# Patient Record
Sex: MALE | Race: WHITE | Employment: OTHER
[De-identification: names, ages, dates, MRNs, and addresses within clinical notes are randomized per-mention and may not be internally consistent; named-entity substitution may affect disease eponyms.]

---

## 2017-01-04 ENCOUNTER — NURSE ONLY (OUTPATIENT)
Dept: PODIATRY | Facility: CLINIC | Age: 72
End: 2017-01-04

## 2017-01-04 DIAGNOSIS — E11.43 DIABETIC AUTONOMIC NEUROPATHY ASSOCIATED WITH TYPE 2 DIABETES MELLITUS (HCC): ICD-10-CM

## 2017-01-04 DIAGNOSIS — E11.51 DIABETES MELLITUS WITH PERIPHERAL VASCULAR DISEASE (HCC): ICD-10-CM

## 2017-01-04 DIAGNOSIS — Z79.4 TYPE 2 DIABETES MELLITUS WITHOUT COMPLICATION, WITH LONG-TERM CURRENT USE OF INSULIN (HCC): ICD-10-CM

## 2017-01-04 DIAGNOSIS — E11.51 DIABETES MELLITUS WITH PERIPHERAL CIRCULATORY DISORDER (HCC): ICD-10-CM

## 2017-01-04 DIAGNOSIS — E11.42 DIABETIC PERIPHERAL NEUROPATHY ASSOCIATED WITH TYPE 2 DIABETES MELLITUS (HCC): Primary | ICD-10-CM

## 2017-01-04 DIAGNOSIS — E11.9 TYPE 2 DIABETES MELLITUS WITHOUT COMPLICATION, WITH LONG-TERM CURRENT USE OF INSULIN (HCC): ICD-10-CM

## 2017-01-04 DIAGNOSIS — B35.1 ONYCHOMYCOSIS OF TOENAIL: ICD-10-CM

## 2017-01-23 ENCOUNTER — TELEPHONE (OUTPATIENT)
Dept: PODIATRY | Facility: CLINIC | Age: 72
End: 2017-01-23

## 2017-02-08 ENCOUNTER — OFFICE VISIT (OUTPATIENT)
Dept: ORTHOPEDIC SURGERY | Facility: CLINIC | Age: 72
End: 2017-02-08

## 2017-02-08 VITALS
HEART RATE: 66 BPM | WEIGHT: 303 LBS | DIASTOLIC BLOOD PRESSURE: 61 MMHG | BODY MASS INDEX: 44.88 KG/M2 | HEIGHT: 69 IN | SYSTOLIC BLOOD PRESSURE: 126 MMHG

## 2017-02-08 DIAGNOSIS — Z96.652 STATUS POST TOTAL LEFT KNEE REPLACEMENT: Primary | ICD-10-CM

## 2017-02-08 PROCEDURE — 99211 OFF/OP EST MAY X REQ PHY/QHP: CPT | Performed by: ORTHOPAEDIC SURGERY

## 2017-02-14 ENCOUNTER — NURSE ONLY (OUTPATIENT)
Dept: PODIATRY | Facility: CLINIC | Age: 72
End: 2017-02-14

## 2017-02-14 DIAGNOSIS — E11.9 TYPE 2 DIABETES MELLITUS WITHOUT COMPLICATION, WITH LONG-TERM CURRENT USE OF INSULIN (HCC): ICD-10-CM

## 2017-02-14 DIAGNOSIS — E11.42 DIABETIC PERIPHERAL NEUROPATHY ASSOCIATED WITH TYPE 2 DIABETES MELLITUS (HCC): Primary | ICD-10-CM

## 2017-02-14 DIAGNOSIS — Z79.4 TYPE 2 DIABETES MELLITUS WITHOUT COMPLICATION, WITH LONG-TERM CURRENT USE OF INSULIN (HCC): ICD-10-CM

## 2017-02-14 PROCEDURE — A5513 MULTI DEN INSERT CUSTOM MOLD: HCPCS | Performed by: PODIATRIST

## 2017-02-14 PROCEDURE — A5500 DIAB SHOE FOR DENSITY INSERT: HCPCS | Performed by: PODIATRIST

## 2017-03-20 ENCOUNTER — OFFICE VISIT (OUTPATIENT)
Dept: PODIATRY | Age: 72
End: 2017-03-20
Payer: MEDICARE

## 2017-03-20 VITALS
BODY MASS INDEX: 43.38 KG/M2 | DIASTOLIC BLOOD PRESSURE: 75 MMHG | HEIGHT: 70 IN | WEIGHT: 303 LBS | SYSTOLIC BLOOD PRESSURE: 145 MMHG | HEART RATE: 67 BPM

## 2017-03-20 DIAGNOSIS — E11.43 TYPE 2 DIABETES MELLITUS WITH DIABETIC AUTONOMIC NEUROPATHY, WITHOUT LONG-TERM CURRENT USE OF INSULIN (HCC): ICD-10-CM

## 2017-03-20 DIAGNOSIS — B35.1 ONYCHOMYCOSIS OF TOENAIL: Primary | ICD-10-CM

## 2017-03-20 DIAGNOSIS — E11.51 TYPE 2 DIABETES MELLITUS WITH PERIPHERAL VASCULAR DISEASE (HCC): ICD-10-CM

## 2017-03-20 PROCEDURE — 11721 DEBRIDE NAIL 6 OR MORE: CPT | Performed by: PODIATRIST

## 2017-03-20 ASSESSMENT — ENCOUNTER SYMPTOMS
NAUSEA: 0
BACK PAIN: 0
COLOR CHANGE: 0
DIARRHEA: 0
SHORTNESS OF BREATH: 0

## 2017-04-17 ENCOUNTER — HOSPITAL ENCOUNTER (OUTPATIENT)
Age: 72
Setting detail: SPECIMEN
Discharge: HOME | End: 2017-04-17
Payer: MEDICARE

## 2017-04-17 ENCOUNTER — HOSPITAL ENCOUNTER (OUTPATIENT)
Facility: CLINIC | Age: 72
Setting detail: OUTPATIENT SURGERY
Discharge: HOME | End: 2017-04-17
Attending: PLASTIC SURGERY | Admitting: PLASTIC SURGERY
Payer: MEDICARE

## 2017-04-17 VITALS
BODY MASS INDEX: 45.03 KG/M2 | HEIGHT: 69 IN | DIASTOLIC BLOOD PRESSURE: 72 MMHG | OXYGEN SATURATION: 98 % | TEMPERATURE: 97 F | RESPIRATION RATE: 16 BRPM | HEART RATE: 68 BPM | SYSTOLIC BLOOD PRESSURE: 138 MMHG | WEIGHT: 304 LBS

## 2017-04-17 PROCEDURE — 3600000002 HC SURGERY LEVEL 2 BASE: Performed by: PLASTIC SURGERY

## 2017-04-17 PROCEDURE — 3600000012 HC SURGERY LEVEL 2 ADDTL 15MIN: Performed by: PLASTIC SURGERY

## 2017-04-17 PROCEDURE — 2500000003 HC RX 250 WO HCPCS: Performed by: PLASTIC SURGERY

## 2017-04-17 PROCEDURE — 7100000010 HC PHASE II RECOVERY - FIRST 15 MIN: Performed by: PLASTIC SURGERY

## 2017-04-17 RX ORDER — LIDOCAINE HYDROCHLORIDE AND EPINEPHRINE 10; 10 MG/ML; UG/ML
INJECTION, SOLUTION INFILTRATION; PERINEURAL PRN
Status: DISCONTINUED | OUTPATIENT
Start: 2017-04-17 | End: 2017-04-17 | Stop reason: HOSPADM

## 2017-04-17 ASSESSMENT — PAIN - FUNCTIONAL ASSESSMENT: PAIN_FUNCTIONAL_ASSESSMENT: 0-10

## 2017-04-19 LAB — DERMATOLOGY PATHOLOGY REPORT: NORMAL

## 2017-04-25 PROBLEM — C44.319 BASAL CELL CARCINOMA OF RIGHT FOREHEAD: Status: ACTIVE | Noted: 2017-04-25

## 2017-06-12 ENCOUNTER — PROCEDURE VISIT (OUTPATIENT)
Dept: PODIATRY | Age: 72
End: 2017-06-12
Payer: MEDICARE

## 2017-06-12 VITALS
WEIGHT: 306 LBS | SYSTOLIC BLOOD PRESSURE: 134 MMHG | DIASTOLIC BLOOD PRESSURE: 65 MMHG | BODY MASS INDEX: 45.32 KG/M2 | HEIGHT: 69 IN | HEART RATE: 70 BPM

## 2017-06-12 DIAGNOSIS — B35.1 ONYCHOMYCOSIS OF TOENAIL: Primary | ICD-10-CM

## 2017-06-12 DIAGNOSIS — E11.51 TYPE 2 DIABETES MELLITUS WITH PERIPHERAL VASCULAR DISEASE (HCC): ICD-10-CM

## 2017-06-12 DIAGNOSIS — E11.43 TYPE 2 DIABETES MELLITUS WITH DIABETIC AUTONOMIC NEUROPATHY, WITHOUT LONG-TERM CURRENT USE OF INSULIN (HCC): ICD-10-CM

## 2017-06-12 PROCEDURE — 11721 DEBRIDE NAIL 6 OR MORE: CPT | Performed by: PODIATRIST

## 2017-06-12 RX ORDER — AMOXICILLIN 500 MG/1
CAPSULE ORAL
Refills: 0 | COMMUNITY
Start: 2017-05-12 | End: 2021-03-16

## 2017-06-15 ASSESSMENT — ENCOUNTER SYMPTOMS
COLOR CHANGE: 0
DIARRHEA: 0
NAUSEA: 0
SHORTNESS OF BREATH: 0
BACK PAIN: 0

## 2017-08-09 ENCOUNTER — OFFICE VISIT (OUTPATIENT)
Dept: ORTHOPEDIC SURGERY | Age: 72
End: 2017-08-09
Payer: MEDICARE

## 2017-08-09 DIAGNOSIS — Z96.652 H/O TOTAL KNEE REPLACEMENT, LEFT: Primary | ICD-10-CM

## 2017-08-09 PROCEDURE — 99213 OFFICE O/P EST LOW 20 MIN: CPT | Performed by: ORTHOPAEDIC SURGERY

## 2017-09-11 ENCOUNTER — PROCEDURE VISIT (OUTPATIENT)
Dept: PODIATRY | Age: 72
End: 2017-09-11
Payer: MEDICARE

## 2017-09-11 VITALS
HEART RATE: 65 BPM | BODY MASS INDEX: 45.47 KG/M2 | WEIGHT: 307 LBS | DIASTOLIC BLOOD PRESSURE: 53 MMHG | HEIGHT: 69 IN | SYSTOLIC BLOOD PRESSURE: 107 MMHG

## 2017-09-11 DIAGNOSIS — B35.1 ONYCHOMYCOSIS OF TOENAIL: Primary | ICD-10-CM

## 2017-09-11 DIAGNOSIS — E11.51 TYPE 2 DIABETES MELLITUS WITH PERIPHERAL VASCULAR DISEASE (HCC): ICD-10-CM

## 2017-09-11 DIAGNOSIS — E11.43 TYPE 2 DIABETES MELLITUS WITH DIABETIC AUTONOMIC NEUROPATHY, WITHOUT LONG-TERM CURRENT USE OF INSULIN (HCC): ICD-10-CM

## 2017-09-11 PROCEDURE — 11721 DEBRIDE NAIL 6 OR MORE: CPT | Performed by: PODIATRIST

## 2017-09-12 ASSESSMENT — ENCOUNTER SYMPTOMS
SHORTNESS OF BREATH: 0
BACK PAIN: 0
COLOR CHANGE: 0
NAUSEA: 0
DIARRHEA: 0

## 2017-12-11 ENCOUNTER — PROCEDURE VISIT (OUTPATIENT)
Dept: PODIATRY | Age: 72
End: 2017-12-11
Payer: MEDICARE

## 2017-12-11 VITALS
HEIGHT: 70 IN | DIASTOLIC BLOOD PRESSURE: 65 MMHG | SYSTOLIC BLOOD PRESSURE: 136 MMHG | BODY MASS INDEX: 43.67 KG/M2 | WEIGHT: 305 LBS | HEART RATE: 71 BPM

## 2017-12-11 DIAGNOSIS — E11.43 TYPE 2 DIABETES MELLITUS WITH DIABETIC AUTONOMIC NEUROPATHY, WITHOUT LONG-TERM CURRENT USE OF INSULIN (HCC): ICD-10-CM

## 2017-12-11 DIAGNOSIS — E11.51 TYPE 2 DIABETES MELLITUS WITH PERIPHERAL VASCULAR DISEASE (HCC): ICD-10-CM

## 2017-12-11 DIAGNOSIS — M79.675 PAIN OF TOES OF BOTH FEET: ICD-10-CM

## 2017-12-11 DIAGNOSIS — B35.1 ONYCHOMYCOSIS OF TOENAIL: Primary | ICD-10-CM

## 2017-12-11 DIAGNOSIS — M79.674 PAIN OF TOES OF BOTH FEET: ICD-10-CM

## 2017-12-11 PROCEDURE — 11721 DEBRIDE NAIL 6 OR MORE: CPT | Performed by: PODIATRIST

## 2017-12-11 RX ORDER — WARFARIN SODIUM 5 MG/1
TABLET ORAL
COMMUNITY
Start: 2017-10-13 | End: 2017-12-11 | Stop reason: SDUPTHER

## 2017-12-11 ASSESSMENT — ENCOUNTER SYMPTOMS
DIARRHEA: 0
COLOR CHANGE: 0
NAUSEA: 0
SHORTNESS OF BREATH: 0
BACK PAIN: 0

## 2017-12-11 NOTE — PROGRESS NOTES
SUBJECTIVE: Mary Snyder is a 74 y.o. male who returns to the office with chief complaint of painful fungal toenails. Patient relates toe nails are thickened/difficult to trim as well as painful with ambulation and with shoe gear. Review of Systems   Constitutional: Negative for activity change, appetite change, chills, diaphoresis, fatigue and fever. Respiratory: Negative for shortness of breath. Cardiovascular: Negative for leg swelling. Gastrointestinal: Negative for diarrhea and nausea. Endocrine: Negative for cold intolerance, heat intolerance and polyuria. Musculoskeletal: Positive for arthralgias. Negative for back pain, gait problem, joint swelling and myalgias. Skin: Negative for color change, pallor, rash and wound. Allergic/Immunologic: Negative for environmental allergies and food allergies. Neurological: Negative for dizziness, weakness, light-headedness and numbness. Hematological: Does not bruise/bleed easily. Psychiatric/Behavioral: Negative for behavioral problems, confusion and self-injury. The patient is not nervous/anxious. OBJECTIVE: Clinical evaluation of patient reveals nails 1,2,3,4,5 of the right foot and nails 1,2,3,4,5, of the left foot to present with thickness, elongation, discoloration, brittleness, and subungual debris. There was pain with palpation and debridement of the toenails of the bilateral feet. No open lesions noted to either foot today. The right DP pulse is not palpable. The left DP pulse is not palpable. The right PT pulse is not palpable. The left PT pulse is not palpable. Protective sensation is absent to the right plantar foot as noted with a 5.07 [place] monofilament. Protective sensation is absent to the left plantar foot as noted with a 5.07 [place] monofilament. Glucose: Patient states that they do not know their current blood sugar level. ASSESSMENT:   1.  Onychomycosis of toenail  [place] DEBRIDEMENT OF NAILS, 6 OR MORE    HM DIABETES FOOT EXAM   2. Pain of toes of both feet  [place] DEBRIDEMENT OF NAILS, 6 OR MORE    HM DIABETES FOOT EXAM   3. Type 2 diabetes mellitus with peripheral vascular disease (HCC)  [place] DEBRIDEMENT OF NAILS, 6 OR MORE    HM DIABETES FOOT EXAM   4. Type 2 diabetes mellitus with diabetic autonomic neuropathy, without long-term current use of insulin (HCC)  [place] DEBRIDEMENT OF NAILS, 6 OR MORE    HM DIABETES FOOT EXAM     PLAN: Toenails 1,2,3,4,5 of the right foot and 1,2,3,4,5 of the left foot were debrided in length and thickness using a nail nipper and a . Return in about 9 weeks (around 2/12/2018) for At risk diabetic foot care.    12/11/2017      Ivan Puckett DPM

## 2018-03-12 ENCOUNTER — PROCEDURE VISIT (OUTPATIENT)
Dept: PODIATRY | Age: 73
End: 2018-03-12
Payer: MEDICARE

## 2018-03-12 VITALS
HEART RATE: 72 BPM | BODY MASS INDEX: 43.38 KG/M2 | HEIGHT: 70 IN | WEIGHT: 303 LBS | DIASTOLIC BLOOD PRESSURE: 67 MMHG | SYSTOLIC BLOOD PRESSURE: 98 MMHG

## 2018-03-12 DIAGNOSIS — E11.43 TYPE 2 DIABETES MELLITUS WITH DIABETIC AUTONOMIC NEUROPATHY, WITHOUT LONG-TERM CURRENT USE OF INSULIN (HCC): ICD-10-CM

## 2018-03-12 DIAGNOSIS — B35.1 ONYCHOMYCOSIS OF TOENAIL: Primary | ICD-10-CM

## 2018-03-12 DIAGNOSIS — E11.51 TYPE 2 DIABETES MELLITUS WITH PERIPHERAL VASCULAR DISEASE (HCC): ICD-10-CM

## 2018-03-12 DIAGNOSIS — M79.675 PAIN OF TOES OF BOTH FEET: ICD-10-CM

## 2018-03-12 DIAGNOSIS — M79.674 PAIN OF TOES OF BOTH FEET: ICD-10-CM

## 2018-03-12 PROCEDURE — 11721 DEBRIDE NAIL 6 OR MORE: CPT | Performed by: PODIATRIST

## 2018-03-12 ASSESSMENT — ENCOUNTER SYMPTOMS
BACK PAIN: 0
COLOR CHANGE: 0
SHORTNESS OF BREATH: 0
DIARRHEA: 0
NAUSEA: 0

## 2018-03-16 ENCOUNTER — NURSE ONLY (OUTPATIENT)
Dept: PODIATRY | Age: 73
End: 2018-03-16

## 2018-03-16 DIAGNOSIS — E11.51 TYPE 2 DIABETES MELLITUS WITH PERIPHERAL VASCULAR DISEASE (HCC): ICD-10-CM

## 2018-03-16 DIAGNOSIS — E11.43 TYPE 2 DIABETES MELLITUS WITH DIABETIC AUTONOMIC NEUROPATHY, WITHOUT LONG-TERM CURRENT USE OF INSULIN (HCC): Primary | ICD-10-CM

## 2018-03-16 DIAGNOSIS — R09.89 POOR CIRCULATION: ICD-10-CM

## 2018-04-17 ENCOUNTER — NURSE ONLY (OUTPATIENT)
Dept: PODIATRY | Age: 73
End: 2018-04-17
Payer: MEDICARE

## 2018-04-17 DIAGNOSIS — E11.51 TYPE 2 DIABETES MELLITUS WITH PERIPHERAL VASCULAR DISEASE (HCC): Primary | ICD-10-CM

## 2018-04-17 DIAGNOSIS — R09.89 POOR CIRCULATION: ICD-10-CM

## 2018-04-17 DIAGNOSIS — E11.42 DIABETIC PERIPHERAL NEUROPATHY ASSOCIATED WITH TYPE 2 DIABETES MELLITUS (HCC): ICD-10-CM

## 2018-04-17 PROCEDURE — A5513 MULTI DEN INSERT CUSTOM MOLD: HCPCS | Performed by: PODIATRIST

## 2018-04-17 PROCEDURE — A5500 DIAB SHOE FOR DENSITY INSERT: HCPCS | Performed by: PODIATRIST

## 2018-06-11 ENCOUNTER — OFFICE VISIT (OUTPATIENT)
Dept: PODIATRY | Age: 73
End: 2018-06-11
Payer: MEDICARE

## 2018-06-11 VITALS
SYSTOLIC BLOOD PRESSURE: 132 MMHG | WEIGHT: 311 LBS | HEIGHT: 69 IN | DIASTOLIC BLOOD PRESSURE: 67 MMHG | HEART RATE: 68 BPM | BODY MASS INDEX: 46.06 KG/M2

## 2018-06-11 DIAGNOSIS — M79.675 PAIN OF TOES OF BOTH FEET: ICD-10-CM

## 2018-06-11 DIAGNOSIS — B35.1 ONYCHOMYCOSIS OF TOENAIL: Primary | ICD-10-CM

## 2018-06-11 DIAGNOSIS — E11.42 DIABETIC PERIPHERAL NEUROPATHY ASSOCIATED WITH TYPE 2 DIABETES MELLITUS (HCC): ICD-10-CM

## 2018-06-11 DIAGNOSIS — M79.674 PAIN OF TOES OF BOTH FEET: ICD-10-CM

## 2018-06-11 DIAGNOSIS — E11.51 TYPE 2 DIABETES MELLITUS WITH PERIPHERAL VASCULAR DISEASE (HCC): ICD-10-CM

## 2018-06-11 PROCEDURE — 99999 PR OFFICE/OUTPT VISIT,PROCEDURE ONLY: CPT | Performed by: PODIATRIST

## 2018-06-11 PROCEDURE — 11721 DEBRIDE NAIL 6 OR MORE: CPT | Performed by: PODIATRIST

## 2018-06-11 RX ORDER — BLOOD PRESSURE TEST KIT
KIT MISCELLANEOUS
Qty: 30 EACH | Refills: 2 | Status: SHIPPED | OUTPATIENT
Start: 2018-06-11 | End: 2018-06-11 | Stop reason: CLARIF

## 2018-06-11 RX ORDER — IBUPROFEN 200 MG
1 CAPSULE ORAL DAILY
Qty: 1 EACH | Refills: 2 | Status: SHIPPED | OUTPATIENT
Start: 2018-06-11 | End: 2018-06-11 | Stop reason: CLARIF

## 2018-06-11 RX ORDER — GAUZE BANDAGE 4" X 4"
BANDAGE TOPICAL
Qty: 100 EACH | Refills: 1 | Status: SHIPPED | OUTPATIENT
Start: 2018-06-11 | End: 2018-06-11 | Stop reason: CLARIF

## 2018-06-11 ASSESSMENT — ENCOUNTER SYMPTOMS
SHORTNESS OF BREATH: 0
COLOR CHANGE: 0
BACK PAIN: 0
DIARRHEA: 0
NAUSEA: 0

## 2018-09-10 ENCOUNTER — OFFICE VISIT (OUTPATIENT)
Dept: PODIATRY | Age: 73
End: 2018-09-10
Payer: MEDICARE

## 2018-09-10 VITALS
WEIGHT: 308 LBS | HEIGHT: 70 IN | DIASTOLIC BLOOD PRESSURE: 66 MMHG | BODY MASS INDEX: 44.09 KG/M2 | SYSTOLIC BLOOD PRESSURE: 116 MMHG | HEART RATE: 69 BPM

## 2018-09-10 DIAGNOSIS — M79.674 PAIN OF TOES OF BOTH FEET: ICD-10-CM

## 2018-09-10 DIAGNOSIS — E11.42 DIABETIC PERIPHERAL NEUROPATHY ASSOCIATED WITH TYPE 2 DIABETES MELLITUS (HCC): ICD-10-CM

## 2018-09-10 DIAGNOSIS — M79.675 PAIN OF TOES OF BOTH FEET: ICD-10-CM

## 2018-09-10 DIAGNOSIS — B35.1 ONYCHOMYCOSIS OF TOENAIL: Primary | ICD-10-CM

## 2018-09-10 DIAGNOSIS — E11.51 TYPE 2 DIABETES MELLITUS WITH PERIPHERAL VASCULAR DISEASE (HCC): ICD-10-CM

## 2018-09-10 PROCEDURE — 11721 DEBRIDE NAIL 6 OR MORE: CPT | Performed by: PODIATRIST

## 2018-09-10 PROCEDURE — 99999 PR OFFICE/OUTPT VISIT,PROCEDURE ONLY: CPT | Performed by: PODIATRIST

## 2018-09-12 ASSESSMENT — ENCOUNTER SYMPTOMS
BACK PAIN: 0
COLOR CHANGE: 0
NAUSEA: 0
SHORTNESS OF BREATH: 0
DIARRHEA: 0

## 2018-09-12 NOTE — PROGRESS NOTES
SUBJECTIVE: Diana Bueno is a 74 y.o. male who returns to the office with chief complaint of painful fungal toenails. Patient relates toe nails are thickened/difficult to trim as well as painful with ambulation and with shoe gear. Review of Systems   Constitutional: Negative for activity change, appetite change, chills, diaphoresis, fatigue and fever. Respiratory: Negative for shortness of breath. Cardiovascular: Negative for leg swelling. Gastrointestinal: Negative for diarrhea and nausea. Endocrine: Negative for cold intolerance, heat intolerance and polyuria. Musculoskeletal: Positive for arthralgias. Negative for back pain, gait problem, joint swelling and myalgias. Skin: Negative for color change, pallor, rash and wound. Allergic/Immunologic: Negative for environmental allergies and food allergies. Neurological: Negative for dizziness, weakness, light-headedness and numbness. Hematological: Does not bruise/bleed easily. Psychiatric/Behavioral: Negative for behavioral problems, confusion and self-injury. The patient is not nervous/anxious. OBJECTIVE: Clinical evaluation of patient reveals nails 1,2,3,4,5 of the right foot and nails 1,2,3,4,5, of the left foot to present with thickness, elongation, discoloration, brittleness, and subungual debris. There was pain with palpation and debridement of the toenails of the bilateral feet. No open lesions noted to either foot today. The right DP pulse is not palpable. The left DP pulse is not palpable. The right PT pulse is not palpable. The left PT pulse is not palpable. Protective sensation is absent to the right plantar foot as noted with a 5.07 [place] monofilament. Protective sensation is absent to the left plantar foot as noted with a 5.07 [place] monofilament. Glucose: 129 mg/dl. ASSESSMENT:    Diagnosis Orders   1.  Onychomycosis of toenail  [place] DEBRIDEMENT OF NAILS, 6 OR MORE    HM DIABETES FOOT EXAM 2. Pain of toes of both feet  [place] DEBRIDEMENT OF NAILS, 6 OR MORE    HM DIABETES FOOT EXAM   3. Type 2 diabetes mellitus with peripheral vascular disease (HCC)  [place] DEBRIDEMENT OF NAILS, 6 OR MORE    HM DIABETES FOOT EXAM   4. Diabetic peripheral neuropathy associated with type 2 diabetes mellitus (HCC)  [place] DEBRIDEMENT OF NAILS, 6 OR MORE    HM DIABETES FOOT EXAM     PLAN: Toenails 1,2,3,4,5 of the right foot and 1,2,3,4,5 of the left foot were debrided in length and thickness using a nail nipper and a . Return in about 9 weeks (around 11/12/2018) for At risk diabetic foot care.    9/10/2018      Govern[place] DPM

## 2018-11-29 ENCOUNTER — OFFICE VISIT (OUTPATIENT)
Dept: PODIATRY | Age: 73
End: 2018-11-29
Payer: MEDICARE

## 2018-11-29 VITALS — BODY MASS INDEX: 44.09 KG/M2 | HEIGHT: 70 IN | WEIGHT: 308 LBS

## 2018-11-29 DIAGNOSIS — M79.675 PAIN IN TOE OF LEFT FOOT: ICD-10-CM

## 2018-11-29 DIAGNOSIS — M79.674 PAIN OF TOES OF BOTH FEET: ICD-10-CM

## 2018-11-29 DIAGNOSIS — L60.0 OC (ONYCHOCRYPTOSIS): ICD-10-CM

## 2018-11-29 DIAGNOSIS — E11.51 TYPE 2 DIABETES MELLITUS WITH PERIPHERAL VASCULAR DISEASE (HCC): ICD-10-CM

## 2018-11-29 DIAGNOSIS — L03.032 PARONYCHIA OF GREAT TOE OF LEFT FOOT: Primary | ICD-10-CM

## 2018-11-29 DIAGNOSIS — B35.1 ONYCHOMYCOSIS OF TOENAIL: ICD-10-CM

## 2018-11-29 DIAGNOSIS — E11.42 DIABETIC PERIPHERAL NEUROPATHY ASSOCIATED WITH TYPE 2 DIABETES MELLITUS (HCC): ICD-10-CM

## 2018-11-29 DIAGNOSIS — M79.675 PAIN OF TOES OF BOTH FEET: ICD-10-CM

## 2018-11-29 PROCEDURE — 11721 DEBRIDE NAIL 6 OR MORE: CPT | Performed by: PODIATRIST

## 2018-11-29 PROCEDURE — 99213 OFFICE O/P EST LOW 20 MIN: CPT | Performed by: PODIATRIST

## 2018-11-29 RX ORDER — CLINDAMYCIN HYDROCHLORIDE 300 MG/1
300 CAPSULE ORAL 4 TIMES DAILY
Qty: 56 CAPSULE | Refills: 0 | Status: SHIPPED | OUTPATIENT
Start: 2018-11-29 | End: 2018-12-13

## 2018-12-03 ASSESSMENT — ENCOUNTER SYMPTOMS
DIARRHEA: 0
COLOR CHANGE: 0
NAUSEA: 0
BACK PAIN: 0
SHORTNESS OF BREATH: 0

## 2018-12-06 ENCOUNTER — OFFICE VISIT (OUTPATIENT)
Dept: PODIATRY | Age: 73
End: 2018-12-06
Payer: MEDICARE

## 2018-12-06 VITALS — WEIGHT: 308 LBS | HEIGHT: 70 IN | BODY MASS INDEX: 44.09 KG/M2

## 2018-12-06 DIAGNOSIS — M79.675 PAIN IN TOE OF LEFT FOOT: ICD-10-CM

## 2018-12-06 DIAGNOSIS — L03.032 PARONYCHIA OF GREAT TOE OF LEFT FOOT: Primary | ICD-10-CM

## 2018-12-06 DIAGNOSIS — L60.0 OC (ONYCHOCRYPTOSIS): ICD-10-CM

## 2018-12-06 PROCEDURE — 99213 OFFICE O/P EST LOW 20 MIN: CPT | Performed by: PODIATRIST

## 2018-12-06 RX ORDER — CLINDAMYCIN HYDROCHLORIDE 300 MG/1
300 CAPSULE ORAL
COMMUNITY
Start: 2018-11-29 | End: 2018-12-13

## 2018-12-06 ASSESSMENT — ENCOUNTER SYMPTOMS
BACK PAIN: 0
SHORTNESS OF BREATH: 0
COLOR CHANGE: 0
DIARRHEA: 0
NAUSEA: 0

## 2019-01-23 PROBLEM — D48.5 NEOPLASM OF UNCERTAIN BEHAVIOR OF SKIN: Status: ACTIVE | Noted: 2019-01-23

## 2019-02-14 ENCOUNTER — OFFICE VISIT (OUTPATIENT)
Dept: PODIATRY | Age: 74
End: 2019-02-14
Payer: MEDICARE

## 2019-02-14 VITALS — BODY MASS INDEX: 44.09 KG/M2 | HEIGHT: 70 IN | WEIGHT: 308 LBS

## 2019-02-14 DIAGNOSIS — R09.89 POOR CIRCULATION: ICD-10-CM

## 2019-02-14 DIAGNOSIS — B35.1 ONYCHOMYCOSIS OF TOENAIL: Primary | ICD-10-CM

## 2019-02-14 DIAGNOSIS — M79.674 PAIN OF TOES OF BOTH FEET: ICD-10-CM

## 2019-02-14 DIAGNOSIS — M79.675 PAIN OF TOES OF BOTH FEET: ICD-10-CM

## 2019-02-14 DIAGNOSIS — E11.51 TYPE 2 DIABETES MELLITUS WITH PERIPHERAL VASCULAR DISEASE (HCC): ICD-10-CM

## 2019-02-14 DIAGNOSIS — E11.42 DIABETIC PERIPHERAL NEUROPATHY ASSOCIATED WITH TYPE 2 DIABETES MELLITUS (HCC): ICD-10-CM

## 2019-02-14 PROCEDURE — 99999 PR OFFICE/OUTPT VISIT,PROCEDURE ONLY: CPT | Performed by: PODIATRIST

## 2019-02-14 PROCEDURE — 11721 DEBRIDE NAIL 6 OR MORE: CPT | Performed by: PODIATRIST

## 2019-02-14 ASSESSMENT — ENCOUNTER SYMPTOMS
NAUSEA: 0
COLOR CHANGE: 0
DIARRHEA: 0
BACK PAIN: 0
SHORTNESS OF BREATH: 0

## 2019-05-02 ENCOUNTER — OFFICE VISIT (OUTPATIENT)
Dept: PODIATRY | Age: 74
End: 2019-05-02
Payer: MEDICARE

## 2019-05-02 VITALS — HEIGHT: 70 IN | WEIGHT: 308 LBS | BODY MASS INDEX: 44.09 KG/M2

## 2019-05-02 DIAGNOSIS — E11.42 DIABETIC PERIPHERAL NEUROPATHY ASSOCIATED WITH TYPE 2 DIABETES MELLITUS (HCC): ICD-10-CM

## 2019-05-02 DIAGNOSIS — M79.675 PAIN OF TOES OF BOTH FEET: ICD-10-CM

## 2019-05-02 DIAGNOSIS — E11.51 TYPE 2 DIABETES MELLITUS WITH PERIPHERAL VASCULAR DISEASE (HCC): ICD-10-CM

## 2019-05-02 DIAGNOSIS — M79.674 PAIN OF TOES OF BOTH FEET: ICD-10-CM

## 2019-05-02 DIAGNOSIS — B35.1 ONYCHOMYCOSIS OF TOENAIL: Primary | ICD-10-CM

## 2019-05-02 PROCEDURE — 99999 PR OFFICE/OUTPT VISIT,PROCEDURE ONLY: CPT | Performed by: PODIATRIST

## 2019-05-02 PROCEDURE — 11721 DEBRIDE NAIL 6 OR MORE: CPT | Performed by: PODIATRIST

## 2019-05-02 RX ORDER — ACETAMINOPHEN 500 MG
650 TABLET ORAL
COMMUNITY
End: 2019-10-03

## 2019-05-08 ASSESSMENT — ENCOUNTER SYMPTOMS
COLOR CHANGE: 0
NAUSEA: 0
BACK PAIN: 0
DIARRHEA: 0
SHORTNESS OF BREATH: 0

## 2019-05-08 NOTE — PROGRESS NOTES
SUBJECTIVE: Bertha Morel is a 74 y.o. male who returns to the office with chief complaint of painful fungal toenails. Patient relates toe nails are thickened/difficult to trim as well as painful with ambulation and with shoe gear. Chief Complaint   Patient presents with    Nail Problem     B/L NAIL TRIM    Other     PCP: LAST VISIT 07/27/2018     Diabetes     B/L DIABETIC FOOT CHECK/BLOODSUGAR: 148     Review of Systems   Constitutional: Negative for activity change, appetite change, chills, diaphoresis, fatigue and fever. Respiratory: Negative for shortness of breath. Cardiovascular: Negative for leg swelling. Gastrointestinal: Negative for diarrhea and nausea. Endocrine: Negative for cold intolerance, heat intolerance and polyuria. Musculoskeletal: Positive for arthralgias. Negative for back pain, gait problem, joint swelling and myalgias. Skin: Negative for color change, pallor, rash and wound. Allergic/Immunologic: Negative for environmental allergies and food allergies. Neurological: Negative for dizziness, weakness, light-headedness and numbness. Hematological: Does not bruise/bleed easily. Psychiatric/Behavioral: Negative for behavioral problems, confusion and self-injury. The patient is not nervous/anxious. OBJECTIVE: Clinical evaluation of patient reveals nails 1,2,3,4,5 of the right foot and nails 1,2,3,4,5, of the left foot to present with thickness, elongation, discoloration, brittleness, and subungual debris. There was pain with palpation and debridement of the toenails of the bilateral feet. No open lesions noted to either foot today. The right DP pulse is not palpable. The left DP pulse is not palpable. The right PT pulse is not palpable. The left PT pulse is not palpable. Protective sensation is absent to the right plantar foot as noted with a 5.07 [place]-[employer] monofilament.    Protective sensation is absent to the left plantar foot as noted with a 5.07 [place]-Inoc[employer] monofilament. Glucose: 148 mg/dl. ASSESSMENT:    Diagnosis Orders   1. Onychomycosis of toenail  [place] DEBRIDEMENT OF NAILS, 6 OR MORE    HM DIABETES FOOT EXAM   2. Pain of toes of both feet  [place] DEBRIDEMENT OF NAILS, 6 OR MORE    HM DIABETES FOOT EXAM   3. Type 2 diabetes mellitus with peripheral vascular disease (HCC)  [place] DEBRIDEMENT OF NAILS, 6 OR MORE    HM DIABETES FOOT EXAM   4. Diabetic peripheral neuropathy associated with type 2 diabetes mellitus (HCC)  [place] DEBRIDEMENT OF NAILS, 6 OR MORE    HM DIABETES FOOT EXAM     PLAN: Toenails 1,2,3,4,5 of the right foot and 1,2,3,4,5 of the left foot were debrided in length and thickness using a nail nipper and a . Return in about 2 months (around 7/4/2019) for At risk diabetic foot care.    5/2/2019      Mary Lockwood DPM

## 2019-07-18 ENCOUNTER — OFFICE VISIT (OUTPATIENT)
Dept: PODIATRY | Age: 74
End: 2019-07-18
Payer: MEDICARE

## 2019-07-18 DIAGNOSIS — M79.674 PAIN OF TOES OF BOTH FEET: ICD-10-CM

## 2019-07-18 DIAGNOSIS — E11.51 TYPE 2 DIABETES MELLITUS WITH PERIPHERAL VASCULAR DISEASE (HCC): ICD-10-CM

## 2019-07-18 DIAGNOSIS — M79.675 PAIN OF TOES OF BOTH FEET: ICD-10-CM

## 2019-07-18 DIAGNOSIS — E11.42 DIABETIC PERIPHERAL NEUROPATHY ASSOCIATED WITH TYPE 2 DIABETES MELLITUS (HCC): ICD-10-CM

## 2019-07-18 DIAGNOSIS — B35.1 ONYCHOMYCOSIS OF TOENAIL: Primary | ICD-10-CM

## 2019-07-18 PROCEDURE — 11721 DEBRIDE NAIL 6 OR MORE: CPT | Performed by: PODIATRIST

## 2019-07-18 PROCEDURE — 99999 PR OFFICE/OUTPT VISIT,PROCEDURE ONLY: CPT | Performed by: PODIATRIST

## 2019-07-19 ASSESSMENT — ENCOUNTER SYMPTOMS
COLOR CHANGE: 0
BACK PAIN: 0
DIARRHEA: 0
SHORTNESS OF BREATH: 0
NAUSEA: 0

## 2019-09-18 ENCOUNTER — HOSPITAL ENCOUNTER (OUTPATIENT)
Age: 74
Setting detail: SPECIMEN
Discharge: HOME | End: 2019-09-18
Payer: MEDICARE

## 2019-09-18 ENCOUNTER — HOSPITAL ENCOUNTER (OUTPATIENT)
Facility: CLINIC | Age: 74
Setting detail: OUTPATIENT SURGERY
Discharge: HOME | End: 2019-09-18
Attending: PLASTIC SURGERY | Admitting: PLASTIC SURGERY
Payer: MEDICARE

## 2019-09-18 VITALS
OXYGEN SATURATION: 96 % | HEIGHT: 69 IN | TEMPERATURE: 97.5 F | BODY MASS INDEX: 45.47 KG/M2 | DIASTOLIC BLOOD PRESSURE: 73 MMHG | WEIGHT: 307 LBS | HEART RATE: 67 BPM | RESPIRATION RATE: 18 BRPM | SYSTOLIC BLOOD PRESSURE: 145 MMHG

## 2019-09-18 PROCEDURE — 3600000002 HC SURGERY LEVEL 2 BASE: Performed by: PLASTIC SURGERY

## 2019-09-18 PROCEDURE — 7100000011 HC PHASE II RECOVERY - ADDTL 15 MIN: Performed by: PLASTIC SURGERY

## 2019-09-18 PROCEDURE — 2500000003 HC RX 250 WO HCPCS: Performed by: PLASTIC SURGERY

## 2019-09-18 PROCEDURE — 2709999900 HC NON-CHARGEABLE SUPPLY: Performed by: PLASTIC SURGERY

## 2019-09-18 PROCEDURE — 7100000010 HC PHASE II RECOVERY - FIRST 15 MIN: Performed by: PLASTIC SURGERY

## 2019-09-18 PROCEDURE — 3600000012 HC SURGERY LEVEL 2 ADDTL 15MIN: Performed by: PLASTIC SURGERY

## 2019-09-18 ASSESSMENT — PAIN - FUNCTIONAL ASSESSMENT: PAIN_FUNCTIONAL_ASSESSMENT: 0-10

## 2019-09-18 NOTE — H&P
Subjective:      Patient ID: Yue Dhillon is a 74 y.o. male.     HPI  Patient comes in today to have cyst back of neck evaluated. He has been seeing his PCP Dr Brenda Weeks for this and been waiting for this appointment for a few weeks. While waiting for this appointment it started to drain and became very red and inflamed. Dr Brenda Weeks gave pt oral antibiotics doxcycline and rocephin shot and things have settled a bit. It recently spontaneously drained.     Review of Systems   Constitutional: Negative. HENT: Negative for congestion and trouble swallowing. Respiratory: Negative for cough and shortness of breath. Cardiovascular: Negative for chest pain. Gastrointestinal: Negative for abdominal pain. Neurological: Negative for light-headedness and headaches. Psychiatric/Behavioral: Negative for dysphoric mood.      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 Provider   Diabetes mellitus [facility])   Provider   GERD (gastroesophageal reflux disease)  BETTER WITH CPAP Provider   [place] (hard of hearing)  BILAT HEARING AIDS Provider   Hyperlipidemia   Provider   Hypertension   Provider   Kidney stones   Provider   Osteoarthritis   Provider   Pacemaker 2010  Provider   Sleep apnea  Cpap machine Provider   Family History     Problem Relation Age of Onset Comments   Cancer Mother     Diabetes Paternal Grandmother     Social History     Tobacco History     Smoking Status  Former Smoker Quit date  8/12/1984   Smokeless Tobacco Use  Never Used   Alcohol History     Alcohol Use Status  Not Currently Drinks/Week  0 Standard drinks or equivalent per week Amount  0.0 standard drinks of alcohol/wk   Drug Use     Drug Use Status  No   Sexual Activity     Sexually Active  Not Asked    Surgical History     Procedure Laterality Date Comment Source   AORTIC VALVE REPLACEMENT  2012/2013 Mechanical Valve Provider   BACK SURGERY   LUMBAR Provider CARDIAC SURGERY   AORTIC VALVE REPLACEMENT Provider   COLONOSCOPY    Provider   CYSTOSCOPY    Provider   EYE SURGERY Bilateral  CATARACTS Provider   FACIAL RECONSTRUCTION SURGERY Right 4/17/2017 EXCISION LESION RIGHT SIDE OF FOREHEAD performed by Tricia Simmons MD at [address] [place] [address] Provider   FOOT SURGERY Right  Bone Spur removed Provider   JOINT REPLACEMENT Left 08/23/2016 TKA Provider   KIDNEY STONE SURGERY    Provider   KIDNEY SURGERY Left  STONES REMOVED, WAS DONE YEARS AGO Provider   KNEE ARTHROSCOPY Left   Provider   PACEMAKER PLACEMENT    Provider   PRE-MALIGNANT / BENIGN SKIN LESION EXCISION Right 04/17/2017 forehead Provider   TONSILLECTOMY    Provider   TOTAL KNEE ARTHROPLASTY Left  with biomet and GPS product application Provider   TUMOR REMOVAL   Ear Provider   E-Cigarettes Vaping or Juuling     Questions   E-Cigarette Use   Start Date   Does device contain nicotine? Quit Date   E-Cigarette Type   Socioeconomic History     Employment History     No employment history on file. Family and Education     Marital Status      Social Identity     Preferred Language Ethnicity Race   English Non-/Non  White      Financial Resource Strain     No financial resource strain value on [employer] Insecurity     No food insecurity information on file   Transportation Needs     No transportation needs information on file          Outpatient Medications    cephALEXin (KEFLEX) 250 MG capsule Take one PO QID. Start day before surgery. acetaminophen (TYLENOL) 500 MG tablet Take 650 mg by mouth    amoxicillin (AMOXIL) 500 MG capsule TAKE 4 CAPSULES BY MOUTH 1 HOUR PRIOR TO APPOINTMENT    warfarin (COUMADIN) 5 MG tablet Continue with 1.5 tablets daily as previous  Patient taking differently: Take by mouth See Admin Instructions MWF and Saturday take 7.5 mg / day. On Tuesdays, Thursdays, and Sundays, take 10 mg / day.     triamterene-hydrochlorothiazide (DYAZIDE) 37.5-25 MG per capsule Take 1

## 2019-09-19 NOTE — OP NOTE
[facility]                                OPERATIVE REPORT    PATIENT NAME: Rosemarie Gloria                      :        1945  MED REC NO:   [id number]                             ROOM:  ACCOUNT NO:   [de-identified]                           ADMIT DATE: 2019  PROVIDER:     Paula Gambino    DATE OF PROCEDURE:  2019    ATTENDING PHYSICIAN AND SURGEON:  Paula Gambino MD    PREOPERATIVE DIAGNOSIS:  Mass, posterior neck. POSTOPERATIVE DIAGNOSIS:  Mass, posterior neck. PROCEDURE:  Excision of mass of posterior neck (2.5 cm), taken with a  2-mm margin, with intermediate repair of 3.5 cm length. ANESTHESIA:  Local 1% Xylocaine with epinephrine, buffered. INDICATIONS:  The patient is a 75-year-old male who previously had an  infected cyst in this area, now with residual mass, here for removal and  diagnosis. The procedure, the risks, the benefits were discussed with  him. All questions were answered to his satisfaction, and consent was  signed. NARRATIVE SUMMARY:  The patient was brought to the operating suite,  placed in the lateral position. He was prepped and draped in the usual  sterile fashion. Next, using a marker, outline was made for the  excision along with margin. Local anesthetic was infiltrated. Next, with a scalpel, incision was carried out through the skin, and  then with sharp and blunt scissor dissection, the underlying mass was  carefully dissected from its surrounding normal tissues. This was all  sent off as specimen. Bovie cautery used for hemostasis. The wound was  then closed in layers with interrupted 4-0 Vicryl in the subcutaneous  and running intracuticular 4-0 Prolene in the skin. Steri-Strips for  dressing. The patient tolerated the procedure well and was transferred  to the recovery in stable condition.         Seamus PINEDO: 09/18/2019 15:40:26       T: 09/18/2019 23:49:19     [email or web address]  Job#: [id number]     Doc#: [id number]    CC:

## 2019-09-20 LAB — DERMATOLOGY PATHOLOGY REPORT: NORMAL

## 2019-10-03 ENCOUNTER — OFFICE VISIT (OUTPATIENT)
Dept: PODIATRY | Age: 74
End: 2019-10-03
Payer: MEDICARE

## 2019-10-03 VITALS — HEIGHT: 70 IN | WEIGHT: 305 LBS | BODY MASS INDEX: 43.67 KG/M2

## 2019-10-03 DIAGNOSIS — E11.42 DIABETIC PERIPHERAL NEUROPATHY ASSOCIATED WITH TYPE 2 DIABETES MELLITUS (HCC): ICD-10-CM

## 2019-10-03 DIAGNOSIS — M79.674 PAIN OF TOES OF BOTH FEET: ICD-10-CM

## 2019-10-03 DIAGNOSIS — E11.51 TYPE 2 DIABETES MELLITUS WITH PERIPHERAL VASCULAR DISEASE (HCC): ICD-10-CM

## 2019-10-03 DIAGNOSIS — B35.1 ONYCHOMYCOSIS OF TOENAIL: Primary | ICD-10-CM

## 2019-10-03 DIAGNOSIS — M79.675 PAIN OF TOES OF BOTH FEET: ICD-10-CM

## 2019-10-03 PROCEDURE — 11721 DEBRIDE NAIL 6 OR MORE: CPT | Performed by: PODIATRIST

## 2019-10-08 ASSESSMENT — ENCOUNTER SYMPTOMS
COLOR CHANGE: 0
BACK PAIN: 0
DIARRHEA: 0
SHORTNESS OF BREATH: 0
NAUSEA: 0

## 2019-12-19 ENCOUNTER — OFFICE VISIT (OUTPATIENT)
Dept: PODIATRY | Age: 74
End: 2019-12-19
Payer: MEDICARE

## 2019-12-19 VITALS — BODY MASS INDEX: 43.67 KG/M2 | HEIGHT: 70 IN | WEIGHT: 305 LBS

## 2019-12-19 DIAGNOSIS — M79.674 PAIN OF TOES OF BOTH FEET: ICD-10-CM

## 2019-12-19 DIAGNOSIS — M79.675 PAIN OF TOES OF BOTH FEET: ICD-10-CM

## 2019-12-19 DIAGNOSIS — E11.51 TYPE 2 DIABETES MELLITUS WITH PERIPHERAL VASCULAR DISEASE (HCC): ICD-10-CM

## 2019-12-19 DIAGNOSIS — B35.1 ONYCHOMYCOSIS OF TOENAIL: Primary | ICD-10-CM

## 2019-12-19 DIAGNOSIS — E11.42 DIABETIC PERIPHERAL NEUROPATHY ASSOCIATED WITH TYPE 2 DIABETES MELLITUS (HCC): ICD-10-CM

## 2019-12-19 PROCEDURE — 99999 PR OFFICE/OUTPT VISIT,PROCEDURE ONLY: CPT | Performed by: PODIATRIST

## 2019-12-19 PROCEDURE — 11721 DEBRIDE NAIL 6 OR MORE: CPT | Performed by: PODIATRIST

## 2019-12-19 ASSESSMENT — ENCOUNTER SYMPTOMS
SHORTNESS OF BREATH: 0
COLOR CHANGE: 0
DIARRHEA: 0
BACK PAIN: 0
NAUSEA: 0

## 2020-03-05 ENCOUNTER — OFFICE VISIT (OUTPATIENT)
Dept: PODIATRY | Age: 75
End: 2020-03-05
Payer: MEDICARE

## 2020-03-05 VITALS — BODY MASS INDEX: 43.67 KG/M2 | WEIGHT: 305 LBS | HEIGHT: 70 IN

## 2020-03-05 PROCEDURE — 11721 DEBRIDE NAIL 6 OR MORE: CPT | Performed by: PODIATRIST

## 2020-03-05 PROCEDURE — 99999 PR OFFICE/OUTPT VISIT,PROCEDURE ONLY: CPT | Performed by: PODIATRIST

## 2020-03-05 ASSESSMENT — ENCOUNTER SYMPTOMS
BACK PAIN: 0
COLOR CHANGE: 0
NAUSEA: 0
DIARRHEA: 0
SHORTNESS OF BREATH: 0

## 2020-03-05 NOTE — PROGRESS NOTES
SUBJECTIVE: Greg Batista is a 66 y.o. male who returns to the office with chief complaint of painful fungal toenails. Patient relates toe nails are thickened/difficult to trim as well as painful with ambulation and with shoe gear. Chief Complaint   Patient presents with    Nail Problem     B/L NAIL TRIM    Diabetes     B/L DM FOOT CHECK/BS: 133    Other     PCP: LAST VISIT 08/06/2019 DR Gisel Wisdom     Review of Systems   Constitutional: Negative for activity change, appetite change, chills, diaphoresis, fatigue and fever. Respiratory: Negative for shortness of breath. Cardiovascular: Negative for leg swelling. Gastrointestinal: Negative for diarrhea and nausea. Endocrine: Negative for cold intolerance, heat intolerance and polyuria. Musculoskeletal: Positive for arthralgias. Negative for back pain, gait problem, joint swelling and myalgias. Skin: Negative for color change, pallor, rash and wound. Allergic/Immunologic: Negative for environmental allergies and food allergies. Neurological: Negative for dizziness, weakness, light-headedness and numbness. Hematological: Does not bruise/bleed easily. Psychiatric/Behavioral: Negative for behavioral problems, confusion and self-injury. The patient is not nervous/anxious. OBJECTIVE: Clinical evaluation of patient reveals nails 1,2,3,4,5 of the right foot and nails 1,2,3,4,5, of the left foot to present with thickness, elongation, discoloration, brittleness, and subungual debris. There was pain with palpation and debridement of the toenails of the bilateral feet. No open lesions noted to either foot today. The right DP pulse is not palpable. The left DP pulse is not palpable. The right PT pulse is not palpable. The left PT pulse is not palpable. Protective sensation is absent to the right plantar foot as noted with a 5.07 [place]-[employer] monofilament.    Protective sensation is absent to the left plantar foot as noted with a 5.07

## 2020-05-21 ENCOUNTER — OFFICE VISIT (OUTPATIENT)
Dept: PODIATRY | Age: 75
End: 2020-05-21
Payer: MEDICARE

## 2020-05-21 VITALS — HEIGHT: 70 IN | BODY MASS INDEX: 42.23 KG/M2 | WEIGHT: 295 LBS

## 2020-05-21 PROCEDURE — 11721 DEBRIDE NAIL 6 OR MORE: CPT | Performed by: PODIATRIST

## 2020-05-21 ASSESSMENT — ENCOUNTER SYMPTOMS
BACK PAIN: 0
SHORTNESS OF BREATH: 0
DIARRHEA: 0
NAUSEA: 0
COLOR CHANGE: 0

## 2020-05-21 NOTE — PROGRESS NOTES
SUBJECTIVE: Lino Bhakta is a 66 y.o. male who returns to the office with chief complaint of painful fungal toenails. Patient relates toe nails are thickened/difficult to trim as well as painful with ambulation and with shoe gear. Chief Complaint   Patient presents with    Nail Problem     nail trim/last saw Talib Fontenot 8/6/2019     Diabetes     blood sugar 143     Review of Systems   Constitutional: Negative for activity change, appetite change, chills, diaphoresis, fatigue and fever. Respiratory: Negative for shortness of breath. Cardiovascular: Negative for leg swelling. Gastrointestinal: Negative for diarrhea and nausea. Endocrine: Negative for cold intolerance, heat intolerance and polyuria. Musculoskeletal: Positive for arthralgias. Negative for back pain, gait problem, joint swelling and myalgias. Skin: Negative for color change, pallor, rash and wound. Allergic/Immunologic: Negative for environmental allergies and food allergies. Neurological: Negative for dizziness, weakness, light-headedness and numbness. Hematological: Does not bruise/bleed easily. Psychiatric/Behavioral: Negative for behavioral problems, confusion and self-injury. The patient is not nervous/anxious. OBJECTIVE: Clinical evaluation of patient reveals nails 1,2,3,4,5 of the right foot and nails 1,2,3,4,5, of the left foot to present with thickness, elongation, discoloration, brittleness, and subungual debris. There was pain with palpation and debridement of the toenails of the bilateral feet. No open lesions noted to either foot today. The right DP pulse is not palpable. The left DP pulse is not palpable. The right PT pulse is not palpable. The left PT pulse is not palpable. Protective sensation is absent to the right plantar foot as noted with a 5.07 [place] monofilament. Protective sensation is absent to the left plantar foot as noted with a 5.07 [place] monofilament.

## 2020-08-13 ENCOUNTER — OFFICE VISIT (OUTPATIENT)
Dept: PODIATRY | Age: 75
End: 2020-08-13
Payer: MEDICARE

## 2020-08-13 VITALS — WEIGHT: 295 LBS | HEIGHT: 70 IN | BODY MASS INDEX: 42.23 KG/M2

## 2020-08-13 PROCEDURE — 11721 DEBRIDE NAIL 6 OR MORE: CPT | Performed by: PODIATRIST

## 2020-08-13 PROCEDURE — 99999 PR OFFICE/OUTPT VISIT,PROCEDURE ONLY: CPT | Performed by: PODIATRIST

## 2020-08-13 ASSESSMENT — ENCOUNTER SYMPTOMS
SHORTNESS OF BREATH: 0
NAUSEA: 0
DIARRHEA: 0
COLOR CHANGE: 0
BACK PAIN: 0

## 2020-08-13 NOTE — PROGRESS NOTES
SUBJECTIVE: Elena Angeles is a 66 y.o. male who returns to the office with chief complaint of painful fungal toenails. Patient relates toe nails are thickened/difficult to trim as well as painful with ambulation and with shoe gear. Chief Complaint   Patient presents with    Nail Problem     b/l nail trim/ last seen Dr. Stefan Caro 8/6/19    Diabetes     last blood sugar 147     Review of Systems   Constitutional: Negative for activity change, appetite change, chills, diaphoresis, fatigue and fever. Respiratory: Negative for shortness of breath. Cardiovascular: Negative for leg swelling. Gastrointestinal: Negative for diarrhea and nausea. Endocrine: Negative for cold intolerance, heat intolerance and polyuria. Musculoskeletal: Positive for arthralgias. Negative for back pain, gait problem, joint swelling and myalgias. Skin: Negative for color change, pallor, rash and wound. Allergic/Immunologic: Negative for environmental allergies and food allergies. Neurological: Negative for dizziness, weakness, light-headedness and numbness. Hematological: Does not bruise/bleed easily. Psychiatric/Behavioral: Negative for behavioral problems, confusion and self-injury. The patient is not nervous/anxious. OBJECTIVE: Clinical evaluation of patient reveals nails 1,2,3,4,5 of the right foot and nails 1,2,3,4,5, of the left foot to present with thickness, elongation, discoloration, brittleness, and subungual debris. There was pain with palpation and debridement of the toenails of the bilateral feet. No open lesions noted to either foot today. The right DP pulse is not palpable. The left DP pulse is not palpable. The right PT pulse is not palpable. The left PT pulse is not palpable. Protective sensation is absent to the right plantar foot as noted with a 5.07 [place]-[employer] monofilament.    Protective sensation is absent to the left plantar foot as noted with a 5.07 [place] monofilament. Glucose: 147 mg/dl. Class A Findings (1 needed)   [] Non-traumatic amputation of foot or integral skeleton portion thereof. [] Q7.      Class B Findings (2 needed)   1. [] Absent posterior tibial pulse   2. [] Absent dorsalis pedis pulse   3. [] Advanced trophic changes; three of the following are required:   ·         [] hair growth (decrease or absence)   ·         [] nail changes (thickening)   ·         [] pigmentary changes (discoloration)   ·         [] skin texture (thin, shiny)   ·         [] skin color (rubor or redness)   [] Q8.      Class C Findings (1 Class B, 2 Class C needed)   1. [] Claudication   2. [] Temperature changes   3. [] Edema   4. [] Paresthesia   5. [] Burning   [] Q9.     NO CLASS FINDINGS ARE NOTED    ASSESSMENT:    Diagnosis Orders   1. Onychomycosis of toenail  OR DEBRIDEMENT OF NAILS, 6 OR MORE    HM DIABETES FOOT EXAM   2. Pain of toes of both feet  OR DEBRIDEMENT OF NAILS, 6 OR MORE    HM DIABETES FOOT EXAM   3. Type 2 diabetes mellitus with peripheral vascular disease (HCC)  [place] DEBRIDEMENT OF NAILS, 6 OR MORE    HM DIABETES FOOT EXAM   4. Diabetic peripheral neuropathy associated with type 2 diabetes mellitus (HCC)  [place] DEBRIDEMENT OF NAILS, 6 OR MORE    HM DIABETES FOOT EXAM     PLAN: Toenails 1,2,3,4,5 of the right foot and 1,2,3,4,5 of the left foot were debrided in length and thickness using a nail nipper and a . Return in about 9 weeks (around 10/15/2020) for At risk diabetic foot care.    8/13/2020      Maxx Cabrera DPM

## 2020-10-29 ENCOUNTER — OFFICE VISIT (OUTPATIENT)
Dept: PODIATRY | Age: 75
End: 2020-10-29
Payer: MEDICARE

## 2020-10-29 VITALS — WEIGHT: 293 LBS | HEIGHT: 70 IN | BODY MASS INDEX: 41.95 KG/M2

## 2020-10-29 PROCEDURE — 11721 DEBRIDE NAIL 6 OR MORE: CPT | Performed by: PODIATRIST

## 2020-10-29 ASSESSMENT — ENCOUNTER SYMPTOMS
BACK PAIN: 0
DIARRHEA: 0
SHORTNESS OF BREATH: 0
NAUSEA: 0
COLOR CHANGE: 0

## 2020-10-29 NOTE — PROGRESS NOTES
SUBJECTIVE: Millie Perez is a 66 y.o. male who returns to the office with chief complaint of painful fungal toenails. Patient relates toe nails are thickened/difficult to trim as well as painful with ambulation and with shoe gear. Chief Complaint   Patient presents with    Nail Problem     b/l nail trim/ last seen Dr. Remedios Minor 8/6/20    Diabetes     last blood sugar 135     Review of Systems   Constitutional: Negative for activity change, appetite change, chills, diaphoresis, fatigue and fever. Respiratory: Negative for shortness of breath. Cardiovascular: Negative for leg swelling. Gastrointestinal: Negative for diarrhea and nausea. Endocrine: Negative for cold intolerance, heat intolerance and polyuria. Musculoskeletal: Positive for arthralgias. Negative for back pain, gait problem, joint swelling and myalgias. Skin: Negative for color change, pallor, rash and wound. Allergic/Immunologic: Negative for environmental allergies and food allergies. Neurological: Negative for dizziness, weakness, light-headedness and numbness. Hematological: Does not bruise/bleed easily. Psychiatric/Behavioral: Negative for behavioral problems, confusion and self-injury. The patient is not nervous/anxious. OBJECTIVE: Clinical evaluation of patient reveals nails 1,2,3,4,5 of the right foot and nails 1,2,3,4,5, of the left foot to present with thickness, elongation, discoloration, brittleness, and subungual debris. There was pain with palpation and debridement of the toenails of the bilateral feet. No open lesions noted to either foot today. The right DP pulse is not palpable. The left DP pulse is not palpable. The right PT pulse is not palpable. The left PT pulse is not palpable. Protective sensation is absent to the right plantar foot as noted with a 5.07 [place] monofilament.    Protective sensation is absent to the left plantar foot as noted with a 5.07 [place] monofilament. Glucose: 135 mg/dl. Class A Findings (1 needed)   [] Non-traumatic amputation of foot or integral skeleton portion thereof. [] Q7.      Class B Findings (2 needed)   1. [x] Absent posterior tibial pulse   2. [x] Absent dorsalis pedis pulse   3. [] Advanced trophic changes; three of the following are required:   ·         [] hair growth (decrease or absence)   ·         [] nail changes (thickening)   ·         [] pigmentary changes (discoloration)   ·         [] skin texture (thin, shiny)   ·         [] skin color (rubor or redness)   [x] Q8.      Class C Findings (1 Class B, 2 Class C needed)   1. [] Claudication   2. [] Temperature changes   3. [] Edema   4. [] Paresthesia   5. [] Burning   [] Q9.     ASSESSMENT:    Diagnosis Orders   1. Onychomycosis of toenail  NE DEBRIDEMENT OF NAILS, 6 OR MORE    HM DIABETES FOOT EXAM   2. Pain of toes of both feet  NE DEBRIDEMENT OF NAILS, 6 OR MORE    HM DIABETES FOOT EXAM   3. Type 2 diabetes mellitus with peripheral vascular disease (HCC)  NE DEBRIDEMENT OF NAILS, 6 OR MORE    HM DIABETES FOOT EXAM   4. Diabetic peripheral neuropathy associated with type 2 diabetes mellitus (HCC)  NE DEBRIDEMENT OF NAILS, 6 OR MORE    HM DIABETES FOOT EXAM     PLAN: Toenails 1,2,3,4,5 of the right foot and 1,2,3,4,5 of the left foot were debrided in length and thickness using a nail nipper and a . Return in about 9 weeks (around 12/31/2020) for At risk diabetic foot care.    10/29/2020      Enrrique Wallis DPM

## 2021-01-14 ENCOUNTER — OFFICE VISIT (OUTPATIENT)
Dept: PODIATRY | Age: 76
End: 2021-01-14
Payer: MEDICARE

## 2021-01-14 VITALS — HEIGHT: 70 IN | BODY MASS INDEX: 41.95 KG/M2 | WEIGHT: 293 LBS

## 2021-01-14 DIAGNOSIS — M79.675 PAIN OF TOES OF BOTH FEET: ICD-10-CM

## 2021-01-14 DIAGNOSIS — M79.674 PAIN OF TOES OF BOTH FEET: ICD-10-CM

## 2021-01-14 DIAGNOSIS — E11.42 DIABETIC PERIPHERAL NEUROPATHY ASSOCIATED WITH TYPE 2 DIABETES MELLITUS (HCC): ICD-10-CM

## 2021-01-14 DIAGNOSIS — E11.51 TYPE 2 DIABETES MELLITUS WITH PERIPHERAL VASCULAR DISEASE (HCC): ICD-10-CM

## 2021-01-14 DIAGNOSIS — B35.1 ONYCHOMYCOSIS OF TOENAIL: Primary | ICD-10-CM

## 2021-01-14 PROCEDURE — 11721 DEBRIDE NAIL 6 OR MORE: CPT | Performed by: PODIATRIST

## 2021-01-14 RX ORDER — HYDROCODONE BITARTRATE AND ACETAMINOPHEN 5; 325 MG/1; MG/1
TABLET ORAL
COMMUNITY
Start: 2020-12-22 | End: 2021-03-16

## 2021-01-14 ASSESSMENT — ENCOUNTER SYMPTOMS
DIARRHEA: 0
NAUSEA: 0
BACK PAIN: 0
COLOR CHANGE: 0
SHORTNESS OF BREATH: 0

## 2021-01-14 NOTE — PROGRESS NOTES
SUBJECTIVE: Edil Gonzalez is a 66 y.o. male who returns to the office with chief complaint of painful fungal toenails. Patient relates toe nails are thickened/difficult to trim as well as painful with ambulation and with shoe gear. Chief Complaint   Patient presents with    Nail Problem     nail trim/last saw Bob Villanueva 11/2020    Diabetes     last a1c 5.7     Review of Systems   Constitutional: Negative for activity change, appetite change, chills, diaphoresis, fatigue and fever. Respiratory: Negative for shortness of breath. Cardiovascular: Negative for leg swelling. Gastrointestinal: Negative for diarrhea and nausea. Endocrine: Negative for cold intolerance, heat intolerance and polyuria. Musculoskeletal: Positive for arthralgias. Negative for back pain, gait problem, joint swelling and myalgias. Skin: Negative for color change, pallor, rash and wound. Allergic/Immunologic: Negative for environmental allergies and food allergies. Neurological: Negative for dizziness, weakness, light-headedness and numbness. Hematological: Does not bruise/bleed easily. Psychiatric/Behavioral: Negative for behavioral problems, confusion and self-injury. The patient is not nervous/anxious. OBJECTIVE: Clinical evaluation of patient reveals nails 1,2,3,4,5 of the right foot and nails 1,2,3,4,5, of the left foot to present with thickness, elongation, discoloration, brittleness, and subungual debris. There was pain with palpation and debridement of the toenails of the bilateral feet. No open lesions noted to either foot today. The right DP pulse is not palpable. The left DP pulse is not palpable. The right PT pulse is not palpable. The left PT pulse is not palpable. Protective sensation is absent to the right plantar foot as noted with a 5.07 [place] monofilament. Protective sensation is absent to the left plantar foot as noted with a 5.07 [place] monofilament.    HbA1c: 5.7 %. Class A Findings (1 needed)   [] Non-traumatic amputation of foot or integral skeleton portion thereof. [] Q7.      Class B Findings (2 needed)   1. [x] Absent posterior tibial pulse   2. [x] Absent dorsalis pedis pulse   3. [] Advanced trophic changes; three of the following are required:   ·         [] hair growth (decrease or absence)   ·         [] nail changes (thickening)   ·         [] pigmentary changes (discoloration)   ·         [] skin texture (thin, shiny)   ·         [] skin color (rubor or redness)   [x] Q8.      Class C Findings (1 Class B, 2 Class C needed)   1. [] Claudication   2. [] Temperature changes   3. [] Edema   4. [] Paresthesia   5. [] Burning   [] Q9.     ASSESSMENT:    Diagnosis Orders   1. Onychomycosis of toenail  VT DEBRIDEMENT OF NAILS, 6 OR MORE    HM DIABETES FOOT EXAM   2. Pain of toes of both feet  VT DEBRIDEMENT OF NAILS, 6 OR MORE    HM DIABETES FOOT EXAM   3. Type 2 diabetes mellitus with peripheral vascular disease (HCC)  VT DEBRIDEMENT OF NAILS, 6 OR MORE    HM DIABETES FOOT EXAM   4. Diabetic peripheral neuropathy associated with type 2 diabetes mellitus (HCC)  VT DEBRIDEMENT OF NAILS, 6 OR MORE    HM DIABETES FOOT EXAM     PLAN: Toenails 1,2,3,4,5 of the right foot and 1,2,3,4,5 of the left foot were debrided in length and thickness using a nail nipper and a . Return in about 9 weeks (around 3/18/2021) for At risk diabetic foot care.    1/14/2021      Zhen Underwood DPM

## 2021-03-12 ENCOUNTER — HOSPITAL ENCOUNTER (OUTPATIENT)
Age: 76
Discharge: HOME | End: 2021-03-12
Payer: MEDICARE

## 2021-03-12 ENCOUNTER — HOSPITAL ENCOUNTER (OUTPATIENT)
Dept: GENERAL RADIOLOGY | Age: 76
Discharge: HOME | End: 2021-03-14
Payer: MEDICARE

## 2021-03-12 ENCOUNTER — HOSPITAL ENCOUNTER (OUTPATIENT)
Age: 76
Discharge: HOME | End: 2021-03-14
Payer: MEDICARE

## 2021-03-12 DIAGNOSIS — R29.898 WEAKNESS OF LOWER EXTREMITY, UNSPECIFIED LATERALITY: ICD-10-CM

## 2021-03-12 LAB
ABSOLUTE EOS #: 0.1 K/UL (ref 0–0.4)
ABSOLUTE IMMATURE GRANULOCYTE: ABNORMAL K/UL (ref 0–0.3)
ABSOLUTE LYMPH #: 2.5 K/UL (ref 1–4.8)
ABSOLUTE MONO #: 1.3 K/UL (ref 0.1–1.3)
ALBUMIN SERPL-MCNC: 4.1 G/DL (ref 3.5–5.2)
ALBUMIN/GLOBULIN RATIO: ABNORMAL (ref 1–2.5)
ALP BLD-CCNC: 76 U/L (ref 40–129)
ALT SERPL-CCNC: 182 U/L (ref 5–41)
ANION GAP SERPL CALCULATED.3IONS-SCNC: 14 MMOL/L (ref 9–17)
AST SERPL-CCNC: 400 U/L
BASOPHILS # BLD: 1 % (ref 0–2)
BASOPHILS ABSOLUTE: 0.1 K/UL (ref 0–0.2)
BILIRUB SERPL-MCNC: 1.77 MG/DL (ref 0.3–1.2)
BILIRUBIN DIRECT: 0.71 MG/DL
BILIRUBIN, INDIRECT: 1.06 MG/DL (ref 0–1)
BUN BLDV-MCNC: 59 MG/DL (ref 8–23)
BUN/CREAT BLD: ABNORMAL (ref 9–20)
CALCIUM SERPL-MCNC: 9.7 MG/DL (ref 8.6–10.4)
CHLORIDE BLD-SCNC: 101 MMOL/L (ref 98–107)
CO2: 21 MMOL/L (ref 20–31)
CREAT SERPL-MCNC: 1.47 MG/DL (ref 0.7–1.2)
DIFFERENTIAL TYPE: ABNORMAL
EOSINOPHILS RELATIVE PERCENT: 1 % (ref 0–4)
GFR AFRICAN AMERICAN: 56 ML/MIN
GFR NON-AFRICAN AMERICAN: 46 ML/MIN
GFR SERPL CREATININE-BSD FRML MDRD: ABNORMAL ML/MIN/{1.73_M2}
GFR SERPL CREATININE-BSD FRML MDRD: ABNORMAL ML/MIN/{1.73_M2}
GLOBULIN: ABNORMAL G/DL (ref 1.5–3.8)
GLUCOSE BLD-MCNC: 142 MG/DL (ref 70–99)
HCT VFR BLD CALC: 41.2 % (ref 41–53)
HEMOGLOBIN: 13.7 G/DL (ref 13.5–17.5)
IMMATURE GRANULOCYTES: ABNORMAL %
LYMPHOCYTES # BLD: 20 % (ref 24–44)
MCH RBC QN AUTO: 28.2 PG (ref 26–34)
MCHC RBC AUTO-ENTMCNC: 33.2 G/DL (ref 31–37)
MCV RBC AUTO: 85.1 FL (ref 80–100)
MONOCYTES # BLD: 11 % (ref 1–7)
NRBC AUTOMATED: ABNORMAL PER 100 WBC
PDW BLD-RTO: 14 % (ref 11.5–14.9)
PLATELET # BLD: 389 K/UL (ref 150–450)
PLATELET ESTIMATE: ABNORMAL
PMV BLD AUTO: 7.8 FL (ref 6–12)
POTASSIUM SERPL-SCNC: 5.1 MMOL/L (ref 3.7–5.3)
RBC # BLD: 4.85 M/UL (ref 4.5–5.9)
RBC # BLD: ABNORMAL 10*6/UL
SEDIMENTATION RATE, ERYTHROCYTE: 33 MM (ref 0–20)
SEG NEUTROPHILS: 67 % (ref 36–66)
SEGMENTED NEUTROPHILS ABSOLUTE COUNT: 8.7 K/UL (ref 1.3–9.1)
SODIUM BLD-SCNC: 136 MMOL/L (ref 135–144)
THYROXINE, FREE: 1.34 NG/DL (ref 0.93–1.7)
TOTAL PROTEIN: 8 G/DL (ref 6.4–8.3)
TSH SERPL DL<=0.05 MIU/L-ACNC: 2.02 MIU/L (ref 0.3–5)
WBC # BLD: 12.7 K/UL (ref 3.5–11)
WBC # BLD: ABNORMAL 10*3/UL

## 2021-03-12 PROCEDURE — 85652 RBC SED RATE AUTOMATED: CPT

## 2021-03-12 PROCEDURE — 80048 BASIC METABOLIC PNL TOTAL CA: CPT

## 2021-03-12 PROCEDURE — 85025 COMPLETE CBC W/AUTO DIFF WBC: CPT

## 2021-03-12 PROCEDURE — 84443 ASSAY THYROID STIM HORMONE: CPT

## 2021-03-12 PROCEDURE — 80076 HEPATIC FUNCTION PANEL: CPT

## 2021-03-12 PROCEDURE — 72100 X-RAY EXAM L-S SPINE 2/3 VWS: CPT

## 2021-03-12 PROCEDURE — 84439 ASSAY OF FREE THYROXINE: CPT

## 2021-03-12 PROCEDURE — 36415 COLL VENOUS BLD VENIPUNCTURE: CPT

## 2021-03-16 ENCOUNTER — APPOINTMENT (OUTPATIENT)
Dept: CT IMAGING | Age: 76
DRG: 683 | End: 2021-03-16
Payer: MEDICARE

## 2021-03-16 ENCOUNTER — HOSPITAL ENCOUNTER (INPATIENT)
Age: 76
LOS: 4 days | Discharge: HOME | DRG: 683 | End: 2021-03-20
Attending: STUDENT IN AN ORGANIZED HEALTH CARE EDUCATION/TRAINING PROGRAM | Admitting: FAMILY MEDICINE
Payer: MEDICARE

## 2021-03-16 ENCOUNTER — APPOINTMENT (OUTPATIENT)
Dept: GENERAL RADIOLOGY | Age: 76
DRG: 683 | End: 2021-03-16
Payer: MEDICARE

## 2021-03-16 DIAGNOSIS — E87.5 HYPERKALEMIA: ICD-10-CM

## 2021-03-16 DIAGNOSIS — N17.9 ACUTE RENAL FAILURE, UNSPECIFIED ACUTE RENAL FAILURE TYPE (HCC): Primary | ICD-10-CM

## 2021-03-16 PROBLEM — I25.10 ASCVD (ARTERIOSCLEROTIC CARDIOVASCULAR DISEASE): Status: ACTIVE | Noted: 2021-03-16

## 2021-03-16 PROBLEM — R74.01 TRANSAMINITIS: Status: ACTIVE | Noted: 2021-03-16

## 2021-03-16 PROBLEM — I10 ESSENTIAL HYPERTENSION: Status: ACTIVE | Noted: 2021-03-16

## 2021-03-16 PROBLEM — I50.32 CHRONIC DIASTOLIC CONGESTIVE HEART FAILURE (HCC): Status: ACTIVE | Noted: 2021-03-16

## 2021-03-16 PROBLEM — N17.0 ACUTE RENAL FAILURE WITH TUBULAR NECROSIS (HCC): Status: ACTIVE | Noted: 2021-03-16

## 2021-03-16 PROBLEM — N19 RENAL FAILURE: Status: ACTIVE | Noted: 2021-03-16

## 2021-03-16 PROBLEM — Z79.01 CURRENT USE OF LONG TERM ANTICOAGULATION: Status: ACTIVE | Noted: 2021-03-16

## 2021-03-16 PROBLEM — G47.33 OBSTRUCTIVE SLEEP APNEA: Status: ACTIVE | Noted: 2021-03-16

## 2021-03-16 PROBLEM — M62.82 NON-TRAUMATIC RHABDOMYOLYSIS: Status: ACTIVE | Noted: 2021-03-16

## 2021-03-16 PROBLEM — E66.01 CLASS 3 SEVERE OBESITY DUE TO EXCESS CALORIES WITH BODY MASS INDEX (BMI) OF 40.0 TO 44.9 IN ADULT (HCC): Status: ACTIVE | Noted: 2021-03-16

## 2021-03-16 LAB
-: NORMAL
ABSOLUTE EOS #: 0.1 K/UL (ref 0–0.4)
ABSOLUTE IMMATURE GRANULOCYTE: ABNORMAL K/UL (ref 0–0.3)
ABSOLUTE LYMPH #: 2.7 K/UL (ref 1–4.8)
ABSOLUTE MONO #: 1 K/UL (ref 0.1–1.3)
ALBUMIN SERPL-MCNC: 3.6 G/DL (ref 3.5–5.2)
ALBUMIN/GLOBULIN RATIO: ABNORMAL (ref 1–2.5)
ALP BLD-CCNC: 68 U/L (ref 40–129)
ALT SERPL-CCNC: 212 U/L (ref 5–41)
AMMONIA: 26 UMOL/L (ref 16–60)
ANION GAP SERPL CALCULATED.3IONS-SCNC: 15 MMOL/L (ref 9–17)
ANION GAP SERPL CALCULATED.3IONS-SCNC: 16 MMOL/L (ref 9–17)
AST SERPL-CCNC: 286 U/L
BASOPHILS # BLD: 1 % (ref 0–2)
BASOPHILS ABSOLUTE: 0.1 K/UL (ref 0–0.2)
BILIRUB SERPL-MCNC: 1.32 MG/DL (ref 0.3–1.2)
BUN BLDV-MCNC: 110 MG/DL (ref 8–23)
BUN BLDV-MCNC: 112 MG/DL (ref 8–23)
BUN/CREAT BLD: ABNORMAL (ref 9–20)
BUN/CREAT BLD: ABNORMAL (ref 9–20)
CALCIUM SERPL-MCNC: 9.2 MG/DL (ref 8.6–10.4)
CALCIUM SERPL-MCNC: 9.3 MG/DL (ref 8.6–10.4)
CHLORIDE BLD-SCNC: 97 MMOL/L (ref 98–107)
CHLORIDE BLD-SCNC: 98 MMOL/L (ref 98–107)
CO2: 18 MMOL/L (ref 20–31)
CO2: 20 MMOL/L (ref 20–31)
CREAT SERPL-MCNC: 2.95 MG/DL (ref 0.7–1.2)
CREAT SERPL-MCNC: 3.33 MG/DL (ref 0.7–1.2)
DIFFERENTIAL TYPE: ABNORMAL
EOSINOPHILS RELATIVE PERCENT: 1 % (ref 0–4)
GFR AFRICAN AMERICAN: 22 ML/MIN
GFR AFRICAN AMERICAN: 25 ML/MIN
GFR NON-AFRICAN AMERICAN: 18 ML/MIN
GFR NON-AFRICAN AMERICAN: 21 ML/MIN
GFR SERPL CREATININE-BSD FRML MDRD: ABNORMAL ML/MIN/{1.73_M2}
GLUCOSE BLD-MCNC: 106 MG/DL (ref 75–110)
GLUCOSE BLD-MCNC: 115 MG/DL (ref 70–99)
GLUCOSE BLD-MCNC: 115 MG/DL (ref 70–99)
HAV IGM SER IA-ACNC: NONREACTIVE
HCT VFR BLD CALC: 39.1 % (ref 41–53)
HEMOGLOBIN: 12.9 G/DL (ref 13.5–17.5)
HEPATITIS B CORE IGM ANTIBODY: NONREACTIVE
HEPATITIS B SURFACE ANTIGEN: NONREACTIVE
HEPATITIS C ANTIBODY: NONREACTIVE
IMMATURE GRANULOCYTES: ABNORMAL %
INR BLD: 9.9
LYMPHOCYTES # BLD: 21 % (ref 24–44)
MCH RBC QN AUTO: 28.3 PG (ref 26–34)
MCHC RBC AUTO-ENTMCNC: 33.1 G/DL (ref 31–37)
MCV RBC AUTO: 85.7 FL (ref 80–100)
MONOCYTES # BLD: 8 % (ref 1–7)
NRBC AUTOMATED: ABNORMAL PER 100 WBC
PDW BLD-RTO: 14.3 % (ref 11.5–14.9)
PLATELET # BLD: 318 K/UL (ref 150–450)
PLATELET ESTIMATE: ABNORMAL
PMV BLD AUTO: 8.5 FL (ref 6–12)
POTASSIUM SERPL-SCNC: 4.5 MMOL/L (ref 3.7–5.3)
POTASSIUM SERPL-SCNC: 6 MMOL/L (ref 3.7–5.3)
PROTHROMBIN TIME: 76.4 SEC (ref 11.8–14.6)
RBC # BLD: 4.56 M/UL (ref 4.5–5.9)
RBC # BLD: ABNORMAL 10*6/UL
REASON FOR REJECTION: NORMAL
SEG NEUTROPHILS: 69 % (ref 36–66)
SEGMENTED NEUTROPHILS ABSOLUTE COUNT: 8.8 K/UL (ref 1.3–9.1)
SODIUM BLD-SCNC: 131 MMOL/L (ref 135–144)
SODIUM BLD-SCNC: 133 MMOL/L (ref 135–144)
TOTAL CK: 6266 U/L (ref 39–308)
TOTAL PROTEIN: 6.9 G/DL (ref 6.4–8.3)
TROPONIN INTERP: ABNORMAL
TROPONIN T: ABNORMAL NG/ML
TROPONIN, HIGH SENSITIVITY: 128 NG/L (ref 0–22)
TROPONIN, HIGH SENSITIVITY: 144 NG/L (ref 0–22)
TROPONIN, HIGH SENSITIVITY: 163 NG/L (ref 0–22)
WBC # BLD: 12.7 K/UL (ref 3.5–11)
WBC # BLD: ABNORMAL 10*3/UL
ZZ NTE CLEAN UP: ORDERED TEST: NORMAL
ZZ NTE WITH NAME CLEAN UP: SPECIMEN SOURCE: NORMAL

## 2021-03-16 PROCEDURE — 74176 CT ABD & PELVIS W/O CONTRAST: CPT

## 2021-03-16 PROCEDURE — 82570 ASSAY OF URINE CREATININE: CPT

## 2021-03-16 PROCEDURE — 2580000003 HC RX 258: Performed by: INTERNAL MEDICINE

## 2021-03-16 PROCEDURE — 85610 PROTHROMBIN TIME: CPT

## 2021-03-16 PROCEDURE — 6360000002 HC RX W HCPCS: Performed by: FAMILY MEDICINE

## 2021-03-16 PROCEDURE — 94761 N-INVAS EAR/PLS OXIMETRY MLT: CPT

## 2021-03-16 PROCEDURE — 6370000000 HC RX 637 (ALT 250 FOR IP): Performed by: FAMILY MEDICINE

## 2021-03-16 PROCEDURE — 83036 HEMOGLOBIN GLYCOSYLATED A1C: CPT

## 2021-03-16 PROCEDURE — 85025 COMPLETE CBC W/AUTO DIFF WBC: CPT

## 2021-03-16 PROCEDURE — 80074 ACUTE HEPATITIS PANEL: CPT

## 2021-03-16 PROCEDURE — 71045 X-RAY EXAM CHEST 1 VIEW: CPT

## 2021-03-16 PROCEDURE — 84300 ASSAY OF URINE SODIUM: CPT

## 2021-03-16 PROCEDURE — 96365 THER/PROPH/DIAG IV INF INIT: CPT

## 2021-03-16 PROCEDURE — 99285 EMERGENCY DEPT VISIT HI MDM: CPT

## 2021-03-16 PROCEDURE — 94660 CPAP INITIATION&MGMT: CPT

## 2021-03-16 PROCEDURE — 2580000003 HC RX 258: Performed by: STUDENT IN AN ORGANIZED HEALTH CARE EDUCATION/TRAINING PROGRAM

## 2021-03-16 PROCEDURE — 84540 ASSAY OF URINE/UREA-N: CPT

## 2021-03-16 PROCEDURE — 84484 ASSAY OF TROPONIN QUANT: CPT

## 2021-03-16 PROCEDURE — 2060000000 HC ICU INTERMEDIATE R&B

## 2021-03-16 PROCEDURE — 80053 COMPREHEN METABOLIC PANEL: CPT

## 2021-03-16 PROCEDURE — 82947 ASSAY GLUCOSE BLOOD QUANT: CPT

## 2021-03-16 PROCEDURE — 70450 CT HEAD/BRAIN W/O DYE: CPT

## 2021-03-16 PROCEDURE — 36415 COLL VENOUS BLD VENIPUNCTURE: CPT

## 2021-03-16 PROCEDURE — 94664 DEMO&/EVAL PT USE INHALER: CPT

## 2021-03-16 PROCEDURE — 96375 TX/PRO/DX INJ NEW DRUG ADDON: CPT

## 2021-03-16 PROCEDURE — 82550 ASSAY OF CK (CPK): CPT

## 2021-03-16 PROCEDURE — 87040 BLOOD CULTURE FOR BACTERIA: CPT

## 2021-03-16 PROCEDURE — 2580000003 HC RX 258: Performed by: FAMILY MEDICINE

## 2021-03-16 PROCEDURE — 6370000000 HC RX 637 (ALT 250 FOR IP): Performed by: STUDENT IN AN ORGANIZED HEALTH CARE EDUCATION/TRAINING PROGRAM

## 2021-03-16 PROCEDURE — 6360000002 HC RX W HCPCS: Performed by: INTERNAL MEDICINE

## 2021-03-16 PROCEDURE — 6360000002 HC RX W HCPCS: Performed by: STUDENT IN AN ORGANIZED HEALTH CARE EDUCATION/TRAINING PROGRAM

## 2021-03-16 PROCEDURE — 80048 BASIC METABOLIC PNL TOTAL CA: CPT

## 2021-03-16 PROCEDURE — 93005 ELECTROCARDIOGRAM TRACING: CPT | Performed by: STUDENT IN AN ORGANIZED HEALTH CARE EDUCATION/TRAINING PROGRAM

## 2021-03-16 PROCEDURE — 82140 ASSAY OF AMMONIA: CPT

## 2021-03-16 RX ORDER — CITALOPRAM 20 MG/1
10 TABLET ORAL DAILY
Status: DISCONTINUED | OUTPATIENT
Start: 2021-03-16 | End: 2021-03-20 | Stop reason: HOSPADM

## 2021-03-16 RX ORDER — ONDANSETRON 2 MG/ML
4 INJECTION INTRAMUSCULAR; INTRAVENOUS EVERY 6 HOURS PRN
Status: DISCONTINUED | OUTPATIENT
Start: 2021-03-16 | End: 2021-03-20 | Stop reason: HOSPADM

## 2021-03-16 RX ORDER — PROMETHAZINE HYDROCHLORIDE 25 MG/1
12.5 TABLET ORAL EVERY 6 HOURS PRN
Status: DISCONTINUED | OUTPATIENT
Start: 2021-03-16 | End: 2021-03-20 | Stop reason: HOSPADM

## 2021-03-16 RX ORDER — SODIUM CHLORIDE 0.9 % (FLUSH) 0.9 %
10 SYRINGE (ML) INJECTION EVERY 12 HOURS SCHEDULED
Status: DISCONTINUED | OUTPATIENT
Start: 2021-03-16 | End: 2021-03-20 | Stop reason: HOSPADM

## 2021-03-16 RX ORDER — WARFARIN SODIUM 7.5 MG/1
7.5 TABLET ORAL DAILY
Status: DISCONTINUED | OUTPATIENT
Start: 2021-03-16 | End: 2021-03-16

## 2021-03-16 RX ORDER — ACETAMINOPHEN 650 MG/1
650 SUPPOSITORY RECTAL EVERY 6 HOURS PRN
Status: DISCONTINUED | OUTPATIENT
Start: 2021-03-16 | End: 2021-03-20 | Stop reason: HOSPADM

## 2021-03-16 RX ORDER — DEXTROSE MONOHYDRATE 25 G/50ML
25 INJECTION, SOLUTION INTRAVENOUS ONCE
Status: COMPLETED | OUTPATIENT
Start: 2021-03-16 | End: 2021-03-16

## 2021-03-16 RX ORDER — SODIUM CHLORIDE 9 MG/ML
1000 INJECTION, SOLUTION INTRAVENOUS CONTINUOUS
Status: DISCONTINUED | OUTPATIENT
Start: 2021-03-16 | End: 2021-03-17

## 2021-03-16 RX ORDER — M-VIT,TX,IRON,MINS/CALC/FOLIC 27MG-0.4MG
1 TABLET ORAL DAILY
COMMUNITY

## 2021-03-16 RX ORDER — WARFARIN SODIUM 5 MG/1
10 TABLET ORAL WEEKLY
COMMUNITY
End: 2021-11-18

## 2021-03-16 RX ORDER — ACETAMINOPHEN 325 MG/1
650 TABLET ORAL EVERY 4 HOURS PRN
Status: DISCONTINUED | OUTPATIENT
Start: 2021-03-16 | End: 2021-03-18 | Stop reason: SDUPTHER

## 2021-03-16 RX ORDER — WARFARIN SODIUM 5 MG/1
7.5 TABLET ORAL
COMMUNITY

## 2021-03-16 RX ORDER — ACETAMINOPHEN 325 MG/1
650 TABLET ORAL EVERY 6 HOURS PRN
Status: DISCONTINUED | OUTPATIENT
Start: 2021-03-16 | End: 2021-03-20 | Stop reason: HOSPADM

## 2021-03-16 RX ORDER — SODIUM CHLORIDE 0.9 % (FLUSH) 0.9 %
10 SYRINGE (ML) INJECTION PRN
Status: DISCONTINUED | OUTPATIENT
Start: 2021-03-16 | End: 2021-03-20 | Stop reason: HOSPADM

## 2021-03-16 RX ORDER — CITALOPRAM 10 MG/1
10 TABLET ORAL DAILY
COMMUNITY
End: 2021-04-27

## 2021-03-16 RX ORDER — NICOTINE POLACRILEX 4 MG
15 LOZENGE BUCCAL PRN
Status: DISCONTINUED | OUTPATIENT
Start: 2021-03-16 | End: 2021-03-20 | Stop reason: HOSPADM

## 2021-03-16 RX ORDER — SODIUM POLYSTYRENE SULFONATE 4.1 MEQ/G
30 POWDER, FOR SUSPENSION ORAL; RECTAL ONCE
Status: COMPLETED | OUTPATIENT
Start: 2021-03-16 | End: 2021-03-16

## 2021-03-16 RX ORDER — DEXTROSE MONOHYDRATE 50 MG/ML
100 INJECTION, SOLUTION INTRAVENOUS PRN
Status: DISCONTINUED | OUTPATIENT
Start: 2021-03-16 | End: 2021-03-20 | Stop reason: HOSPADM

## 2021-03-16 RX ORDER — SODIUM CHLORIDE 9 MG/ML
1000 INJECTION, SOLUTION INTRAVENOUS CONTINUOUS
Status: DISCONTINUED | OUTPATIENT
Start: 2021-03-16 | End: 2021-03-16

## 2021-03-16 RX ORDER — DOXYCYCLINE 100 MG/1
100 CAPSULE ORAL EVERY 12 HOURS SCHEDULED
Status: DISCONTINUED | OUTPATIENT
Start: 2021-03-16 | End: 2021-03-20 | Stop reason: HOSPADM

## 2021-03-16 RX ORDER — 0.9 % SODIUM CHLORIDE 0.9 %
500 INTRAVENOUS SOLUTION INTRAVENOUS ONCE
Status: COMPLETED | OUTPATIENT
Start: 2021-03-16 | End: 2021-03-16

## 2021-03-16 RX ORDER — DEXTROSE MONOHYDRATE 25 G/50ML
12.5 INJECTION, SOLUTION INTRAVENOUS PRN
Status: DISCONTINUED | OUTPATIENT
Start: 2021-03-16 | End: 2021-03-20 | Stop reason: HOSPADM

## 2021-03-16 RX ADMIN — CALCIUM GLUCONATE 1000 MG: 98 INJECTION, SOLUTION INTRAVENOUS at 15:09

## 2021-03-16 RX ADMIN — DOXYCYCLINE 100 MG: 100 CAPSULE ORAL at 21:58

## 2021-03-16 RX ADMIN — SODIUM POLYSTYRENE SULFONATE 30 G: 1 POWDER ORAL; RECTAL at 16:30

## 2021-03-16 RX ADMIN — PHYTONADIONE 1 MG: 10 INJECTION, EMULSION INTRAMUSCULAR; INTRAVENOUS; SUBCUTANEOUS at 21:25

## 2021-03-16 RX ADMIN — CEFTRIAXONE SODIUM 1000 MG: 1 INJECTION, POWDER, FOR SOLUTION INTRAMUSCULAR; INTRAVENOUS at 22:29

## 2021-03-16 RX ADMIN — CITALOPRAM HYDROBROMIDE 10 MG: 20 TABLET ORAL at 22:02

## 2021-03-16 RX ADMIN — METOPROLOL TARTRATE 25 MG: 25 TABLET, FILM COATED ORAL at 21:58

## 2021-03-16 RX ADMIN — SODIUM CHLORIDE 500 ML: 9 INJECTION, SOLUTION INTRAVENOUS at 19:54

## 2021-03-16 RX ADMIN — SODIUM CHLORIDE 1000 ML: 9 INJECTION, SOLUTION INTRAVENOUS at 16:30

## 2021-03-16 RX ADMIN — SODIUM CHLORIDE 1000 ML: 9 INJECTION, SOLUTION INTRAVENOUS at 22:28

## 2021-03-16 RX ADMIN — INSULIN HUMAN 5 UNITS: 100 INJECTION, SOLUTION PARENTERAL at 15:09

## 2021-03-16 RX ADMIN — DEXTROSE MONOHYDRATE 25 G: 25 INJECTION, SOLUTION INTRAVENOUS at 15:09

## 2021-03-16 ASSESSMENT — ENCOUNTER SYMPTOMS
DIARRHEA: 0
EYE PAIN: 0
RECTAL PAIN: 0
COLOR CHANGE: 0
VOICE CHANGE: 0
ANAL BLEEDING: 0
ABDOMINAL DISTENTION: 0
PHOTOPHOBIA: 0
RHINORRHEA: 0
SHORTNESS OF BREATH: 0
NAUSEA: 0
BACK PAIN: 0
CHOKING: 0
EYE REDNESS: 0
FACIAL SWELLING: 0
COUGH: 0
TROUBLE SWALLOWING: 0
STRIDOR: 0
BLOOD IN STOOL: 0
VOMITING: 0
SORE THROAT: 0
ABDOMINAL PAIN: 0

## 2021-03-16 NOTE — PROGRESS NOTES
Pharmacy Note  Warfarin Consult    Harrison Prasad is a 66 y.o. male for whom pharmacy has been consulted to manage warfarin therapy. Consulting Physician: Popeye Ramos  Reason for Admission: fatigue, bilateral leg weakness. Warfarin dose prior to admission: 7.5 mg daily except on Mondays dose is 10 mg. Warfarin indication: mechanical aortic heart valve. Target INR range: 2.5-3.5     Past Medical History:   Diagnosis Date    Atrioventricular block     Cancer ([facility] [address] 1978    Mastoid tumor removed right ear    Diabetes mellitus ([facility] [address]     GERD (gastroesophageal reflux disease)     BETTER WITH CPAP    [place] (hard of hearing)     BILAT HEARING AIDS    Hyperlipidemia     Hypertension     Kidney stones     Osteoarthritis     Pacemaker 2010    Sleep apnea     Cpap machine                Recent Labs     03/16/21  1848   INR 9.9*     Recent Labs     03/16/21  1340   HGB 12.9*   HCT 39.1*          Current warfarin drug-drug interactions: doxycycline, Rocephin      Date             INR        Dose   3/16/2021        9.9     Hold dose for now. Daily PT/INR while inpatient. Thank you for the consult. Will continue to follow. Leslie Gambino. Ph.  3/16/2021  7:47 PM

## 2021-03-16 NOTE — PROGRESS NOTES
Medication History completed:    New medications: citalopram, multivitamin    Medications discontinued: amoxicillin, Norco    Changes to dosing:  Warfarin dosing changed to 2 tablets (10mg) on Mondays, 1 1/2 tablets (7.5mg) all other days  Metoprolol tartrate dosing changed to 50mg daily    Stated allergies: NKDA    Other pertinent information:   Patient medication information obtained from patient and wife who presented with their full home supply. Prescription bottles are from both [facility] and [facility].  [facility]  Patient was told by doctor to stop simvastatin, most recent dose taken yesterday.     Thank you,  Roseline Nicholas, PharmD candidate [id number]

## 2021-03-16 NOTE — ED NOTES
Pt off monitor per self; pt reports that he \"needed to sit up\" due to the pressure in his rectum which pt relates to the diarrhea; pt denies further needs; spouse at bedside.      Elías Corona RN  03/16/21 0603

## 2021-03-16 NOTE — ED PROVIDER NOTES
Osteoarthritis     Pacemaker 2010    Sleep apnea     Cpap machine     Past Problem List  Patient Active Problem List   Diagnosis Code    Basal cell carcinoma of right forehead C44.319    Neoplasm of uncertain behavior of skin D48.5    Acute renal failure with tubular necrosis (HCC) N17.0    Essential hypertension I10    Class 3 severe obesity due to excess calories with body mass index (BMI) of 40.0 to 44.9 in adult ([facility] [address] E66.01, Z68.41    Transaminitis R74.01    ASCVD (arteriosclerotic cardiovascular disease) I25.10    Chronic diastolic congestive heart failure (HCC) I50.32    Current use of long term anticoagulation Z79.01    Obstructive sleep apnea G47.33    Renal failure N19     SURGICAL HISTORY       Past Surgical History:   Procedure Laterality Date    AORTIC VALVE REPLACEMENT  2012/2013    Mechanical Valve    BACK SURGERY      LUMBAR    CARDIAC SURGERY      AORTIC VALVE REPLACEMENT    COLONOSCOPY      CYSTOSCOPY      EYE SURGERY Bilateral     CATARACTS    FACIAL RECONSTRUCTION SURGERY Right 4/17/2017    EXCISION LESION RIGHT SIDE OF FOREHEAD performed by Avinash Lopez MD at [address] [place] [address] Right     Bone Spur removed    JOINT REPLACEMENT Left 08/23/2016    TKA    KIDNEY STONE SURGERY      KIDNEY SURGERY Left     STONES REMOVED, WAS DONE YEARS AGO    KNEE ARTHROSCOPY Left     NECK SURGERY N/A 9/18/2019    EXCISION MASS POSTERIOR NECK performed by Avinash Lopez MD at [facility]      PRE-MALIGNANT / [address] Right 04/17/2017    forehead    PRE-MALIGNANT / BENIGN SKIN LESION EXCISION  09/18/2019    posterior neck mass    TONSILLECTOMY      TOTAL KNEE ARTHROPLASTY Left     with biomet and GPS product application    TUMOR REMOVAL      Ear     CURRENT MEDICATIONS       Previous Medications    CITALOPRAM (CELEXA) 10 MG TABLET    Take 10 mg by mouth daily    LISINOPRIL (PRINIVIL;ZESTRIL) 20 MG TABLET    Take 20 mg by mouth daily     METFORMIN ER (GLUCOPHAGE-XR) 500 MG XR TABLET    Take 500 mg by mouth 2 times daily     METOPROLOL TARTRATE (LOPRESSOR) 50 MG TABLET    Take 25 mg by mouth 2 times daily     MULTIPLE VITAMINS-MINERALS (THERAPEUTIC MULTIVITAMIN-MINERALS) TABLET    Take 1 tablet by mouth daily    SIMVASTATIN (ZOCOR) 40 MG TABLET    Take 40 mg by mouth nightly     TRIAMTERENE-HYDROCHLOROTHIAZIDE (DYAZIDE) 37.5-25 MG PER CAPSULE    Take 1 capsule by mouth every morning     WARFARIN (COUMADIN) 5 MG TABLET    Take 7.5 mg by mouth Six times weekly Indications: ALL DAYS EXCEPT MONDAY - INR range 2.5-3.5 Per [facility]    WARFARIN (COUMADIN) 5 MG TABLET    Take 10 mg by mouth once a week Indications: ON  ONLY - INR range 2.5-3.5 Per [facility]     ALLERGIES     has No Known Allergies. FAMILY HISTORY     He indicated that his mother is . He indicated that his father is . He indicated that the status of his paternal grandmother is unknown. SOCIAL HISTORY       Social History     Tobacco Use    Smoking status: Former Smoker     Quit date: 1984     Years since quittin.6    Smokeless tobacco: Never Used   Substance Use Topics    Alcohol use: Not Currently     Alcohol/week: 0.0 standard drinks    Drug use: No     PHYSICAL EXAM     INITIAL VITALS: BP (!) 127/48   Pulse 66   Temp 97.9 °F (36.6 °C) (Oral)   Resp 20   Ht 5' 9\" (1.753 m)   Wt 293 lb (132.9 kg)   SpO2 97%   BMI 43.27 kg/m²    Physical Exam  Constitutional:       Appearance: Normal appearance. HENT:      Head: Normocephalic and atraumatic. Nose: Nose normal.   Eyes:      Extraocular Movements: Extraocular movements intact. Pupils: Pupils are equal, round, and reactive to light. Neck:      Musculoskeletal: Normal range of motion. No neck rigidity. Cardiovascular:      Rate and Rhythm: Normal rate and regular rhythm.    Pulmonary:      Effort: Pulmonary effort is normal. No respiratory distress. Breath sounds: Normal breath sounds. Abdominal:      General: Abdomen is flat. There is no distension. Palpations: Abdomen is soft. There is no mass. Musculoskeletal: Normal range of motion. General: No swelling. Skin:     General: Skin is warm and dry. Neurological:      General: No focal deficit present. Mental Status: He is alert. Mental status is at baseline. Cranial Nerves: No cranial nerve deficit. Comments: 5/5 BUE/BLE strength and sensation. Normal mental status. CN II-XII intact. No ataxia. Normal speech          MEDICAL DECISION MAKIN-year-old male presents for evaluation of generalized weakness and fatigue. Nonfocal neurologically. Work up for renal failure, liver failure, symptomatic anemia, electrolyte abnormality, pacemaker malfunction    EKG shows normal paced rhythm. Labs significant for acute renal failure with creatinine of 3.3 BUN of 112 and potassium of 6.0. Stable transaminitis. Patient did have urine in his bladder on bladder scan. Will start treatment for hyperkalemia with insulin D50 Kayexalate calcium. Nephrology consulted and will admit for work-up of acute kidney injury. CRITICAL CARE:   30 minutes for management of acute renal failure and life threatening hyperkalemia    PROCEDURES:    Procedures    DIAGNOSTIC RESULTS   EKG:All EKG's are interpreted by the Emergency Department Physician who either signs or Co-signs this chart in the absence of a cardiologist.    Paced rhythm rate of 68 left axis wide QRS atrial sensed ventricular paced morphology    RADIOLOGY:All plain film, CT, MRI, and formal ultrasound images (except ED bedside ultrasound) are read by the radiologist, see reports below, unless otherwisenoted in MDM or here. CT ABDOMEN PELVIS WO CONTRAST Additional Contrast? None   Final Result   1.   Findings compatible with bronchiolitis in the left lower lobe and to a   lesser degree in the lingula for which developing inflammatory process or   infection should be considered. 2.  No acute abnormality identified in the abdomen or pelvis. 3.  Distended gallbladder without calcified stones or evidence for biliary   dilatation. 4.  No renal calculi identified. 5.  Additional chronic and benign findings, as above. XR CHEST PORTABLE   Final Result   Mild stable cardiomegaly and chronic pulmonary change without acute process. LABS: All lab results were reviewed by myself, and all abnormals are listed below.   Labs Reviewed   CBC WITH AUTO DIFFERENTIAL - Abnormal; Notable for the following components:       Result Value    WBC 12.7 (*)     Hemoglobin 12.9 (*)     Hematocrit 39.1 (*)     Seg Neutrophils 69 (*)     Lymphocytes 21 (*)     Monocytes 8 (*)     All other components within normal limits   COMPREHENSIVE METABOLIC PANEL W/ REFLEX TO MG FOR LOW K - Abnormal; Notable for the following components:    Glucose 115 (*)      (*)     CREATININE 3.33 (*)     Sodium 133 (*)     Potassium 6.0 (*)      (*)      (*)     Total Bilirubin 1.32 (*)     GFR Non- 18 (*)     GFR  22 (*)     All other components within normal limits   TROPONIN - Abnormal; Notable for the following components:    Troponin, High Sensitivity 144 (*)     All other components within normal limits   SPECIMEN REJECTION   TROPONIN   URINALYSIS   SODIUM, URINE, RANDOM   CREATININE, RANDOM URINE   UREA NITROGEN, URINE   CK   BASIC METABOLIC PANEL W/ REFLEX TO MG FOR LOW K       EMERGENCY DEPARTMENTCOURSE:         Vitals:    Vitals:    03/16/21 1545 03/16/21 1600 03/16/21 1615 03/16/21 1747   BP:    (!) 127/48   Pulse:       Resp:       Temp:       TempSrc:       SpO2: 96% 96% 95% 97%   Weight:       Height:           The patient was given the following medications while in the emergency department:  Orders Placed This Encounter   Medications    insulin regular (HUMULIN R;NOVOLIN R) injection 5 Units    dextrose 50 % IV solution    calcium gluconate 1,000 mg in dextrose 5 % 100 mL IVPB    sodium polystyrene (KAYEXALATE) powder 30 g    0.9 % sodium chloride infusion     CONSULTS:  IP CONSULT TO INTERNAL MEDICINE  IP CONSULT TO NEPHROLOGY  PHARMACY TO DOSE WARFARIN    FINAL IMPRESSION      1. Acute renal failure, unspecified acute renal failure type ([facility] [address]    2. Hyperkalemia          DISPOSITION/PLAN   DISPOSITION Admitted 03/16/2021 05:11:41 PM      PATIENT REFERRED TO:  No follow-up provider specified.   DISCHARGE MEDICATIONS:  New Prescriptions    No medications on file     Anatoly Soto MD  Attending Emergency Physician                    Anatoly Soto MD  03/16/21 1277

## 2021-03-16 NOTE — ED NOTES
Bladder scan - 76 mls of urine present. Pt stated that he urinated PTA.      Brittany Nicole RN  03/16/21 0566

## 2021-03-16 NOTE — PROGRESS NOTES
Dr. Ermelinda Razo notified of B/P 77/46. Orders received for 500 mL bolus and to recheck pressure after. Normal saline @ 200 mL/hr after bolus due to elevated CK.

## 2021-03-16 NOTE — H&P
History and Physical      Name: Danyelle Mg  MRN: [id number]     Acct: [de-identified]  Room: [facility]    Admit Date: 3/16/2021  PCP: Jose Ferreira      Chief Complaint:     Chief Complaint   Patient presents with    Fatigue    Extremity Weakness     bilateral legs        History Obtained From:     Patient,wife,electronic medical record    History of Present Illness:      Danyelle Mg is a  66 y.o.  male with medical history of hypertension, long-term anticoagulation due to mechanical aortic valve , obstructive sleep apnea presents with Fatigue and Extremity Weakness (bilateral legs )  Provide patient has been progressively getting weaker specially has bilateral leg weakness. Patient has been falling asleep frequently during daytime. Patient denies chest pain shortness of breath palpitations visual change nausea vomiting abdominal pain dysuria decrease in urination difficulty urinating loss of taste loss of smell fever or chills. abdomen CT report reviewed.     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    Diabetes mellitus ([address]     GERD (gastroesophageal reflux disease)     BETTER WITH CPAP    [place] (hard of hearing)     BILAT HEARING AIDS    Hyperlipidemia     Hypertension     Kidney stones     Osteoarthritis     Pacemaker 2010    Sleep apnea     Cpap machine        Past Surgical History:     Past Surgical History:   Procedure Laterality Date    AORTIC VALVE REPLACEMENT  2012/2013    Mechanical Valve    BACK SURGERY      LUMBAR    CARDIAC SURGERY      AORTIC VALVE REPLACEMENT    COLONOSCOPY      CYSTOSCOPY      EYE SURGERY Bilateral     CATARACTS    FACIAL RECONSTRUCTION SURGERY Right 4/17/2017    EXCISION LESION RIGHT SIDE OF FOREHEAD performed by Jamie Conway MD at [facility] Right     Bone Spur removed    JOINT REPLACEMENT Left 08/23/2016    TKA    KIDNEY STONE SURGERY      KIDNEY SURGERY Left     STONES REMOVED, WAS DONE YEARS AGO    KNEE ARTHROSCOPY Left     NECK SURGERY N/A 9/18/2019    EXCISION MASS POSTERIOR NECK performed by Chet Browne MD at [address] [place] [address] PRE-MALIGNANT / [address] Right 04/17/2017    forehead    PRE-MALIGNANT / BENIGN SKIN LESION EXCISION  09/18/2019    posterior neck mass    TONSILLECTOMY      TOTAL KNEE ARTHROPLASTY Left     with biomet and GPS product application    TUMOR REMOVAL      Ear        Medications Prior to Admission:       Prior to Admission medications    Medication Sig Start Date End Date Taking? Authorizing Provider   citalopram (CELEXA) 10 MG tablet Take 10 mg by mouth daily   Yes Historical Provider, MD   Multiple Vitamins-Minerals (THERAPEUTIC MULTIVITAMIN-MINERALS) tablet Take 1 tablet by mouth daily   Yes Historical Provider, MD   warfarin (COUMADIN) 5 MG tablet Take 7.5 mg by mouth Six times weekly Indications: ALL DAYS EXCEPT MONDAY - INR range 2.5-3.5 Per [facility]   Yes Historical Provider, MD   warfarin (COUMADIN) 5 MG tablet Take 10 mg by mouth once a week Indications: ON MONDAYS ONLY - INR range 2.5-3.5 Per [facility]   Yes Historical Provider, MD   triamterene-hydrochlorothiazide (DYAZIDE) 37.5-25 MG per capsule Take 1 capsule by mouth every morning  12/7/15  Yes Historical Provider, MD   simvastatin (ZOCOR) 40 MG tablet Take 40 mg by mouth nightly  10/23/15  Yes Historical Provider, MD   metoprolol tartrate (LOPRESSOR) 50 MG tablet Take 25 mg by mouth 2 times daily  10/12/15  Yes Historical Provider, MD   lisinopril (PRINIVIL;ZESTRIL) 20 MG tablet Take 20 mg by mouth daily  10/2/15  Yes Historical Provider, MD   metFORMIN ER (GLUCOPHAGE-XR) 500 MG XR tablet Take 500 mg by mouth 2 times daily  11/12/15  Yes Historical Provider, MD        Allergies:       Patient has no known allergies.     Social History: Tobacco:    reports that he quit smoking about 36 years ago. He has never used smokeless tobacco.  Alcohol:      reports previous alcohol use. Drug Use:  reports no history of drug use. Family History:     Family History   Problem Relation Age of Onset   Lety Lund Cancer Mother     Diabetes Paternal Grandmother        Review of Systems:     Positive and Negative as described in HPI   all 10 systems are reviewed and negative except as Noted      Review of Systems   Constitutional: Negative for appetite change, chills and diaphoresis. HENT: Negative for drooling, ear pain, trouble swallowing and voice change. Eyes: Negative for photophobia and visual disturbance. Respiratory: Negative for choking and stridor. Cardiovascular: Negative for chest pain and palpitations. Gastrointestinal: Negative for abdominal distention, anal bleeding, blood in stool and rectal pain. Endocrine: Negative for polyphagia and polyuria. Genitourinary: Negative for dysuria, flank pain, hematuria and urgency. Musculoskeletal: Negative for back pain, myalgias and neck stiffness. Skin: Negative for color change, pallor and rash. Allergic/Immunologic: Negative for environmental allergies and food allergies. Neurological: Positive for weakness (generalized). Negative for tremors, seizures, facial asymmetry and numbness. Hematological: Negative for adenopathy. Does not bruise/bleed easily. Psychiatric/Behavioral: Negative for agitation, behavioral problems, hallucinations, self-injury and suicidal ideas. Code Status:  Full Code    Physical Exam:     Vitals:  BP (!) 77/46   Pulse 66   Temp 97.5 °F (36.4 °C) (Axillary)   Resp 18   Ht 5' 9\" (1.753 m)   Wt 293 lb (132.9 kg)   SpO2 94%   BMI 43.27 kg/m²   Temp (24hrs), Av.7 °F (36.5 °C), Min:97.5 °F (36.4 °C), Max:97.9 °F (36.6 °C)        Physical Exam  Vitals signs reviewed. Constitutional:       Appearance: Normal appearance. He is obese.  He is not diaphoretic. HENT:      Head: Normocephalic and atraumatic. Right Ear: External ear normal.      Left Ear: External ear normal.      Nose: Nose normal.      Mouth/Throat:      Mouth: Mucous membranes are moist.      Pharynx: Oropharynx is clear. Eyes:      Conjunctiva/sclera: Conjunctivae normal.   Neck:      Musculoskeletal: Normal range of motion and neck supple. No neck rigidity. Cardiovascular:      Rate and Rhythm: Normal rate and regular rhythm. Pulses: Normal pulses. Heart sounds: Normal heart sounds. Pulmonary:      Effort: Pulmonary effort is normal.      Breath sounds: Examination of the left-lower field reveals rhonchi. Rhonchi present. Abdominal:      General: Bowel sounds are normal. There is no distension. Palpations: Abdomen is soft. Tenderness: There is no abdominal tenderness. There is no right CVA tenderness or left CVA tenderness. Musculoskeletal: Normal range of motion. General: No tenderness or deformity. Right lower leg: No edema. Left lower leg: No edema. Skin:     General: Skin is warm and dry. Capillary Refill: Capillary refill takes less than 2 seconds. Coloration: Skin is not jaundiced. Neurological:      General: No focal deficit present. Mental Status: Mental status is at baseline.    Psychiatric:         Mood and Affect: Mood normal.         Behavior: Behavior normal.               Data:     Recent Results (from the past 24 hour(s))   EKG 12 Lead    Collection Time: 03/16/21  1:30 PM   Result Value Ref Range    Ventricular Rate 68 BPM    Atrial Rate 68 BPM    P-R Interval 192 ms    QRS Duration 206 ms    Q-T Interval 510 ms    QTc Calculation (Bazett) 542 ms    P Axis 16 degrees    R Axis -76 degrees    T Axis 84 degrees   CBC Auto Differential    Collection Time: 03/16/21  1:40 PM   Result Value Ref Range    WBC 12.7 (H) 3.5 - 11.0 k/uL    RBC 4.56 4.5 - 5.9 m/uL    Hemoglobin 12.9 (L) 13.5 - 17.5 g/dL Hematocrit 39.1 (L) 41 - 53 %    MCV 85.7 80 - 100 fL    MCH 28.3 26 - 34 pg    MCHC 33.1 31 - 37 g/dL    RDW 14.3 11.5 - 14.9 %    Platelets 864 112 - 213 k/uL    MPV 8.5 6.0 - 12.0 fL    NRBC Automated NOT REPORTED per 100 WBC    Differential Type NOT REPORTED     Seg Neutrophils 69 (H) 36 - 66 %    Lymphocytes 21 (L) 24 - 44 %    Monocytes 8 (H) 1 - 7 %    Eosinophils % 1 0 - 4 %    Basophils 1 0 - 2 %    Immature Granulocytes NOT REPORTED 0 %    Segs Absolute 8.80 1.3 - 9.1 k/uL    Absolute Lymph # 2.70 1.0 - 4.8 k/uL    Absolute Mono # 1.00 0.1 - 1.3 k/uL    Absolute Eos # 0.10 0.0 - 0.4 k/uL    Basophils Absolute 0.10 0.0 - 0.2 k/uL    Absolute Immature Granulocyte NOT REPORTED 0.00 - 0.30 k/uL    WBC Morphology NOT REPORTED     RBC Morphology NOT REPORTED     Platelet Estimate NOT REPORTED    SPECIMEN REJECTION    Collection Time: 03/16/21  1:40 PM   Result Value Ref Range    Specimen Source BLOOD     Ordered Test CP,TROPI     Reason for Rejection Unable to perform testing: Specimen hemolyzed.      - NOT REPORTED    Comprehensive Metabolic Panel w/ Reflex to MG    Collection Time: 03/16/21  2:08 PM   Result Value Ref Range    Glucose 115 (H) 70 - 99 mg/dL     (HH) 8 - 23 mg/dL    CREATININE 3.33 (H) 0.70 - 1.20 mg/dL    Bun/Cre Ratio NOT REPORTED 9 - 20    Calcium 9.3 8.6 - 10.4 mg/dL    Sodium 133 (L) 135 - 144 mmol/L    Potassium 6.0 (HH) 3.7 - 5.3 mmol/L    Chloride 98 98 - 107 mmol/L    CO2 20 20 - 31 mmol/L    Anion Gap 15 9 - 17 mmol/L    Alkaline Phosphatase 68 40 - 129 U/L     (H) 5 - 41 U/L     (H) <40 U/L    Total Bilirubin 1.32 (H) 0.3 - 1.2 mg/dL    Total Protein 6.9 6.4 - 8.3 g/dL    Albumin 3.6 3.5 - 5.2 g/dL    Albumin/Globulin Ratio NOT REPORTED 1.0 - 2.5    GFR Non- 18 (L) >60 mL/min    GFR  22 (L) >60 mL/min    GFR Comment          GFR Staging NOT REPORTED    Troponin    Collection Time: 03/16/21  2:08 PM   Result Value Ref Range Troponin, High Sensitivity 144 (HH) 0 - 22 ng/L    Troponin T NOT REPORTED <0.03 ng/mL    Troponin Interp NOT REPORTED    CK    Collection Time: 03/16/21  2:08 PM   Result Value Ref Range    Total CK 6,266 (H) 39 - 308 U/L       Assesment:     Primary Problem  Acute renal failure with tubular necrosis (HCC)    Principal Problem:    Acute renal failure with tubular necrosis (HCC)  Active Problems:    Essential hypertension    Class 3 severe obesity due to excess calories with body mass index (BMI) of 40.0 to 44.9 in adult (HCC)    Transaminitis    ASCVD (arteriosclerotic cardiovascular disease)    Chronic diastolic congestive heart failure [address]    Current use of long term anticoagulation    Obstructive sleep apnea    Renal failure    Non-traumatic rhabdomyolysis  Resolved Problems:    * No resolved hospital problems. *      Plan:     1. Monitor Trop level  2. IV normal saline at 100 mL/hour  3.  hold lisinopril  4.  hold triamterene/ hydrochlorothiazide  5.  hold metformin  6. insulin sliding scale  7.  monitor CPK level  8. IV Rocephin 1 g  9.  doxycycline 100 mg p.o. twice daily  10. Consult nephrology for acute renal failure  11. Monitor CBC, CMP  12. Acute hepatitis panel  13. BiPAP nightly  14. 2D echo  15. Check ammonia level  16.  urine culture  17.  blood culture  18. DVT prophylaxis on Coumadin  19. CT abdominal pelvis report reviewed  20. CT brain  21. EPCs  22.  check and replace electrolytes per sliding scale  23.   restart home medications        Electronically signed by Ema Donnelly MD     Copy sent to Dr. Mack Dos Santos

## 2021-03-16 NOTE — ED NOTES
Pt transported to room via wheelchair accompanied by PCT Isabel Santoro. Pt denies any further needs at this time. Pt is eupneic, A&OX4, and PWD. Wife present.         Garima Turner RN  03/16/21 2190

## 2021-03-16 NOTE — CONSULTS
NEPHROLOGY CONSULT     Patient :  Saige Tillman; 66 y.o. MRN# [id number]  Location:  A/A  Attending:  Amanda Florian MD  Admit Date:  3/16/2021   Hospital Day: 0      Reason for Consult:  AURELIO, Hyperkalmeia      Chief Complaint: weakness, fatigue, poor oral intake  History Obtained From: Patient    History of Present Illness: This is a 66 y.o. male with past medical history of type 2 diabetes non-insulin-dependent diabetes mellitus, history of essential hypertension, obstructive sleep apnea on CPAP,CHF with preserved EF diastolic dysfunction, mild to moderate MR mild to moderate TR. Patient presented to the hospital with complaints of weakness fatigue, decrease in appetite poor oral intake for the last 10 days patient has been losing weight  Denies any shortness of breath  Does complain of dizziness    Pt denies any history of  prolonged NSAID use. Patient denies dysuria, gross hematuria, flank pain, nocturia, urgency, passing frothy urine or urinary incontinence. There has been no recent exposure to IV contrast. There is no history  of paraprotein disease. Pt denies any history of recurrent UTI or kidney stones. Medication review shows use of lisinopril, triamterene, hydrochlorothiazide, Metformin.   Statin    Past Medical History:        Diagnosis Date    Atrioventricular block     Cancer [address] 1978    Mastoid tumor removed right ear    Diabetes mellitus ([facility]     GERD (gastroesophageal reflux disease)     BETTER WITH CPAP    [place] (hard of hearing)     BILAT HEARING AIDS    Hyperlipidemia     Hypertension     Kidney stones     Osteoarthritis     Pacemaker 2010    Sleep apnea     Cpap machine       Past Surgical History:        Procedure Laterality Date    AORTIC VALVE REPLACEMENT  2012/2013    Mechanical Valve    BACK SURGERY      LUMBAR    CARDIAC SURGERY      AORTIC VALVE REPLACEMENT    COLONOSCOPY      CYSTOSCOPY      EYE SURGERY Bilateral     CATARACTS    FACIAL RECONSTRUCTION SURGERY Right 2017    EXCISION LESION RIGHT SIDE OF FOREHEAD performed by Ammy Esposito MD at [facility] Right     Bone Spur removed    JOINT REPLACEMENT Left 2016    TKA    KIDNEY STONE SURGERY      KIDNEY SURGERY Left     STONES REMOVED, WAS DONE YEARS AGO    KNEE ARTHROSCOPY Left     NECK SURGERY N/A 2019    EXCISION MASS POSTERIOR NECK performed by Ammy Es[facility]ito MD at [facility]      PRE-MALIGNANT / [address] Right 2017    forehead    PRE-MALIGNANT / BENIGN SKIN LESION EXCISION  2019    posterior neck mass    TONSILLECTOMY      TOTAL KNEE ARTHROPLASTY Left     with biomet and GPS product application    TUMOR REMOVAL      Ear       Current Medications:    sodium polystyrene (KAYEXALATE) powder 30 g, Once  0.9 % sodium chloride infusion, Continuous        Allergies:  Patient has no known allergies.     Social History:   Social History     Socioeconomic History    Marital status:      Spouse name: Not on file    Number of children: Not on file    Years of education: Not on file    Highest education level: Not on file   Occupational History    Not on file   Social Needs    Financial resource strain: Not on file    Food insecurity     Worry: Not on file     Inability: Not on file    Transportation needs     Medical: Not on file     Non-medical: Not on file   Tobacco Use    Smoking status: Former Smoker     Quit date: 1984     Years since quittin.6    Smokeless tobacco: Never Used   Substance and Sexual Activity    Alcohol use: Not Currently     Alcohol/week: 0.0 standard drinks    Drug use: No    Sexual activity: Not on file   Lifestyle    Physical activity     Days per week: Not on file     Minutes per session: Not on file    Stress: Not on file   Relationships    Social connections     Talks on phone: Not on file     Gets together: Not on file     Attends Jew service: Not on file     Active member of club or organization: Not on file     Attends meetings of clubs or organizations: Not on file     Relationship status: Not on file    Intimate partner violence     Fear of current or ex partner: Not on file     Emotionally abused: Not on file     Physically abused: Not on file     Forced sexual activity: Not on file   Other Topics Concern    Not on file   Social History Narrative    Not on file       Family History:   Family History   Problem Relation Age of Onset    Cancer Mother     Diabetes Paternal Grandmother        Review of Systems:    Constitutional: No fever, no chills, no night sweats, fatigue, generalized weakness, loss of appetite  HEENT:  No headache, otalgia, itchy eyes, epistaxis, nasal discharge or sore throat. Cardiac:  No chest pain, dyspnea, orthopnea or PND, palpitations  Chest:  No cough, hemoptysis, pleuritic chest pain, wheezing,SOB  Abdomen:  No abdominal pain, nausea, vomiting, diarrhea, melena, dysphagia hematemesis,constipation, abdominal bloating, flank pain  Neuro:  No CVA, TIA or seizure like activity. Skin:   No rashes, no itching. :   No hematuria, no pyuria, no dysuria, no flank pain. Extremities:  No swelling or joint pains. Objective:  CURRENT TEMPERATURE:  Temp: 97.9 °F (36.6 °C)  MAXIMUM TEMPERATURE OVER 24HRS:  Temp (24hrs), Av.9 °F (36.6 °C), Min:97.9 °F (36.6 °C), Max:97.9 °F (36.6 °C)    CURRENT RESPIRATORY RATE:  Resp: 20  CURRENT PULSE:  Pulse: 66  CURRENT BLOOD PRESSURE:  BP: (!) 120/45  24HR BLOOD PRESSURE RANGE:  Systolic (30ERR), MNQ:322 , Min:94 , ZW   ; Diastolic (07XZG), FOH:62, Min:45, Max:79    24HR INTAKE/OUTPUT:  No intake or output data in the 24 hours ending 21 1733  Patient Vitals for the past 96 hrs (Last 3 readings):   Weight   21 1316 293 lb (132.9 kg)       Physical Exam:  GENERAL APPEARANCE: Alert and cooperative, and appears to be in no acute distress.   HEAD: 5:33 PM

## 2021-03-16 NOTE — ED NOTES
Mode of arrival (squad #, walk in, police, etc) : walk in, from home, with family         Arrival Note (brief scenario, treatment PTA, etc). : patient reports feeling weak and fatigued, he describes that his legs are weaker than normal. Patients wife states that he has been getting increasingly weak, she states that he is falling asleep frequently. Patients wife states they spoke to his PCP and they advised patient to come to ED for evaluation. Patient alert and oriented x4, respirations even non-labored. Patient able to transfer from wheelchair to ED stretcher with assistance but has unsteady gait.             Dean Martinez RN  03/16/21 7605

## 2021-03-17 LAB
-: ABNORMAL
ABSOLUTE EOS #: 0.3 K/UL (ref 0–0.4)
ABSOLUTE IMMATURE GRANULOCYTE: ABNORMAL K/UL (ref 0–0.3)
ABSOLUTE LYMPH #: 3.6 K/UL (ref 1–4.8)
ABSOLUTE MONO #: 1.4 K/UL (ref 0.1–1.3)
ALBUMIN SERPL-MCNC: 3.3 G/DL (ref 3.5–5.2)
ALBUMIN/GLOBULIN RATIO: ABNORMAL (ref 1–2.5)
ALP BLD-CCNC: 60 U/L (ref 40–129)
ALT SERPL-CCNC: 184 U/L (ref 5–41)
AMORPHOUS: ABNORMAL
ANION GAP SERPL CALCULATED.3IONS-SCNC: 15 MMOL/L (ref 9–17)
AST SERPL-CCNC: 222 U/L
BACTERIA: ABNORMAL
BASOPHILS # BLD: 1 % (ref 0–2)
BASOPHILS ABSOLUTE: 0.1 K/UL (ref 0–0.2)
BILIRUB SERPL-MCNC: 1.54 MG/DL (ref 0.3–1.2)
BILIRUBIN URINE: NEGATIVE
BUN BLDV-MCNC: 111 MG/DL (ref 8–23)
BUN/CREAT BLD: ABNORMAL (ref 9–20)
CALCIUM SERPL-MCNC: 8.9 MG/DL (ref 8.6–10.4)
CASTS UA: ABNORMAL /LPF
CHLORIDE BLD-SCNC: 100 MMOL/L (ref 98–107)
CO2: 16 MMOL/L (ref 20–31)
COLOR: YELLOW
COMMENT UA: ABNORMAL
COMPLEMENT C3: 127 MG/DL (ref 90–180)
COMPLEMENT C4: 29 MG/DL (ref 10–40)
CREAT SERPL-MCNC: 2.22 MG/DL (ref 0.7–1.2)
CREATININE URINE: 128.9 MG/DL (ref 39–259)
CREATININE URINE: 137.5 MG/DL (ref 39–259)
CRYSTALS, UA: ABNORMAL /HPF
DIFFERENTIAL TYPE: ABNORMAL
EKG ATRIAL RATE: 68 BPM
EKG P AXIS: 16 DEGREES
EKG P-R INTERVAL: 192 MS
EKG Q-T INTERVAL: 510 MS
EKG QRS DURATION: 206 MS
EKG QTC CALCULATION (BAZETT): 542 MS
EKG R AXIS: -76 DEGREES
EKG T AXIS: 84 DEGREES
EKG VENTRICULAR RATE: 68 BPM
EOSINOPHILS RELATIVE PERCENT: 2 % (ref 0–4)
EPITHELIAL CELLS UA: ABNORMAL /HPF
ESTIMATED AVERAGE GLUCOSE: 134 MG/DL
FREE KAPPA/LAMBDA RATIO: 1.43 (ref 0.26–1.65)
GFR AFRICAN AMERICAN: 35 ML/MIN
GFR NON-AFRICAN AMERICAN: 29 ML/MIN
GFR SERPL CREATININE-BSD FRML MDRD: ABNORMAL ML/MIN/{1.73_M2}
GFR SERPL CREATININE-BSD FRML MDRD: ABNORMAL ML/MIN/{1.73_M2}
GLUCOSE BLD-MCNC: 110 MG/DL (ref 75–110)
GLUCOSE BLD-MCNC: 112 MG/DL (ref 70–99)
GLUCOSE BLD-MCNC: 118 MG/DL (ref 75–110)
GLUCOSE BLD-MCNC: 122 MG/DL (ref 75–110)
GLUCOSE BLD-MCNC: 145 MG/DL (ref 75–110)
GLUCOSE URINE: NEGATIVE
HBA1C MFR BLD: 6.3 % (ref 4–6)
HCT VFR BLD CALC: 36.1 % (ref 41–53)
HEMOGLOBIN: 12.4 G/DL (ref 13.5–17.5)
IMMATURE GRANULOCYTES: ABNORMAL %
INR BLD: 3.6
KAPPA FREE LIGHT CHAINS QNT: 5.48 MG/DL (ref 0.37–1.94)
KETONES, URINE: NEGATIVE
LAMBDA FREE LIGHT CHAINS QNT: 3.83 MG/DL (ref 0.57–2.63)
LEUKOCYTE ESTERASE, URINE: NEGATIVE
LV EF: 65 %
LVEF MODALITY: NORMAL
LYMPHOCYTES # BLD: 26 % (ref 24–44)
MCH RBC QN AUTO: 28.6 PG (ref 26–34)
MCHC RBC AUTO-ENTMCNC: 34.3 G/DL (ref 31–37)
MCV RBC AUTO: 83.4 FL (ref 80–100)
MONOCYTES # BLD: 10 % (ref 1–7)
MUCUS: ABNORMAL
NITRITE, URINE: NEGATIVE
NRBC AUTOMATED: ABNORMAL PER 100 WBC
OSMOLALITY URINE: 480 MOSM/KG (ref 80–1300)
OTHER OBSERVATIONS UA: ABNORMAL
PDW BLD-RTO: 13.8 % (ref 11.5–14.9)
PH UA: 5 (ref 5–8)
PLATELET # BLD: 261 K/UL (ref 150–450)
PLATELET ESTIMATE: ABNORMAL
PMV BLD AUTO: 8.8 FL (ref 6–12)
POTASSIUM SERPL-SCNC: 4.1 MMOL/L (ref 3.7–5.3)
PROTEIN UA: ABNORMAL
PROTHROMBIN TIME: 35 SEC (ref 11.8–14.6)
RBC # BLD: 4.33 M/UL (ref 4.5–5.9)
RBC # BLD: ABNORMAL 10*6/UL
RBC UA: ABNORMAL /HPF
RENAL EPITHELIAL, UA: ABNORMAL /HPF
SEG NEUTROPHILS: 61 % (ref 36–66)
SEGMENTED NEUTROPHILS ABSOLUTE COUNT: 8.3 K/UL (ref 1.3–9.1)
SERUM OSMOLALITY: 313 MOSM/KG (ref 275–295)
SODIUM BLD-SCNC: 131 MMOL/L (ref 135–144)
SODIUM,UR: 22 MMOL/L
SPECIFIC GRAVITY UA: 1.01 (ref 1–1.03)
TOTAL CK: 4190 U/L (ref 39–308)
TOTAL PROTEIN, URINE: 14 MG/DL
TOTAL PROTEIN: 6.9 G/DL (ref 6.4–8.3)
TRICHOMONAS: ABNORMAL
TROPONIN INTERP: ABNORMAL
TROPONIN T: ABNORMAL NG/ML
TROPONIN, HIGH SENSITIVITY: 101 NG/L (ref 0–22)
TURBIDITY: ABNORMAL
UREA NITROGEN, UR: 903 MG/DL
URIC ACID: 10.5 MG/DL (ref 3.4–7)
URINE HGB: ABNORMAL
URINE TOTAL PROTEIN CREATININE RATIO: 0.1 (ref 0–0.2)
UROBILINOGEN, URINE: NORMAL
WBC # BLD: 13.7 K/UL (ref 3.5–11)
WBC # BLD: ABNORMAL 10*3/UL
WBC UA: ABNORMAL /HPF
YEAST: ABNORMAL

## 2021-03-17 PROCEDURE — 6370000000 HC RX 637 (ALT 250 FOR IP): Performed by: INTERNAL MEDICINE

## 2021-03-17 PROCEDURE — 84484 ASSAY OF TROPONIN QUANT: CPT

## 2021-03-17 PROCEDURE — 93306 TTE W/DOPPLER COMPLETE: CPT

## 2021-03-17 PROCEDURE — 84550 ASSAY OF BLOOD/URIC ACID: CPT

## 2021-03-17 PROCEDURE — 97116 GAIT TRAINING THERAPY: CPT

## 2021-03-17 PROCEDURE — 82550 ASSAY OF CK (CPK): CPT

## 2021-03-17 PROCEDURE — 83883 ASSAY NEPHELOMETRY NOT SPEC: CPT

## 2021-03-17 PROCEDURE — 93010 ELECTROCARDIOGRAM REPORT: CPT | Performed by: INTERNAL MEDICINE

## 2021-03-17 PROCEDURE — 83935 ASSAY OF URINE OSMOLALITY: CPT

## 2021-03-17 PROCEDURE — 81001 URINALYSIS AUTO W/SCOPE: CPT

## 2021-03-17 PROCEDURE — 2580000003 HC RX 258: Performed by: FAMILY MEDICINE

## 2021-03-17 PROCEDURE — 6370000000 HC RX 637 (ALT 250 FOR IP): Performed by: FAMILY MEDICINE

## 2021-03-17 PROCEDURE — 6360000002 HC RX W HCPCS: Performed by: FAMILY MEDICINE

## 2021-03-17 PROCEDURE — 2500000003 HC RX 250 WO HCPCS: Performed by: INTERNAL MEDICINE

## 2021-03-17 PROCEDURE — 82947 ASSAY GLUCOSE BLOOD QUANT: CPT

## 2021-03-17 PROCEDURE — 85610 PROTHROMBIN TIME: CPT

## 2021-03-17 PROCEDURE — 86160 COMPLEMENT ANTIGEN: CPT

## 2021-03-17 PROCEDURE — 83930 ASSAY OF BLOOD OSMOLALITY: CPT

## 2021-03-17 PROCEDURE — 2580000003 HC RX 258: Performed by: INTERNAL MEDICINE

## 2021-03-17 PROCEDURE — 97166 OT EVAL MOD COMPLEX 45 MIN: CPT

## 2021-03-17 PROCEDURE — 2060000000 HC ICU INTERMEDIATE R&B

## 2021-03-17 PROCEDURE — 87086 URINE CULTURE/COLONY COUNT: CPT

## 2021-03-17 PROCEDURE — 85025 COMPLETE CBC W/AUTO DIFF WBC: CPT

## 2021-03-17 PROCEDURE — 97530 THERAPEUTIC ACTIVITIES: CPT

## 2021-03-17 PROCEDURE — 97162 PT EVAL MOD COMPLEX 30 MIN: CPT

## 2021-03-17 PROCEDURE — 36415 COLL VENOUS BLD VENIPUNCTURE: CPT

## 2021-03-17 PROCEDURE — 80053 COMPREHEN METABOLIC PANEL: CPT

## 2021-03-17 PROCEDURE — 84156 ASSAY OF PROTEIN URINE: CPT

## 2021-03-17 RX ORDER — 0.9 % SODIUM CHLORIDE 0.9 %
250 INTRAVENOUS SOLUTION INTRAVENOUS ONCE
Status: COMPLETED | OUTPATIENT
Start: 2021-03-17 | End: 2021-03-17

## 2021-03-17 RX ORDER — MIDODRINE HYDROCHLORIDE 2.5 MG/1
2.5 TABLET ORAL
Status: DISCONTINUED | OUTPATIENT
Start: 2021-03-17 | End: 2021-03-20 | Stop reason: HOSPADM

## 2021-03-17 RX ADMIN — SODIUM BICARBONATE: 84 INJECTION, SOLUTION INTRAVENOUS at 18:52

## 2021-03-17 RX ADMIN — SODIUM CHLORIDE, PRESERVATIVE FREE 10 ML: 5 INJECTION INTRAVENOUS at 10:00

## 2021-03-17 RX ADMIN — SODIUM CHLORIDE 250 ML: 9 INJECTION, SOLUTION INTRAVENOUS at 13:33

## 2021-03-17 RX ADMIN — MIDODRINE HYDROCHLORIDE 2.5 MG: 2.5 TABLET ORAL at 16:49

## 2021-03-17 RX ADMIN — CEFTRIAXONE SODIUM 1000 MG: 1 INJECTION, POWDER, FOR SOLUTION INTRAMUSCULAR; INTRAVENOUS at 18:52

## 2021-03-17 RX ADMIN — BISACODYL 5 MG: 5 TABLET, COATED ORAL at 18:47

## 2021-03-17 RX ADMIN — DOXYCYCLINE 100 MG: 100 CAPSULE ORAL at 20:45

## 2021-03-17 RX ADMIN — MIDODRINE HYDROCHLORIDE 2.5 MG: 2.5 TABLET ORAL at 13:33

## 2021-03-17 RX ADMIN — METOPROLOL TARTRATE 25 MG: 25 TABLET, FILM COATED ORAL at 20:45

## 2021-03-17 RX ADMIN — DOXYCYCLINE 100 MG: 100 CAPSULE ORAL at 09:53

## 2021-03-17 ASSESSMENT — ENCOUNTER SYMPTOMS
COLOR CHANGE: 0
RECTAL PAIN: 0
STRIDOR: 0
ANAL BLEEDING: 0
TROUBLE SWALLOWING: 0
CHOKING: 0
VOICE CHANGE: 0
ABDOMINAL DISTENTION: 0
PHOTOPHOBIA: 0
BACK PAIN: 0
BLOOD IN STOOL: 0

## 2021-03-17 ASSESSMENT — PAIN SCALES - GENERAL: PAINLEVEL_OUTOF10: 0

## 2021-03-17 NOTE — PROGRESS NOTES
Pharmacy Note  Warfarin Consult follow-up      Recent Labs     03/16/21  1848 03/17/21  0602   INR 9.9* 3.6     Recent Labs     03/16/21  1340 03/17/21  0602   HGB 12.9* 12.4*   HCT 39.1* 36.1*    261       Significant Drug-Drug Interactions:  New warfarin drug-drug interactions: none  Discontinued drug-drug interactions: none  Current warfarin drug-drug interactions: doxycycline, ceftriaxone      Date             INR        Dose given previous day  Dose scheduled for today  3/17/2021            3.6       HELD           HOLD        Notes:                   Patient received vitamin k 1 mg last night. Patient on doxycycline which has a significant interaction with warfarin. HOLD warfarin again today. Daily PT/INR while inpatient.      Malcolm Rangel, Pharm D, [address]  3/17/2021 9:12 AM

## 2021-03-17 NOTE — FLOWSHEET NOTE
03/17/21 1257   Provider Notification   Reason for Communication Evaluate   Provider Notification Physician   Method of Communication Call   Response Waiting for response   Notification Time [phone]   Provider notified of hypotension.

## 2021-03-17 NOTE — CARE COORDINATION
CASE MANAGEMENT NOTE:    Admission Date:  3/16/2021 Gabriel Mcgee is a 66 y.o.  male    Admitted for : Renal failure [N19]    Met with:  Patient    PCP:  Dr. Goyo Santos:  [employer]      Current Residence/ Living Arrangements:  independently at home             Current Services PTA:  No    Is patient agreeable to VNS: No    Freedom of choice provided:  No    List of [address] provided: No    VNS chosen:  NA    DME:  straight cane    Home Oxygen: No    Nebulizer: No    CPAP/BIPAP: No    Supplier: N/A    Potential Assistance Needed: No    SNF needed: No    Freedom of choice and list provided: NA    Pharmacy:  [facility] on [address]        Does Patient want to use MEDS to BEDS? No    Is patient currently receiving oral anticoagulation therapy? No    Is the Patient an BRIAN ANTOINE [facility] with Readmission Risk Score greater than 14%? NA  If yes, pt needs a follow up appointment made within 7 days. Family Members/Caregivers that pt would like involved in their care:    Yes    If yes, list name here:  Jourdan Hernández    Transportation Provider:  Family             Is patient in Isolation/One on One/Altered Mental Status? No  If yes, skip next question. If no, would they like an I-Pad to  use? No  If yes, call [phone]. Discharge Plan:  3/17/21 [employer] Pt is from home in a one story home with his wife DME: cane VNS Denies need plan is to discharge to home with no needs will continue to follow .//tv                  Electronically signed by:  Alicia Huynh RN on 3/17/2021 at 11:41 AM

## 2021-03-17 NOTE — PROGRESS NOTES
Call Dr. Kaden Cantu, critical lab value Trop of 163 and INR 9.9. Orders received vit K 1 mg. Hold Coumadin.

## 2021-03-17 NOTE — PROGRESS NOTES
Patient wore bipap for half an hour before requesting it off. Patient stated it felt like it was leaking. RN offered to adjust straps, patient still felt like it was leaking. Patient's Osat 96 -97% on room air.

## 2021-03-17 NOTE — PROGRESS NOTES
Physical Therapy    Facility/Department: [facility] PROGRESSIVE CARE  Initial Assessment    NAME: Gabriel Mcgee  : 1945  MRN: [id number]    Date of Service: 3/17/2021    Discharge Recommendations:  Patient would benefit from continued therapy after discharge   PT Equipment Recommendations  Equipment Needed: Yes  Mobility Devices: Sallie Volga: Rolling    Assessment   Body structures, Functions, Activity limitations: Decreased functional mobility ; Decreased endurance;Decreased balance;Decreased strength  Assessment: continue per POC to maxmize potential for safe D/C  Treatment Diagnosis: impaired mobility due to generalized  weakness  Specific instructions for Next Treatment: advance gait distance, instruct in exercise program  Prognosis: Good  Decision Making: Medium Complexity  History: admitted due to renal failure  Exam: ROM, MMT, balance and mobility assessments, monitored O2 sats w/ evaluation  Clinical Presentation: gait w/ wheeled walker 15' x 2 w/ CGA x 1, fall risk  PT Education: Goals;PT Role;Plan of Care  Barriers to Learning: [place]  REQUIRES PT FOLLOW UP: Yes  Activity Tolerance  Activity Tolerance: Patient limited by fatigue;Patient limited by endurance       Patient Diagnosis(es): The primary encounter diagnosis was Acute renal failure, unspecified acute renal failure type ([address] A diagnosis of Hyperkalemia was also pertinent to this visit. has a past medical history of Atrioventricular block, Cancer ([address] Diabetes mellitus ([address] GERD (gastroesophageal reflux disease), [place] (hard of hearing), Hyperlipidemia, Hypertension, Kidney stones, Osteoarthritis, Pacemaker, and Sleep apnea. has a past surgical history that includes Aortic valve replacement (); Kidney stone surgery; Foot surgery (Right); Colonoscopy; Knee arthroscopy (Left); tumor removal; pacemaker placement; Kidney surgery (Left); Cystoscopy; Cardiac surgery; back surgery; Tonsillectomy; eye surgery (Bilateral);  Total knee arthroplasty (Left); joint replacement (Left, 08/23/2016); pre-malignant / benign skin lesion excision (Right, 04/17/2017); Facial reconstruction surgery (Right, 4/17/2017); pre-malignant / benign skin lesion excision (09/18/2019); and Neck surgery (N/A, 9/18/2019). Restrictions  Restrictions/Precautions  Restrictions/Precautions: Fall Risk, Up as Tolerated(peripheral IV right antecubital, troponins 163 on 3-, continuous pulse oximeter)  Required Braces or Orthoses?: Yes(bilateral wrist braces for carpal keyonna)  Implants present? : Metal implants, Pacemaker(left TKR, aortic valve replacement)  Required Braces or Orthoses  Right Upper Extremity Brace/Splint: (bilateral wrist braces for carpal keyonna)  Left Upper Extremity Brace/Splint: (bilateral wrist braces for carpal keyonna)  Vision/Hearing  Vision: Impaired  Vision Exceptions: Wears glasses for distance  Hearing: Exceptions to [facility]  Hearing Exceptions: Hard of hearing/hearing concerns;Bilateral hearing aid     Subjective  General  Chart Reviewed: Yes  Patient assessed for rehabilitation services?: Yes  Response To Previous Treatment: Not applicable  Family / Caregiver Present: Yes(spouse)  Referring Practitioner: Dr. Morris Shoemaker  Referral Date : 03/16/21  Diagnosis: renal failure  Follows Commands: Within Functional Limits  Other (Comment): OK per nurse Zimmerman to proceed w/ PT evaluation  General Comment  Comments: spouse states that he was weak and having increased difficulty walking at home prior to admission but that he was feeling stronger today. Subjective  Subjective: pt had C/O generalized weakness mainly the legs , fatigue and SOB for several days PTA. Pt reports that yesterday he wasn't even able to shift himself in bed and had trouble standing for nursing staff. Pt states that he feels he is at 40% right now.   Pain Screening  Patient Currently in Pain: Denies  Vital Signs  Patient Currently in Pain: Denies Orientation  Orientation  Overall Orientation Status: Within Functional Limits  Social/Functional History  Social/Functional History  Lives With: Spouse  Type of Home: House(recently moved to [facility])  Home Layout: One level  Home Access: Level entry  Bathroom Shower/Tub: Walk-in shower, Curtain, Doors(\"curtain for prettiness only\"; also states that they have a walkin tub but doesn't use it)  Bathroom Toilet: Handicap height(has sink/ cabinent next to it)  [employer]: Grab bars in shower, Built-in shower seat(states that he is unable to use corner built in seat)  Bathroom Accessibility: Walker accessible  Home Equipment: Forrestine Must, Quad cane(C-Pap)  Receives Help From: Family  ADL Assistance: Independent  Homemaking Assistance: Needs assistance(spouse is primary)  Homemaking Responsibilities: Yes(spouse is primary)  Ambulation Assistance: Independent(no device)  Transfer Assistance: Independent  Active : Yes  Mode of Transportation: [place]  Occupation: Retired  Type of occupation: [employer]  Leisure & Hobbies: watch [employer], [employer], watch [employer]  IADL Comments: sleeps in a flat bed  Additional Comments: spouse is retired and able to assist at [employer]        Objective     Observation/Palpation  Observation: peripheral IV right antecubital, continuous pulse oximeter    AROM RLE (degrees)  RLE AROM: WFL  AROM LLE (degrees)  LLE AROM : WFL  AROM RUE (degrees)  RUE General AROM: see OT for UE assessment  AROM LUE (degrees)  LUE General AROM: see OT for UE assessment  Strength RLE  Comment: hip flexors 4-/5 otherwise grossly 4/5  Strength LLE  Comment: hip flexors 4-/5 otherwise grossly 4/5  Strength RUE  Comment: see OT for UE assessment  Strength LUE  Comment: see OT for UE assessment     Sensation  Overall Sensation Status: Impaired(C/O intermittant numbness and tingling bilateral hands from carpal keyonna)  Bed mobility  Rolling to Right: Stand by assistance  Supine to Sit: Stand by assistance  Sit to Supine: Minimal assistance(assist to swing legs back into bed)  Comment: pt dangled at the EOB w/ supervision; O2 sat at rest in bed 95%, O2 sat dangling at the EOB 94%, O2 sat post first gait 94-96%, end of treatment post 2nd gait 93%  initially but rebounded to 96%  Transfers  Sit to Stand: Contact guard assistance  Stand to sit: Contact guard assistance  Stand Pivot Transfers: Contact guard assistance(used wheeled walker)  Ambulation  Ambulation?: Yes  Ambulation 1  Surface: level tile  Device: Rolling Walker  Assistance: Contact guard assistance  Gait Deviations: Slow Hue;Deviated path; Increased FERMÍN  Distance: 15' x 2 w/ short seated rest break to evaluate O2 sat  Comments: O2 sat post first gait 94-96%, end of treatment post 2nd gait 93%  initially but rebounded to 96%  Stairs/Curb  Stairs?: No     Balance  Sitting - Static: Good  Sitting - Dynamic: Good  Standing - Static: Good(used wheeled walker)  Standing - Dynamic: Good;-(used wheeled walker)        Plan   Plan  Times per week: 3-5 treatments/ 5 days  Times per day: (3-5 treatments/ 5 days)  Specific instructions for Next Treatment: advance gait distance, instruct in exercise program  Current Treatment Recommendations: Strengthening, Home Exercise Program, Safety Education & Training, Balance Training, Endurance Training, Patient/Caregiver Education & Training, Equipment Evaluation, Education, & procurement, Functional Mobility Training, Transfer Training, Gait Training  Safety Devices  Type of devices: Call light within reach, Gait belt, Patient at risk for falls, All fall risk precautions in place, Nurse notified, Bed alarm in place, Left in bed(nurse Zimmerman)    G-Code       OutComes Score                                                  AM-PAC Score     AM-PAC Inpatient Mobility without Stair Climbing Raw Score : 15 (03/17/21 1010)  AM-PAC Inpatient without Stair Climbing T-Scale Score : 43.03 (03/17/21 1010)  Mobility Inpatient CMS 0-100% Score: 47.43 (03/17/21 1010)  Mobility Inpatient without Stair CMS G-Code Modifier : CK (03/17/21 1010)       Goals  Short term goals  Time Frame for Short term goals: 3-5 treatments/ 5 days  Short term goal 1: pt to tolerate 1/2 hour of therapuetic exercise and activity keeping O2 sats above 90%  Short term goal 2: pt to demonstrate independent bed mobility for rolling and supine <> sit for position change  Short term goal 3: pt to demonstrate MODIFIED independent transfers using least restrictive device  Short term goal 4: pt to demonstrate MODIFIED independent gait 50-80'  using least restrictive device for household distances  Short term goal 5: pt to demonstrate good balance using least restrictive device  Short term goal 6: pt to demonstrate good technique for LE strengthening and energy conservation techniques  Short term goal 7: pt to demonstrate increased strength bilateral LEs by 1/2 MMG  Patient Goals   Patient goals : return home w/ spouse       Therapy Time   Individual Concurrent Group Co-treatment   Time In 1010         Time Out 1048         Minutes 38         Timed Code Treatment Minutes: [address], PT

## 2021-03-17 NOTE — PROGRESS NOTES
PRN  acetaminophen (TYLENOL) tablet 650 mg, Q4H PRN  citalopram (CELEXA) tablet 10 mg, Daily  metoprolol tartrate (LOPRESSOR) tablet 25 mg, BID  cefTRIAXone (ROCEPHIN) 1000 mg IVPB in 50 mL D5W minibag, Q24H  doxycycline monohydrate (MONODOX) capsule 100 mg, 2 times per day  insulin lispro (HUMALOG) injection vial 0-12 Units, TID WC  insulin lispro (HUMALOG) injection vial 0-6 Units, Nightly  glucose (GLUTOSE) 40 % oral gel 15 g, PRN  dextrose 50 % IV solution, PRN  glucagon (rDNA) injection 1 mg, PRN  dextrose 5 % solution, PRN  sodium chloride flush 0.9 % injection 10 mL, 2 times per day  sodium chloride flush 0.9 % injection 10 mL, PRN  promethazine (PHENERGAN) tablet 12.5 mg, Q6H PRN    Or  ondansetron (ZOFRAN) injection 4 mg, Q6H PRN  acetaminophen (TYLENOL) tablet 650 mg, Q6H PRN    Or  acetaminophen (TYLENOL) suppository 650 mg, Q6H PRN  bisacodyl (DULCOLAX) EC tablet 5 mg, Daily PRN  0.9 % sodium chloride infusion, Continuous  warfarin (COUMADIN) daily dosing (placeholder), RX Placeholder        Data:     Code Status:  Full Code    Family History   Problem Relation Age of Onset    Cancer Mother     Diabetes Paternal Grandmother        Social History     Socioeconomic History    Marital status:      Spouse name: Not on file    Number of children: Not on file    Years of education: Not on file    Highest education level: Not on file   Occupational History    Not on file   Social Needs    Financial resource strain: Not on file    Food insecurity     Worry: Not on file     Inability: Not on file    Transportation needs     Medical: Not on file     Non-medical: Not on file   Tobacco Use    Smoking status: Former Smoker     Quit date: 1984     Years since quittin.6    Smokeless tobacco: Never Used   Substance and Sexual Activity    Alcohol use: Not Currently     Alcohol/week: 0.0 standard drinks    Drug use: No    Sexual activity: Not on file   Lifestyle    Physical activity Days per week: Not on file     Minutes per session: Not on file    Stress: Not on file   Relationships    Social connections     Talks on phone: Not on file     Gets together: Not on file     Attends Christian service: Not on file     Active member of club or organization: Not on file     Attends meetings of clubs or organizations: Not on file     Relationship status: Not on file    Intimate partner violence     Fear of current or ex partner: Not on file     Emotionally abused: Not on file     Physically abused: Not on file     Forced sexual activity: Not on file   Other Topics Concern    Not on file   Social History Narrative    Not on file       I/O (24Hr): Intake/Output Summary (Last 24 hours) at 3/17/2021 1123  Last data filed at 3/17/2021 1108  Gross per 24 hour   Intake 2139 ml   Output 1400 ml   Net 739 ml     Radiology:  Ct Abdomen Pelvis Wo Contrast Additional Contrast? None    Result Date: 3/16/2021  1. Findings compatible with bronchiolitis in the left lower lobe and to a lesser degree in the lingula for which developing inflammatory process or infection should be considered. 2.  No acute abnormality identified in the abdomen or pelvis. 3.  Distended gallbladder without calcified stones or evidence for biliary dilatation. 4.  No renal calculi identified. 5.  Additional chronic and benign findings, as above. Xr Lumbar Spine (2-3 Views)    Result Date: 3/12/2021  Multilevel degenerative changes     Ct Head Wo Contrast    Result Date: 3/16/2021  No evidence for acute intracranial hemorrhage, territorial infarction or intracranial mass lesion. Mild generalized volume loss. Xr Chest Portable    Result Date: 3/16/2021  Mild stable cardiomegaly and chronic pulmonary change without acute process.        Labs:  Recent Results (from the past 24 hour(s))   EKG 12 Lead    Collection Time: 03/16/21  1:30 PM   Result Value Ref Range    Ventricular Rate 68 BPM    Atrial Rate 68 BPM    P-R Interval 192 ms QRS Duration 206 ms    Q-T Interval 510 ms    QTc Calculation (Bazett) 542 ms    P Axis 16 degrees    R Axis -76 degrees    T Axis 84 degrees   CBC Auto Differential    Collection Time: 03/16/21  1:40 PM   Result Value Ref Range    WBC 12.7 (H) 3.5 - 11.0 k/uL    RBC 4.56 4.5 - 5.9 m/uL    Hemoglobin 12.9 (L) 13.5 - 17.5 g/dL    Hematocrit 39.1 (L) 41 - 53 %    MCV 85.7 80 - 100 fL    MCH 28.3 26 - 34 pg    MCHC 33.1 31 - 37 g/dL    RDW 14.3 11.5 - 14.9 %    Platelets 360 290 - 889 k/uL    MPV 8.5 6.0 - 12.0 fL    NRBC Automated NOT REPORTED per 100 WBC    Differential Type NOT REPORTED     Seg Neutrophils 69 (H) 36 - 66 %    Lymphocytes 21 (L) 24 - 44 %    Monocytes 8 (H) 1 - 7 %    Eosinophils % 1 0 - 4 %    Basophils 1 0 - 2 %    Immature Granulocytes NOT REPORTED 0 %    Segs Absolute 8.80 1.3 - 9.1 k/uL    Absolute Lymph # 2.70 1.0 - 4.8 k/uL    Absolute Mono # 1.00 0.1 - 1.3 k/uL    Absolute Eos # 0.10 0.0 - 0.4 k/uL    Basophils Absolute 0.10 0.0 - 0.2 k/uL    Absolute Immature Granulocyte NOT REPORTED 0.00 - 0.30 k/uL    WBC Morphology NOT REPORTED     RBC Morphology NOT REPORTED     Platelet Estimate NOT REPORTED    SPECIMEN REJECTION    Collection Time: 03/16/21  1:40 PM   Result Value Ref Range    Specimen Source BLOOD     Ordered Test CP,TROPI     Reason for Rejection Unable to perform testing: Specimen hemolyzed.      - NOT REPORTED    Hemoglobin A1C    Collection Time: 03/16/21  1:40 PM   Result Value Ref Range    Hemoglobin A1C 6.3 (H) 4.0 - 6.0 %    Estimated Avg Glucose 134 mg/dL   Comprehensive Metabolic Panel w/ Reflex to MG    Collection Time: 03/16/21  2:08 PM   Result Value Ref Range    Glucose 115 (H) 70 - 99 mg/dL     (HH) 8 - 23 mg/dL    CREATININE 3.33 (H) 0.70 - 1.20 mg/dL    Bun/Cre Ratio NOT REPORTED 9 - 20    Calcium 9.3 8.6 - 10.4 mg/dL    Sodium 133 (L) 135 - 144 mmol/L    Potassium 6.0 (HH) 3.7 - 5.3 mmol/L    Chloride 98 98 - 107 mmol/L    CO2 20 20 - 31 mmol/L    Anion Gap 15 9 - 17 mmol/L    Alkaline Phosphatase 68 40 - 129 U/L     (H) 5 - 41 U/L     (H) <40 U/L    Total Bilirubin 1.32 (H) 0.3 - 1.2 mg/dL    Total Protein 6.9 6.4 - 8.3 g/dL    Albumin 3.6 3.5 - 5.2 g/dL    Albumin/Globulin Ratio NOT REPORTED 1.0 - 2.5    GFR Non- 18 (L) >60 mL/min    GFR  22 (L) >60 mL/min    GFR Comment          GFR Staging NOT REPORTED    Troponin    Collection Time: 03/16/21  2:08 PM   Result Value Ref Range    Troponin, High Sensitivity 144 (HH) 0 - 22 ng/L    Troponin T NOT REPORTED <0.03 ng/mL    Troponin Interp NOT REPORTED    CK    Collection Time: 03/16/21  2:08 PM   Result Value Ref Range    Total CK 6,266 (H) 39 - 308 U/L   Protime-INR    Collection Time: 03/16/21  6:48 PM   Result Value Ref Range    Protime 76.4 (H) 11.8 - 14.6 sec    INR 9.9 (HH)    Culture, Blood 1    Collection Time: 03/16/21  6:48 PM    Specimen: Blood   Result Value Ref Range    Specimen Description . BLOOD LAC 10ML     Special Requests NOT REPORTED     Culture NO GROWTH 10 HOURS    Hepatitis Acute June    Collection Time: 03/16/21  6:48 PM   Result Value Ref Range    Hepatitis B Surface Ag NONREACTIVE NONREACTIVE    Hepatitis C Ab NONREACTIVE NONREACTIVE    Hep B Core Ab, IgM NONREACTIVE NONREACTIVE    Hep A IgM NONREACTIVE NONREACTIVE   Ammonia    Collection Time: 03/16/21  6:48 PM   Result Value Ref Range    Ammonia 26 16 - 60 umol/L   Troponin    Collection Time: 03/16/21  6:48 PM   Result Value Ref Range    Troponin, High Sensitivity 163 (HH) 0 - 22 ng/L    Troponin T NOT REPORTED <0.03 ng/mL    Troponin Interp NOT REPORTED    POC Glucose Fingerstick    Collection Time: 03/16/21  7:49 PM   Result Value Ref Range    POC Glucose 106 75 - 110 mg/dL   Culture, Blood 1    Collection Time: 03/16/21  9:20 PM    Specimen: Blood   Result Value Ref Range    Specimen Description . BLOOD     Special Requests NOT REPORTED     Culture NO GROWTH 7 HOURS    Basic Metabolic Panel w/ Reflex to MG    Collection Time: 03/16/21  9:20 PM   Result Value Ref Range    Glucose 115 (H) 70 - 99 mg/dL     (HH) 8 - 23 mg/dL    CREATININE 2.95 (H) 0.70 - 1.20 mg/dL    Bun/Cre Ratio NOT REPORTED 9 - 20    Calcium 9.2 8.6 - 10.4 mg/dL    Sodium 131 (L) 135 - 144 mmol/L    Potassium 4.5 3.7 - 5.3 mmol/L    Chloride 97 (L) 98 - 107 mmol/L    CO2 18 (L) 20 - 31 mmol/L    Anion Gap 16 9 - 17 mmol/L    GFR Non-African American 21 (L) >60 mL/min    GFR  25 (L) >60 mL/min    GFR Comment          GFR Staging NOT REPORTED    Troponin    Collection Time: 03/16/21  9:20 PM   Result Value Ref Range    Troponin, High Sensitivity 128 (HH) 0 - 22 ng/L    Troponin T NOT REPORTED <0.03 ng/mL    Troponin Interp NOT REPORTED    PROTIME-INR    Collection Time: 03/17/21  6:02 AM   Result Value Ref Range    Protime 35.0 (H) 11.8 - 14.6 sec    INR 3.6    Comprehensive Metabolic Panel w/ Reflex to MG    Collection Time: 03/17/21  6:02 AM   Result Value Ref Range    Glucose 112 (H) 70 - 99 mg/dL     (HH) 8 - 23 mg/dL    CREATININE 2.22 (H) 0.70 - 1.20 mg/dL    Bun/Cre Ratio NOT REPORTED 9 - 20    Calcium 8.9 8.6 - 10.4 mg/dL    Sodium 131 (L) 135 - 144 mmol/L    Potassium 4.1 3.7 - 5.3 mmol/L    Chloride 100 98 - 107 mmol/L    CO2 16 (L) 20 - 31 mmol/L    Anion Gap 15 9 - 17 mmol/L    Alkaline Phosphatase 60 40 - 129 U/L     (H) 5 - 41 U/L     (H) <40 U/L    Total Bilirubin 1.54 (H) 0.3 - 1.2 mg/dL    Total Protein 6.9 6.4 - 8.3 g/dL    Albumin 3.3 (L) 3.5 - 5.2 g/dL    Albumin/Globulin Ratio NOT REPORTED 1.0 - 2.5    GFR Non-African American 29 (L) >60 mL/min    GFR  35 (L) >60 mL/min    GFR Comment          GFR Staging NOT REPORTED    CBC auto differential    Collection Time: 03/17/21  6:02 AM   Result Value Ref Range    WBC 13.7 (H) 3.5 - 11.0 k/uL    RBC 4.33 (L) 4.5 - 5.9 m/uL    Hemoglobin 12.4 (L) 13.5 - 17.5 g/dL    Hematocrit 36.1 (L) 41 - 53 %    MCV 83.4 80 - 100 /HPF    Renal Epithelial, UA NOT REPORTED 0 /HPF    Bacteria, UA FEW (A) None    Mucus, UA 1+ (A) None    Trichomonas, UA NOT REPORTED None    Amorphous, UA NOT REPORTED None    Other Observations UA NOT REPORTED NOT REQ. Yeast, UA NOT REPORTED None   POC Glucose Fingerstick    Collection Time: 21  6:54 AM   Result Value Ref Range    POC Glucose 110 75 - 110 mg/dL       Physical Examination:        Vitals:  BP (!) 96/48   Pulse 62   Temp 98.2 °F (36.8 °C) (Oral)   Resp 18   Ht 5' 9\" (1.753 m)   Wt 278 lb 3.5 oz (126.2 kg)   SpO2 97%   BMI 41.09 kg/m²   Temp (24hrs), Av.7 °F (36.5 °C), Min:97.3 °F (36.3 °C), Max:98.2 °F (36.8 °C)    Recent Labs     21  0654   POCGLU 106 110         Physical Exam  Vitals signs reviewed. Constitutional:       Appearance: Normal appearance. He is obese. He is not diaphoretic. HENT:      Head: Normocephalic and atraumatic. Right Ear: External ear normal.      Left Ear: External ear normal.      Nose: Nose normal.      Mouth/Throat:      Mouth: Mucous membranes are moist.      Pharynx: Oropharynx is clear. Eyes:      Conjunctiva/sclera: Conjunctivae normal.   Neck:      Musculoskeletal: Normal range of motion and neck supple. No neck rigidity. Cardiovascular:      Rate and Rhythm: Normal rate and regular rhythm. Pulses: Normal pulses. Heart sounds: Normal heart sounds. Pulmonary:      Effort: Pulmonary effort is normal.      Breath sounds: Normal breath sounds. Abdominal:      General: Bowel sounds are normal. There is no distension. Palpations: Abdomen is soft. Musculoskeletal: Normal range of motion. General: No tenderness or deformity. Right lower leg: No edema. Left lower leg: No edema. Skin:     General: Skin is warm and dry. Capillary Refill: Capillary refill takes less than 2 seconds. Coloration: Skin is not jaundiced. Neurological:      General: No focal deficit present. Mental Status: Mental status is at baseline. Psychiatric:         Mood and Affect: Mood normal.         Behavior: Behavior normal.         Assessment:        Primary Problem  Acute renal failure with tubular necrosis (HCC)     Principal Problem:    Acute renal failure with tubular necrosis (HCC)  Active Problems:    Essential hypertension    Class 3 severe obesity due to excess calories with body mass index (BMI) of 40.0 to 44.9 in adult [facility])    Transaminitis    ASCVD (arteriosclerotic cardiovascular disease)    Chronic diastolic congestive heart failure (HCC)    Current use of long term anticoagulation    Obstructive sleep apnea    Renal failure    Non-traumatic rhabdomyolysis  Resolved Problems:    * No resolved hospital problems. 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 IV bicarb drip per nephrology  2. IV Rocephin  3. 100 mg p.o. daily  4. Urine culture pending  5. Blood culture pending  6. Monitor CMP, CK level  1. DVT Prophylaxis on Coumadin INR 3.6  2. EPCs  3. PT/OT to evaluate and treat  4. Pain control  5. Replace electrolytes as per sliding scale  6. Home medications reviewed and appropriate medications continued  7.  Reviewed labs and imaging studies from last 24 hours and results explained to patient      Electronically signed by Kalee Emerson MD

## 2021-03-17 NOTE — PROGRESS NOTES
NEPHROLOGY PROGRESS NOTE     Patient :  Harrison Prasad; 66 y.o. MRN# [id number]  Location:  2115/2115-01  Attending:  Merissa Davila MD  Admit Date:  3/16/2021   Hospital Day: 1    Reason for Consult:  Management of acute kidney injury and hyperkalemia. Chief Complaint: Weakness, fatigue and poor oral intake. Interval history: Patient was seen and examined today and he is feeling much better. He is nonoliguric and Dyazide remains on hold. Serum potassium is now within normal range at 4.5 mmol/L. He does not have shortness of breath at rest.    History of Present Illness: This is a 66 y.o. male with a significant past medical history of type 2 diabetes mellitus, essential systemic hypertension, obstructive sleep apnea [on nocturnal CPAP] and heart failure with preserved ejection fraction [HFpEF], who presented to the hospital with complaints of generalized weakness, fatigue, poor appetite and weight loss of 10 days duration. He denied any associated shortness of breath or dizziness. Pt denies any history of  prolonged NSAID use. Patient denies dysuria, gross hematuria, flank pain, nocturia, urgency, passing frothy urine or urinary incontinence. There has been no recent exposure to IV contrast. There is no history  of paraprotein disease. Pt denies any history of recurrent UTI or kidney stones. Medication review shows use of Lisinopril, triamterene, hydrochlorothiazide, Metformin and Simvastatin.     Current Medications:    perflutren lipid microspheres (DEFINITY) injection 2.2 mg, ONCE PRN  sodium chloride flush 0.9 % injection 10 mL, 2 times per day  sodium chloride flush 0.9 % injection 10 mL, PRN  acetaminophen (TYLENOL) tablet 650 mg, Q4H PRN  citalopram (CELEXA) tablet 10 mg, Daily  metoprolol tartrate (LOPRESSOR) tablet 25 mg, BID  cefTRIAXone (ROCEPHIN) 1000 mg IVPB in 50 mL D5W minibag, Q24H  doxycycline monohydrate (MONODOX) capsule 100 mg, 2 times per day  insulin lispro (HUMALOG) injection vial 0-12 Units, TID WC  insulin lispro (HUMALOG) injection vial 0-6 Units, Nightly  glucose (GLUTOSE) 40 % oral gel 15 g, PRN  dextrose 50 % IV solution, PRN  glucagon (rDNA) injection 1 mg, PRN  dextrose 5 % solution, PRN  sodium chloride flush 0.9 % injection 10 mL, 2 times per day  sodium chloride flush 0.9 % injection 10 mL, PRN  promethazine (PHENERGAN) tablet 12.5 mg, Q6H PRN    Or  ondansetron (ZOFRAN) injection 4 mg, Q6H PRN  acetaminophen (TYLENOL) tablet 650 mg, Q6H PRN    Or  acetaminophen (TYLENOL) suppository 650 mg, Q6H PRN  bisacodyl (DULCOLAX) EC tablet 5 mg, Daily PRN  0.9 % sodium chloride infusion, Continuous  warfarin (COUMADIN) daily dosing (placeholder), RX Placeholder      Objective:  CURRENT TEMPERATURE:  Temp: 98.2 °F (36.8 °C)  MAXIMUM TEMPERATURE OVER 24HRS:  Temp (24hrs), Av.7 °F (36.5 °C), Min:97.3 °F (36.3 °C), Max:98.2 °F (36.8 °C)    CURRENT RESPIRATORY RATE:  Resp: 18  CURRENT PULSE:  Pulse: 62  CURRENT BLOOD PRESSURE:  BP: (!) 96/48  24HR BLOOD PRESSURE RANGE:  Systolic (39IUM), LZQ:887 , Min:77 , JAJ:501   ; Diastolic (17UKD), ANGELICA:76, Min:45, Max:79    24HR INTAKE/OUTPUT:      Intake/Output Summary (Last 24 hours) at 3/17/2021 1212  Last data filed at 3/17/2021 1108  Gross per 24 hour   Intake 2139 ml   Output 1400 ml   Net 739 ml     Patient Vitals for the past 96 hrs (Last 3 readings):   Weight   21 0415 278 lb 3.5 oz (126.2 kg)   21 1316 293 lb (132.9 kg)     Physical Exam:  GENERAL APPEARANCE: Alert and cooperative, and appears to be in no acute distress. HEAD: normocephalic  CARDIAC: Normal S1 and S2. No S3, S4 or murmurs. Rhythm is regular. LUNGS: Clear to auscultation and percussion without rales, rhonchi, wheezing or diminished breath sounds. ABDOMEN: Soft with normal bowel sounds. MUSKULOSKELETAL: Adequately aligned spine. No joint erythema or tenderness. EXTREMITIES: No edema. Peripheral pulses intact.    NEURO: Nonfocal    Labs:   CBC:  Recent Labs 03/16/21  1340 03/17/21  0602   WBC 12.7* 13.7*   RBC 4.56 4.33*   HGB 12.9* 12.4*   HCT 39.1* 36.1*   MCV 85.7 83.4   MCH 28.3 28.6   MCHC 33.1 34.3   RDW 14.3 13.8    261   MPV 8.5 8.8      BMP:   Recent Labs     03/16/21  1408 03/16/21  2120 03/17/21  0602   * 131* 131*   K 6.0* 4.5 4.1   CL 98 97* 100   CO2 20 18* 16*   * 110* 111*   CREATININE 3.33* 2.95* 2.22*   GLUCOSE 115* 115* 112*   CALCIUM 9.3 9.2 8.9      Albumin:   Recent Labs     03/16/21  1408 03/17/21  0602   LABALBU 3.6 3.3*       Recent Labs     03/17/21  0602   PROT 6.9     Urinalysis:    Recent Labs     03/17/21  0626   NITRU NEGATIVE   COLORU YELLOW   PHUR 5.0   WBCUA 2 TO 5   RBCUA 0 TO 2   MUCUS 1+*   TRICHOMONAS NOT REPORTED   YEAST NOT REPORTED   BACTERIA FEW*   SPECGRAV 1.013   LEUKOCYTESUR NEGATIVE   UROBILINOGEN Normal   BILIRUBINUR NEGATIVE   GLUCOSEU NEGATIVE   KETUA NEGATIVE   AMORPHOUS NOT REPORTED     Assessment/plan:    1. Acute kidney injury - most consistent with prerenal azotemia from poor intake/Diuretic/ACE inhibitor as well as toxic acute tubular necrosis secondary to rhabdomyolysis [statin related]. Renal function is slowly improving. Plan: Gentle hydration with IV fluid 0.45 normal saline at 50 mL/h. Hold lisinopril, triamterene, hydrochlorothiazide and simvastatin. Check CPK daily. Monitor urine output closely. Basic metabolic profile daily. 2.  Hyperkalemia - secondary to impaired potassium elimination in the setting of decreased distal sodium delivery. Serum potassium is now within normal range. We will however continue to hold lisinopril. 3.  Systemic hypertension - blood pressure control is adequate. 4.  Non-anion gap metabolic acidosis - top differential is renal tubular acidosis. We will check urinary anion gap. Add bicarbonate to IV fluid. 5.  Hypotension - likely due to hypovolemia. We will start gentle hydration.     6.  Hyponatremia - we will check serum and urine osmolality. Prognosis is guarded. I had prolonged discussion with patient and his wife regarding renal prognosis.     Electronically signed by Nelda Steinberg MD on 3/17/2021 at 12:12 PM

## 2021-03-17 NOTE — FLOWSHEET NOTE
03/17/21 0827   Encounter Summary   [facility] of [facility] Completed   Spiritual/Voodoo   Intervention Contacted support as requested per patient/family request   Who? [facility]   Why?  Spiritual Support   At Request Of Patient/Family

## 2021-03-17 NOTE — FLOWSHEET NOTE
provided listening presence, words of comfort and prayer. 03/17/21 1533   Encounter Summary   Services provided to: Patient   Referral/Consult From: Rounding   Support System Spouse; Family members   Continue Visiting   (3/17/2021)   Complexity of Encounter Moderate   Length of Encounter 15 minutes   Spiritual Assessment Completed Yes   Routine   Type Initial   Assessment Coping   Intervention Active listening;Nurtured hope;[place]   Outcome Expressed gratitude

## 2021-03-17 NOTE — PLAN OF CARE
Problem: Falls - Risk of:  Goal: Will remain free from falls  Description: Will remain free from falls  Outcome: Ongoing  Goal: Absence of physical injury  Description: Absence of physical injury  Outcome: Pt assessed as a fall risk this shift. Remains free from falls and accidental injury at this time. Fall precautions in place, including falling star sign and fall risk band on pt. Floor free from obstacles, and bed is locked and in lowest position. Adequate lighting provided. Pt encouraged to call before getting OOB for any need. Bed alarm activated. Will continue to monitor needs during hourly rounding, and reinforce education on use of call light.      Problem: Musculor/Skeletal Functional Status  Goal: Highest potential functional level  Outcome: Ongoing  Goal: Absence of falls  Outcome: Ongoing

## 2021-03-17 NOTE — FLOWSHEET NOTE
03/17/21 1245   Vital Signs   Temp 98.1 °F (36.7 °C)   Temp Source Oral   Pulse 65   Heart Rate Source Monitor   Resp 16   BP (!) 88/44   BP Location Left lower arm   Patient Position High fowlers   Level of Consciousness Alert (0)   MEWS Score 2   Height and Weight   Weight 276 lb 10.8 oz (125.5 kg)   Weight Method Bed scale   BMI (Calculated) 40.9   Oxygen Therapy   SpO2 98 %   Pulse Oximeter Device Mode Intermittent   Pulse Oximeter Device Location Finger   O2 Device None (Room air)   Patient hypotension reported to provider.  BP88/44

## 2021-03-18 LAB
ABSOLUTE EOS #: 0.3 K/UL (ref 0–0.4)
ABSOLUTE IMMATURE GRANULOCYTE: ABNORMAL K/UL (ref 0–0.3)
ABSOLUTE LYMPH #: 4 K/UL (ref 1–4.8)
ABSOLUTE MONO #: 1.3 K/UL (ref 0.1–1.3)
ALBUMIN SERPL-MCNC: 3.1 G/DL (ref 3.5–5.2)
ALBUMIN/GLOBULIN RATIO: ABNORMAL (ref 1–2.5)
ALP BLD-CCNC: 56 U/L (ref 40–129)
ALT SERPL-CCNC: 156 U/L (ref 5–41)
ANION GAP SERPL CALCULATED.3IONS-SCNC: 15 MMOL/L (ref 9–17)
AST SERPL-CCNC: 137 U/L
BASOPHILS # BLD: 1 % (ref 0–2)
BASOPHILS ABSOLUTE: 0.1 K/UL (ref 0–0.2)
BILIRUB SERPL-MCNC: 1.82 MG/DL (ref 0.3–1.2)
BUN BLDV-MCNC: 87 MG/DL (ref 8–23)
BUN/CREAT BLD: ABNORMAL (ref 9–20)
CA 19-9: 29 U/ML (ref 0–35)
CALCIUM SERPL-MCNC: 8.8 MG/DL (ref 8.6–10.4)
CARCINOEMBRYONIC ANTIGEN: 3.7 NG/ML
CHLORIDE BLD-SCNC: 104 MMOL/L (ref 98–107)
CO2: 18 MMOL/L (ref 20–31)
CREAT SERPL-MCNC: 1.5 MG/DL (ref 0.7–1.2)
CULTURE: NO GROWTH
DIFFERENTIAL TYPE: ABNORMAL
EOSINOPHILS RELATIVE PERCENT: 3 % (ref 0–4)
GFR AFRICAN AMERICAN: 55 ML/MIN
GFR NON-AFRICAN AMERICAN: 45 ML/MIN
GFR SERPL CREATININE-BSD FRML MDRD: ABNORMAL ML/MIN/{1.73_M2}
GFR SERPL CREATININE-BSD FRML MDRD: ABNORMAL ML/MIN/{1.73_M2}
GLUCOSE BLD-MCNC: 112 MG/DL (ref 75–110)
GLUCOSE BLD-MCNC: 115 MG/DL (ref 70–99)
GLUCOSE BLD-MCNC: 125 MG/DL (ref 75–110)
GLUCOSE BLD-MCNC: 135 MG/DL (ref 75–110)
GLUCOSE BLD-MCNC: 138 MG/DL (ref 75–110)
HCT VFR BLD CALC: 34.2 % (ref 41–53)
HEMOGLOBIN: 11.2 G/DL (ref 13.5–17.5)
IMMATURE GRANULOCYTES: ABNORMAL %
INR BLD: 2.2
LYMPHOCYTES # BLD: 36 % (ref 24–44)
Lab: NORMAL
MAGNESIUM: 1.8 MG/DL (ref 1.6–2.6)
MCH RBC QN AUTO: 28.7 PG (ref 26–34)
MCHC RBC AUTO-ENTMCNC: 32.9 G/DL (ref 31–37)
MCV RBC AUTO: 87.4 FL (ref 80–100)
MONOCYTES # BLD: 12 % (ref 1–7)
NRBC AUTOMATED: ABNORMAL PER 100 WBC
PDW BLD-RTO: 14.5 % (ref 11.5–14.9)
PLATELET # BLD: 224 K/UL (ref 150–450)
PLATELET ESTIMATE: ABNORMAL
PMV BLD AUTO: 8.7 FL (ref 6–12)
POTASSIUM SERPL-SCNC: 3.5 MMOL/L (ref 3.7–5.3)
PROTHROMBIN TIME: 24.3 SEC (ref 11.8–14.6)
RBC # BLD: 3.91 M/UL (ref 4.5–5.9)
RBC # BLD: ABNORMAL 10*6/UL
SEG NEUTROPHILS: 48 % (ref 36–66)
SEGMENTED NEUTROPHILS ABSOLUTE COUNT: 5.4 K/UL (ref 1.3–9.1)
SODIUM BLD-SCNC: 137 MMOL/L (ref 135–144)
SPECIMEN DESCRIPTION: NORMAL
TOTAL CK: 1754 U/L (ref 39–308)
TOTAL PROTEIN: 6.5 G/DL (ref 6.4–8.3)
WBC # BLD: 11 K/UL (ref 3.5–11)
WBC # BLD: ABNORMAL 10*3/UL

## 2021-03-18 PROCEDURE — 6370000000 HC RX 637 (ALT 250 FOR IP): Performed by: FAMILY MEDICINE

## 2021-03-18 PROCEDURE — 86301 IMMUNOASSAY TUMOR CA 19-9: CPT

## 2021-03-18 PROCEDURE — 2500000003 HC RX 250 WO HCPCS: Performed by: INTERNAL MEDICINE

## 2021-03-18 PROCEDURE — 80053 COMPREHEN METABOLIC PANEL: CPT

## 2021-03-18 PROCEDURE — 2580000003 HC RX 258: Performed by: INTERNAL MEDICINE

## 2021-03-18 PROCEDURE — 6360000002 HC RX W HCPCS: Performed by: FAMILY MEDICINE

## 2021-03-18 PROCEDURE — 6370000000 HC RX 637 (ALT 250 FOR IP): Performed by: INTERNAL MEDICINE

## 2021-03-18 PROCEDURE — 99222 1ST HOSP IP/OBS MODERATE 55: CPT | Performed by: INTERNAL MEDICINE

## 2021-03-18 PROCEDURE — 82550 ASSAY OF CK (CPK): CPT

## 2021-03-18 PROCEDURE — 82378 CARCINOEMBRYONIC ANTIGEN: CPT

## 2021-03-18 PROCEDURE — 36415 COLL VENOUS BLD VENIPUNCTURE: CPT

## 2021-03-18 PROCEDURE — 82947 ASSAY GLUCOSE BLOOD QUANT: CPT

## 2021-03-18 PROCEDURE — 2580000003 HC RX 258: Performed by: FAMILY MEDICINE

## 2021-03-18 PROCEDURE — 85025 COMPLETE CBC W/AUTO DIFF WBC: CPT

## 2021-03-18 PROCEDURE — 85610 PROTHROMBIN TIME: CPT

## 2021-03-18 PROCEDURE — 2060000000 HC ICU INTERMEDIATE R&B

## 2021-03-18 PROCEDURE — 83735 ASSAY OF MAGNESIUM: CPT

## 2021-03-18 RX ORDER — POTASSIUM CHLORIDE 7.45 MG/ML
10 INJECTION INTRAVENOUS PRN
Status: DISCONTINUED | OUTPATIENT
Start: 2021-03-18 | End: 2021-03-20 | Stop reason: HOSPADM

## 2021-03-18 RX ORDER — POTASSIUM CHLORIDE 20 MEQ/1
40 TABLET, EXTENDED RELEASE ORAL PRN
Status: DISCONTINUED | OUTPATIENT
Start: 2021-03-18 | End: 2021-03-20 | Stop reason: HOSPADM

## 2021-03-18 RX ORDER — WARFARIN SODIUM 3 MG/1
3 TABLET ORAL ONCE
Status: COMPLETED | OUTPATIENT
Start: 2021-03-18 | End: 2021-03-18

## 2021-03-18 RX ADMIN — METOPROLOL TARTRATE 25 MG: 25 TABLET, FILM COATED ORAL at 08:17

## 2021-03-18 RX ADMIN — DOXYCYCLINE 100 MG: 100 CAPSULE ORAL at 08:17

## 2021-03-18 RX ADMIN — DOXYCYCLINE 100 MG: 100 CAPSULE ORAL at 21:03

## 2021-03-18 RX ADMIN — MIDODRINE HYDROCHLORIDE 2.5 MG: 2.5 TABLET ORAL at 08:17

## 2021-03-18 RX ADMIN — CITALOPRAM HYDROBROMIDE 10 MG: 20 TABLET ORAL at 08:17

## 2021-03-18 RX ADMIN — SODIUM BICARBONATE: 84 INJECTION, SOLUTION INTRAVENOUS at 16:00

## 2021-03-18 RX ADMIN — POTASSIUM CHLORIDE 40 MEQ: 1500 TABLET, EXTENDED RELEASE ORAL at 10:04

## 2021-03-18 RX ADMIN — CEFTRIAXONE SODIUM 1000 MG: 1 INJECTION, POWDER, FOR SOLUTION INTRAMUSCULAR; INTRAVENOUS at 16:46

## 2021-03-18 RX ADMIN — METOPROLOL TARTRATE 25 MG: 25 TABLET, FILM COATED ORAL at 21:03

## 2021-03-18 RX ADMIN — WARFARIN SODIUM 3 MG: 3 TABLET ORAL at 21:03

## 2021-03-18 ASSESSMENT — ENCOUNTER SYMPTOMS
RECTAL PAIN: 0
TROUBLE SWALLOWING: 0
CHOKING: 0
VOICE CHANGE: 0
STRIDOR: 0
PHOTOPHOBIA: 0
ANAL BLEEDING: 0
ABDOMINAL DISTENTION: 0
BACK PAIN: 0
BLOOD IN STOOL: 0
COLOR CHANGE: 0

## 2021-03-18 ASSESSMENT — PAIN SCALES - GENERAL: PAINLEVEL_OUTOF10: 0

## 2021-03-18 NOTE — PROGRESS NOTES
Physical Therapy    DATE: 3/18/2021    NAME: Bruce Clements  MRN: [id number]   : 1945      Patient not seen this date for Physical Therapy due to:    3- at [phone] [id number]- pt unavailable for PT treatment as pt w/ doctor. Will try back later today if time permits.       Electronically signed by Shoshana Farias PT on 3/18/2021 at 3:52 PM

## 2021-03-18 NOTE — CARE COORDINATION
DISCHARGE PLANNING NOTE:      I spoke with [facility] and they informed me that they are able to do patients with certain pacemakers. I provided them with the patients pacemaker information/model number etc and they informed me that his patient is not compatible with their machine. Per Dr. Lawanda Arechiga, cancel MRI for now and proceed with liver US.      Electronically signed by Pineda Sood RN on 3/18/2021 at 3:19 PM

## 2021-03-18 NOTE — PROGRESS NOTES
PT refused BIPAP at this time. Pt is complaining of mask hurting/leaking. Placed on cont pox for the night.   Sp02 95% on room air

## 2021-03-18 NOTE — PLAN OF CARE
Problem: Falls - Risk of:  Goal: Will remain free from falls  Description: Will remain free from falls  3/18/2021 1555 by Nikia Umaña RN  Outcome: MET     Problem: Musculor/Skeletal Functional Status  Goal: Highest potential functional level  3/18/2021 1555 by Nikia Umaña RN  Outcome: Ongoing   Pt/ot on     Problem: Fluid Volume:  Goal: Ability to achieve a balanced intake and output will improve  Description: Ability to achieve a balanced intake and output will improve  3/18/2021 1555 by Nikia Umaña RN  Outcome: Ongoing   I/o tracked. Problem: Physical Regulation:  Goal: Ability to maintain clinical measurements within normal limits will improve  Description: Ability to maintain clinical measurements within normal limits will improve  Outcome: Ongoing   Encourage pt to reposition frequently.

## 2021-03-18 NOTE — CARE COORDINATION
DISCHARGE PLANNING NOTE:    Patient declines any discharge needs upon my visit. States he has everything he needs at home and no need for [facility] services. Hopeful to discharge to home tomorrow.     Electronically signed by Saba Soliz RN on 3/18/2021 at 2:42 PM

## 2021-03-18 NOTE — PROGRESS NOTES
Pharmacy Note  Warfarin Consult follow-up      Recent Labs     03/16/21  1848 03/17/21  0602 03/18/21  0427   INR 9.9* 3.6 2.2     Recent Labs     03/16/21  1340 03/17/21  0602 03/18/21  0427   HGB 12.9* 12.4* 11.2*   HCT 39.1* 36.1* 34.2*    261 224       Significant Drug-Drug Interactions:  New warfarin drug-drug interactions: none  Discontinued drug-drug interactions: none  Current warfarin drug-drug interactions: celexa      Date             INR        Dose given previous day  Dose scheduled for today  3/18/2021            2.2       Held for INR 3.6           3 mg        Notes:                   INR goal 2.5 to 3.5, below goal.  Due to potential for drug interaction with Doxycycline, will give lower dose of 3 mg tonight. Daily PT/INR while inpatient.      Chela Gan,PharmD,  3/18/2021, 6:02 PM

## 2021-03-18 NOTE — PLAN OF CARE
Problem: Falls - Risk of:  Goal: Will remain free from falls  Description: Will remain free from falls  3/18/2021 0429 by Raine Alexander RN  Outcome: Met This Shift  Note: Will continue to monitor patient needs hourly and respond in a timely fashion in order to decrease the risk of patient falls. Patient is a high fall risk. Bed alarm initiated at all times. Will encourage patient to utilize call light when needing to ambulate out of bed. Patient compliant this shift. Problem: Falls - Risk of:  Goal: Absence of physical injury  Description: Absence of physical injury  3/18/2021 0429 by Raine Alexander RN  Outcome: Met This Shift  Note: Patient will continue to be free from physical injury. Patient's bed alarm is on and hourly rounding is being completed to ensure all patient needs are met. Problem: Musculor/Skeletal Functional Status  Goal: Highest potential functional level  3/18/2021 0429 by Raine Alexander RN  Outcome: Ongoing  Note: Will continue to work with patient to continue to maintain and improve patients ability to function on a physical level. Problem: Fluid Volume:  Goal: Ability to achieve a balanced intake and output will improve  Description: Ability to achieve a balanced intake and output will improve  Outcome: Ongoing  Note: Will continue to monitor patients I&O's to ensure fluid balance is remaining stable. Problem: Physical Regulation:  Goal: Will show no signs and symptoms of electrolyte imbalance  Description: Will show no signs and symptoms of electrolyte imbalance  Outcome: Ongoing  Note: Will continue to monitor patient electrolytes daily to ensure they are in normal limits.

## 2021-03-18 NOTE — CONSULTS
chronic and benign findings, as above.             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    Diabetes mellitus ([address]     GERD (gastroesophageal reflux disease)     BETTER WITH CPAP    [place] (hard of hearing)     BILAT HEARING AIDS    Hyperlipidemia     Hypertension     Kidney stones     Osteoarthritis     Pacemaker 2010    Sleep apnea     Cpap machine      Past Surgical History:   Procedure Laterality Date    AORTIC VALVE REPLACEMENT  2012/2013    Mechanical Valve    BACK SURGERY      LUMBAR    CARDIAC SURGERY      AORTIC VALVE REPLACEMENT    COLONOSCOPY      CYSTOSCOPY      EYE SURGERY Bilateral     CATARACTS    FACIAL RECONSTRUCTION SURGERY Right 4/17/2017    EXCISION LESION RIGHT SIDE OF FOREHEAD performed by Lin Fernandez MD at [address] [place] [address] Right     Bone Spur removed    JOINT REPLACEMENT Left 08/23/2016    TKA    KIDNEY STONE SURGERY      KIDNEY SURGERY Left     STONES REMOVED, WAS DONE YEARS AGO    KNEE ARTHROSCOPY Left     NECK SURGERY N/A 9/18/2019    EXCISION MASS POSTERIOR NECK performed by Lin Fernandez MD at [facility]      PRE-MALIGNANT / [address] Right 04/17/2017    forehead    PRE-MALIGNANT / BENIGN SKIN LESION EXCISION  09/18/2019    posterior neck mass    TONSILLECTOMY      TOTAL KNEE ARTHROPLASTY Left     with biomet and GPS product application    TUMOR REMOVAL      Ear      Past Endoscopic History none recent    Admission Meds  No current facility-administered medications on file prior to encounter.       Current Outpatient Medications on File Prior to Encounter   Medication Sig Dispense Refill    citalopram (CELEXA) 10 MG tablet Take 10 mg by mouth daily      Multiple Vitamins-Minerals (THERAPEUTIC MULTIVITAMIN-MINERALS) tablet Take 1 tablet by mouth daily      warfarin (COUMADIN) 5 MG tablet Take 7.5 mg by mouth Six times weekly Indications: ALL DAYS EXCEPT MONDAY - INR range 2.5-3.5 Per [facility]      warfarin (COUMADIN) 5 MG tablet Take 10 mg by mouth once a week Indications: ON  ONLY - INR range 2.5-3.5 Per [facility]      triamterene-hydrochlorothiazide (DYAZIDE) 37.5-25 MG per capsule Take 1 capsule by mouth every morning       simvastatin (ZOCOR) 40 MG tablet Take 40 mg by mouth nightly       metoprolol tartrate (LOPRESSOR) 50 MG tablet Take 25 mg by mouth 2 times daily       lisinopril (PRINIVIL;ZESTRIL) 20 MG tablet Take 20 mg by mouth daily       metFORMIN ER (GLUCOPHAGE-XR) 500 MG XR tablet Take 500 mg by mouth 2 times daily          Patient   Does Use ASA, NSAID No  Allergies  No Known Allergies     Social   Social History     Tobacco Use    Smoking status: Former Smoker     Quit date: 1984     Years since quittin.6    Smokeless tobacco: Never Used   Substance Use Topics    Alcohol use: Not Currently     Alcohol/week: 0.0 standard drinks        PSYCH HISTORY:  Depression No  Anxiety No  Suicide No       Family History   Problem Relation Age of Onset    Cancer Mother     Diabetes Paternal Grandmother       No family history of colon cancer, Crohn's disease, or ulcerative colitis. Review of Systems  Constitutional: negative  Eyes: negative  Ears, nose, mouth, throat, and face: negative  Respiratory: negative  Cardiovascular: negative  Gastrointestinal: negative  Genitourinary:negative  Integument/breast: negative  Hematologic/lymphatic: negative  Musculoskeletal:negative  Endocrine: negative           Physical Exam  Blood pressure 132/60, pulse 67, temperature 98.1 °F (36.7 °C), temperature source Oral, resp. rate 18, height 5' 9\" (1.753 m), weight 282 lb 10.1 oz (128.2 kg), SpO2 94 %.          General Appearance: alert and oriented to person, place and time, well-developed and well-nourished, in no acute distress  Skin: warm and dry, no rash or erythema  Head: normocephalic and atraumatic  Eyes: pupils equal, round, and reactive to light, extraocular eye movements intact, conjunctivae normal  ENT: hearing grossly normal bilaterally  Neck: neck supple and non tender without mass, no thyromegaly or thyroid nodules, no cervical lymphadenopathy   Pulmonary/Chest: clear to auscultation bilaterally- no wheezes, rales or rhonchi, normal air movement, no respiratory distress  Cardiovascular: normal rate, regular rhythm, normal S1 and S2, no murmurs, rubs, clicks or gallops, distal pulses intact, no carotid bruits  Abdomen: soft, non-tender, non-distended, normal bowel sounds, no masses or organomegaly  Extremities: no cyanosis, clubbing or edema  Musculoskeletal: normal range of motion, no joint swelling, deformity or tenderness  Neurologic: no cranial nerve deficit and muscle strength normal    Data Review:    Recent Labs     03/16/21  1340 03/17/21  0602 03/18/21  0427   WBC 12.7* 13.7* 11.0   HGB 12.9* 12.4* 11.2*   HCT 39.1* 36.1* 34.2*   MCV 85.7 83.4 87.4    261 224     Recent Labs     03/16/21  2120 03/17/21  0602 03/18/21  0427   * 131* 137   K 4.5 4.1 3.5*   CL 97* 100 104   CO2 18* 16* 18*   * 111* 87*   CREATININE 2.95* 2.22* 1.50*     Recent Labs     03/16/21  1408 03/17/21  0602 03/18/21  0427   * 222* 137*   * 184* 156*   BILITOT 1.32* 1.54* 1.82*   ALKPHOS 68 60 56     No results for input(s): LIPASE, AMYLASE in the last 72 hours. Recent Labs     03/16/21  1848 03/17/21  0602 03/18/21  0427   PROTIME 76.4* 35.0* 24.3*   INR 9.9* 3.6 2.2     No results for input(s): PTT in the last 72 hours. No results for input(s): OCCULTBLD in the last 72 hours. CEA:  No results found for: CEA  Ca 125:  No results found for:   Ca 19-9:  No results found for:   Ca 15-3:  No results found for:   AFP:  No components found for: AFAFP  Beta HCG:  No components found for: BHCG  Neuron Specific Enolase:   No results found for: NSE  Imaging Studies:                           All appropriate imaging studies and reports reviewed: Yes                 Assessment:     Principal Problem:    Acute renal failure with tubular necrosis (HCC)  Active Problems:    Essential hypertension    Class 3 severe obesity due to excess calories with body mass index (BMI) of 40.0 to 44.9 in adult [facility])    Transaminitis    ASCVD (arteriosclerotic cardiovascular disease)    Chronic diastolic congestive heart failure (HCC)    Current use of long term anticoagulation    Obstructive sleep apnea    Renal failure    Non-traumatic rhabdomyolysis  Resolved Problems:    * No resolved hospital problems. *    Patient with significant elevation of the liver enzymes  No significant abdominal pain  Weight loss which is significant but started to be intentionally and then it was not after that  Used to be on statin and Metformin which has been stopped      Recommendations: The main issue with this patient is to rule out malignancy  Responsible for the elevation of the liver enzymes nevertheless  We are not able to proceed with an MRI because of a pacemaker  We will get an ultrasound  We will get the tumor markers  Liver diseases work-up  We will trend the liver enzymes and take it from there  Although the patient does have significant elevation in the CPK and possibility of rhabdomyolysis is there, but he denied any bruises denied laying on the floor for any long time, which make it less likely  The patient does have large blood on the UA with negative RBCs which could indicate rhabdomyolysis  For which he recommended rehydration as tolerated  Rest of the liver diseases work-up will be sent                    Thank you for allowing me to participate in the care of your patient. Please feel free to contact me with any questions or concerns.      Crispin Cunningham MD

## 2021-03-18 NOTE — PLAN OF CARE
Please have the patient, or the patient representative, fill out the MRI Screening form and fax to dept @ [phone]. Any questions. .please call [facility] @ [phone]. MRI exam will be scheduled after receiving the completed screening form.  Thank you!!

## 2021-03-18 NOTE — PROGRESS NOTES
NEPHROLOGY PROGRESS NOTE     Patient :  Alexander Santos; 66 y.o. MRN# [id number]  Location:  2115/2115-01  Attending:  Drea Helms MD  Admit Date:  3/16/2021   Hospital Day: 2    Reason for Consult:  Management of acute kidney injury and hyperkalemia. Chief Complaint: Weakness, fatigue and poor oral intake. Interval history:   Patient was seen and examined today and he is feeling much better. He is nonoliguric and Dyazide remains on hold. He does not have shortness of breath at rest.  Creatinine has improved to 1.5 mg/dL BUN 87 good urine output  CK has trended down 1754    History of Present Illness: This is a 66 y.o. male with a significant past medical history of type 2 diabetes mellitus, essential systemic hypertension, obstructive sleep apnea [on nocturnal CPAP] and heart failure with preserved ejection fraction [HFpEF], who presented to the hospital with complaints of generalized weakness, fatigue, poor appetite and weight loss of 10 days duration. He denied any associated shortness of breath or dizziness. Pt denies any history of  prolonged NSAID use. Patient denies dysuria, gross hematuria, flank pain, nocturia, urgency, passing frothy urine or urinary incontinence. There has been no recent exposure to IV contrast. There is no history  of paraprotein disease. Pt denies any history of recurrent UTI or kidney stones. Medication review shows use of Lisinopril, triamterene, hydrochlorothiazide, Metformin and Simvastatin.     Current Medications:    potassium chloride (KLOR-CON M) extended release tablet 40 mEq, PRN    Or  potassium bicarb-citric acid (EFFER-K) effervescent tablet 40 mEq, PRN    Or  potassium chloride 10 mEq/100 mL IVPB (Peripheral Line), PRN  perflutren lipid microspheres (DEFINITY) injection 2.2 mg, ONCE PRN  sodium bicarbonate 75 mEq in sodium chloride 0.45 % 1,000 mL infusion, Continuous  midodrine (PROAMATINE) tablet 2.5 mg, TID WC  sodium chloride flush 0.9 % injection 10 mL, 2 times per day  sodium chloride flush 0.9 % injection 10 mL, PRN  citalopram (CELEXA) tablet 10 mg, Daily  metoprolol tartrate (LOPRESSOR) tablet 25 mg, BID  cefTRIAXone (ROCEPHIN) 1000 mg IVPB in 50 mL D5W minibag, Q24H  doxycycline monohydrate (MONODOX) capsule 100 mg, 2 times per day  insulin lispro (HUMALOG) injection vial 0-12 Units, TID WC  insulin lispro (HUMALOG) injection vial 0-6 Units, Nightly  glucose (GLUTOSE) 40 % oral gel 15 g, PRN  dextrose 50 % IV solution, PRN  glucagon (rDNA) injection 1 mg, PRN  dextrose 5 % solution, PRN  sodium chloride flush 0.9 % injection 10 mL, 2 times per day  sodium chloride flush 0.9 % injection 10 mL, PRN  promethazine (PHENERGAN) tablet 12.5 mg, Q6H PRN    Or  ondansetron (ZOFRAN) injection 4 mg, Q6H PRN  acetaminophen (TYLENOL) tablet 650 mg, Q6H PRN    Or  acetaminophen (TYLENOL) suppository 650 mg, Q6H PRN  bisacodyl (DULCOLAX) EC tablet 5 mg, Daily PRN  warfarin (COUMADIN) daily dosing (placeholder), RX Placeholder      Objective:  CURRENT TEMPERATURE:  Temp: 98.1 °F (36.7 °C)  MAXIMUM TEMPERATURE OVER 24HRS:  Temp (24hrs), Av.8 °F (36.6 °C), Min:97.4 °F (36.3 °C), Max:98.1 °F (36.7 °C)    CURRENT RESPIRATORY RATE:  Resp: 18  CURRENT PULSE:  Pulse: 65  CURRENT BLOOD PRESSURE:  BP: 135/61  24HR BLOOD PRESSURE RANGE:  Systolic (24AFX), ZET:353 , Min:112 , DIS:781   ; Diastolic (15WOA), ONS:88, Min:48, Max:61    24HR INTAKE/OUTPUT:      Intake/Output Summary (Last 24 hours) at 3/18/2021 1645  Last data filed at 3/18/2021 1551  Gross per 24 hour   Intake 1144.33 ml   Output --   Net 1144.33 ml     Patient Vitals for the past 96 hrs (Last 3 readings):   Weight   21 0100 282 lb 10.1 oz (128.2 kg)   21 1245 276 lb 10.8 oz (125.5 kg)   21 0415 278 lb 3.5 oz (126.2 kg)     Physical Exam:  GENERAL APPEARANCE: Alert and cooperative, and appears to be in no acute distress. HEAD: normocephalic  CARDIAC: Normal S1 and S2. No S3, S4 or murmurs.  Rhythm is regular. LUNGS: Clear to auscultation and percussion without rales, rhonchi, wheezing or diminished breath sounds. ABDOMEN: Soft with normal bowel sounds. MUSKULOSKELETAL: Adequately aligned spine. No joint erythema or tenderness. EXTREMITIES: No edema. Peripheral pulses intact. NEURO: Nonfocal    Labs:   CBC:  Recent Labs     03/16/21  1340 03/17/21  0602 03/18/21  0427   WBC 12.7* 13.7* 11.0   RBC 4.56 4.33* 3.91*   HGB 12.9* 12.4* 11.2*   HCT 39.1* 36.1* 34.2*   MCV 85.7 83.4 87.4   MCH 28.3 28.6 28.7   MCHC 33.1 34.3 32.9   RDW 14.3 13.8 14.5    261 224   MPV 8.5 8.8 8.7      BMP:   Recent Labs     03/16/21  2120 03/17/21  0602 03/18/21  0427   * 131* 137   K 4.5 4.1 3.5*   CL 97* 100 104   CO2 18* 16* 18*   * 111* 87*   CREATININE 2.95* 2.22* 1.50*   GLUCOSE 115* 112* 115*   CALCIUM 9.2 8.9 8.8      Albumin:   Recent Labs     03/16/21  1408 03/17/21  0602 03/18/21  0427   LABALBU 3.6 3.3* 3.1*       Recent Labs     03/18/21  0427   PROT 6.5     Urinalysis:    Recent Labs     03/17/21  0626   NITRU NEGATIVE   COLORU YELLOW   PHUR 5.0   WBCUA 2 TO 5   RBCUA 0 TO 2   MUCUS 1+*   TRICHOMONAS NOT REPORTED   YEAST NOT REPORTED   BACTERIA FEW*   SPECGRAV 1.013   LEUKOCYTESUR NEGATIVE   UROBILINOGEN Normal   BILIRUBINUR NEGATIVE   GLUCOSEU NEGATIVE   KETUA NEGATIVE   AMORPHOUS NOT REPORTED     Assessment/plan:    1. Acute kidney injury - most consistent with prerenal azotemia from poor intake/Diuretic/ACE inhibitor as well as toxic acute tubular necrosis secondary to rhabdomyolysis [statin related]. Renal function is slowly improving. 2.  Hyperkalemia - secondary to impaired potassium elimination in the setting of decreased distal sodium delivery. Serum potassium is now within normal range. We will however continue to hold lisinopril. Calcium improved and stable    3. Systemic hypertension - blood pressure control is adequate. 4.  Non-anion gap metabolic acidosis -     5. Hypotension - likely due to hypovolemia. We will start gentle hydration. 6.  Hyponatremia - we will check serum and urine osmolality. Plan  Continue IV fluids bicarb drip at 50 mils per hour  CK daily  BMP daily    Prognosis is guarded.   Electronically signed by Daphnie Dean MD on 3/18/2021 at 4:45 PM No

## 2021-03-18 NOTE — PROGRESS NOTES
Spoke with patient records department with [employer] regarding pt pacemaker. Pacemaker is NOT mri compatible. Dr Amanuel Ayoub notified.

## 2021-03-18 NOTE — PROGRESS NOTES
Progress Note    3/18/2021   2:37 PM    Name:  Jayleen Edouard  MRN:    [id number]     Acct:     [de-identified]   Room:  2115/2115-01  IP Day: 2     Admit Date: 3/16/2021  1:11 PM  PCP: Gabi Ceballos    Subjective:     C/C:   Chief Complaint   Patient presents with    Fatigue    Extremity Weakness     bilateral legs        Interval History: Status: not changed. Patient denies chest pain shortness of breath abdominal pain extremity weakness significantly improving. Vital signs reviewed, blood pressure readings stable with addition of midodrine. Recent labs reviewed creatinine improved at 1.50. Remains elevated at 1.82,  , CPK decreasing at 1754    ROS:   all 10 systems reviewed and are negative except as noted    Review of Systems   Constitutional: Negative for appetite change, chills and diaphoresis. HENT: Negative for drooling, ear pain, trouble swallowing and voice change. Eyes: Negative for photophobia and visual disturbance. Respiratory: Negative for choking and stridor. Cardiovascular: Negative for chest pain and palpitations. Gastrointestinal: Negative for abdominal distention, anal bleeding, blood in stool and rectal pain. Endocrine: Negative for polyphagia and polyuria. Genitourinary: Negative for dysuria, flank pain, hematuria and urgency. Musculoskeletal: Negative for back pain, myalgias and neck stiffness. Skin: Negative for color change, pallor and rash. Allergic/Immunologic: Negative for environmental allergies and food allergies. Neurological: Negative for tremors, seizures, facial asymmetry and numbness. Hematological: Negative for adenopathy. Does not bruise/bleed easily. Psychiatric/Behavioral: Negative for agitation, behavioral problems, hallucinations, self-injury and suicidal ideas. Medications:      Allergies: No Known Allergies    Current Meds: potassium chloride (KLOR-CON M) extended release tablet 40 mEq, PRN    Or  potassium bicarb-citric acid (EFFER-K) effervescent tablet 40 mEq, PRN    Or  potassium chloride 10 mEq/100 mL IVPB (Peripheral Line), PRN  perflutren lipid microspheres (DEFINITY) injection 2.2 mg, ONCE PRN  sodium bicarbonate 75 mEq in sodium chloride 0.45 % 1,000 mL infusion, Continuous  midodrine (PROAMATINE) tablet 2.5 mg, TID WC  sodium chloride flush 0.9 % injection 10 mL, 2 times per day  sodium chloride flush 0.9 % injection 10 mL, PRN  citalopram (CELEXA) tablet 10 mg, Daily  metoprolol tartrate (LOPRESSOR) tablet 25 mg, BID  cefTRIAXone (ROCEPHIN) 1000 mg IVPB in 50 mL D5W minibag, Q24H  doxycycline monohydrate (MONODOX) capsule 100 mg, 2 times per day  insulin lispro (HUMALOG) injection vial 0-12 Units, TID WC  insulin lispro (HUMALOG) injection vial 0-6 Units, Nightly  glucose (GLUTOSE) 40 % oral gel 15 g, PRN  dextrose 50 % IV solution, PRN  glucagon (rDNA) injection 1 mg, PRN  dextrose 5 % solution, PRN  sodium chloride flush 0.9 % injection 10 mL, 2 times per day  sodium chloride flush 0.9 % injection 10 mL, PRN  promethazine (PHENERGAN) tablet 12.5 mg, Q6H PRN    Or  ondansetron (ZOFRAN) injection 4 mg, Q6H PRN  acetaminophen (TYLENOL) tablet 650 mg, Q6H PRN    Or  acetaminophen (TYLENOL) suppository 650 mg, Q6H PRN  bisacodyl (DULCOLAX) EC tablet 5 mg, Daily PRN  warfarin (COUMADIN) daily dosing (placeholder), RX Placeholder        Data:     Code Status:  Full Code    Family History   Problem Relation Age of Onset    Cancer Mother     Diabetes Paternal Grandmother        Social History     Socioeconomic History    Marital status:      Spouse name: Not on file    Number of children: Not on file    Years of education: Not on file    Highest education level: Not on file   Occupational History    Not on file   Social Needs    Financial resource strain: Not on file    Food insecurity     Worry: Not on file     Inability: Not on file    Transportation needs     Medical: Not on file     Non-medical: Not on file Tobacco Use    Smoking status: Former Smoker     Quit date: 1984     Years since quittin.6    Smokeless tobacco: Never Used   Substance and Sexual Activity    Alcohol use: Not Currently     Alcohol/week: 0.0 standard drinks    Drug use: No    Sexual activity: Not on file   Lifestyle    Physical activity     Days per week: Not on file     Minutes per session: Not on file    Stress: Not on file   Relationships    Social connections     Talks on phone: Not on file     Gets together: Not on file     Attends [employer] service: Not on file     Active member of club or organization: Not on file     Attends meetings of clubs or organizations: Not on file     Relationship status: Not on file    Intimate partner violence     Fear of current or ex partner: Not on file     Emotionally abused: Not on file     Physically abused: Not on file     Forced sexual activity: Not on file   Other Topics Concern    Not on file   Social History Narrative    Not on file       I/O (24Hr): Intake/Output Summary (Last 24 hours) at 3/18/2021 1437  Last data filed at 3/18/2021 1238  Gross per 24 hour   Intake 919.33 ml   Output --   Net 919.33 ml     Radiology:  Ct Abdomen Pelvis Wo Contrast Additional Contrast? None    Result Date: 3/16/2021  1. Findings compatible with bronchiolitis in the left lower lobe and to a lesser degree in the lingula for which developing inflammatory process or infection should be considered. 2.  No acute abnormality identified in the abdomen or pelvis. 3.  Distended gallbladder without calcified stones or evidence for biliary dilatation. 4.  No renal calculi identified. 5.  Additional chronic and benign findings, as above. Xr Lumbar Spine (2-3 Views)    Result Date: 3/12/2021  Multilevel degenerative changes     Ct Head Wo Contrast    Result Date: 3/16/2021  No evidence for acute intracranial hemorrhage, territorial infarction or intracranial mass lesion. Mild generalized volume loss. 34.2 (L) 41 - 53 %    MCV 87.4 80 - 100 fL    MCH 28.7 26 - 34 pg    MCHC 32.9 31 - 37 g/dL    RDW 14.5 11.5 - 14.9 %    Platelets 383 070 - 881 k/uL    MPV 8.7 6.0 - 12.0 fL    NRBC Automated NOT REPORTED per 100 WBC    Differential Type NOT REPORTED     Seg Neutrophils 48 36 - 66 %    Lymphocytes 36 24 - 44 %    Monocytes 12 (H) 1 - 7 %    Eosinophils % 3 0 - 4 %    Basophils 1 0 - 2 %    Immature Granulocytes NOT REPORTED 0 %    Segs Absolute 5.40 1.3 - 9.1 k/uL    Absolute Lymph # 4.00 1.0 - 4.8 k/uL    Absolute Mono # 1.30 0.1 - 1.3 k/uL    Absolute Eos # 0.30 0.0 - 0.4 k/uL    Basophils Absolute 0.10 0.0 - 0.2 k/uL    Absolute Immature Granulocyte NOT REPORTED 0.00 - 0.30 k/uL    WBC Morphology NOT REPORTED     RBC Morphology NOT REPORTED     Platelet Estimate NOT REPORTED    Creatine Kinase    Collection Time: 21  4:27 AM   Result Value Ref Range    Total CK 1,754 (H) 39 - 308 U/L   Magnesium    Collection Time: 21  4:27 AM   Result Value Ref Range    Magnesium 1.8 1.6 - 2.6 mg/dL   POC Glucose Fingerstick    Collection Time: 21  6:57 AM   Result Value Ref Range    POC Glucose 112 (H) 75 - 110 mg/dL   POC Glucose Fingerstick    Collection Time: 21 11:05 AM   Result Value Ref Range    POC Glucose 138 (H) 75 - 110 mg/dL       Physical Examination:        Vitals:  /60   Pulse 67   Temp 98.1 °F (36.7 °C) (Oral)   Resp 18   Ht 5' 9\" (1.753 m)   Wt 282 lb 10.1 oz (128.2 kg)   SpO2 94%   BMI 41.74 kg/m²   Temp (24hrs), Av.7 °F (36.5 °C), Min:97.4 °F (36.3 °C), Max:98.1 °F (36.7 °C)    Recent Labs     21  1649 21  2056 21  0657 21  1105   POCGLU 118* 145* 112* 138*         Physical Exam  Vitals signs reviewed. Constitutional:       Appearance: Normal appearance. He is not diaphoretic. HENT:      Head: Normocephalic and atraumatic.       Right Ear: External ear normal.      Left Ear: External ear normal.      Nose: Nose normal.      Mouth/Throat: Mouth: Mucous membranes are moist.      Pharynx: Oropharynx is clear. Eyes:      Conjunctiva/sclera: Conjunctivae normal.   Neck:      Musculoskeletal: Normal range of motion and neck supple. No neck rigidity. Cardiovascular:      Rate and Rhythm: Normal rate and regular rhythm. Pulses: Normal pulses. Heart sounds: Normal heart sounds. Pulmonary:      Effort: Pulmonary effort is normal.      Breath sounds: Normal breath sounds. Abdominal:      General: Bowel sounds are normal. There is no distension. Palpations: Abdomen is soft. Tenderness: There is no abdominal tenderness. Musculoskeletal: Normal range of motion. General: No tenderness or deformity. Right lower leg: No edema. Left lower leg: No edema. Skin:     General: Skin is warm and dry. Capillary Refill: Capillary refill takes less than 2 seconds. Coloration: Skin is not jaundiced. Neurological:      General: No focal deficit present. Mental Status: Mental status is at baseline. Psychiatric:         Mood and Affect: Mood normal.         Behavior: Behavior normal.         Assessment:        Primary Problem  Acute renal failure with tubular necrosis (HCC)     Principal Problem:    Acute renal failure with tubular necrosis (HCC)  Active Problems:    Essential hypertension    Class 3 severe obesity due to excess calories with body mass index (BMI) of 40.0 to 44.9 in adult [facility])    Transaminitis    ASCVD (arteriosclerotic cardiovascular disease)    Chronic diastolic congestive heart failure (HCC)    Current use of long term anticoagulation    Obstructive sleep apnea    Renal failure    Non-traumatic rhabdomyolysis  Resolved Problems:    * No resolved hospital problems.  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 IV bicarb drip per nephrology  1. MRCP abdomen  2. Consult GI for elevated LFTs, elevated bilirubin level  3. Discussed with GI Dr. Barbra Rider  4. Blood culture pending  5. Urine culture negative  6. DVT Prophylaxis on Coumadin  7. Monitor CMP CK level  8. IV Rocephin p.o. doxycycline  9. Discussed with wife was present in room during the exam  10. EPCs  11. PT/OT to evaluate and treat  12. Pain control  13. Replace electrolytes as per sliding scale  14. Home medications reviewed and appropriate medications continued  15.  Reviewed labs and imaging studies from last 24 hours and results explained to patient      Electronically signed by [place] MD

## 2021-03-19 ENCOUNTER — APPOINTMENT (OUTPATIENT)
Dept: ULTRASOUND IMAGING | Age: 76
DRG: 683 | End: 2021-03-19
Payer: MEDICARE

## 2021-03-19 LAB
ABSOLUTE EOS #: 0.3 K/UL (ref 0–0.4)
ABSOLUTE IMMATURE GRANULOCYTE: ABNORMAL K/UL (ref 0–0.3)
ABSOLUTE LYMPH #: 3.3 K/UL (ref 1–4.8)
ABSOLUTE MONO #: 1.2 K/UL (ref 0.1–1.3)
AFP: 0.6 UG/L
ALBUMIN SERPL-MCNC: 3.6 G/DL (ref 3.5–5.2)
ALBUMIN/GLOBULIN RATIO: ABNORMAL (ref 1–2.5)
ALP BLD-CCNC: 56 U/L (ref 40–129)
ALPHA-1 ANTITRYPSIN: 121 MG/DL (ref 90–200)
ALT SERPL-CCNC: 126 U/L (ref 5–41)
ANION GAP SERPL CALCULATED.3IONS-SCNC: 10 MMOL/L (ref 9–17)
AST SERPL-CCNC: 88 U/L
BASOPHILS # BLD: 1 % (ref 0–2)
BASOPHILS ABSOLUTE: 0.1 K/UL (ref 0–0.2)
BILIRUB SERPL-MCNC: 1.45 MG/DL (ref 0.3–1.2)
BUN BLDV-MCNC: 48 MG/DL (ref 8–23)
BUN/CREAT BLD: ABNORMAL (ref 9–20)
CALCIUM SERPL-MCNC: 8.7 MG/DL (ref 8.6–10.4)
CERULOPLASMIN: 23 MG/DL (ref 15–30)
CHLORIDE BLD-SCNC: 101 MMOL/L (ref 98–107)
CO2: 26 MMOL/L (ref 20–31)
CREAT SERPL-MCNC: 1.06 MG/DL (ref 0.7–1.2)
DIFFERENTIAL TYPE: ABNORMAL
EOSINOPHILS RELATIVE PERCENT: 3 % (ref 0–4)
FERRITIN: 674 UG/L (ref 30–400)
FOLATE: 6.6 NG/ML
GFR AFRICAN AMERICAN: >60 ML/MIN
GFR NON-AFRICAN AMERICAN: >60 ML/MIN
GFR SERPL CREATININE-BSD FRML MDRD: ABNORMAL ML/MIN/{1.73_M2}
GFR SERPL CREATININE-BSD FRML MDRD: ABNORMAL ML/MIN/{1.73_M2}
GLUCOSE BLD-MCNC: 113 MG/DL (ref 75–110)
GLUCOSE BLD-MCNC: 123 MG/DL (ref 75–110)
GLUCOSE BLD-MCNC: 145 MG/DL (ref 70–99)
GLUCOSE BLD-MCNC: 146 MG/DL (ref 75–110)
GLUCOSE BLD-MCNC: 155 MG/DL (ref 75–110)
HCT VFR BLD CALC: 32.2 % (ref 41–53)
HEMOGLOBIN: 11 G/DL (ref 13.5–17.5)
IGA: 438 MG/DL (ref 70–400)
IMMATURE GRANULOCYTES: ABNORMAL %
INR BLD: 1.8
IRON SATURATION: 29 % (ref 20–55)
IRON: 67 UG/DL (ref 59–158)
LYMPHOCYTES # BLD: 33 % (ref 24–44)
MAGNESIUM: 1.5 MG/DL (ref 1.6–2.6)
MCH RBC QN AUTO: 28.8 PG (ref 26–34)
MCHC RBC AUTO-ENTMCNC: 34.2 G/DL (ref 31–37)
MCV RBC AUTO: 84.2 FL (ref 80–100)
MONOCYTES # BLD: 12 % (ref 1–7)
NRBC AUTOMATED: ABNORMAL PER 100 WBC
PDW BLD-RTO: 13.8 % (ref 11.5–14.9)
PLATELET # BLD: 232 K/UL (ref 150–450)
PLATELET ESTIMATE: ABNORMAL
PMV BLD AUTO: 8.5 FL (ref 6–12)
POTASSIUM SERPL-SCNC: 3.2 MMOL/L (ref 3.7–5.3)
PROTHROMBIN TIME: 21.2 SEC (ref 11.8–14.6)
RBC # BLD: 3.83 M/UL (ref 4.5–5.9)
RBC # BLD: ABNORMAL 10*6/UL
SEG NEUTROPHILS: 51 % (ref 36–66)
SEGMENTED NEUTROPHILS ABSOLUTE COUNT: 5.1 K/UL (ref 1.3–9.1)
SODIUM BLD-SCNC: 137 MMOL/L (ref 135–144)
TOTAL CK: 840 U/L (ref 39–308)
TOTAL IRON BINDING CAPACITY: 232 UG/DL (ref 250–450)
TOTAL PROTEIN: 6.3 G/DL (ref 6.4–8.3)
UNSATURATED IRON BINDING CAPACITY: 165 UG/DL (ref 112–347)
VITAMIN B-12: 376 PG/ML (ref 232–1245)
WBC # BLD: 9.9 K/UL (ref 3.5–11)
WBC # BLD: ABNORMAL 10*3/UL

## 2021-03-19 PROCEDURE — 86038 ANTINUCLEAR ANTIBODIES: CPT

## 2021-03-19 PROCEDURE — 80053 COMPREHEN METABOLIC PANEL: CPT

## 2021-03-19 PROCEDURE — 86663 EPSTEIN-BARR ANTIBODY: CPT

## 2021-03-19 PROCEDURE — 76705 ECHO EXAM OF ABDOMEN: CPT

## 2021-03-19 PROCEDURE — 86645 CMV ANTIBODY IGM: CPT

## 2021-03-19 PROCEDURE — 82105 ALPHA-FETOPROTEIN SERUM: CPT

## 2021-03-19 PROCEDURE — 94660 CPAP INITIATION&MGMT: CPT

## 2021-03-19 PROCEDURE — 83516 IMMUNOASSAY NONANTIBODY: CPT

## 2021-03-19 PROCEDURE — 82607 VITAMIN B-12: CPT

## 2021-03-19 PROCEDURE — 86644 CMV ANTIBODY: CPT

## 2021-03-19 PROCEDURE — 2580000003 HC RX 258: Performed by: FAMILY MEDICINE

## 2021-03-19 PROCEDURE — 82746 ASSAY OF FOLIC ACID SERUM: CPT

## 2021-03-19 PROCEDURE — 2060000000 HC ICU INTERMEDIATE R&B

## 2021-03-19 PROCEDURE — 6360000002 HC RX W HCPCS: Performed by: INTERNAL MEDICINE

## 2021-03-19 PROCEDURE — 82103 ALPHA-1-ANTITRYPSIN TOTAL: CPT

## 2021-03-19 PROCEDURE — 6370000000 HC RX 637 (ALT 250 FOR IP): Performed by: INTERNAL MEDICINE

## 2021-03-19 PROCEDURE — 6360000002 HC RX W HCPCS: Performed by: FAMILY MEDICINE

## 2021-03-19 PROCEDURE — 94761 N-INVAS EAR/PLS OXIMETRY MLT: CPT

## 2021-03-19 PROCEDURE — 82784 ASSAY IGA/IGD/IGG/IGM EACH: CPT

## 2021-03-19 PROCEDURE — 82947 ASSAY GLUCOSE BLOOD QUANT: CPT

## 2021-03-19 PROCEDURE — 86665 EPSTEIN-BARR CAPSID VCA: CPT

## 2021-03-19 PROCEDURE — 83550 IRON BINDING TEST: CPT

## 2021-03-19 PROCEDURE — 6370000000 HC RX 637 (ALT 250 FOR IP): Performed by: FAMILY MEDICINE

## 2021-03-19 PROCEDURE — 85025 COMPLETE CBC W/AUTO DIFF WBC: CPT

## 2021-03-19 PROCEDURE — 85610 PROTHROMBIN TIME: CPT

## 2021-03-19 PROCEDURE — 82728 ASSAY OF FERRITIN: CPT

## 2021-03-19 PROCEDURE — 83540 ASSAY OF IRON: CPT

## 2021-03-19 PROCEDURE — 86256 FLUORESCENT ANTIBODY TITER: CPT

## 2021-03-19 PROCEDURE — 82550 ASSAY OF CK (CPK): CPT

## 2021-03-19 PROCEDURE — 86376 MICROSOMAL ANTIBODY EACH: CPT

## 2021-03-19 PROCEDURE — 86664 EPSTEIN-BARR NUCLEAR ANTIGEN: CPT

## 2021-03-19 PROCEDURE — APPSS30 APP SPLIT SHARED TIME 16-30 MINUTES: Performed by: NURSE PRACTITIONER

## 2021-03-19 PROCEDURE — 99232 SBSQ HOSP IP/OBS MODERATE 35: CPT | Performed by: INTERNAL MEDICINE

## 2021-03-19 PROCEDURE — 83735 ASSAY OF MAGNESIUM: CPT

## 2021-03-19 PROCEDURE — 97116 GAIT TRAINING THERAPY: CPT

## 2021-03-19 PROCEDURE — 36415 COLL VENOUS BLD VENIPUNCTURE: CPT

## 2021-03-19 PROCEDURE — 82390 ASSAY OF CERULOPLASMIN: CPT

## 2021-03-19 RX ORDER — MAGNESIUM SULFATE 1 G/100ML
1000 INJECTION INTRAVENOUS PRN
Status: DISCONTINUED | OUTPATIENT
Start: 2021-03-19 | End: 2021-03-20 | Stop reason: HOSPADM

## 2021-03-19 RX ORDER — WARFARIN SODIUM 5 MG/1
5 TABLET ORAL
Status: COMPLETED | OUTPATIENT
Start: 2021-03-19 | End: 2021-03-19

## 2021-03-19 RX ADMIN — CEFTRIAXONE SODIUM 1000 MG: 1 INJECTION, POWDER, FOR SOLUTION INTRAMUSCULAR; INTRAVENOUS at 16:49

## 2021-03-19 RX ADMIN — POTASSIUM CHLORIDE 40 MEQ: 1500 TABLET, EXTENDED RELEASE ORAL at 09:08

## 2021-03-19 RX ADMIN — MIDODRINE HYDROCHLORIDE 2.5 MG: 2.5 TABLET ORAL at 12:43

## 2021-03-19 RX ADMIN — CITALOPRAM HYDROBROMIDE 10 MG: 20 TABLET ORAL at 09:08

## 2021-03-19 RX ADMIN — METOPROLOL TARTRATE 25 MG: 25 TABLET, FILM COATED ORAL at 09:08

## 2021-03-19 RX ADMIN — ENOXAPARIN SODIUM 120 MG: 120 INJECTION SUBCUTANEOUS at 20:38

## 2021-03-19 RX ADMIN — ENOXAPARIN SODIUM 120 MG: 120 INJECTION SUBCUTANEOUS at 12:43

## 2021-03-19 RX ADMIN — MAGNESIUM SULFATE HEPTAHYDRATE 1000 MG: 1 INJECTION, SOLUTION INTRAVENOUS at 09:08

## 2021-03-19 RX ADMIN — DOXYCYCLINE 100 MG: 100 CAPSULE ORAL at 20:38

## 2021-03-19 RX ADMIN — SODIUM CHLORIDE, PRESERVATIVE FREE 10 ML: 5 INJECTION INTRAVENOUS at 20:38

## 2021-03-19 RX ADMIN — MIDODRINE HYDROCHLORIDE 2.5 MG: 2.5 TABLET ORAL at 16:49

## 2021-03-19 RX ADMIN — INSULIN LISPRO 2 UNITS: 100 INJECTION, SOLUTION INTRAVENOUS; SUBCUTANEOUS at 11:25

## 2021-03-19 RX ADMIN — WARFARIN SODIUM 5 MG: 5 TABLET ORAL at 16:49

## 2021-03-19 RX ADMIN — MAGNESIUM SULFATE HEPTAHYDRATE 1000 MG: 1 INJECTION, SOLUTION INTRAVENOUS at 06:38

## 2021-03-19 RX ADMIN — DOXYCYCLINE 100 MG: 100 CAPSULE ORAL at 09:08

## 2021-03-19 RX ADMIN — METOPROLOL TARTRATE 25 MG: 25 TABLET, FILM COATED ORAL at 20:38

## 2021-03-19 RX ADMIN — INSULIN LISPRO 1 UNITS: 100 INJECTION, SOLUTION INTRAVENOUS; SUBCUTANEOUS at 20:38

## 2021-03-19 ASSESSMENT — ENCOUNTER SYMPTOMS
ANAL BLEEDING: 0
CHOKING: 0
RECTAL PAIN: 0
COLOR CHANGE: 0
BLOOD IN STOOL: 0
VOICE CHANGE: 0
STRIDOR: 0
ABDOMINAL DISTENTION: 0
TROUBLE SWALLOWING: 0
BACK PAIN: 0
PHOTOPHOBIA: 0

## 2021-03-19 ASSESSMENT — PAIN SCALES - GENERAL: PAINLEVEL_OUTOF10: 0

## 2021-03-19 NOTE — PROGRESS NOTES
Pharmacy Note  Warfarin Consult follow-up      Recent Labs     03/17/21  0602 03/18/21  0427 03/19/21  0444   INR 3.6 2.2 1.8     Recent Labs     03/17/21  0602 03/18/21  0427 03/19/21  0444   HGB 12.4* 11.2* 11.0*   HCT 36.1* 34.2* 32.2*    224 232       Significant Drug-Drug Interactions:  New warfarin drug-drug interactions: none  Discontinued drug-drug interactions: none  Current warfarin drug-drug interactions: citalopram      Date             INR        Dose given previous day  Dose scheduled for today  3/19/2021           1.8       3 mg           5 mg        Notes:                   INR = 1.8, will give warfarin 5 mg today. Daily PT/INR while inpatient. Ezekiel Claros. [address] [facility]

## 2021-03-19 NOTE — PROGRESS NOTES
Hospital provided bipap currently on Standby in patient's room. Pt refused to wear and did not wear last night. Currently on room air with SPO2 91%.

## 2021-03-19 NOTE — PLAN OF CARE
Problem: Falls - Risk of:  Goal: Will remain free from falls  Description: Will remain free from falls  3/19/2021 0433 by Ragini Castaneda RN  Outcome: Ongoing  Note: Will continue to monitor patient needs hourly and respond in a timely fashion in order to decrease the risk of patient falls. Patient is a high fall risk. Bed alarm initiated at all times. Will encourage patient to utilize call light when needing to ambulate out of bed. Patient compliant this shift. Problem: Falls - Risk of:  Goal: Absence of physical injury  Description: Absence of physical injury  3/19/2021 0433 by Ragini Castaneda RN  Outcome: Ongoing  Note: Patient will continue to be free from physical injury. Patient's bed alarm is on and hourly rounding is being completed to ensure all patient needs are met. Problem: Musculor/Skeletal Functional Status  Goal: Highest potential functional level  3/19/2021 0433 by Ragini Castaneda RN  Outcome: Ongoing  Note: Will continue to work with patient to continue to maintain and improve patients ability to function on a physical level. Problem: Fluid Volume:  Goal: Ability to achieve a balanced intake and output will improve  Description: Ability to achieve a balanced intake and output will improve  3/19/2021 0433 by Ragini Castaneda RN  Outcome: Ongoing  Note: Will continue to monitor patients I&O's to ensure fluid balance is remaining stable. Problem: Physical Regulation:  Goal: Will show no signs and symptoms of electrolyte imbalance  Description: Will show no signs and symptoms of electrolyte imbalance  3/19/2021 0433 by Ragini Castaneda RN  Outcome: Ongoing  Note: Will continue to monitor patient electrolytes daily to ensure they are in normal limits.

## 2021-03-19 NOTE — PROGRESS NOTES
Physical Therapy  Facility/Department: [facility] PROGRESSIVE CARE  Daily Treatment Note  NAME: Jazmine Jasso  : 1945  MRN: [id number]    Date of Service: 3/19/2021    Discharge Recommendations:  Patient would benefit from continued therapy after discharge   PT Equipment Recommendations  Equipment Needed: No    Assessment   Body structures, Functions, Activity limitations: Decreased functional mobility ; Decreased endurance;Decreased balance;Decreased strength  Assessment: Mobility improve significantly, able to ambulate/transfer safely without rolling walker, sao2 >90% at all times. Continue per POC to maxmize potential for safe D/C  Treatment Diagnosis: impaired mobility due to generalized  weakness  Specific instructions for Next Treatment: advance gait distance, instruct in exercise program  Prognosis: Good  Decision Making: Medium Complexity  History: admitted due to renal failure  Exam: ROM, MMT, balance and mobility assessments, monitored O2 sats w/ evaluation  Barriers to Learning: [place]  REQUIRES PT FOLLOW UP: Yes  Activity Tolerance  Activity Tolerance: Patient Tolerated treatment well     Patient Diagnosis(es): The primary encounter diagnosis was Acute renal failure, unspecified acute renal failure type ([address] A diagnosis of Hyperkalemia was also pertinent to this visit. has a past medical history of Atrioventricular block, Cancer ([address] Diabetes mellitus ([address] GERD (gastroesophageal reflux disease), [place] (hard of hearing), Hyperlipidemia, Hypertension, Kidney stones, Osteoarthritis, Pacemaker, and Sleep apnea. has a past surgical history that includes Aortic valve replacement (); Kidney stone surgery; Foot surgery (Right); Colonoscopy; Knee arthroscopy (Left); tumor removal; pacemaker placement; Kidney surgery (Left); Cystoscopy; Cardiac surgery; back surgery; Tonsillectomy; eye surgery (Bilateral);  Total knee arthroplasty (Left); joint replacement (Left, 2016); pre-malignant / benign skin lesion excision (Right, 04/17/2017); Facial reconstruction surgery (Right, 4/17/2017); pre-malignant / benign skin lesion excision (09/18/2019); and Neck surgery (N/A, 9/18/2019). Restrictions  Restrictions/Precautions  Restrictions/Precautions: Fall Risk, Up as Tolerated(peripheral IV right antecubital, troponins 163 on 3-, continuous pulse oximeter)  Required Braces or Orthoses?: Yes(bilateral wrist braces for carpal keyonna)  Implants present? : Metal implants, Pacemaker(left TKR, aortic valve replacement)  Required Braces or Orthoses  Right Upper Extremity Brace/Splint: (bilateral wrist braces for carpal keyonna)  Left Upper Extremity Brace/Splint: (bilateral wrist braces for carpal keyonna)  Subjective   General  Chart Reviewed: Yes  Response To Previous Treatment: Not applicable  Family / Caregiver Present: Yes(spouse)  Referring Practitioner: Dr. America Falcon: Pt reports he feels much better, does not use Rolling walker to go the bathroom. General Comment  Comments: spouse states that he was weak and having increased difficulty walking at home prior to admission but that he was feeling stronger today. Pain Screening  Patient Currently in Pain: Denies  Vital Signs  BP Location: Left upper arm  Level of Consciousness: Alert (0)  Patient Currently in Pain: Denies  Oxygen Therapy  O2 Device: None (Room air)       Orientation  Orientation  Overall Orientation Status: Within Functional Limits  Cognition      Objective   Bed mobility  Rolling to Right: Stand by assistance  Supine to Sit: Stand by assistance  Sit to Supine: Stand by assistance  Scooting: Stand by assistance  Comment: Sao2 > 90% at all times, pt on room air.   Transfers  Sit to Stand: Stand by assistance  Stand to sit: Stand by assistance  Ambulation  Ambulation?: Yes  Ambulation 1  Surface: level tile  Device: No Device  Assistance: Stand by assistance  Quality of Gait: Lateral sways, per pt its normal for hime to walk this way 2* knee surgery in the past.  Gait Deviations: Slow Hue;Deviated path; Increased FERMÍN  Distance: 140 ft  Comments: Sao2 94% after ambulation, assisted pt back to bed, pt wants to rest, and visit with his wife.    Stairs/Curb  Stairs?: No     Balance  Sitting - Static: Good  Sitting - Dynamic: Good  Standing - Static: Good(used wheeled walker)  Standing - Dynamic: Good;-(used wheeled walker)                           G-Code     OutComes Score                                                     AM-PAC Score             Goals  Short term goals  Time Frame for Short term goals: 3-5 treatments/ 5 days  Short term goal 1: pt to tolerate 1/2 hour of therapuetic exercise and activity keeping O2 sats above 90%  Short term goal 2: pt to demonstrate independent bed mobility for rolling and supine <> sit for position change  Short term goal 3: pt to demonstrate MODIFIED independent transfers using least restrictive device  Short term goal 4: pt to demonstrate MODIFIED independent gait 50-80'  using least restrictive device for household distances  Short term goal 5: pt to demonstrate good balance using least restrictive device  Short term goal 6: pt to demonstrate good technique for LE strengthening and energy conservation techniques  Short term goal 7: pt to demonstrate increased strength bilateral LEs by 1/2 MMG  Patient Goals   Patient goals : return home w/ spouse    Plan    Plan  Times per week: 3-5 treatments/ 5 days  Times per day: (3-5 treatments/ 5 days)  Specific instructions for Next Treatment: advance gait distance, instruct in exercise program  Current Treatment Recommendations: Strengthening, Home Exercise Program, Safety Education & Training, Balance Training, Endurance Training, Patient/Caregiver Education & Training, Equipment Evaluation, Education, & procurement, Functional Mobility Training, Transfer Training, Gait Training  Safety Devices  Type of devices: Call light within reach, Gait belt, Patient at risk for falls, All fall risk precautions in place, Bed alarm in place, Left in bed(nurse Pastora)     Therapy Time   Individual Concurrent Group Co-treatment   Time In 1456         Time Out 1510         Minutes 14                 Ezio Maciel, PT

## 2021-03-19 NOTE — PROGRESS NOTES
[facility]    Gastroenterology Daily Progress Note      Patient:   Elaine Duncan   :    1945   [facility]  Date:     3/19/2021  Consultant:   Elisa Mc CNP      SUBJECTIVE  66 y.o. male admitted 3/16/2021 with Renal failure [N19] and seen for elevated lft's. The pt was seen and examined. lft's are trending down liver US not completed yet. He has no c/o abdominal pain.          OBJECTIVE  Scheduled Meds:   midodrine  2.5 mg Oral TID WC    sodium chloride flush  10 mL Intravenous 2 times per day    citalopram  10 mg Oral Daily    metoprolol tartrate  25 mg Oral BID    cefTRIAXone (ROCEPHIN) IV  1,000 mg Intravenous Q24H    doxycycline monohydrate  100 mg Oral 2 times per day    insulin lispro  0-12 Units Subcutaneous TID WC    insulin lispro  0-6 Units Subcutaneous Nightly    sodium chloride flush  10 mL Intravenous 2 times per day    warfarin (COUMADIN) daily dosing (placeholder)   Other RX Placeholder       Vital Signs:  BP (!) 163/84   Pulse 61   Temp 97.6 °F (36.4 °C) (Oral)   Resp 17   Ht 5' 9\" (1.753 m)   Wt 275 lb 12.7 oz (125.1 kg)   SpO2 94%   BMI 40.73 kg/m²      Physical Exam:     General Appearance: alert and oriented to person, place and time, well-developed and well-nourished, in no acute distress  Skin: warm and dry, no rash or erythema  Head: normocephalic and atraumatic  Eyes: pupils equal, round, and reactive to light, extraocular eye movements intact, conjunctivae normal  ENT: hearing grossly normal bilaterally  Neck: neck supple and non tender without mass, no thyromegaly or thyroid nodules, no cervical lymphadenopathy   Pulmonary/Chest: clear to auscultation bilaterally- no wheezes, rales or rhonchi, normal air movement, no respiratory distress  Cardiovascular: normal rate, regular rhythm, normal S1 and S2, no murmurs, rubs, clicks or gallops, distal pulses intact, no carotid bruits  Abdomen: soft, non-tender, non-distended, normal bowel sounds, no masses or organomegaly  Extremities: no cyanosis, clubbing or edema  Musculoskeletal: normal range of motion, no joint swelling, deformity or tenderness  Neurologic: no cranial nerve deficit and muscle strength normal    Lab and Imaging Review     CBC  Recent Labs     03/17/21  0602 03/18/21 0427 03/19/21  0444   WBC 13.7* 11.0 9.9   HGB 12.4* 11.2* 11.0*   HCT 36.1* 34.2* 32.2*   MCV 83.4 87.4 84.2    224 232       BMP  Recent Labs     03/17/21  0602 03/18/21 0427 03/19/21  0444   * 137 137   K 4.1 3.5* 3.2*    104 101   CO2 16* 18* 26   * 87* 48*   CREATININE 2.22* 1.50* 1.06   GLUCOSE 112* 115* 145*   CALCIUM 8.9 8.8 8.7       LFTS  Recent Labs     03/17/21 0602 03/18/21 0427 03/19/21  0444   ALKPHOS 60 56 56   * 156* 126*   * 137* 88*   PROT 6.9 6.5 6.3*   BILITOT 1.54* 1.82* 1.45*   LABALBU 3.3* 3.1* 3.6   Results for Brad Sánchez" (MRN [id number]) as of 3/19/2021 08:49   Ref. Range 3/16/2021 18:48   Hep A IgM Latest Ref Range: NONREACTIVE  NONREACTIVE   Hepatitis B Surface Ag Latest Ref Range: NONREACTIVE  NONREACTIVE   Hepatitis C Ab Latest Ref Range: NONREACTIVE  NONREACTIVE   Hep B Core Ab, IgM Latest Ref Range: NONREACTIVE  NONREACTIVE   Results for Brad Sánchez" (MRN [id number]) as of 3/19/2021 08:49   Ref.  Range 3/18/2021 15:14   CA 19-9 Latest Ref Range: 0 - 35 U/mL 29   CEA Latest Ref Range: <3.9 ng/mL 3.7       AMYLASE/LIPASE/AMMONIA  Recent Labs     03/16/21  1848   AMMONIA 26       PT/INR  Recent Labs     03/17/21  0602 03/18/21 0427 03/19/21  0444   PROTIME 35.0* 24.3* 21.2*   INR 3.6 2.2 1.8     FINDINGS:ct abd 3/16/21   LOWER CHEST: Clustered ground-glass and nodular opacities in the left lower   lobe and posterior lingula are noted, consistent with bronchiolitis.  Sequela   of old granulomatous disease in the right lower lobe.  Status post aortic   valve replacement and pacer placement.       KIDNEYS AND URINARY TRACT: No renal calculi are identified. There is no   evidence for hydronephrosis.  The ureters are of normal course and caliber.       ORGANS: Lack of intravenous contrast limits evaluation of the solid organs   and bowel. The liver, gallbladder, pancreas, spleen, and adrenals reveal no   acute abnormality.  The gallbladder is well distended.       GI/BOWEL: Small bowel malrotation is incidentally noted with the majority of   the small bowel in the right abdomen and the colon located in the left   abdomen.  The cecum is present in the left mid abdomen.  No bowel dilatation   or wall thickening.       PELVIS: No acute findings.       PERITONEUM/RETROPERITONEUM: No lymphadenopathy is noted.  No free air or free   fluid.  The aorta is normal in caliber.       BONES/SOFT TISSUES: No acute osseous abnormality is identified.  Deformity of   the distal coccyx has findings of chronicity.           Impression   1.  Findings compatible with bronchiolitis in the left lower lobe and to a   lesser degree in the lingula for which developing inflammatory process or   infection should be considered.       2.  No acute abnormality identified in the abdomen or pelvis.       3.  Distended gallbladder without calcified stones or evidence for biliary   dilatation.       4.  No renal calculi identified.       5.  Additional chronic and benign findings, as above.             ASSESSMENT/PLAN:  1. Elevated lft's with weight loss; lft's  trending down, etiology includes rhabdomyolysis from fall and malignancy  -unable to have mri abd due to pacemaker, liver US has been ordered  -liver w/u ordered      This plan was formulated in collaboration with Dr. Paolo Mccauley .     Electronically signed by: WOODY Mcpherson CNP on 3/19/2021 at 7:37 AM     Attending Physician Statement  I have discussed the care of Nicho Higuera and   I have examined the patient myselft independently, and taken ros and hpi , including pertinent history and exam findings,

## 2021-03-19 NOTE — FLOWSHEET NOTE
Notified Dr Mandeep Rich regarding critical Mg of 1.4 this morning. See new orders.     Electronically signed by Sabino Arce RN on 3/19/2021 at 6:07 AM

## 2021-03-19 NOTE — PROGRESS NOTES
[facility]    OCCUPATIONAL THERAPY MISSED TREATMENT NOTE   INPATIENT   Date: 3/19/21  Patient Name: Maru Phelan       Room:   MRN: [id number]   Account #: [de-identified]    : 1945  (66 y.o.)  Gender: male   Referring Practitioner: Dr. Maria Ines Rodriguez  Diagnosis: Renal Failure             REASON FOR MISSED TREATMENT:  Patient unable to participate   -   Other - Currently with PT dept.           ÁNGEL Hodgson

## 2021-03-19 NOTE — PROGRESS NOTES
Progress Note    3/19/2021   1:52 PM    Name:  Sejal Payne  MRN:    [id number]     Acct:     [de-identified]   Room:  2115/2115-01  IP Day: 3     Admit Date: 3/16/2021  1:11 PM  PCP: Tamara Angelo    Subjective:     C/C:   Chief Complaint   Patient presents with    Fatigue    Extremity Weakness     bilateral legs        Interval History: Status: not changed. Patient denies chest pain shortness of breath. Generalized weakness significantly improving. Vital signs reviewed and stable. Recent labs reviewed creatinine 1.06, CPK level decreasing at 840, bilirubin 1.45 . CEA within normal limits CA 19-9 within normal limit    ROS:   all 10 systems reviewed and are negative except as noted    Review of Systems   Constitutional: Negative for appetite change, chills and diaphoresis. HENT: Negative for drooling, ear pain, trouble swallowing and voice change. Eyes: Negative for photophobia and visual disturbance. Respiratory: Negative for choking and stridor. Cardiovascular: Negative for chest pain and palpitations. Gastrointestinal: Negative for abdominal distention, anal bleeding, blood in stool and rectal pain. Endocrine: Negative for polyphagia and polyuria. Genitourinary: Negative for dysuria, flank pain, hematuria and urgency. Musculoskeletal: Negative for back pain, myalgias and neck stiffness. Skin: Negative for color change, pallor and rash. Allergic/Immunologic: Negative for environmental allergies and food allergies. Neurological: Negative for tremors, seizures, facial asymmetry and numbness. Hematological: Negative for adenopathy. Does not bruise/bleed easily. Psychiatric/Behavioral: Negative for agitation, behavioral problems, hallucinations, self-injury and suicidal ideas. Medications:      Allergies: No Known Allergies    Current Meds: magnesium sulfate 1000 mg in dextrose 5% 100 mL IVPB, PRN  warfarin (COUMADIN) tablet 5 mg, Once  enoxaparin (LOVENOX) injection 120 mg, BID  potassium chloride (KLOR-CON M) extended release tablet 40 mEq, PRN    Or  potassium bicarb-citric acid (EFFER-K) effervescent tablet 40 mEq, PRN    Or  potassium chloride 10 mEq/100 mL IVPB (Peripheral Line), PRN  perflutren lipid microspheres (DEFINITY) injection 2.2 mg, ONCE PRN  midodrine (PROAMATINE) tablet 2.5 mg, TID WC  sodium chloride flush 0.9 % injection 10 mL, 2 times per day  sodium chloride flush 0.9 % injection 10 mL, PRN  citalopram (CELEXA) tablet 10 mg, Daily  metoprolol tartrate (LOPRESSOR) tablet 25 mg, BID  cefTRIAXone (ROCEPHIN) 1000 mg IVPB in 50 mL D5W minibag, Q24H  doxycycline monohydrate (MONODOX) capsule 100 mg, 2 times per day  insulin lispro (HUMALOG) injection vial 0-12 Units, TID WC  insulin lispro (HUMALOG) injection vial 0-6 Units, Nightly  glucose (GLUTOSE) 40 % oral gel 15 g, PRN  dextrose 50 % IV solution, PRN  glucagon (rDNA) injection 1 mg, PRN  dextrose 5 % solution, PRN  sodium chloride flush 0.9 % injection 10 mL, 2 times per day  sodium chloride flush 0.9 % injection 10 mL, PRN  promethazine (PHENERGAN) tablet 12.5 mg, Q6H PRN    Or  ondansetron (ZOFRAN) injection 4 mg, Q6H PRN  acetaminophen (TYLENOL) tablet 650 mg, Q6H PRN    Or  acetaminophen (TYLENOL) suppository 650 mg, Q6H PRN  bisacodyl (DULCOLAX) EC tablet 5 mg, Daily PRN  warfarin (COUMADIN) daily dosing (placeholder), RX Placeholder        Data:     Code Status:  Full Code    Family History   Problem Relation Age of Onset    Cancer Mother     Diabetes Paternal Grandmother        Social History     Socioeconomic History    Marital status:      Spouse name: Not on file    Number of children: Not on file    Years of education: Not on file    Highest education level: Not on file   Occupational History    Not on file   Social Needs    Financial resource strain: Not on file    Food insecurity     Worry: Not on file     Inability: Not on file    Transportation needs     Medical: Not on file Non-medical: Not on file   Tobacco Use    Smoking status: Former Smoker     Quit date: 1984     Years since quittin.6    Smokeless tobacco: Never Used   Substance and Sexual Activity    Alcohol use: Not Currently     Alcohol/week: 0.0 standard drinks    Drug use: No    Sexual activity: Not on file   Lifestyle    Physical activity     Days per week: Not on file     Minutes per session: Not on file    Stress: Not on file   Relationships    Social connections     Talks on phone: Not on file     Gets together: Not on file     Attends [employer]ism service: Not on file     Active member of club or organization: Not on file     Attends meetings of clubs or organizations: Not on file     Relationship status: Not on file    Intimate partner violence     Fear of current or ex partner: Not on file     Emotionally abused: Not on file     Physically abused: Not on file     Forced sexual activity: Not on file   Other Topics Concern    Not on file   Social History Narrative    Not on file       I/O (24Hr): Intake/Output Summary (Last 24 hours) at 3/19/2021 1352  Last data filed at 3/19/2021 6955  Gross per 24 hour   Intake 675 ml   Output --   Net 675 ml     Radiology:  Ct Abdomen Pelvis Wo Contrast Additional Contrast? None    Result Date: 3/16/2021  1. Findings compatible with bronchiolitis in the left lower lobe and to a lesser degree in the lingula for which developing inflammatory process or infection should be considered. 2.  No acute abnormality identified in the abdomen or pelvis. 3.  Distended gallbladder without calcified stones or evidence for biliary dilatation. 4.  No renal calculi identified. 5.  Additional chronic and benign findings, as above. Xr Lumbar Spine (2-3 Views)    Result Date: 3/12/2021  Multilevel degenerative changes     Ct Head Wo Contrast    Result Date: 3/16/2021  No evidence for acute intracranial hemorrhage, territorial infarction or intracranial mass lesion.  Mild generalized volume loss. Xr Chest Portable    Result Date: 3/16/2021  Mild stable cardiomegaly and chronic pulmonary change without acute process. Us Abdomen Limited    Result Date: 3/19/2021  1. Mild distention of gallbladder however no cholelithiasis or signs of acute cholecystitis.        Labs:  Recent Results (from the past 24 hour(s))   CANCER ANTIGEN 19-9    Collection Time: 03/18/21  3:14 PM   Result Value Ref Range    CA 19-9 29 0 - 35 U/mL   CEA    Collection Time: 03/18/21  3:14 PM   Result Value Ref Range    CEA 3.7 <3.9 ng/mL   POC Glucose Fingerstick    Collection Time: 03/18/21  4:41 PM   Result Value Ref Range    POC Glucose 125 (H) 75 - 110 mg/dL   POC Glucose Fingerstick    Collection Time: 03/18/21  8:50 PM   Result Value Ref Range    POC Glucose 135 (H) 75 - 110 mg/dL   PROTIME-INR    Collection Time: 03/19/21  4:44 AM   Result Value Ref Range    Protime 21.2 (H) 11.8 - 14.6 sec    INR 1.8    Comprehensive Metabolic Panel w/ Reflex to MG    Collection Time: 03/19/21  4:44 AM   Result Value Ref Range    Glucose 145 (H) 70 - 99 mg/dL    BUN 48 (H) 8 - 23 mg/dL    CREATININE 1.06 0.70 - 1.20 mg/dL    Bun/Cre Ratio NOT REPORTED 9 - 20    Calcium 8.7 8.6 - 10.4 mg/dL    Sodium 137 135 - 144 mmol/L    Potassium 3.2 (L) 3.7 - 5.3 mmol/L    Chloride 101 98 - 107 mmol/L    CO2 26 20 - 31 mmol/L    Anion Gap 10 9 - 17 mmol/L    Alkaline Phosphatase 56 40 - 129 U/L     (H) 5 - 41 U/L    AST 88 (H) <40 U/L    Total Bilirubin 1.45 (H) 0.3 - 1.2 mg/dL    Total Protein 6.3 (L) 6.4 - 8.3 g/dL    Albumin 3.6 3.5 - 5.2 g/dL    Albumin/Globulin Ratio NOT REPORTED 1.0 - 2.5    GFR Non-African American >60 >60 mL/min    GFR African American >60 >60 mL/min    GFR Comment          GFR Staging NOT REPORTED    CBC auto differential    Collection Time: 03/19/21  4:44 AM   Result Value Ref Range    WBC 9.9 3.5 - 11.0 k/uL    RBC 3.83 (L) 4.5 - 5.9 m/uL    Hemoglobin 11.0 (L) 13.5 - 17.5 g/dL    Hematocrit 32.2 Mouth: Mucous membranes are moist.      Pharynx: Oropharynx is clear. Eyes:      Conjunctiva/sclera: Conjunctivae normal.   Neck:      Musculoskeletal: Normal range of motion and neck supple. No neck rigidity. Cardiovascular:      Rate and Rhythm: Normal rate and regular rhythm. Pulses: Normal pulses. Heart sounds: Normal heart sounds. Pulmonary:      Effort: Pulmonary effort is normal.      Breath sounds: Normal breath sounds. Abdominal:      General: Bowel sounds are normal. There is no distension. Palpations: Abdomen is soft. Musculoskeletal: Normal range of motion. General: No tenderness or deformity. Right lower leg: No edema. Left lower leg: No edema. Skin:     General: Skin is warm and dry. Capillary Refill: Capillary refill takes less than 2 seconds. Coloration: Skin is not jaundiced. Neurological:      General: No focal deficit present. Mental Status: Mental status is at baseline. Psychiatric:         Mood and Affect: Mood normal.         Behavior: Behavior normal.         Assessment:        Primary Problem  Acute renal failure with tubular necrosis (HCC)     Principal Problem:    Acute renal failure with tubular necrosis (HCC)  Active Problems:    Essential hypertension    Class 3 severe obesity due to excess calories with body mass index (BMI) of 40.0 to 44.9 in adult [facility])    Transaminitis    ASCVD (arteriosclerotic cardiovascular disease)    Chronic diastolic congestive heart failure (HCC)    Current use of long term anticoagulation    Obstructive sleep apnea    Renal failure    Non-traumatic rhabdomyolysis    Elevated LFTs  Resolved Problems:    * No resolved hospital problems.  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 IV Rocephin  2. Doxycycline p.o.  3. Ultrasound abdomen report reviewed. 4. Patient unable to have MRCP abdomen due to pacemaker  1. No statins on discharge  2. DVT Prophylaxis on Coumadin INR subtherapeutic. Will bridge with Lovenox 1 mg/kg subcu twice daily. 3. Monitor CBC, CMP, CPK  4. Urine culture negative  5. GI input noted  6. Blood culture no growth for 2 days  7. EPCs  8. PT/OT to evaluate and treat  9. Pain control  10. Replace electrolytes as per sliding scale  11. Home medications reviewed and appropriate medications continued  12.  Reviewed labs and imaging studies from last 24 hours and results explained to patient      Electronically signed by Du Quintana MD

## 2021-03-19 NOTE — PLAN OF CARE
Problem: Falls - Risk of:  Goal: Will remain free from falls  Description: Will remain free from falls  3/19/2021 1512 by Wei Do RN  Outcome: met     Problem: Musculor/Skeletal Functional Status  Goal: Highest potential functional level  3/19/2021 1512 by Wei Do RN  Outcome: Ongoing   Encourage ROM while in bed    Problem: Fluid Volume:  Goal: Ability to achieve a balanced intake and output will improve  Description: Ability to achieve a balanced intake and output will improve  3/19/2021 1512 by Wei Do RN  Outcome: Ongoing   I/o tracked each shift. Problem: Physical Regulation:  Goal: Ability to maintain clinical measurements within normal limits will improve  Description: Ability to maintain clinical measurements within normal limits will improve  Outcome: Ongoing  Encourage frequent repositioning.  Pt up with standby

## 2021-03-19 NOTE — PROGRESS NOTES
NEPHROLOGY PROGRESS NOTE     Patient :  Crystal Patterson; 66 y.o. MRN# [id number]  Location:  2115/2115-01  Attending:  Hoang Chau MD  Admit Date:  3/16/2021   Hospital Day: 3    Reason for Consult:  Management of acute kidney injury and hyperkalemia. Chief Complaint: Weakness, fatigue and poor oral intake. Interval history:   Patient was seen and examined today and he is feeling much better. He is nonoliguric and Dyazide remains on hold. He does not have shortness of breath at rest.  Creatinine has improved to 1.0 mg/dL BUN 48 good urine output  CK has trended down 800    History of Present Illness: This is a 66 y.o. male with a significant past medical history of type 2 diabetes mellitus, essential systemic hypertension, obstructive sleep apnea [on nocturnal CPAP] and heart failure with preserved ejection fraction [HFpEF], who presented to the hospital with complaints of generalized weakness, fatigue, poor appetite and weight loss of 10 days duration. He denied any associated shortness of breath or dizziness. Pt denies any history of  prolonged NSAID use. Patient denies dysuria, gross hematuria, flank pain, nocturia, urgency, passing frothy urine or urinary incontinence. There has been no recent exposure to IV contrast. There is no history  of paraprotein disease. Pt denies any history of recurrent UTI or kidney stones. Medication review shows use of Lisinopril, triamterene, hydrochlorothiazide, Metformin and Simvastatin.     Current Medications:    magnesium sulfate 1000 mg in dextrose 5% 100 mL IVPB, PRN  warfarin (COUMADIN) tablet 5 mg, Once  potassium chloride (KLOR-CON M) extended release tablet 40 mEq, PRN    Or  potassium bicarb-citric acid (EFFER-K) effervescent tablet 40 mEq, PRN    Or  potassium chloride 10 mEq/100 mL IVPB (Peripheral Line), PRN  perflutren lipid microspheres (DEFINITY) injection 2.2 mg, ONCE PRN  sodium bicarbonate 75 mEq in sodium chloride 0.45 % 1,000 mL infusion, Continuous  midodrine (PROAMATINE) tablet 2.5 mg, TID WC  sodium chloride flush 0.9 % injection 10 mL, 2 times per day  sodium chloride flush 0.9 % injection 10 mL, PRN  citalopram (CELEXA) tablet 10 mg, Daily  metoprolol tartrate (LOPRESSOR) tablet 25 mg, BID  cefTRIAXone (ROCEPHIN) 1000 mg IVPB in 50 mL D5W minibag, Q24H  doxycycline monohydrate (MONODOX) capsule 100 mg, 2 times per day  insulin lispro (HUMALOG) injection vial 0-12 Units, TID WC  insulin lispro (HUMALOG) injection vial 0-6 Units, Nightly  glucose (GLUTOSE) 40 % oral gel 15 g, PRN  dextrose 50 % IV solution, PRN  glucagon (rDNA) injection 1 mg, PRN  dextrose 5 % solution, PRN  sodium chloride flush 0.9 % injection 10 mL, 2 times per day  sodium chloride flush 0.9 % injection 10 mL, PRN  promethazine (PHENERGAN) tablet 12.5 mg, Q6H PRN    Or  ondansetron (ZOFRAN) injection 4 mg, Q6H PRN  acetaminophen (TYLENOL) tablet 650 mg, Q6H PRN    Or  acetaminophen (TYLENOL) suppository 650 mg, Q6H PRN  bisacodyl (DULCOLAX) EC tablet 5 mg, Daily PRN  warfarin (COUMADIN) daily dosing (placeholder), RX Placeholder      Objective:  CURRENT TEMPERATURE:  Temp: 97.6 °F (36.4 °C)  MAXIMUM TEMPERATURE OVER 24HRS:  Temp (24hrs), Av.9 °F (36.6 °C), Min:97.6 °F (36.4 °C), Max:98.2 °F (36.8 °C)    CURRENT RESPIRATORY RATE:  Resp: 18  CURRENT PULSE:  Pulse: 61  CURRENT BLOOD PRESSURE:  BP: (!) 163/84  24HR BLOOD PRESSURE RANGE:  Systolic (10KDA), JORJE:190 , Min:119 , BBJ:294   ; Diastolic (29PRR), HSS:18, Min:49, Max:84    24HR INTAKE/OUTPUT:      Intake/Output Summary (Last 24 hours) at 3/19/2021 2402  Last data filed at 3/19/2021 7173  Gross per 24 hour   Intake 911 ml   Output --   Net 911 ml     Patient Vitals for the past 96 hrs (Last 3 readings):   Weight   21 0100 275 lb 12.7 oz (125.1 kg)   21 0100 282 lb 10.1 oz (128.2 kg)   21 1245 276 lb 10.8 oz (125.5 kg)     Physical Exam:  GENERAL APPEARANCE: Alert and cooperative, and appears to be in no acute distress. HEAD: normocephalic  CARDIAC: Normal S1 and S2. No S3, S4 or murmurs. Rhythm is regular. LUNGS: Clear to auscultation and percussion without rales, rhonchi, wheezing or diminished breath sounds. ABDOMEN: Soft with normal bowel sounds. MUSKULOSKELETAL: Adequately aligned spine. No joint erythema or tenderness. EXTREMITIES: No edema. Peripheral pulses intact. NEURO: Nonfocal    Labs:   CBC:  Recent Labs     03/17/21  0602 03/18/21  0427 03/19/21  0444   WBC 13.7* 11.0 9.9   RBC 4.33* 3.91* 3.83*   HGB 12.4* 11.2* 11.0*   HCT 36.1* 34.2* 32.2*   MCV 83.4 87.4 84.2   MCH 28.6 28.7 28.8   MCHC 34.3 32.9 34.2   RDW 13.8 14.5 13.8    224 232   MPV 8.8 8.7 8.5      BMP:   Recent Labs     03/17/21  0602 03/18/21  0427 03/19/21  0444   * 137 137   K 4.1 3.5* 3.2*    104 101   CO2 16* 18* 26   * 87* 48*   CREATININE 2.22* 1.50* 1.06   GLUCOSE 112* 115* 145*   CALCIUM 8.9 8.8 8.7      Albumin:   Recent Labs     03/17/21  0602 03/18/21  0427 03/19/21  0444   LABALBU 3.3* 3.1* 3.6       Recent Labs     03/19/21  0444   PROT 6.3*     Urinalysis:    Recent Labs     03/17/21  0626   NITRU NEGATIVE   COLORU YELLOW   PHUR 5.0   WBCUA 2 TO 5   RBCUA 0 TO 2   MUCUS 1+*   TRICHOMONAS NOT REPORTED   YEAST NOT REPORTED   BACTERIA FEW*   SPECGRAV 1.013   LEUKOCYTESUR NEGATIVE   UROBILINOGEN Normal   BILIRUBINUR NEGATIVE   GLUCOSEU NEGATIVE   KETUA NEGATIVE   AMORPHOUS NOT REPORTED     Assessment/plan:    1. Acute kidney injury - most consistent with prerenal azotemia from poor intake/Diuretic/ACE inhibitor as well as toxic acute tubular necrosis secondary to rhabdomyolysis [statin related]. Renal function is slowly improving. 2.  Hyperkalemia - secondary to impaired potassium elimination in the setting of decreased distal sodium delivery. Serum potassium is now within normal range. We will however continue to hold lisinopril. Calcium improved and stable    3.   Systemic hypertension - blood pressure control is adequate. 4.  Non-anion gap metabolic acidosis -     5. Hypotension - likely due to hypovolemia. We will start gentle hydration. 6.  Hyponatremia - we will check serum and urine osmolality. Plan  Dc IVF  No statins on discharge due to   rhabdomylysis  Discharge planning    Prognosis is guarded.   Electronically signed by Rosy Campbell MD on 3/19/2021 at 9:27 AM

## 2021-03-20 VITALS
OXYGEN SATURATION: 72 % | RESPIRATION RATE: 18 BRPM | TEMPERATURE: 97.7 F | HEART RATE: 64 BPM | DIASTOLIC BLOOD PRESSURE: 50 MMHG | BODY MASS INDEX: 42.06 KG/M2 | SYSTOLIC BLOOD PRESSURE: 124 MMHG | HEIGHT: 69 IN | WEIGHT: 283.95 LBS

## 2021-03-20 LAB
ABSOLUTE EOS #: 0.3 K/UL (ref 0–0.4)
ABSOLUTE IMMATURE GRANULOCYTE: ABNORMAL K/UL (ref 0–0.3)
ABSOLUTE LYMPH #: 3.2 K/UL (ref 1–4.8)
ABSOLUTE MONO #: 1.1 K/UL (ref 0.1–1.3)
ALBUMIN SERPL-MCNC: 3.4 G/DL (ref 3.5–5.2)
ALBUMIN/GLOBULIN RATIO: ABNORMAL (ref 1–2.5)
ALP BLD-CCNC: 56 U/L (ref 40–129)
ALT SERPL-CCNC: 100 U/L (ref 5–41)
ANION GAP SERPL CALCULATED.3IONS-SCNC: 8 MMOL/L (ref 9–17)
AST SERPL-CCNC: 55 U/L
BASOPHILS # BLD: 1 % (ref 0–2)
BASOPHILS ABSOLUTE: 0.1 K/UL (ref 0–0.2)
BILIRUB SERPL-MCNC: 1.33 MG/DL (ref 0.3–1.2)
BUN BLDV-MCNC: 29 MG/DL (ref 8–23)
BUN/CREAT BLD: ABNORMAL (ref 9–20)
CALCIUM SERPL-MCNC: 9 MG/DL (ref 8.6–10.4)
CHLORIDE BLD-SCNC: 104 MMOL/L (ref 98–107)
CO2: 28 MMOL/L (ref 20–31)
CREAT SERPL-MCNC: 0.86 MG/DL (ref 0.7–1.2)
DIFFERENTIAL TYPE: ABNORMAL
EOSINOPHILS RELATIVE PERCENT: 4 % (ref 0–4)
GFR AFRICAN AMERICAN: >60 ML/MIN
GFR NON-AFRICAN AMERICAN: >60 ML/MIN
GFR SERPL CREATININE-BSD FRML MDRD: ABNORMAL ML/MIN/{1.73_M2}
GFR SERPL CREATININE-BSD FRML MDRD: ABNORMAL ML/MIN/{1.73_M2}
GLUCOSE BLD-MCNC: 119 MG/DL (ref 75–110)
GLUCOSE BLD-MCNC: 121 MG/DL (ref 75–110)
GLUCOSE BLD-MCNC: 132 MG/DL (ref 70–99)
HCT VFR BLD CALC: 32.5 % (ref 41–53)
HEMOGLOBIN: 11.1 G/DL (ref 13.5–17.5)
IMMATURE GRANULOCYTES: ABNORMAL %
INR BLD: 1.8
LYMPHOCYTES # BLD: 33 % (ref 24–44)
MCH RBC QN AUTO: 29.3 PG (ref 26–34)
MCHC RBC AUTO-ENTMCNC: 34.2 G/DL (ref 31–37)
MCV RBC AUTO: 85.7 FL (ref 80–100)
MONOCYTES # BLD: 12 % (ref 1–7)
NRBC AUTOMATED: ABNORMAL PER 100 WBC
PDW BLD-RTO: 14.3 % (ref 11.5–14.9)
PLATELET # BLD: 236 K/UL (ref 150–450)
PLATELET ESTIMATE: ABNORMAL
PMV BLD AUTO: 8.8 FL (ref 6–12)
POTASSIUM SERPL-SCNC: 4.3 MMOL/L (ref 3.7–5.3)
PROTHROMBIN TIME: 20.7 SEC (ref 11.8–14.6)
RBC # BLD: 3.79 M/UL (ref 4.5–5.9)
RBC # BLD: ABNORMAL 10*6/UL
SEG NEUTROPHILS: 50 % (ref 36–66)
SEGMENTED NEUTROPHILS ABSOLUTE COUNT: 4.9 K/UL (ref 1.3–9.1)
SODIUM BLD-SCNC: 140 MMOL/L (ref 135–144)
TOTAL CK: 340 U/L (ref 39–308)
TOTAL PROTEIN: 6.3 G/DL (ref 6.4–8.3)
WBC # BLD: 9.7 K/UL (ref 3.5–11)
WBC # BLD: ABNORMAL 10*3/UL

## 2021-03-20 PROCEDURE — 82550 ASSAY OF CK (CPK): CPT

## 2021-03-20 PROCEDURE — 2580000003 HC RX 258: Performed by: FAMILY MEDICINE

## 2021-03-20 PROCEDURE — 82947 ASSAY GLUCOSE BLOOD QUANT: CPT

## 2021-03-20 PROCEDURE — APPSS30 APP SPLIT SHARED TIME 16-30 MINUTES: Performed by: NURSE PRACTITIONER

## 2021-03-20 PROCEDURE — 85025 COMPLETE CBC W/AUTO DIFF WBC: CPT

## 2021-03-20 PROCEDURE — 85610 PROTHROMBIN TIME: CPT

## 2021-03-20 PROCEDURE — 6370000000 HC RX 637 (ALT 250 FOR IP): Performed by: INTERNAL MEDICINE

## 2021-03-20 PROCEDURE — 94761 N-INVAS EAR/PLS OXIMETRY MLT: CPT

## 2021-03-20 PROCEDURE — 80053 COMPREHEN METABOLIC PANEL: CPT

## 2021-03-20 PROCEDURE — 6370000000 HC RX 637 (ALT 250 FOR IP): Performed by: FAMILY MEDICINE

## 2021-03-20 PROCEDURE — 99232 SBSQ HOSP IP/OBS MODERATE 35: CPT | Performed by: INTERNAL MEDICINE

## 2021-03-20 PROCEDURE — 6360000002 HC RX W HCPCS: Performed by: FAMILY MEDICINE

## 2021-03-20 PROCEDURE — 36415 COLL VENOUS BLD VENIPUNCTURE: CPT

## 2021-03-20 RX ORDER — DOXYCYCLINE 100 MG/1
100 CAPSULE ORAL EVERY 12 HOURS SCHEDULED
Qty: 20 CAPSULE | Refills: 0 | Status: SHIPPED | OUTPATIENT
Start: 2021-03-20 | End: 2021-03-30

## 2021-03-20 RX ORDER — WARFARIN SODIUM 7.5 MG/1
7.5 TABLET ORAL
Status: DISCONTINUED | OUTPATIENT
Start: 2021-03-20 | End: 2021-03-20 | Stop reason: HOSPADM

## 2021-03-20 RX ADMIN — ENOXAPARIN SODIUM 120 MG: 120 INJECTION SUBCUTANEOUS at 08:38

## 2021-03-20 RX ADMIN — DOXYCYCLINE 100 MG: 100 CAPSULE ORAL at 08:38

## 2021-03-20 RX ADMIN — MIDODRINE HYDROCHLORIDE 2.5 MG: 2.5 TABLET ORAL at 08:38

## 2021-03-20 RX ADMIN — SODIUM CHLORIDE, PRESERVATIVE FREE 10 ML: 5 INJECTION INTRAVENOUS at 08:38

## 2021-03-20 RX ADMIN — METOPROLOL TARTRATE 25 MG: 25 TABLET, FILM COATED ORAL at 08:38

## 2021-03-20 RX ADMIN — CITALOPRAM HYDROBROMIDE 10 MG: 20 TABLET ORAL at 08:38

## 2021-03-20 NOTE — PROGRESS NOTES
RN reviewed all paperwork with patient and patient's wife. All questions and concerns were answered at this time. IVs were taken out. Telemetry was discontinued. Patient was taken downstairs via wheelchair for discharge.

## 2021-03-20 NOTE — PLAN OF CARE
Problem: Falls - Risk of:  Goal: Will remain free from falls  Description: Will remain free from falls  3/20/2021 0343 by Stephen Kennedy RN  Outcome: Ongoing  Note: Pt is up with 1 assist with his walker. Pt calls out appropriately and remains free from falls. Will monitor      Problem: Falls - Risk of:  Goal: Absence of physical injury  Description: Absence of physical injury  3/20/2021 0343 by Stephen Kennedy RN  Outcome: Ongoing  Note: Pt remains free from any physical injury.  Will monitor

## 2021-03-20 NOTE — DISCHARGE SUMMARY
Hospitalist Discharge Summary    Crystal Patterson  :  1945  MRN:  [id number]    Admit date:  3/16/2021  Discharge date:  3/20/21    Admitting Physician:  Hoang Chau MD    Discharge Diagnoses:   Patient Active Problem List   Diagnosis    Basal cell carcinoma of right forehead    Neoplasm of uncertain behavior of skin    [facility] failure with tubular necrosis [facility] [address] [facility]    Essential hypertension    Class 3 severe obesity due to excess calories with body mass index (BMI) of 40.0 to 44.9 in adult ([facility] [address]    Transaminitis    ASCVD (arteriosclerotic cardiovascular disease)    Chronic diastolic congestive heart failure ([facility] [address]    Current use of long term anticoagulation    Obstructive sleep apnea    Renal failure    Non-traumatic rhabdomyolysis    Elevated LFTs        Admission Condition:  fair      Discharged Condition:  good    Hospital Course/Treatments   Pt was admitted with ac kidney injury, pt was seen in [facility] with weakness and poor oral intake, pt ck was elevated, pt was hydrated and his cpk improved, pt ststinwas d/c pt did well, pt will be d/c with following meds    Discharge Medications:       [facility]"   Home Medication Instructions DYX:[id number]    Printed on:21 4917   Medication Information                      citalopram (CELEXA) 10 MG tablet  Take 10 mg by mouth daily             doxycycline monohydrate (MONODOX) 100 MG capsule  Take 1 capsule by mouth every 12 hours for 10 days             lisinopril (PRINIVIL;ZESTRIL) 20 MG tablet  Take 20 mg by mouth daily              metFORMIN ER (GLUCOPHAGE-XR) 500 MG XR tablet  Take 500 mg by mouth 2 times daily              metoprolol tartrate (LOPRESSOR) 50 MG tablet  Take 25 mg by mouth 2 times daily              Multiple Vitamins-Minerals (THERAPEUTIC MULTIVITAMIN-MINERALS) tablet  Take 1 tablet by mouth daily             warfarin (COUMADIN) 5 MG tablet  Take 7.5 mg by mouth Six times weekly Indications: ALL DAYS EXCEPT MONDAY - INR range 2.5-3.5 Per [facility]             warfarin (COUMADIN) 5 MG tablet  Take 10 mg by mouth once a week Indications: ON MONDAYS ONLY - INR range 2.5-3.5 Per [facility]                 Consults:  IP CONSULT TO INTERNAL MEDICINE  IP CONSULT TO NEPHROLOGY  PHARMACY TO DOSE WARFARIN  IP CONSULT TO GI    Significant Diagnostic Studies:  Echo Complete 2d W Doppler W Color    Result Date: 3/17/2021  1604 [facility] Transthoracic Echocardiography Report (TTE)  Patient Name TORRES       Date of Study                 03/17/2021               [facility]   Date of      1945  Gender                        Male  Birth   Age          66 year(s)  Race                             Room Number  2115        Height:                       68.9 inch, 175 cm   Corporate ID [id number]    Weight:                       278 pounds, 126 kg  #   Patient [place] [de-identified]   BSA:           2.37 m^2       BMI:      41.14  #                                                                kg/m^2   MR #         [id number]      Sonographer                   Maine Morse   Accession #  [id number]  Interpreting Physician        [address]   Fellow                   Referring Nurse Practitioner   Interpreting             Referring Physician           Mis Bajwa  Fellow  Type of Study   TTE procedure:2D Echocardiogram, M-Mode, Doppler, Color Doppler. Procedure Date Date: 03/17/2021 Start: 08:44 AM Study Location: [facility] Technical Quality: Fair visualization Indications:Shortness of breath. History / Tech. Comments: mechanical AVR-size unspecified pacer STANLEY HTN Patient Status: Inpatient Height: 68.9 inches Weight: 277.79 pounds BSA: 2.37 m^2 BMI: 41.14 kg/m^2 Rhythm: Ventricular paced rhythm HR: 64 bpm BP: 108/48 mmHg Allergies   - *No Known Allergies. CONCLUSIONS Summary Small LV cavity. Hyperdynamic left ventricular function. Estimated LV EF 65 %.  Mild to moderate left ventricular hypertrophy. Normal wall motions. Pacemaker lead seen in right ventricle. Right ventricular dilatation with normal systolic function. Left atrial dilatation. Pacing lead seen in the right atrium. Right atrium is normal size . Mechanical valve in aortic position. Dimensionless index 0.36 Peak instantaneous gradient 28 mmHg and mean gradient 14 mmHg. No aortic insufficiency. Mitral annular calcification is seen. Mean 3 mmHg, max 6 mmHg. Mild tricuspid regurgitation. Normal right ventricular systolic pressure. No significant pericardial effusion is seen. Signature ----------------------------------------------------------------------------  Electronically signed by Maine Morse(Sonographer) on 03/17/2021 09:28  AM ---------------------------------------------------------------------------- ----------------------------------------------------------------------------  Electronically signed by Steven Rowland(Interpreting physician) on 03/17/2021  09:49 AM ---------------------------------------------------------------------------- FINDINGS Left Atrium Left atrial dilatation. Left Ventricle Small LV cavity. Hyperdynamic left ventricular function. Estimated LV EF 65 %. Mild to moderate left ventricular hypertrophy. Normal wall motions. Right Atrium Pacing lead seen in the right atrium. Right atrium is normal size . Right Ventricle Pacemaker lead seen in right ventricle. Right ventricular dilatation with normal systolic function. Mitral Valve Mitral annular calcification is seen. Mean 3 mmHg, max 6 mmHg. Trivial mitral regurgitation. Aortic Valve Mechanical valve in aortic position. Peak instantaneous gradient 28 mmHg and mean gradient 14 mmHg. Dimensionless index 0.36 No aortic insufficiency. Tricuspid Valve Normal tricuspid valve structure and function. Mild tricuspid regurgitation. Normal right ventricular systolic pressure. Pulmonic Valve Pulmonic valve not well visualized. No pulmonic insufficiency. Pericardial Effusion No significant pericardial effusion is seen. Miscellaneous Normal aortic root dimension. E/e' average 20.2 IVC normal diameter & inspiratory collapse indicating normal RA filling pressure . M-mode / 2D Measurements & Calculations:   LVIDd:3.91 cm(3.7 - 5.6 cm)      Diastolic MLXVOT:53.6 ml  MWZGZ:1.33 cm(2.2 - 4.0 cm)      Systolic XWQQIY:39.5 ml  ZNKW:9.55 cm(0.6 - 1.1 cm)       Aortic Root:3.5 cm(2.0 - 3.7 cm)  LVPWd:1.42 cm(0.6 - 1.1 cm)      LA Dimension: 4.3 cm(1.9 - 4.0 cm)  Fractional Shortenin.2 %     LA volume/Index: 74.4 ml /31m^2  Calculated LVEF (%): 67.39 %   Mitral:                                   Aortic   Valve Area (P1/2-Time): 2.65 cm^2         Peak Velocity: 2.65 m/s  Peak E-Wave: 0.93 m/s                     Mean Velocity: 1.82 m/s  Peak A-Wave: 1.19 m/s                     Peak Gradient: 28.09 mmHg  E/A Ratio: 0.78                           Mean Gradient: 14 mmHg  Peak Gradient: 3.47 mmHg  Mean Gradient: 3 mmHg  Deceleration Time: 320 msec               AV VTI: 50.6 cm  P1/2t: 83 msec   Mean Velocity: 0.77 m/s   Tricuspid:   Peak TR Velocity: 2.58 m/s  Peak TR Gradient: 26.6256 mmHg  Septal Wall E' velocity:0.04 m/s Lateral Wall E' velocity:0.05 m/s    Ct Abdomen Pelvis Wo Contrast Additional Contrast? None    Result Date: 3/16/2021  EXAMINATION: CT OF THE ABDOMEN AND PELVIS WITHOUT CONTRAST 3/16/2021 4:29 pm TECHNIQUE: CT of the abdomen and pelvis was performed without the administration of intravenous contrast. Multiplanar reformatted images are provided for review. Dose modulation, iterative reconstruction, and/or weight based adjustment of the mA/kV was utilized to reduce the radiation dose to as low as reasonably achievable. COMPARISON: None. HISTORY: ORDERING SYSTEM PROVIDED HISTORY: renal failure TECHNOLOGIST PROVIDED HISTORY: renal failure Decision Support Exception->Emergency Medical Condition (MA) Reason for Exam: renal failure, Fatigue;  Extremity Weakness, loss of appetite Acuity: Acute Type of Exam: Initial Relevant Medical/Surgical History: HTN, CHF, History of kidney stones, lumbar surgery and pacemaker FINDINGS: LOWER CHEST: Clustered ground-glass and nodular opacities in the left lower lobe and posterior lingula are noted, consistent with bronchiolitis. Sequela of old granulomatous disease in the right lower lobe. Status post aortic valve replacement and pacer placement. KIDNEYS AND URINARY TRACT: No renal calculi are identified. There is no evidence for hydronephrosis. The ureters are of normal course and caliber. ORGANS: Lack of intravenous contrast limits evaluation of the solid organs and bowel. The liver, gallbladder, pancreas, spleen, and adrenals reveal no acute abnormality. The gallbladder is well distended. 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 The cecum is present in the left mid abdomen. No bowel dilatation or wall thickening. PELVIS: No acute findings. PERITONEUM/RETROPERITONEUM: No lymphadenopathy is noted. No free air or free fluid. The aorta is normal in caliber. BONES/SOFT TISSUES: No acute osseous abnormality is identified. Deformity of the distal coccyx has findings of chronicity. 1.  Findings compatible with bronchiolitis in the left lower lobe and to a lesser degree in the lingula for which developing inflammatory process or infection should be considered. 2.  No acute abnormality identified in the abdomen or pelvis. 3.  Distended gallbladder without calcified stones or evidence for biliary dilatation. 4.  No renal calculi identified. 5.  Additional chronic and benign findings, as above.      Ct Head Wo Contrast    Result Date: 3/16/2021  EXAMINATION: CT OF THE HEAD WITHOUT CONTRAST  3/16/2021 8:56 pm TECHNIQUE: CT of the head was performed without the administration of intravenous contrast. Dose modulation, iterative reconstruction, and/or weight based adjustment of the mA/kV was utilized to reduce the radiation dose to as low as reasonably achievable. COMPARISON: None. HISTORY: ORDERING SYSTEM PROVIDED HISTORY: weakness TECHNOLOGIST PROVIDED HISTORY: weakness Reason for Exam: fatigue, extremity weakness Acuity: Acute Type of Exam: Initial FINDINGS: There is no acute infarction, intracranial hemorrhage or intracranial mass lesion. No mass effect, midline shift or extra-axial collection is noted. The brain parenchyma is normal. The cerebellar tonsils are in normal position. The ventricles, sulci, and cisterns are mildly prominent suggestive of mild generalized volume loss. The globes and orbits are within normal limits. The visualized extracranial structures including paranasal sinuses and mastoid air cells are unremarkable. No fracture is identified. No evidence for acute intracranial hemorrhage, territorial infarction or intracranial mass lesion. Mild generalized volume loss. Xr Chest Portable    Result Date: 3/16/2021  EXAMINATION: ONE XRAY VIEW OF THE CHEST 3/16/2021 2:20 pm COMPARISON: Chest radiograph performed 02/13/2010. HISTORY: ORDERING SYSTEM PROVIDED HISTORY: generalized weakness TECHNOLOGIST PROVIDED HISTORY: generalized weakness Reason for Exam: PT CO generalized weakness and SOB X several days. Acuity: Acute Type of Exam: Initial FINDINGS: There is chronic pulmonary change. There is no acute consolidation or effusion. There is no pneumothorax. The heart is enlarged with stable cardiac leads. The upper abdomen is unremarkable. The extrathoracic soft tissues are unremarkable. Mild stable cardiomegaly and chronic pulmonary change without acute process. Disposition:   home    Discharge Instructions: Activity: activity as tolerated  Diet:  diabetic diet    Follow up with Lauryn Rodriguez in 1 weeks.     Signed:  Helen Spatz  3/20/2021, 2:55 PM    Time spent in discharge of this pt is more than 30 minutes in examination,evaluvation,  counseling and review of medication and discharge plan

## 2021-03-20 NOTE — PLAN OF CARE
Problem: Falls - Risk of:  Goal: Will remain free from falls  Description: Will remain free from falls  3/20/2021 0344 by Ellis Duron RN  Outcome: Ongoing  Note: Pt is up 1 assist with walker. Pt calls out appropriately. Remains free from falls      Problem: Falls - Risk of:  Goal: Absence of physical injury  Description: Absence of physical injury  3/20/2021 0344 by Ellis Duron RN  Outcome: Ongoing  Note: Pt remains free from any injury.  Will monitor

## 2021-03-20 NOTE — PROGRESS NOTES
GI Progress notes    3/20/2021   11:18 AM    Name:  Danyelle Mg  MRN:    [id number]     Acct:     [de-identified]   Room:  211/[facility] Day: 4     Admit Date: 3/16/2021  1:11 PM  PCP: Jose Ferreira    Subjective:     C/C:   Chief Complaint   Patient presents with    Fatigue    Extremity Weakness     bilateral legs        Interval History: Status: improved    Patient seen and examined. No acute events overnight. LFTs improved. Liver disease work-up unremarkable  AFP, CEA, CA 19-9 wnl  Ultrasound liver normal  No complaint of abdominal pain    ROS:  Constitutional: negative for chills, fevers and sweats  Gastrointestinal: negative for abdominal pain, constipation, diarrhea, nausea and vomiting    Medications:      Allergies: No Known Allergies    Current Meds: warfarin (COUMADIN) tablet 7.5 mg, Once  magnesium sulfate 1000 mg in dextrose 5% 100 mL IVPB, PRN  enoxaparin (LOVENOX) injection 120 mg, BID  potassium chloride (KLOR-CON M) extended release tablet 40 mEq, PRN    Or  potassium bicarb-citric acid (EFFER-K) effervescent tablet 40 mEq, PRN    Or  potassium chloride 10 mEq/100 mL IVPB (Peripheral Line), PRN  perflutren lipid microspheres (DEFINITY) injection 2.2 mg, ONCE PRN  midodrine (PROAMATINE) tablet 2.5 mg, TID WC  sodium chloride flush 0.9 % injection 10 mL, 2 times per day  sodium chloride flush 0.9 % injection 10 mL, PRN  citalopram (CELEXA) tablet 10 mg, Daily  metoprolol tartrate (LOPRESSOR) tablet 25 mg, BID  cefTRIAXone (ROCEPHIN) 1000 mg IVPB in 50 mL D5W minibag, Q24H  doxycycline monohydrate (MONODOX) capsule 100 mg, 2 times per day  insulin lispro (HUMALOG) injection vial 0-12 Units, TID WC  insulin lispro (HUMALOG) injection vial 0-6 Units, Nightly  glucose (GLUTOSE) 40 % oral gel 15 g, PRN  dextrose 50 % IV solution, PRN  glucagon (rDNA) injection 1 mg, PRN  dextrose 5 % solution, PRN  sodium chloride flush 0.9 % injection 10 mL, 2 times per day  sodium chloride flush 0.9 % injection 10 mL, PRN  promethazine (PHENERGAN) tablet 12.5 mg, Q6H PRN    Or  ondansetron (ZOFRAN) injection 4 mg, Q6H PRN  acetaminophen (TYLENOL) tablet 650 mg, Q6H PRN    Or  acetaminophen (TYLENOL) suppository 650 mg, Q6H PRN  bisacodyl (DULCOLAX) EC tablet 5 mg, Daily PRN  warfarin (COUMADIN) daily dosing (placeholder), RX Placeholder        Data:     Code Status:  Full Code    Family History   Problem Relation Age of Onset    Cancer Mother     Diabetes Paternal Grandmother        Social History     Socioeconomic History    Marital status:      Spouse name: Not on file    Number of children: Not on file    Years of education: Not on file    Highest education level: Not on file   Occupational History    Not on file   Social Needs    Financial resource strain: Not on file    Food insecurity     Worry: Not on file     Inability: Not on file    Transportation needs     Medical: Not on file     Non-medical: Not on file   Tobacco Use    Smoking status: Former Smoker     Quit date: 1984     Years since quittin.6    Smokeless tobacco: Never Used   Substance and Sexual Activity    Alcohol use: Not Currently     Alcohol/week: 0.0 standard drinks    Drug use: No    Sexual activity: Not on file   Lifestyle    Physical activity     Days per week: Not on file     Minutes per session: Not on file    Stress: Not on file   Relationships    Social connections     Talks on phone: Not on file     Gets together: Not on file     Attends Yazidi service: Not on file     Active member of club or organization: Not on file     Attends meetings of clubs or organizations: Not on file     Relationship status: Not on file    Intimate partner violence     Fear of current or ex partner: Not on file     Emotionally abused: Not on file     Physically abused: Not on file     Forced sexual activity: Not on file   Other Topics Concern    Not on file   Social History Narrative    Not on file       Vitals:  BP (!) 116/52 Pulse 65   Temp 98.1 °F (36.7 °C) (Oral)   Resp 18   Ht 5' 9\" (1.753 m)   Wt 283 lb 15.2 oz (128.8 kg)   SpO2 94%   BMI 41.93 kg/m²   Temp (24hrs), Av.9 °F (36.6 °C), Min:97.3 °F (36.3 °C), Max:98.2 °F (36.8 °C)    Recent Labs     21  1110 21  1618 21  0649   POCGLU 155* 113* 146* 121*       I/O (24Hr):     Intake/Output Summary (Last 24 hours) at 3/20/2021 1118  Last data filed at 3/19/2021 2340  Gross per 24 hour   Intake 1286 ml   Output --   Net 1286 ml       Labs:      CBC:   Lab Results   Component Value Date    WBC 9.7 2021    RBC 3.79 2021    HGB 11.1 2021    HCT 32.5 2021    MCV 85.7 2021    MCH 29.3 2021    MCHC 34.2 2021    RDW 14.3 2021     2021    MPV 8.8 2021     CBC with Differential:    Lab Results   Component Value Date    WBC 9.7 2021    RBC 3.79 2021    HGB 11.1 2021    HCT 32.5 2021     2021    MCV 85.7 2021    MCH 29.3 2021    MCHC 34.2 2021    RDW 14.3 2021    LYMPHOPCT 33 2021    MONOPCT 12 2021    BASOPCT 1 2021    MONOSABS 1.10 2021    LYMPHSABS 3.20 2021    EOSABS 0.30 2021    BASOSABS 0.10 2021    DIFFTYPE NOT REPORTED 2021     Hemoglobin/Hematocrit:    Lab Results   Component Value Date    HGB 11.1 2021    HCT 32.5 2021     CMP:    Lab Results   Component Value Date     2021    K 4.3 2021     2021    CO2 28 2021    BUN 29 2021    CREATININE 0.86 2021    GFRAA >60 2021    LABGLOM >60 2021    GLUCOSE 132 2021    PROT 6.3 2021    LABALBU 3.4 2021    CALCIUM 9.0 2021    BILITOT 1.33 2021    ALKPHOS 56 2021    AST 55 2021     2021     BMP:    Lab Results   Component Value Date     2021    K 4.3 2021     2021    CO2 28 03/20/2021    BUN 29 03/20/2021    LABALBU 3.4 03/20/2021    CREATININE 0.86 03/20/2021    CALCIUM 9.0 03/20/2021    GFRAA >60 03/20/2021    LABGLOM >60 03/20/2021    GLUCOSE 132 03/20/2021     PT/INR:    Lab Results   Component Value Date    PROTIME 20.7 03/20/2021    PROTIME 20.4 11/06/2012    INR 1.8 03/20/2021     PTT:  No results found for: APTT, PTT[APTT}    Physical Examination:        General appearance: alert, cooperative and no distress  Mental Status: oriented to person, place and time and normal affect  Abdomen: soft, nontender, nondistended, bowel sounds present    Assessment:        Primary Problem  Acute renal failure with tubular necrosis [facility])     Active Hospital Problems    Diagnosis Date Noted    Elevated LFTs [R79.89]     Acute renal failure with tubular necrosis ([address] [N17.0] 03/16/2021    Essential hypertension [I10] 03/16/2021    Class 3 severe obesity due to excess calories with body mass index (BMI) of 40.0 to 44.9 in adult ([address] [E66.01, Z68.41] 03/16/2021    Transaminitis [R74.01] 03/16/2021    ASCVD (arteriosclerotic cardiovascular disease) [I25.10] 03/16/2021    Chronic diastolic congestive heart failure ([address] [I50.32] 03/16/2021    Current use of long term anticoagulation [Z79.01] 03/16/2021    Obstructive sleep apnea [G47.33] 03/16/2021    Renal failure [N19] 03/16/2021    Non-traumatic rhabdomyolysis [M62.82] 03/16/2021     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 Elevated LFTs with weight loss, recent fall, unclear etiology may include rhabdomyolysis  1. LFTs improved  2.  Tumor markers negative  3. Ultrasound negative  4. Liver disease work-up in process  5.  May DC and follow-up outpatient    Explained to the patient and d/W Nursing Staff  Will F/U with you  Please call or Page for any issues or change in status  Thanks    Electronically signed by WOODY Torres NP on 3/20/2021 at 11:18 AM

## 2021-03-20 NOTE — PROGRESS NOTES
chloride flush 0.9 % injection 10 mL, 2 times per day  sodium chloride flush 0.9 % injection 10 mL, PRN  citalopram (CELEXA) tablet 10 mg, Daily  metoprolol tartrate (LOPRESSOR) tablet 25 mg, BID  cefTRIAXone (ROCEPHIN) 1000 mg IVPB in 50 mL D5W minibag, Q24H  doxycycline monohydrate (MONODOX) capsule 100 mg, 2 times per day  insulin lispro (HUMALOG) injection vial 0-12 Units, TID WC  insulin lispro (HUMALOG) injection vial 0-6 Units, Nightly  glucose (GLUTOSE) 40 % oral gel 15 g, PRN  dextrose 50 % IV solution, PRN  glucagon (rDNA) injection 1 mg, PRN  dextrose 5 % solution, PRN  sodium chloride flush 0.9 % injection 10 mL, 2 times per day  sodium chloride flush 0.9 % injection 10 mL, PRN  promethazine (PHENERGAN) tablet 12.5 mg, Q6H PRN    Or  ondansetron (ZOFRAN) injection 4 mg, Q6H PRN  acetaminophen (TYLENOL) tablet 650 mg, Q6H PRN    Or  acetaminophen (TYLENOL) suppository 650 mg, Q6H PRN  bisacodyl (DULCOLAX) EC tablet 5 mg, Daily PRN  warfarin (COUMADIN) daily dosing (placeholder), RX Placeholder      Objective:  CURRENT TEMPERATURE:  Temp: 97.7 °F (36.5 °C)  MAXIMUM TEMPERATURE OVER 24HRS:  Temp (24hrs), Av.8 °F (36.6 °C), Min:97.3 °F (36.3 °C), Max:98.1 °F (36.7 °C)    CURRENT RESPIRATORY RATE:  Resp: 18  CURRENT PULSE:  Pulse: 64  CURRENT BLOOD PRESSURE:  BP: (!) 124/50  24HR BLOOD PRESSURE RANGE:  Systolic (84XYE), IAU:257 , Min:113 , TRH:186   ; Diastolic (44LXG), RQX:14, Min:49, Max:67    24HR INTAKE/OUTPUT:      Intake/Output Summary (Last 24 hours) at 3/20/2021 1438  Last data filed at 3/19/2021 2340  Gross per 24 hour   Intake 1046 ml   Output --   Net 1046 ml     Patient Vitals for the past 96 hrs (Last 3 readings):   Weight   21 010 283 lb 15.2 oz (128.8 kg)   21 0100 275 lb 12.7 oz (125.1 kg)   21 010 282 lb 10.1 oz (128.2 kg)     Physical Exam:  GENERAL APPEARANCE: Alert and cooperative, and appears to be in no acute distress.   HEAD: normocephalic  CARDIAC: Normal S1 and S2. No S3, S4 or murmurs. Rhythm is regular. LUNGS: Clear to auscultation and percussion without rales, rhonchi, wheezing or diminished breath sounds. ABDOMEN: Soft with normal bowel sounds. MUSKULOSKELETAL: Adequately aligned spine. No joint erythema or tenderness. EXTREMITIES: No edema. Peripheral pulses intact. NEURO: Nonfocal    Labs:   CBC:  Recent Labs     03/18/21 0427 03/19/21  0444 03/20/21  0515   WBC 11.0 9.9 9.7   RBC 3.91* 3.83* 3.79*   HGB 11.2* 11.0* 11.1*   HCT 34.2* 32.2* 32.5*   MCV 87.4 84.2 85.7   MCH 28.7 28.8 29.3   MCHC 32.9 34.2 34.2   RDW 14.5 13.8 14.3    232 236   MPV 8.7 8.5 8.8      BMP:   Recent Labs     03/18/21 0427 03/19/21  0444 03/20/21  0515    137 140   K 3.5* 3.2* 4.3    101 104   CO2 18* 26 28   BUN 87* 48* 29*   CREATININE 1.50* 1.06 0.86   GLUCOSE 115* 145* 132*   CALCIUM 8.8 8.7 9.0      Albumin:   Recent Labs     03/18/21 0427 03/19/21  0444 03/20/21  0515   LABALBU 3.1* 3.6 3.4*       Recent Labs     03/20/21  0515   PROT 6.3*     Urinalysis:    No results for input(s): NITRU, COLORU, PHUR, LABCAST, WBCUA, RBCUA, MUCUS, TRICHOMONAS, YEAST, BACTERIA, CLARITYU, SPECGRAV, LEUKOCYTESUR, UROBILINOGEN, BILIRUBINUR, BLOODU, GLUCOSEU, KETUA, AMORPHOUS in the last 72 hours. Assessment/plan:    1. Acute kidney injury - most consistent with prerenal azotemia from poor intake/Diuretic/ACE inhibitor as well as toxic acute tubular necrosis secondary to rhabdomyolysis [statin related]. Renal function is slowly improving. Creatinine 0.8 mg/dL within normal limits        2. Hyperkalemia -resolved    3. Systemic hypertension - blood pressure control is adequate. 4.  Non-anion gap metabolic acidosis -     5. Hypotension - likely due to hypovolemia. We will start gentle hydration. 6.  Hyponatremia - we will check serum and urine osmolality.     Plan  No objection on discharge from nephrology standpoint  No statins on discharge due to rhabdomylysis  Discharge planning    Prognosis is guarded.   Electronically signed by Rosy Campbell MD on 3/20/2021 at 2:38 PM

## 2021-03-20 NOTE — PROGRESS NOTES
Pharmacy Note  Warfarin Consult follow-up      Recent Labs     03/20/21  0515   INR 1.8     Recent Labs     03/18/21  0427 03/19/21  0444 03/20/21  0515   HGB 11.2* 11.0* 11.1*   HCT 34.2* 32.2* 32.5*    232 236       Significant Drug-Drug Interactions:  New warfarin drug-drug interactions: none  Discontinued drug-drug interactions: none      Notes: Will initiate 7.5 mg for today. Patient on lovenox bridge   Daily PT/INR while inpatient.      Lashae Jensen, PharmD, BCPS  3/20    8:01 am

## 2021-03-20 NOTE — DISCHARGE INSTR - DIET

## 2021-03-20 NOTE — DISCHARGE INSTR - ACTIVITY
- up as tolerated. - take your time getting up.  - if you start to feel weak sit down and call your doctor.

## 2021-03-20 NOTE — CARE COORDINATION
ONGOING DISCHARGE PLAN:    Spoke with patient regarding discharge plan and patient confirms that plan is still to return to home w/ Wife. Denies Needs/VNS. INR today 1.8. Follows at [address]. Anticipate DC today w/ no needs. Will continue to follow for additional discharge needs.     Electronically signed by Kimberly Sosa RN on 3/20/2021 at 10:50 AM

## 2021-03-21 LAB — SMOOTH MUSCLE ANTIBODY: 35 UNITS (ref 0–19)

## 2021-03-22 ENCOUNTER — HOSPITAL ENCOUNTER (OUTPATIENT)
Dept: CT IMAGING | Age: 76
Discharge: HOME | End: 2021-03-24
Payer: MEDICARE

## 2021-03-22 DIAGNOSIS — N32.0 BLADDER NECK OBSTRUCTION: ICD-10-CM

## 2021-03-22 DIAGNOSIS — N32.89 DISTENDED BLADDER: ICD-10-CM

## 2021-03-22 DIAGNOSIS — R17 JAUNDICE: ICD-10-CM

## 2021-03-22 DIAGNOSIS — R74.8 ACID PHOSPHATASE ELEVATED: ICD-10-CM

## 2021-03-22 LAB
ANTI-NUCLEAR ANTIBODY (ANA): POSITIVE
CMV IGM: 0.1
CULTURE: NORMAL
CYTOMEGALOVIRUS IGG ANTIBODY: 6.7
LIVER-KIDNEY MICROSOMAL AB: NORMAL
Lab: NORMAL
MITOCHONDRIAL ANTIBODY: 2 U/ML (ref 0–4)
SMOOTH MUSCLE AB IGG TITER: NORMAL
SPECIMEN DESCRIPTION: NORMAL

## 2021-03-22 PROCEDURE — 74150 CT ABDOMEN W/O CONTRAST: CPT

## 2021-03-23 ENCOUNTER — TELEPHONE (OUTPATIENT)
Dept: GASTROENTEROLOGY | Age: 76
End: 2021-03-23

## 2021-03-23 LAB
CULTURE: NORMAL
EBV EARLY ANTIGEN IGG: 19 U/ML
EBV INTERPRETATION: NORMAL
EBV NUCLEAR AG AB: 82 U/ML
EPSTEIN-BARR VCA IGG: 82 U/ML
EPSTEIN-BARR VCA IGM: 5 U/ML
Lab: NORMAL
SPECIMEN DESCRIPTION: NORMAL

## 2021-03-23 NOTE — TELEPHONE ENCOUNTER
Artie Perez from patient PCP called to see if we could see patient earlier than 4/27/2021 if so please call patient at [phone] and move appointment up it's for a hospital f/u he is scheduled for 4/27/2021 .

## 2021-03-31 ENCOUNTER — HOSPITAL ENCOUNTER (OUTPATIENT)
Age: 76
Discharge: HOME | End: 2021-03-31
Payer: MEDICARE

## 2021-03-31 LAB
ALBUMIN SERPL-MCNC: 3.4 G/DL (ref 3.5–5.2)
ALBUMIN/GLOBULIN RATIO: ABNORMAL (ref 1–2.5)
ALP BLD-CCNC: 55 U/L (ref 40–129)
ALT SERPL-CCNC: 26 U/L (ref 5–41)
ANION GAP SERPL CALCULATED.3IONS-SCNC: 11 MMOL/L (ref 9–17)
AST SERPL-CCNC: 23 U/L
BILIRUB SERPL-MCNC: 1.14 MG/DL (ref 0.3–1.2)
BUN BLDV-MCNC: 14 MG/DL (ref 8–23)
BUN/CREAT BLD: ABNORMAL (ref 9–20)
CALCIUM SERPL-MCNC: 8.8 MG/DL (ref 8.6–10.4)
CHLORIDE BLD-SCNC: 103 MMOL/L (ref 98–107)
CO2: 25 MMOL/L (ref 20–31)
CREAT SERPL-MCNC: 0.59 MG/DL (ref 0.7–1.2)
GFR AFRICAN AMERICAN: >60 ML/MIN
GFR NON-AFRICAN AMERICAN: >60 ML/MIN
GFR SERPL CREATININE-BSD FRML MDRD: ABNORMAL ML/MIN/{1.73_M2}
GFR SERPL CREATININE-BSD FRML MDRD: ABNORMAL ML/MIN/{1.73_M2}
GLUCOSE BLD-MCNC: 113 MG/DL (ref 70–99)
POTASSIUM SERPL-SCNC: 3.8 MMOL/L (ref 3.7–5.3)
SODIUM BLD-SCNC: 139 MMOL/L (ref 135–144)
TOTAL PROTEIN: 6.2 G/DL (ref 6.4–8.3)

## 2021-03-31 PROCEDURE — 36415 COLL VENOUS BLD VENIPUNCTURE: CPT

## 2021-03-31 PROCEDURE — 80053 COMPREHEN METABOLIC PANEL: CPT

## 2021-04-01 ENCOUNTER — OFFICE VISIT (OUTPATIENT)
Dept: PODIATRY | Age: 76
End: 2021-04-01
Payer: MEDICARE

## 2021-04-01 VITALS — BODY MASS INDEX: 41.92 KG/M2 | HEIGHT: 69 IN | WEIGHT: 283 LBS

## 2021-04-01 DIAGNOSIS — M79.675 PAIN OF TOES OF BOTH FEET: ICD-10-CM

## 2021-04-01 DIAGNOSIS — E11.51 TYPE 2 DIABETES MELLITUS WITH PERIPHERAL VASCULAR DISEASE (HCC): ICD-10-CM

## 2021-04-01 DIAGNOSIS — B35.1 ONYCHOMYCOSIS OF TOENAIL: Primary | ICD-10-CM

## 2021-04-01 DIAGNOSIS — R60.0 EDEMA OF LOWER EXTREMITY: ICD-10-CM

## 2021-04-01 DIAGNOSIS — M79.674 PAIN OF TOES OF BOTH FEET: ICD-10-CM

## 2021-04-01 DIAGNOSIS — E11.42 DIABETIC PERIPHERAL NEUROPATHY ASSOCIATED WITH TYPE 2 DIABETES MELLITUS (HCC): ICD-10-CM

## 2021-04-01 PROCEDURE — 11721 DEBRIDE NAIL 6 OR MORE: CPT | Performed by: PODIATRIST

## 2021-04-01 RX ORDER — AMOXICILLIN 500 MG/1
TABLET, FILM COATED ORAL
COMMUNITY
Start: 2021-01-26 | End: 2021-04-27

## 2021-04-01 RX ORDER — CEFPROZIL 500 MG/1
TABLET, FILM COATED ORAL
COMMUNITY
Start: 2021-03-31 | End: 2021-04-27

## 2021-04-01 ASSESSMENT — ENCOUNTER SYMPTOMS
DIARRHEA: 0
COLOR CHANGE: 0
SHORTNESS OF BREATH: 0
NAUSEA: 0
BACK PAIN: 0

## 2021-04-01 NOTE — PROGRESS NOTES
SUBJECTIVE: Oneida James is a 66 y.o. male who returns to the office with chief complaint of painful fungal toenails. Patient relates toe nails are thickened/difficult to trim as well as painful with ambulation and with shoe gear. Patient complains today of painful swollen legs bilaterally. Patient states that he was discharged from the hospital 2 weeks ago and has been taken off of his water pill. Patient states that he woke up this morning with the swelling and pain to the legs. Patient states that he is not feeling out of breath. Chief Complaint   Patient presents with    Nail Problem     b/l nail trim/ last seen Dr. Vasquez Medina 3/30/2021    Diabetes     last blood sugar 123     Review of Systems   Constitutional: Negative for activity change, appetite change, chills, diaphoresis, fatigue and fever. Respiratory: Negative for shortness of breath. Cardiovascular: Positive for leg swelling. Gastrointestinal: Negative for diarrhea and nausea. Endocrine: Negative for cold intolerance, heat intolerance and polyuria. Musculoskeletal: Positive for arthralgias. Negative for back pain, gait problem, joint swelling and myalgias. Skin: Negative for color change, pallor, rash and wound. Allergic/Immunologic: Negative for environmental allergies and food allergies. Neurological: Negative for dizziness, weakness, light-headedness and numbness. Hematological: Does not bruise/bleed easily. Psychiatric/Behavioral: Negative for behavioral problems, confusion and self-injury. The patient is not nervous/anxious. OBJECTIVE: Clinical evaluation of patient reveals nails 1,2,3,4,5 of the right foot and nails 1,2,3,4,5, of the left foot to present with thickness, elongation, discoloration, brittleness, and subungual debris. There was pain with palpation and debridement of the toenails of the bilateral feet. No open lesions noted to either foot today.  There is 3+ pitting edema noted to the bilateral lower extremities. The right DP pulse is not palpable. The left DP pulse is not palpable. The right PT pulse is not palpable. The left PT pulse is not palpable. Protective sensation is absent to the right plantar foot as noted with a 5.07 Goodrich-Inocente monofilament. Protective sensation is absent to the left plantar foot as noted with a 5.07 Goodrich-Inocente monofilament. Glucose: 123 mg/dl. Class A Findings (1 needed)   [] Non-traumatic amputation of foot or integral skeleton portion thereof. [] Q7.      Class B Findings (2 needed)   1. [x] Absent posterior tibial pulse   2. [x] Absent dorsalis pedis pulse   3. [] Advanced trophic changes; three of the following are required:   ·         [] hair growth (decrease or absence)   ·         [] nail changes (thickening)   ·         [] pigmentary changes (discoloration)   ·         [] skin texture (thin, shiny)   ·         [] skin color (rubor or redness)   [x] Q8.      Class C Findings (1 Class B, 2 Class C needed)   1. [] Claudication   2. [] Temperature changes   3. [] Edema   4. [] Paresthesia   5. [] Burning   [] Q9.     ASSESSMENT:    Diagnosis Orders   1. Onychomycosis of toenail  IA DEBRIDEMENT OF NAILS, 6 OR MORE    HM DIABETES FOOT EXAM   2. Pain of toes of both feet  IA DEBRIDEMENT OF NAILS, 6 OR MORE    HM DIABETES FOOT EXAM   3. Type 2 diabetes mellitus with peripheral vascular disease (HCC)  IA DEBRIDEMENT OF NAILS, 6 OR MORE    HM DIABETES FOOT EXAM   4. Diabetic peripheral neuropathy associated with type 2 diabetes mellitus (HCC)  IA DEBRIDEMENT OF NAILS, 6 OR MORE    HM DIABETES FOOT EXAM   5. Edema of lower extremity       PLAN: Toenails 1,2,3,4,5 of the right foot and 1,2,3,4,5 of the left foot were debrided in length and thickness using a nail nipper and a . Patient advised to present to his PCP today for evaluation of the swelling to his legs. Return in about 9 weeks (around 6/3/2021) for At risk diabetic foot care. 4/1/2021      Shazia Contreras, HIM

## 2021-04-19 ENCOUNTER — HOSPITAL ENCOUNTER (OUTPATIENT)
Age: 76
Discharge: HOME | End: 2021-04-19
Payer: MEDICARE

## 2021-04-19 LAB
ANION GAP SERPL CALCULATED.3IONS-SCNC: 8 MMOL/L (ref 9–17)
BUN BLDV-MCNC: 16 MG/DL (ref 8–23)
BUN/CREAT BLD: ABNORMAL (ref 9–20)
CALCIUM SERPL-MCNC: 9.1 MG/DL (ref 8.6–10.4)
CHLORIDE BLD-SCNC: 102 MMOL/L (ref 98–107)
CO2: 26 MMOL/L (ref 20–31)
CREAT SERPL-MCNC: 0.66 MG/DL (ref 0.7–1.2)
GFR AFRICAN AMERICAN: >60 ML/MIN
GFR NON-AFRICAN AMERICAN: >60 ML/MIN
GFR SERPL CREATININE-BSD FRML MDRD: ABNORMAL ML/MIN/{1.73_M2}
GFR SERPL CREATININE-BSD FRML MDRD: ABNORMAL ML/MIN/{1.73_M2}
GLUCOSE BLD-MCNC: 131 MG/DL (ref 70–99)
POTASSIUM SERPL-SCNC: 4.2 MMOL/L (ref 3.7–5.3)
SODIUM BLD-SCNC: 136 MMOL/L (ref 135–144)

## 2021-04-19 PROCEDURE — 36415 COLL VENOUS BLD VENIPUNCTURE: CPT

## 2021-04-19 PROCEDURE — 80048 BASIC METABOLIC PNL TOTAL CA: CPT

## 2021-04-27 ENCOUNTER — OFFICE VISIT (OUTPATIENT)
Dept: GASTROENTEROLOGY | Age: 76
End: 2021-04-27
Payer: MEDICARE

## 2021-04-27 VITALS
DIASTOLIC BLOOD PRESSURE: 73 MMHG | OXYGEN SATURATION: 99 % | SYSTOLIC BLOOD PRESSURE: 138 MMHG | BODY MASS INDEX: 42.65 KG/M2 | WEIGHT: 288 LBS | HEIGHT: 69 IN | HEART RATE: 67 BPM | RESPIRATION RATE: 14 BRPM | TEMPERATURE: 98.1 F

## 2021-04-27 DIAGNOSIS — R74.01 TRANSAMINITIS: Primary | ICD-10-CM

## 2021-04-27 PROCEDURE — 99214 OFFICE O/P EST MOD 30 MIN: CPT | Performed by: INTERNAL MEDICINE

## 2021-04-27 ASSESSMENT — ENCOUNTER SYMPTOMS
RESPIRATORY NEGATIVE: 1
GASTROINTESTINAL NEGATIVE: 1
ALLERGIC/IMMUNOLOGIC NEGATIVE: 1
EYES NEGATIVE: 1

## 2021-04-27 NOTE — PROGRESS NOTES
GI CLINIC FOLLOW UP    INTERVAL HISTORY:   No referring provider defined for this encounter. Chief Complaint   Patient presents with    Follow-Up from [facility] 3/16/21 Elevated LFT's, renal failure            HISTORY OF PRESENT ILLNESS: Manisha Hamilton is a 66 y.o. male , referred for evaluation of hospital f/u    we saw him in the hospital with .  significant elevation of the liver enzymes  No significant abdominal pain  Weight loss which is significant but started to be intentionally and then it was not after that  Used to be on statin and Metformin which has been stopped  1. LFTs improved  2. Tumor markers negative  3. Ultrasound negative  4. Liver disease work-up , positive KARRI,ASMA  mild  LFTs normalized before d/c   and was thought to be related to [facility] or medications   Today he is denying any abdominal pain no nausea no vomiting no weight loss eating and drinking well he said he this is the best he is feeling for a long time. Denied any upper GI symptoms. Had a colonoscopy screening more than 10 years he was told it was normal and he does not need to have it again, and he does not want to have it repeated. Past Medical,Family, and Social History reviewed and does contribute to the patient presentingcondition. Patient's PMH/PSH,SH,PSYCH Hx, MEDs, ALLERGIES, and ROS were all reviewed and updated in the appropriate sections.     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    Diabetes mellitus ([address]     GERD (gastroesophageal reflux disease)     BETTER WITH CPAP    Eyak (hard of hearing)     BILAT HEARING AIDS    Hyperlipidemia     Hypertension     Kidney stones     Osteoarthritis     Pacemaker 2010    Sleep apnea     Cpap machine       Past Surgical History:   Procedure Laterality Date    AORTIC VALVE REPLACEMENT  2012/2013    Mechanical Valve    BACK SURGERY      LUMBAR  CARDIAC SURGERY      AORTIC VALVE REPLACEMENT    COLONOSCOPY      CYSTOSCOPY      EYE SURGERY Bilateral     CATARACTS    FACIAL RECONSTRUCTION SURGERY Right 4/17/2017    EXCISION LESION RIGHT SIDE OF FOREHEAD performed by Ros Solano MD at [address] [place] [address] Right     Bone Spur removed    JOINT REPLACEMENT Left 08/23/2016    TKA    KIDNEY STONE SURGERY      KIDNEY SURGERY Left     STONES REMOVED, WAS DONE YEARS AGO    KNEE ARTHROSCOPY Left     NECK SURGERY N/A 9/18/2019    EXCISION MASS POSTERIOR NECK performed by Ros Solano MD at [place] [address]      PRE-MALIGNANT / BENIGN SKIN LESION EXCISION Right 04/17/2017    forehead    PRE-MALIGNANT / BENIGN SKIN LESION EXCISION  09/18/2019    posterior neck mass    TONSILLECTOMY      TOTAL KNEE ARTHROPLASTY Left     with biomet and GPS product application    TUMOR REMOVAL      Ear       CURRENT MEDICATIONS:    Current Outpatient Medications:     Amoxicillin 500 MG TABS, TAKE 1 TABLET BY MOUTH THREE TIMES DAILY FOR 7 DAYS, Disp: , Rfl:     cefPROZIL (CEFZIL) 500 MG tablet, , Disp: , Rfl:     citalopram (CELEXA) 10 MG tablet, Take 10 mg by mouth daily, Disp: , Rfl:     Multiple Vitamins-Minerals (THERAPEUTIC MULTIVITAMIN-MINERALS) tablet, Take 1 tablet by mouth daily, Disp: , Rfl:     warfarin (COUMADIN) 5 MG tablet, Take 7.5 mg by mouth Six times weekly Indications: ALL DAYS EXCEPT MONDAY - INR range 2.5-3.5 Per [facility], Disp: , Rfl:     warfarin (COUMADIN) 5 MG tablet, Take 10 mg by mouth once a week Indications: ON MONDAYS ONLY - INR range 2.5-3.5 Per [facility], Disp: , Rfl:     metoprolol tartrate (LOPRESSOR) 50 MG tablet, Take 25 mg by mouth 2 times daily , Disp: , Rfl:     metFORMIN ER (GLUCOPHAGE-XR) 500 MG XR tablet, Take 500 mg by mouth 2 times daily , Disp: , Rfl:     ALLERGIES:   No Known Allergies    FAMILY HISTORY:       Problem Relation Age of Onset    Cancer Mother     Diabetes Paternal Grandmother          SOCIAL HISTORY:   Social History     Socioeconomic History    Marital status:      Spouse name: Not on file    Number of children: Not on file    Years of education: Not on file    Highest education level: Not on file   Occupational History    Not on file   Social Needs    Financial resource strain: Not on file    Food insecurity     Worry: Not on file     Inability: Not on file    Transportation needs     Medical: Not on file     Non-medical: Not on file   Tobacco Use    Smoking status: Former Smoker     Packs/day: 1.00     Years: 10.00     Pack years: 10.00     Types: Cigarettes     Quit date: 1984     Years since quittin.7    Smokeless tobacco: Never Used   Substance and Sexual Activity    Alcohol use: Not Currently     Alcohol/week: 0.0 standard drinks    Drug use: No    Sexual activity: Not on file   Lifestyle    Physical activity     Days per week: Not on file     Minutes per session: Not on file    Stress: Not on file   Relationships    Social connections     Talks on phone: Not on file     Gets together: Not on file     Attends Restoration service: Not on file     Active member of club or organization: Not on file     Attends meetings of clubs or organizations: Not on file     Relationship status: Not on file    Intimate partner violence     Fear of current or ex partner: Not on file     Emotionally abused: Not on file     Physically abused: Not on file     Forced sexual activity: Not on file   Other Topics Concern    Not on file   Social History Narrative    Not on file       REVIEW OF SYSTEMS: A 12-point review of systemswas obtained and pertinent positives and negatives were enumerated above in the history of present illness. All other reviewed systems / symptoms were negative. Review of Systems   Constitutional: Negative. HENT: Negative. Eyes: Negative. Respiratory: Negative. Cardiovascular: Negative. Gastrointestinal: Negative. Endocrine: Negative. Genitourinary: Negative. Musculoskeletal: Negative. Allergic/Immunologic: Negative. Neurological: Negative. Hematological: Negative. Psychiatric/Behavioral: Negative. LABORATORY DATA: Reviewed  Lab Results   Component Value Date    WBC 9.7 03/20/2021    HGB 11.1 (L) 03/20/2021    HCT 32.5 (L) 03/20/2021    MCV 85.7 03/20/2021     03/20/2021     04/19/2021    K 4.2 04/19/2021     04/19/2021    CO2 26 04/19/2021    BUN 16 04/19/2021    CREATININE 0.66 (L) 04/19/2021    LABALBU 3.4 (L) 03/31/2021    BILITOT 1.14 03/31/2021    ALKPHOS 55 03/31/2021    AST 23 03/31/2021    ALT 26 03/31/2021    INR 1.8 03/20/2021         Lab Results   Component Value Date    RBC 3.79 (L) 03/20/2021    HGB 11.1 (L) 03/20/2021    MCV 85.7 03/20/2021    MCH 29.3 03/20/2021    MCHC 34.2 03/20/2021    RDW 14.3 03/20/2021    MPV 8.8 03/20/2021    BASOPCT 1 03/20/2021    LYMPHSABS 3.20 03/20/2021    MONOSABS 1.10 03/20/2021    NEUTROABS 4.90 03/20/2021    EOSABS 0.30 03/20/2021    BASOSABS 0.10 03/20/2021         DIAGNOSTIC TESTING:     No results found. PHYSICAL EXAMINATION: Vital signs reviewed per the nursing documentation. Ht 5' 9\" (1.753 m)   BMI 41.79 kg/m²   Body mass index is 41.79 kg/m². Physical Exam  Vitals signs and nursing note reviewed. Constitutional:       General: He is not in acute distress. Appearance: He is well-developed. He is not diaphoretic. HENT:      Head: Normocephalic and atraumatic. Eyes:      General: No scleral icterus. Pupils: Pupils are equal, round, and reactive to light. Neck:      Musculoskeletal: Normal range of motion and neck supple. Thyroid: No thyromegaly. Vascular: No JVD. Trachea: No tracheal deviation. Cardiovascular:      Rate and Rhythm: Normal rate and regular rhythm. Heart sounds: Normal heart sounds. No murmur. Pulmonary:      Effort: Pulmonary effort is normal. No respiratory distress. Breath sounds: Normal breath sounds. No wheezing. Abdominal:      General: Bowel sounds are normal. There is no distension. Palpations: Abdomen is soft. There is no mass. Tenderness: There is no abdominal tenderness. There is no guarding or rebound. Musculoskeletal: Normal range of motion. General: No tenderness. Skin:     General: Skin is warm. Coloration: Skin is not pale. Findings: No erythema or rash. Comments: He is not diaphoretic   Neurological:      Mental Status: He is alert and oriented to person, place, and time. Deep Tendon Reflexes: Reflexes are normal and symmetric. Psychiatric:         Behavior: Behavior normal.         Thought Content: Thought content normal.         Judgment: Judgment normal.           IMPRESSION: Mr. Jamel Hills is a 66 y.o. male with      Diagnosis Orders   1. Transaminitis  Hepatic Function Panel    KARRI    Smooth Muscle Antibody Quant   Ordered but his liver enzymes make sure there is still normal he did have slight elevation of the KARRI anti-smooth muscle antibody we will review those and make sure there are not any higher and no need for liver biopsy  We offered him colonoscopy he refused it for more than 10 years but he understand the risk and the benefit and the decided not to do it. Diet/life style/natural hx /complication of the dx were all explained in details   Past medical, past surgical, social history, psychiatric history, medications or allergies, all reviewed and  updated    Thank you for allowing me to participate in the care of Mr. Jamel Hills. For any further questions please do not hesitate to contact me. I have reviewed and agree with the ROS entered by the MA/RN. Note is dictated utilizing voice recognition software. Unfortunately this leads to occasional typographical errors.  Please contact our office if you have any questions.       Meera Olvera MD  [facility]  O: #[phone]

## 2021-05-06 ENCOUNTER — HOSPITAL ENCOUNTER (OUTPATIENT)
Age: 76
Discharge: HOME | End: 2021-05-06
Payer: MEDICARE

## 2021-05-06 DIAGNOSIS — R74.01 TRANSAMINITIS: ICD-10-CM

## 2021-05-06 LAB
ALBUMIN SERPL-MCNC: 4.1 G/DL (ref 3.5–5.2)
ALBUMIN/GLOBULIN RATIO: ABNORMAL (ref 1–2.5)
ALP BLD-CCNC: 62 U/L (ref 40–129)
ALT SERPL-CCNC: 12 U/L (ref 5–41)
AST SERPL-CCNC: 17 U/L
BILIRUB SERPL-MCNC: 0.99 MG/DL (ref 0.3–1.2)
BILIRUBIN DIRECT: 0.32 MG/DL
BILIRUBIN, INDIRECT: 0.67 MG/DL (ref 0–1)
GLOBULIN: ABNORMAL G/DL (ref 1.5–3.8)
TOTAL PROTEIN: 7.2 G/DL (ref 6.4–8.3)

## 2021-05-06 PROCEDURE — 83516 IMMUNOASSAY NONANTIBODY: CPT

## 2021-05-06 PROCEDURE — 36415 COLL VENOUS BLD VENIPUNCTURE: CPT

## 2021-05-06 PROCEDURE — 80076 HEPATIC FUNCTION PANEL: CPT

## 2021-05-06 PROCEDURE — 86256 FLUORESCENT ANTIBODY TITER: CPT

## 2021-05-06 PROCEDURE — 86038 ANTINUCLEAR ANTIBODIES: CPT

## 2021-05-07 LAB — ANTI-NUCLEAR ANTIBODY (ANA): POSITIVE

## 2021-05-09 LAB — SMOOTH MUSCLE ANTIBODY: 32 UNITS (ref 0–19)

## 2021-05-10 LAB — SMOOTH MUSCLE AB IGG TITER: NORMAL

## 2021-05-18 ENCOUNTER — TELEPHONE (OUTPATIENT)
Dept: GASTROENTEROLOGY | Age: 76
End: 2021-05-18

## 2021-06-17 ENCOUNTER — OFFICE VISIT (OUTPATIENT)
Dept: PODIATRY | Age: 76
End: 2021-06-17
Payer: MEDICARE

## 2021-06-17 VITALS — WEIGHT: 288 LBS | HEIGHT: 69 IN | BODY MASS INDEX: 42.65 KG/M2

## 2021-06-17 DIAGNOSIS — E11.42 DIABETIC PERIPHERAL NEUROPATHY ASSOCIATED WITH TYPE 2 DIABETES MELLITUS (HCC): ICD-10-CM

## 2021-06-17 DIAGNOSIS — M79.674 PAIN OF TOES OF BOTH FEET: ICD-10-CM

## 2021-06-17 DIAGNOSIS — E11.51 TYPE 2 DIABETES MELLITUS WITH PERIPHERAL VASCULAR DISEASE (HCC): ICD-10-CM

## 2021-06-17 DIAGNOSIS — M79.675 PAIN OF TOES OF BOTH FEET: ICD-10-CM

## 2021-06-17 DIAGNOSIS — B35.1 ONYCHOMYCOSIS OF TOENAIL: Primary | ICD-10-CM

## 2021-06-17 PROCEDURE — 11721 DEBRIDE NAIL 6 OR MORE: CPT | Performed by: PODIATRIST

## 2021-06-17 RX ORDER — BUMETANIDE 1 MG/1
TABLET ORAL
COMMUNITY
Start: 2021-06-03

## 2021-06-17 RX ORDER — FLUTICASONE PROPIONATE 50 MCG
SPRAY, SUSPENSION (ML) NASAL
COMMUNITY
Start: 2021-05-27

## 2021-06-17 ASSESSMENT — ENCOUNTER SYMPTOMS
COLOR CHANGE: 0
SHORTNESS OF BREATH: 0
BACK PAIN: 0
NAUSEA: 0
DIARRHEA: 0

## 2021-06-17 NOTE — PROGRESS NOTES
SUBJECTIVE: Moon Guerrero is a 66 y.o. male who returns to the office with chief complaint of painful fungal toenails. Patient relates toe nails are thickened/difficult to trim as well as painful with ambulation and with shoe gear. Chief Complaint   Patient presents with    Nail Problem     Bilat Nail Trim / Last seen Marissa [place] 06/02/21 per pt     Diabetes          Review of Systems   Constitutional: Negative for activity change, appetite change, chills, diaphoresis, fatigue and fever. Respiratory: Negative for shortness of breath. Cardiovascular: Negative for leg swelling. Gastrointestinal: Negative for diarrhea and nausea. Endocrine: Negative for cold intolerance, heat intolerance and polyuria. Musculoskeletal: Positive for arthralgias. Negative for back pain, gait problem, joint swelling and myalgias. Skin: Negative for color change, pallor, rash and wound. Allergic/Immunologic: Negative for environmental allergies and food allergies. Neurological: Negative for dizziness, weakness, light-headedness and numbness. Hematological: Does not bruise/bleed easily. Psychiatric/Behavioral: Negative for behavioral problems, confusion and self-injury. The patient is not nervous/anxious. OBJECTIVE: Clinical evaluation of patient reveals nails 1,2,3,4,5 of the right foot and nails 1,2,3,4,5, of the left foot to present with thickness, elongation, discoloration, brittleness, and subungual debris. There was pain with palpation and debridement of the toenails of the bilateral feet. No open lesions noted to either foot today. The right DP pulse is not palpable. The left DP pulse is not palpable. The right PT pulse is not palpable. The left PT pulse is not palpable. Protective sensation is absent to the right plantar foot as noted with a 5.07 [place] monofilament. Protective sensation is absent to the left plantar foot as noted with a 5.07 [place] monofilament. Glucose: 140 mg/dl. Class A Findings (1 needed)   [] Non-traumatic amputation of foot or integral skeleton portion thereof. [] Q7.      Class B Findings (2 needed)   1. [x] Absent posterior tibial pulse   2. [x] Absent dorsalis pedis pulse   3. [] Advanced trophic changes; three of the following are required:   ·         [] hair growth (decrease or absence)   ·         [] nail changes (thickening)   ·         [] pigmentary changes (discoloration)   ·         [] skin texture (thin, shiny)   ·         [] skin color (rubor or redness)   [x] Q8.      Class C Findings (1 Class B, 2 Class C needed)   1. [] Claudication   2. [] Temperature changes   3. [] Edema   4. [] Paresthesia   5. [] Burning   [] Q9.     ASSESSMENT:    Diagnosis Orders   1. Onychomycosis of toenail  [place] DEBRIDEMENT OF NAILS, 6 OR MORE    HM DIABETES FOOT EXAM   2. Pain of toes of both feet  [place] DEBRIDEMENT OF NAILS, 6 OR MORE    HM DIABETES FOOT EXAM   3. Type 2 diabetes mellitus with peripheral vascular disease (HCC)  [place] DEBRIDEMENT OF NAILS, 6 OR MORE    HM DIABETES FOOT EXAM   4. Diabetic peripheral neuropathy associated with type 2 diabetes mellitus (HCC)  [place] DEBRIDEMENT OF NAILS, 6 OR MORE    HM DIABETES FOOT EXAM     PLAN: Toenails 1,2,3,4,5 of the right foot and 1,2,3,4,5 of the left foot were debrided in length and thickness using a nail nipper and a . Return in about 9 weeks (around 8/19/2021) for At risk diabetic foot care.    6/17/2021      Nemesio Leyva DPM

## 2021-09-02 ENCOUNTER — OFFICE VISIT (OUTPATIENT)
Dept: PODIATRY | Age: 76
End: 2021-09-02
Payer: MEDICARE

## 2021-09-02 VITALS — BODY MASS INDEX: 42.06 KG/M2 | HEIGHT: 69 IN | WEIGHT: 284 LBS

## 2021-09-02 DIAGNOSIS — M79.675 PAIN OF TOES OF BOTH FEET: ICD-10-CM

## 2021-09-02 DIAGNOSIS — M79.674 PAIN OF TOES OF BOTH FEET: ICD-10-CM

## 2021-09-02 DIAGNOSIS — E11.42 DIABETIC PERIPHERAL NEUROPATHY ASSOCIATED WITH TYPE 2 DIABETES MELLITUS (HCC): ICD-10-CM

## 2021-09-02 DIAGNOSIS — E11.51 TYPE 2 DIABETES MELLITUS WITH PERIPHERAL VASCULAR DISEASE (HCC): ICD-10-CM

## 2021-09-02 DIAGNOSIS — B35.1 ONYCHOMYCOSIS OF TOENAIL: Primary | ICD-10-CM

## 2021-09-02 PROCEDURE — 11721 DEBRIDE NAIL 6 OR MORE: CPT | Performed by: PODIATRIST

## 2021-09-02 RX ORDER — CLOTRIMAZOLE AND BETAMETHASONE DIPROPIONATE 10; .5 MG/ML; MG/ML
LOTION TOPICAL
COMMUNITY
Start: 2021-06-25

## 2021-09-02 ASSESSMENT — ENCOUNTER SYMPTOMS
SHORTNESS OF BREATH: 0
NAUSEA: 0
BACK PAIN: 0
COLOR CHANGE: 0
DIARRHEA: 0

## 2021-09-02 NOTE — PROGRESS NOTES
SUBJECTIVE: Giovana Maharaj is a 66 y.o. male who returns to the office with chief complaint of painful fungal toenails. Patient relates toe nails are thickened/difficult to trim as well as painful with ambulation and with shoe gear. Chief Complaint   Patient presents with    Nail Problem     b/l nail trim, last seen Fartun Cheung 7/26/21    Diabetes          Review of Systems   Constitutional: Negative for activity change, appetite change, chills, diaphoresis, fatigue and fever. Respiratory: Negative for shortness of breath. Cardiovascular: Negative for leg swelling. Gastrointestinal: Negative for diarrhea and nausea. Endocrine: Negative for cold intolerance, heat intolerance and polyuria. Musculoskeletal: Positive for arthralgias. Negative for back pain, gait problem, joint swelling and myalgias. Skin: Negative for color change, pallor, rash and wound. Allergic/Immunologic: Negative for environmental allergies and food allergies. Neurological: Negative for dizziness, weakness, light-headedness and numbness. Hematological: Does not bruise/bleed easily. Psychiatric/Behavioral: Negative for behavioral problems, confusion and self-injury. The patient is not nervous/anxious. OBJECTIVE: Clinical evaluation of patient reveals nails 1,2,3,4,5 of the right foot and nails 1,2,3,4,5, of the left foot to present with thickness, elongation, discoloration, brittleness, and subungual debris. There was pain with palpation and debridement of the toenails of the bilateral feet. No open lesions noted to either foot today. The right DP pulse is not palpable. The left DP pulse is not palpable. The right PT pulse is not palpable. The left PT pulse is not palpable. Protective sensation is absent to the right plantar foot as noted with a 5.07 [place] monofilament. Protective sensation is absent to the left plantar foot as noted with a 5.07 [place] monofilament.    Glucose: 147 mg/dl. Class A Findings (1 needed)   [] Non-traumatic amputation of foot or integral skeleton portion thereof. [] Q7.      Class B Findings (2 needed)   1. [x] Absent posterior tibial pulse   2. [x] Absent dorsalis pedis pulse   3. [] Advanced trophic changes; three of the following are required:   ·         [] hair growth (decrease or absence)   ·         [] nail changes (thickening)   ·         [] pigmentary changes (discoloration)   ·         [] skin texture (thin, shiny)   ·         [] skin color (rubor or redness)   [x] Q8.      Class C Findings (1 Class B, 2 Class C needed)   1. [] Claudication   2. [] Temperature changes   3. [] Edema   4. [] Paresthesia   5. [] Burning   [] Q9.     ASSESSMENT:    Diagnosis Orders   1. Onychomycosis of toenail  [place] DEBRIDEMENT OF NAILS, 6 OR MORE    HM DIABETES FOOT EXAM   2. Pain of toes of both feet  DE DEBRIDEMENT OF NAILS, 6 OR MORE    HM DIABETES FOOT EXAM   3. Type 2 diabetes mellitus with peripheral vascular disease (HCC)  DE DEBRIDEMENT OF NAILS, 6 OR MORE    HM DIABETES FOOT EXAM   4. Diabetic peripheral neuropathy associated with type 2 diabetes mellitus (HCC)  DE DEBRIDEMENT OF NAILS, 6 OR MORE    HM DIABETES FOOT EXAM     PLAN: Toenails 1,2,3,4,5 of the right foot and 1,2,3,4,5 of the left foot were debrided in length and thickness using a nail nipper and a . Return in about 9 weeks (around 11/4/2021) for At risk diabetic foot care.    9/2/2021      Jacque Parr DPM

## 2021-10-26 ENCOUNTER — HOSPITAL ENCOUNTER (OUTPATIENT)
Age: 76
Setting detail: SPECIMEN
Discharge: HOME | End: 2021-10-26
Payer: MEDICARE

## 2021-10-26 LAB
CREATININE URINE: 111 MG/DL (ref 39–259)
MICROALBUMIN/CREAT 24H UR: 44 MG/L
MICROALBUMIN/CREAT UR-RTO: 40 MCG/MG CREAT

## 2021-11-18 ENCOUNTER — OFFICE VISIT (OUTPATIENT)
Dept: PODIATRY | Age: 76
End: 2021-11-18
Payer: MEDICARE

## 2021-11-18 VITALS — BODY MASS INDEX: 43.04 KG/M2 | HEIGHT: 68 IN | WEIGHT: 284 LBS

## 2021-11-18 DIAGNOSIS — E11.51 TYPE 2 DIABETES MELLITUS WITH PERIPHERAL VASCULAR DISEASE (HCC): ICD-10-CM

## 2021-11-18 DIAGNOSIS — E11.42 DIABETIC PERIPHERAL NEUROPATHY ASSOCIATED WITH TYPE 2 DIABETES MELLITUS (HCC): ICD-10-CM

## 2021-11-18 DIAGNOSIS — B35.1 ONYCHOMYCOSIS OF TOENAIL: Primary | ICD-10-CM

## 2021-11-18 DIAGNOSIS — M79.675 PAIN OF TOES OF BOTH FEET: ICD-10-CM

## 2021-11-18 DIAGNOSIS — M79.674 PAIN OF TOES OF BOTH FEET: ICD-10-CM

## 2021-11-18 PROCEDURE — 11721 DEBRIDE NAIL 6 OR MORE: CPT | Performed by: PODIATRIST

## 2021-11-18 RX ORDER — ROSUVASTATIN CALCIUM 5 MG/1
TABLET, COATED ORAL
COMMUNITY
Start: 2021-11-04

## 2021-11-18 RX ORDER — LISINOPRIL 5 MG/1
TABLET ORAL
COMMUNITY
Start: 2021-11-02

## 2021-11-22 ASSESSMENT — ENCOUNTER SYMPTOMS
SHORTNESS OF BREATH: 0
COLOR CHANGE: 0
BACK PAIN: 0
DIARRHEA: 0
NAUSEA: 0

## 2021-11-22 NOTE — PROGRESS NOTES
SUBJECTIVE: Ruel Epstein is a 64 y.o. male who returns to the office with chief complaint of painful fungal toenails. Patient relates toe nails are thickened/difficult to trim as well as painful with ambulation and with shoe gear. Chief Complaint   Patient presents with    Nail Problem     b/l nail trim, last seen Zoë Ortega 7/26/21    Diabetes     141 today     Review of Systems   Constitutional: Negative for activity change, appetite change, chills, diaphoresis, fatigue and fever. Respiratory: Negative for shortness of breath. Cardiovascular: Negative for leg swelling. Gastrointestinal: Negative for diarrhea and nausea. Endocrine: Negative for cold intolerance, heat intolerance and polyuria. Musculoskeletal: Positive for arthralgias. Negative for back pain, gait problem, joint swelling and myalgias. Skin: Negative for color change, pallor, rash and wound. Allergic/Immunologic: Negative for environmental allergies and food allergies. Neurological: Negative for dizziness, weakness, light-headedness and numbness. Hematological: Does not bruise/bleed easily. Psychiatric/Behavioral: Negative for behavioral problems, confusion and self-injury. The patient is not nervous/anxious. OBJECTIVE: Clinical evaluation of patient reveals nails 1,2,3,4,5 of the right foot and nails 1,2,3,4,5, of the left foot to present with thickness, elongation, discoloration, brittleness, and subungual debris. There was pain with palpation and debridement of the toenails of the bilateral feet. No open lesions noted to either foot today. The right DP pulse is not palpable. The left DP pulse is not palpable. The right PT pulse is not palpable. The left PT pulse is not palpable. Protective sensation is absent to the right plantar foot as noted with a 5.07 [place] monofilament. Protective sensation is absent to the left plantar foot as noted with a 5.07 [place] monofilament. Glucose: 141 mg/dl. Class A Findings (1 needed)   [] Non-traumatic amputation of foot or integral skeleton portion thereof. [] Q7.      Class B Findings (2 needed)   1. [x] Absent posterior tibial pulse   2. [x] Absent dorsalis pedis pulse   3. [] Advanced trophic changes; three of the following are required:   ·         [] hair growth (decrease or absence)   ·         [] nail changes (thickening)   ·         [] pigmentary changes (discoloration)   ·         [] skin texture (thin, shiny)   ·         [] skin color (rubor or redness)   [x] Q8.      Class C Findings (1 Class B, 2 Class C needed)   1. [] Claudication   2. [] Temperature changes   3. [] Edema   4. [] Paresthesia   5. [] Burning   [] Q9.     ASSESSMENT:    Diagnosis Orders   1. Onychomycosis of toenail  [place] DEBRIDEMENT OF NAILS, 6 OR MORE    HM DIABETES FOOT EXAM   2. Pain of toes of both feet  [place] DEBRIDEMENT OF NAILS, 6 OR MORE    HM DIABETES FOOT EXAM   3. Type 2 diabetes mellitus with peripheral vascular disease (HCC)  [place] DEBRIDEMENT OF NAILS, 6 OR MORE    HM DIABETES FOOT EXAM   4. Diabetic peripheral neuropathy associated with type 2 diabetes mellitus (HCC)  [place] DEBRIDEMENT OF NAILS, 6 OR MORE    HM DIABETES FOOT EXAM     PLAN: Toenails 1,2,3,4,5 of the right foot and 1,2,3,4,5 of the left foot were debrided in length and thickness using a nail nipper and a . Return in about 9 weeks (around 1/20/2022) for At risk diabetic foot care.    11/18/2021      Gina Vázquez DPM

## 2022-02-17 ENCOUNTER — OFFICE VISIT (OUTPATIENT)
Dept: PODIATRY | Age: 77
End: 2022-02-17
Payer: MEDICARE

## 2022-02-17 VITALS — HEIGHT: 68 IN | BODY MASS INDEX: 43.04 KG/M2 | WEIGHT: 284 LBS

## 2022-02-17 DIAGNOSIS — M79.675 PAIN OF TOES OF BOTH FEET: ICD-10-CM

## 2022-02-17 DIAGNOSIS — B35.1 ONYCHOMYCOSIS OF TOENAIL: Primary | ICD-10-CM

## 2022-02-17 DIAGNOSIS — E11.51 TYPE 2 DIABETES MELLITUS WITH PERIPHERAL VASCULAR DISEASE (HCC): ICD-10-CM

## 2022-02-17 DIAGNOSIS — M79.674 PAIN OF TOES OF BOTH FEET: ICD-10-CM

## 2022-02-17 PROCEDURE — 99999 PR OFFICE/OUTPT VISIT,PROCEDURE ONLY: CPT | Performed by: PODIATRIST

## 2022-02-17 PROCEDURE — 11721 DEBRIDE NAIL 6 OR MORE: CPT | Performed by: PODIATRIST

## 2022-02-17 ASSESSMENT — ENCOUNTER SYMPTOMS
SHORTNESS OF BREATH: 0
BACK PAIN: 0
DIARRHEA: 0
NAUSEA: 0
COLOR CHANGE: 0

## 2022-02-17 NOTE — PROGRESS NOTES
SUBJECTIVE: Matias Boland is a 64 y.o. male who returns to the office with chief complaint of painful fungal toenails. Patient relates toe nails are thickened/difficult to trim as well as painful with ambulation and with shoe gear. Chief Complaint   Patient presents with    Nail Problem     b/l nail trim/ last seen Dr. Brooks Gamble 12/6/2021    Diabetes     last blood sugar 129     Review of Systems   Constitutional: Negative for activity change, appetite change, chills, diaphoresis, fatigue and fever. Respiratory: Negative for shortness of breath. Cardiovascular: Negative for leg swelling. Gastrointestinal: Negative for diarrhea and nausea. Endocrine: Negative for cold intolerance, heat intolerance and polyuria. Musculoskeletal: Positive for arthralgias. Negative for back pain, gait problem, joint swelling and myalgias. Skin: Negative for color change, pallor, rash and wound. Allergic/Immunologic: Negative for environmental allergies and food allergies. Neurological: Negative for dizziness, weakness, light-headedness and numbness. Hematological: Does not bruise/bleed easily. Psychiatric/Behavioral: Negative for behavioral problems, confusion and self-injury. The patient is not nervous/anxious. OBJECTIVE: Clinical evaluation of patient reveals nails 1,2,3,4,5 of the right foot and nails 1,2,3,4,5 of the left foot to present with thickness, elongation, discoloration, brittleness, and subungual debris. There was pain with palpation and debridement of the toenails of the bilateral feet. No open lesions noted to either foot today. The right DP pulse is not palpable. The left DP pulse is not palpable. The right PT pulse is not palpable. The left PT pulse is not palpable. Protective sensation is absent to the right plantar foot as noted with a 5.07 [place] monofilament.    Protective sensation is absent to the left plantar foot as noted with a 5.07 [place] monofilament. Glucose: 129 mg/dl. Class A Findings (1 needed)   [] Non-traumatic amputation of foot or integral skeleton portion thereof. [] Q7.      Class B Findings (2 needed)   1. [x] Absent posterior tibial pulse   2. [x] Absent dorsalis pedis pulse   3. [] Advanced trophic changes; three of the following are required:   ·         [] hair growth (decrease or absence)   ·         [] nail changes (thickening)   ·         [] pigmentary changes (discoloration)   ·         [] skin texture (thin, shiny)   ·         [] skin color (rubor or redness)   [x] Q8.      Class C Findings (1 Class B, 2 Class C needed)   1. [] Claudication   2. [] Temperature changes   3. [] Edema   4. [] Paresthesia   5. [] Burning   [] Q9.     ASSESSMENT:    Diagnosis Orders   1. Onychomycosis of toenail  [place] DEBRIDEMENT OF NAILS, 6 OR MORE    HM DIABETES FOOT EXAM   2. Pain of toes of both feet  ND DEBRIDEMENT OF NAILS, 6 OR MORE    HM DIABETES FOOT EXAM   3. Type 2 diabetes mellitus with peripheral vascular disease (HCC)  [place] DEBRIDEMENT OF NAILS, 6 OR MORE    HM DIABETES FOOT EXAM     PLAN: Toenails 1,2,3,4,5 of the right foot and 1,2,3,4,5 of the left foot were debrided in length and thickness using a nail nipper and a . Return in about 9 weeks (around 4/21/2022) for At risk diabetic foot care.    2/17/2022      Waynetta Cheadle, DPM

## 2022-05-05 ENCOUNTER — OFFICE VISIT (OUTPATIENT)
Dept: PODIATRY | Age: 77
End: 2022-05-05
Payer: MEDICARE

## 2022-05-05 VITALS — WEIGHT: 284 LBS | HEIGHT: 68 IN | BODY MASS INDEX: 43.04 KG/M2

## 2022-05-05 DIAGNOSIS — M79.675 PAIN OF TOES OF BOTH FEET: ICD-10-CM

## 2022-05-05 DIAGNOSIS — E11.42 DIABETIC PERIPHERAL NEUROPATHY ASSOCIATED WITH TYPE 2 DIABETES MELLITUS (HCC): ICD-10-CM

## 2022-05-05 DIAGNOSIS — M79.674 PAIN OF TOES OF BOTH FEET: ICD-10-CM

## 2022-05-05 DIAGNOSIS — E11.51 TYPE 2 DIABETES MELLITUS WITH PERIPHERAL VASCULAR DISEASE (HCC): ICD-10-CM

## 2022-05-05 DIAGNOSIS — B35.1 ONYCHOMYCOSIS OF TOENAIL: Primary | ICD-10-CM

## 2022-05-05 PROCEDURE — 99999 PR OFFICE/OUTPT VISIT,PROCEDURE ONLY: CPT | Performed by: PODIATRIST

## 2022-05-05 PROCEDURE — 11721 DEBRIDE NAIL 6 OR MORE: CPT | Performed by: PODIATRIST

## 2022-05-06 ASSESSMENT — ENCOUNTER SYMPTOMS
COLOR CHANGE: 0
NAUSEA: 0
BACK PAIN: 0
DIARRHEA: 0
SHORTNESS OF BREATH: 0

## 2022-05-06 NOTE — PROGRESS NOTES
SUBJECTIVE: Oneil Sprague is a 64 y.o. male who returns to the office with chief complaint of painful fungal toenails. Patient relates toe nails are thickened/difficult to trim as well as painful with ambulation and with shoe gear. Chief Complaint   Patient presents with    Nail Problem     bilat nail trim PCP León Ramirez - about 2 months ago    Diabetes     A1c 5.7     Review of Systems   Constitutional: Negative for activity change, appetite change, chills, diaphoresis, fatigue and fever. Respiratory: Negative for shortness of breath. Cardiovascular: Negative for leg swelling. Gastrointestinal: Negative for diarrhea and nausea. Endocrine: Negative for cold intolerance, heat intolerance and polyuria. Musculoskeletal: Positive for arthralgias. Negative for back pain, gait problem, joint swelling and myalgias. Skin: Negative for color change, pallor, rash and wound. Allergic/Immunologic: Negative for environmental allergies and food allergies. Neurological: Negative for dizziness, weakness, light-headedness and numbness. Hematological: Does not bruise/bleed easily. Psychiatric/Behavioral: Negative for behavioral problems, confusion and self-injury. The patient is not nervous/anxious. OBJECTIVE: Clinical evaluation of patient reveals nails 1,2,3,4,5 of the right foot and nails 1,2,3,4,5 of the left foot to present with thickness, elongation, discoloration, brittleness, and subungual debris. There was pain with palpation and debridement of the toenails of the bilateral feet. No open lesions noted to either foot today. The right DP pulse is not palpable. The left DP pulse is not palpable. The right PT pulse is not palpable. The left PT pulse is not palpable. Protective sensation is absent to the right plantar foot as noted with a 5.07 [place] monofilament. Protective sensation is absent to the left plantar foot as noted with a 5.07 [place] monofilament. HbA1c: 5.7 %. Class A Findings (1 needed)   [] Non-traumatic amputation of foot or integral skeleton portion thereof. [] Q7.      Class B Findings (2 needed)   1. [x] Absent posterior tibial pulse   2. [x] Absent dorsalis pedis pulse   3. [] Advanced trophic changes; three of the following are required:   ·         [] hair growth (decrease or absence)   ·         [] nail changes (thickening)   ·         [] pigmentary changes (discoloration)   ·         [] skin texture (thin, shiny)   ·         [] skin color (rubor or redness)   [x] Q8.      Class C Findings (1 Class B, 2 Class C needed)   1. [] Claudication   2. [] Temperature changes   3. [] Edema   4. [] Paresthesia   5. [] Burning   [] Q9.     ASSESSMENT:    Diagnosis Orders   1. Onychomycosis of toenail  [place] DEBRIDEMENT OF NAILS, 6 OR MORE    HM DIABETES FOOT EXAM   2. Pain of toes of both feet  [place] DEBRIDEMENT OF NAILS, 6 OR MORE    HM DIABETES FOOT EXAM   3. Type 2 diabetes mellitus with peripheral vascular disease (HCC)  Amb External Referral For Orthotics    [place] DEBRIDEMENT OF NAILS, 6 OR MORE    HM DIABETES FOOT EXAM   4. Diabetic peripheral neuropathy associated with type 2 diabetes mellitus (HCC)  Amb External Referral For Orthotics    [place] DEBRIDEMENT OF NAILS, 6 OR MORE    HM DIABETES FOOT EXAM     PLAN: Toenails 1,2,3,4,5 of the right foot and 1,2,3,4,5 of the left foot were debrided in length and thickness using a nail nipper and a . Patient qualifies for diabetic shoes and he was given an order for these today. Return in about 9 weeks (around 7/7/2022) for At risk diabetic foot care.    5/5/2022      Enrrique Wallis DPM

## 2022-07-21 ENCOUNTER — OFFICE VISIT (OUTPATIENT)
Dept: PODIATRY | Age: 77
End: 2022-07-21
Payer: MEDICARE

## 2022-07-21 VITALS — BODY MASS INDEX: 43.04 KG/M2 | WEIGHT: 284 LBS | HEIGHT: 68 IN

## 2022-07-21 DIAGNOSIS — B35.1 ONYCHOMYCOSIS OF TOENAIL: Primary | ICD-10-CM

## 2022-07-21 DIAGNOSIS — E11.51 TYPE 2 DIABETES MELLITUS WITH PERIPHERAL VASCULAR DISEASE (HCC): ICD-10-CM

## 2022-07-21 DIAGNOSIS — M79.674 PAIN OF TOES OF BOTH FEET: ICD-10-CM

## 2022-07-21 DIAGNOSIS — E11.42 DIABETIC PERIPHERAL NEUROPATHY ASSOCIATED WITH TYPE 2 DIABETES MELLITUS (HCC): ICD-10-CM

## 2022-07-21 DIAGNOSIS — M79.675 PAIN OF TOES OF BOTH FEET: ICD-10-CM

## 2022-07-21 PROCEDURE — 99999 PR OFFICE/OUTPT VISIT,PROCEDURE ONLY: CPT | Performed by: PODIATRIST

## 2022-07-21 PROCEDURE — 11721 DEBRIDE NAIL 6 OR MORE: CPT | Performed by: PODIATRIST

## 2022-07-21 RX ORDER — LISINOPRIL 10 MG/1
TABLET ORAL
COMMUNITY
Start: 2022-07-10

## 2022-08-01 ASSESSMENT — ENCOUNTER SYMPTOMS
COLOR CHANGE: 0
BACK PAIN: 0
SHORTNESS OF BREATH: 0
DIARRHEA: 0
NAUSEA: 0

## 2022-08-01 NOTE — PROGRESS NOTES
SUBJECTIVE: Cee Lama is a 64 y.o. male who returns to the office with chief complaint of painful fungal toenails. Patient relates toe nails are thickened/difficult to trim as well as painful with ambulation and with shoe gear. Chief Complaint   Patient presents with    Nail Problem     B/l nail trim, last seen Katie Tom     Diabetes     A1C 5.6     Review of Systems   Constitutional:  Negative for activity change, appetite change, chills, diaphoresis, fatigue and fever. Respiratory:  Negative for shortness of breath. Cardiovascular:  Negative for leg swelling. Gastrointestinal:  Negative for diarrhea and nausea. Endocrine: Negative for cold intolerance, heat intolerance and polyuria. Musculoskeletal:  Positive for arthralgias. Negative for back pain, gait problem, joint swelling and myalgias. Skin:  Negative for color change, pallor, rash and wound. Allergic/Immunologic: Negative for environmental allergies and food allergies. Neurological:  Negative for dizziness, weakness, light-headedness and numbness. Hematological:  Does not bruise/bleed easily. Psychiatric/Behavioral:  Negative for behavioral problems, confusion and self-injury. The patient is not nervous/anxious. OBJECTIVE: Clinical evaluation of patient reveals nails 1,2,3,4,5 of the right foot and nails 1,2,3,4,5 of the left foot to present with thickness, elongation, discoloration, brittleness, and subungual debris. There was pain with palpation and debridement of the toenails of the bilateral feet. No open lesions noted to either foot today. The right DP pulse is not palpable. The left DP pulse is not palpable. The right PT pulse is not palpable. The left PT pulse is not palpable. Protective sensation is absent to the right plantar foot as noted with a 5.07 [place] monofilament. Protective sensation is absent to the left plantar foot as noted with a 5.07 [place] monofilament.    HbA1c: 5.6 %.    Class A Findings (1 needed)   [] Non-traumatic amputation of foot or integral skeleton portion thereof. [] Q7.      Class B Findings (2 needed)   1. [x] Absent posterior tibial pulse   2. [x] Absent dorsalis pedis pulse   3. [] Advanced trophic changes; three of the following are required:   ·         [] hair growth (decrease or absence)   ·         [] nail changes (thickening)   ·         [] pigmentary changes (discoloration)   ·         [] skin texture (thin, shiny)   ·         [] skin color (rubor or redness)   [x] Q8.      Class C Findings (1 Class B, 2 Class C needed)   1. [] Claudication   2. [] Temperature changes   3. [] Edema   4. [] Paresthesia   5. [] Burning   [] Q9.     ASSESSMENT:    Diagnosis Orders   1. Onychomycosis of toenail  [place] DEBRIDEMENT OF NAILS, 6 OR MORE    HM DIABETES FOOT EXAM      2. Pain of toes of both feet  [place] DEBRIDEMENT OF NAILS, 6 OR MORE    HM DIABETES FOOT EXAM      3. Type 2 diabetes mellitus with peripheral vascular disease (HCC)  [place] DEBRIDEMENT OF NAILS, 6 OR MORE    HM DIABETES FOOT EXAM      4. Diabetic peripheral neuropathy associated with type 2 diabetes mellitus (HCC)  [place] DEBRIDEMENT OF NAILS, 6 OR MORE    HM DIABETES FOOT EXAM        PLAN: Toenails 1,2,3,4,5 of the right foot and 1,2,3,4,5 of the left foot were debrided in length and thickness using a nail nipper and a . Return in about 9 weeks (around 9/22/2022) for At risk diabetic foot care.    7/21/2022      Abdelrahman Azevedo DPM

## 2022-10-06 ENCOUNTER — OFFICE VISIT (OUTPATIENT)
Dept: PODIATRY | Age: 77
End: 2022-10-06
Payer: MEDICARE

## 2022-10-06 VITALS — WEIGHT: 284 LBS | HEIGHT: 68 IN | BODY MASS INDEX: 43.04 KG/M2

## 2022-10-06 DIAGNOSIS — M79.674 PAIN OF TOES OF BOTH FEET: ICD-10-CM

## 2022-10-06 DIAGNOSIS — E11.51 TYPE 2 DIABETES MELLITUS WITH PERIPHERAL VASCULAR DISEASE (HCC): ICD-10-CM

## 2022-10-06 DIAGNOSIS — B35.1 ONYCHOMYCOSIS OF TOENAIL: Primary | ICD-10-CM

## 2022-10-06 DIAGNOSIS — M79.675 PAIN OF TOES OF BOTH FEET: ICD-10-CM

## 2022-10-06 DIAGNOSIS — E11.42 DIABETIC PERIPHERAL NEUROPATHY ASSOCIATED WITH TYPE 2 DIABETES MELLITUS (HCC): ICD-10-CM

## 2022-10-06 PROCEDURE — 99999 PR OFFICE/OUTPT VISIT,PROCEDURE ONLY: CPT | Performed by: PODIATRIST

## 2022-10-06 PROCEDURE — 11721 DEBRIDE NAIL 6 OR MORE: CPT | Performed by: PODIATRIST

## 2022-10-10 ASSESSMENT — ENCOUNTER SYMPTOMS
NAUSEA: 0
BACK PAIN: 0
COLOR CHANGE: 0
SHORTNESS OF BREATH: 0
DIARRHEA: 0

## 2022-10-10 NOTE — PROGRESS NOTES
5.7 %. Class A Findings (1 needed)   [] Non-traumatic amputation of foot or integral skeleton portion thereof. [] Q7.      Class B Findings (2 needed)   1. [x] Absent posterior tibial pulse   2. [x] Absent dorsalis pedis pulse   3. [] Advanced trophic changes; three of the following are required:   ·         [] hair growth (decrease or absence)   ·         [] nail changes (thickening)   ·         [] pigmentary changes (discoloration)   ·         [] skin texture (thin, shiny)   ·         [] skin color (rubor or redness)   [x] Q8.      Class C Findings (1 Class B, 2 Class C needed)   1. [] Claudication   2. [] Temperature changes   3. [] Edema   4. [] Paresthesia   5. [] Burning   [] Q9.     ASSESSMENT:    Diagnosis Orders   1. Onychomycosis of toenail  [place] DEBRIDEMENT OF NAILS, 6 OR MORE    HM DIABETES FOOT EXAM      2. Pain of toes of both feet  [place] DEBRIDEMENT OF NAILS, 6 OR MORE    HM DIABETES FOOT EXAM      3. Type 2 diabetes mellitus with peripheral vascular disease (HCC)  [place] DEBRIDEMENT OF NAILS, 6 OR MORE    HM DIABETES FOOT EXAM      4. Diabetic peripheral neuropathy associated with type 2 diabetes mellitus (HCC)  [place] DEBRIDEMENT OF NAILS, 6 OR MORE    HM DIABETES FOOT EXAM        PLAN: Toenails 1,2,3,4,5 of the right foot and 1,2,3,4,5 of the left foot were debrided in length and thickness using a nail nipper and a . Return in about 9 weeks (around 12/8/2022) for At risk diabetic foot care.    10/6/2022      Otoniel Shukla DPM

## 2022-12-15 ENCOUNTER — OFFICE VISIT (OUTPATIENT)
Dept: PODIATRY | Age: 77
End: 2022-12-15

## 2022-12-15 VITALS — HEIGHT: 68 IN | WEIGHT: 284 LBS | BODY MASS INDEX: 43.04 KG/M2

## 2022-12-15 DIAGNOSIS — E11.51 TYPE 2 DIABETES MELLITUS WITH PERIPHERAL VASCULAR DISEASE (HCC): ICD-10-CM

## 2022-12-15 DIAGNOSIS — M79.675 PAIN OF TOES OF BOTH FEET: ICD-10-CM

## 2022-12-15 DIAGNOSIS — B35.1 ONYCHOMYCOSIS OF TOENAIL: Primary | ICD-10-CM

## 2022-12-15 DIAGNOSIS — E11.42 DIABETIC PERIPHERAL NEUROPATHY ASSOCIATED WITH TYPE 2 DIABETES MELLITUS (HCC): ICD-10-CM

## 2022-12-15 DIAGNOSIS — M79.674 PAIN OF TOES OF BOTH FEET: ICD-10-CM

## 2022-12-21 ASSESSMENT — ENCOUNTER SYMPTOMS
NAUSEA: 0
DIARRHEA: 0
SHORTNESS OF BREATH: 0
COLOR CHANGE: 0
BACK PAIN: 0

## 2022-12-21 NOTE — PROGRESS NOTES
SUBJECTIVE: Aleks Cabrera is a 76 y.o. male who returns to the office with chief complaint of painful fungal toenails. Patient relates toe nails are thickened/difficult to trim as well as painful with ambulation and with shoe gear. Chief Complaint   Patient presents with    Nail Problem     B/l nail trim, last seen Donnis Phoenix MD    Diabetes     Last A1C 5.7     Review of Systems   Constitutional:  Negative for activity change, appetite change, chills, diaphoresis, fatigue and fever. Respiratory:  Negative for shortness of breath. Cardiovascular:  Negative for leg swelling. Gastrointestinal:  Negative for diarrhea and nausea. Endocrine: Negative for cold intolerance, heat intolerance and polyuria. Musculoskeletal:  Positive for arthralgias. Negative for back pain, gait problem, joint swelling and myalgias. Skin:  Negative for color change, pallor, rash and wound. Allergic/Immunologic: Negative for environmental allergies and food allergies. Neurological:  Negative for dizziness, weakness, light-headedness and numbness. Hematological:  Does not bruise/bleed easily. Psychiatric/Behavioral:  Negative for behavioral problems, confusion and self-injury. The patient is not nervous/anxious. OBJECTIVE: Clinical evaluation of patient reveals nails 1,2,3,4,5 of the right foot and nails 1,2,3,4,5 of the left foot to present with thickness, elongation, discoloration, brittleness, and subungual debris. There was pain with palpation and debridement of the toenails of the bilateral feet. No open lesions noted to either foot today. The right DP pulse is not palpable. The left DP pulse is not palpable. The right PT pulse is not palpable. The left PT pulse is not palpable. Protective sensation is absent to the right plantar foot as noted with a 5.07 [place] monofilament. Protective sensation is absent to the left plantar foot as noted with a 5.07 [place] monofilament.    HbA1c: 5.7 %. Class A Findings (1 needed)   [] Non-traumatic amputation of foot or integral skeleton portion thereof. [] Q7.      Class B Findings (2 needed)   1. [x] Absent posterior tibial pulse   2. [x] Absent dorsalis pedis pulse   3. [] Advanced trophic changes; three of the following are required:   ·         [] hair growth (decrease or absence)   ·         [] nail changes (thickening)   ·         [] pigmentary changes (discoloration)   ·         [] skin texture (thin, shiny)   ·         [] skin color (rubor or redness)   [x] Q8.      Class C Findings (1 Class B, 2 Class C needed)   1. [] Claudication   2. [] Temperature changes   3. [] Edema   4. [] Paresthesia   5. [] Burning   [] Q9.     ASSESSMENT:    Diagnosis Orders   1. Onychomycosis of toenail  [place] DEBRIDEMENT OF NAILS, 6 OR MORE    HM DIABETES FOOT EXAM      2. Pain of toes of both feet  [place] DEBRIDEMENT OF NAILS, 6 OR MORE    HM DIABETES FOOT EXAM      3. Type 2 diabetes mellitus with peripheral vascular disease (HCC)  [place] DEBRIDEMENT OF NAILS, 6 OR MORE    HM DIABETES FOOT EXAM      4. Diabetic peripheral neuropathy associated with type 2 diabetes mellitus (HCC)  [place] DEBRIDEMENT OF NAILS, 6 OR MORE    HM DIABETES FOOT EXAM        PLAN: Toenails 1,2,3,4,5 of the right foot and 1,2,3,4,5 of the left foot were debrided in length and thickness using a nail nipper and a . Return in about 9 weeks (around 2/16/2023) for At risk diabetic foot care.    12/15/2022      Kerline Lama DPM

## 2023-02-08 ENCOUNTER — HOSPITAL ENCOUNTER (OUTPATIENT)
Age: 78
Discharge: HOME | End: 2023-02-08
Payer: MEDICARE

## 2023-02-08 LAB
ALBUMIN SERPL-MCNC: 4.5 G/DL (ref 3.5–5.2)
ALP SERPL-CCNC: 96 U/L (ref 40–129)
ALT SERPL-CCNC: 16 U/L (ref 5–41)
ANION GAP SERPL CALCULATED.3IONS-SCNC: 11 MMOL/L (ref 9–17)
AST SERPL-CCNC: 17 U/L
BILIRUB SERPL-MCNC: 2.3 MG/DL (ref 0.3–1.2)
BUN SERPL-MCNC: 14 MG/DL (ref 8–23)
CALCIUM SERPL-MCNC: 9.3 MG/DL (ref 8.6–10.4)
CHLORIDE SERPL-SCNC: 103 MMOL/L (ref 98–107)
CHOLEST SERPL-MCNC: 139 MG/DL
CHOLESTEROL/HDL RATIO: 2.2
CO2 SERPL-SCNC: 27 MMOL/L (ref 20–31)
CREAT SERPL-MCNC: 0.73 MG/DL (ref 0.7–1.2)
CREATININE URINE: 88.7 MG/DL (ref 39–259)
GFR SERPL CREATININE-BSD FRML MDRD: >60 ML/MIN/1.73M2
GLUCOSE SERPL-MCNC: 118 MG/DL (ref 70–99)
HDLC SERPL-MCNC: 63 MG/DL
LDLC SERPL CALC-MCNC: 53 MG/DL (ref 0–130)
MICROALBUMIN/CREAT 24H UR: 89 MG/L
MICROALBUMIN/CREAT UR-RTO: 100 MCG/MG CREAT
POTASSIUM SERPL-SCNC: 4.1 MMOL/L (ref 3.7–5.3)
PROT SERPL-MCNC: 7.7 G/DL (ref 6.4–8.3)
SODIUM SERPL-SCNC: 141 MMOL/L (ref 135–144)
TRIGL SERPL-MCNC: 117 MG/DL

## 2023-02-08 PROCEDURE — 82570 ASSAY OF URINE CREATININE: CPT

## 2023-02-08 PROCEDURE — 36415 COLL VENOUS BLD VENIPUNCTURE: CPT

## 2023-02-08 PROCEDURE — 82043 UR ALBUMIN QUANTITATIVE: CPT

## 2023-02-08 PROCEDURE — 80053 COMPREHEN METABOLIC PANEL: CPT

## 2023-02-08 PROCEDURE — 80061 LIPID PANEL: CPT

## 2023-03-02 ENCOUNTER — OFFICE VISIT (OUTPATIENT)
Dept: PODIATRY | Age: 78
End: 2023-03-02
Payer: MEDICARE

## 2023-03-02 VITALS — HEIGHT: 68 IN | WEIGHT: 284 LBS | BODY MASS INDEX: 43.04 KG/M2

## 2023-03-02 DIAGNOSIS — E11.42 DIABETIC PERIPHERAL NEUROPATHY ASSOCIATED WITH TYPE 2 DIABETES MELLITUS (HCC): ICD-10-CM

## 2023-03-02 DIAGNOSIS — M79.675 PAIN OF TOES OF BOTH FEET: ICD-10-CM

## 2023-03-02 DIAGNOSIS — M79.674 PAIN OF TOES OF BOTH FEET: ICD-10-CM

## 2023-03-02 DIAGNOSIS — E11.51 TYPE 2 DIABETES MELLITUS WITH PERIPHERAL VASCULAR DISEASE (HCC): ICD-10-CM

## 2023-03-02 DIAGNOSIS — B35.1 ONYCHOMYCOSIS OF TOENAIL: Primary | ICD-10-CM

## 2023-03-02 PROCEDURE — 99999 PR OFFICE/OUTPT VISIT,PROCEDURE ONLY: CPT | Performed by: PODIATRIST

## 2023-03-02 PROCEDURE — 11721 DEBRIDE NAIL 6 OR MORE: CPT | Performed by: PODIATRIST

## 2023-03-02 RX ORDER — FLUOROURACIL 50 MG/G
CREAM TOPICAL
COMMUNITY
Start: 2023-02-09

## 2023-03-02 RX ORDER — CLOTRIMAZOLE 1 %
CREAM (GRAM) TOPICAL
COMMUNITY
Start: 2023-02-07

## 2023-03-06 ASSESSMENT — ENCOUNTER SYMPTOMS
SHORTNESS OF BREATH: 0
NAUSEA: 0
COLOR CHANGE: 0
DIARRHEA: 0
BACK PAIN: 0

## 2023-03-06 NOTE — PROGRESS NOTES
SUBJECTIVE: Prentis Libman is a 76 y.o. male who returns to the office with chief complaint of painful fungal toenails. Patient relates toe nails are thickened/difficult to trim as well as painful with ambulation and with shoe gear. Chief Complaint   Patient presents with    Nail Problem     B/l nail trim, last seen Lizbeth Willis MD     Diabetes     Last blood sugar 122     Review of Systems   Constitutional:  Negative for activity change, appetite change, chills, diaphoresis, fatigue and fever. Respiratory:  Negative for shortness of breath. Cardiovascular:  Negative for leg swelling. Gastrointestinal:  Negative for diarrhea and nausea. Endocrine: Negative for cold intolerance, heat intolerance and polyuria. Musculoskeletal:  Positive for arthralgias. Negative for back pain, gait problem, joint swelling and myalgias. Skin:  Negative for color change, pallor, rash and wound. Allergic/Immunologic: Negative for environmental allergies and food allergies. Neurological:  Negative for dizziness, weakness, light-headedness and numbness. Hematological:  Does not bruise/bleed easily. Psychiatric/Behavioral:  Negative for behavioral problems, confusion and self-injury. The patient is not nervous/anxious. OBJECTIVE: Clinical evaluation of patient reveals nails 1,2,3,4,5 of the right foot and nails 1,2,3,4,5 of the left foot to present with thickness, elongation, discoloration, brittleness, and subungual debris. There was pain with palpation and debridement of the toenails of the bilateral feet. No open lesions noted to either foot today. The right DP pulse is not palpable. The left DP pulse is not palpable. The right PT pulse is not palpable. The left PT pulse is not palpable. Protective sensation is absent to the right plantar foot as noted with a 5.07 [place] monofilament. Protective sensation is absent to the left plantar foot as noted with a 5.07 [place] monofilament. Glucose: 122 mg/dl. Class A Findings (1 needed)   [] Non-traumatic amputation of foot or integral skeleton portion thereof. [] Q7.      Class B Findings (2 needed)   1. [x] Absent posterior tibial pulse   2. [x] Absent dorsalis pedis pulse   3. [] Advanced trophic changes; three of the following are required:   ·         [] hair growth (decrease or absence)   ·         [] nail changes (thickening)   ·         [] pigmentary changes (discoloration)   ·         [] skin texture (thin, shiny)   ·         [] skin color (rubor or redness)   [x] Q8.      Class C Findings (1 Class B, 2 Class C needed)   1. [] Claudication   2. [] Temperature changes   3. [] Edema   4. [] Paresthesia   5. [] Burning   [] Q9.     ASSESSMENT:    Diagnosis Orders   1. Onychomycosis of toenail  [place] DEBRIDEMENT NAIL ANY METHOD 6/>    HM DIABETES FOOT EXAM      2. Pain of toes of both feet  [place] DEBRIDEMENT NAIL ANY METHOD 6/>    HM DIABETES FOOT EXAM      3. Type 2 diabetes mellitus with peripheral vascular disease (HCC)  [place] DEBRIDEMENT NAIL ANY METHOD 6/>    HM DIABETES FOOT EXAM      4. Diabetic peripheral neuropathy associated with type 2 diabetes mellitus ([facility] [address]  [place] DEBRIDEMENT NAIL ANY METHOD 6/>    HM DIABETES FOOT EXAM        PLAN: Toenails 1,2,3,4,5 of the right foot and 1,2,3,4,5 of the left foot were debrided in length and thickness using a nail nipper and a . Return in about 9 weeks (around 5/4/2023) for At risk diabetic foot care.    3/2/2023      Santino Chaudhry DPM

## 2023-03-20 ENCOUNTER — HOSPITAL ENCOUNTER (OUTPATIENT)
Age: 78
Discharge: HOME | End: 2023-03-20
Payer: MEDICARE

## 2023-03-20 LAB
BILIRUB DIRECT SERPL-MCNC: 0.3 MG/DL
BILIRUB SERPL-MCNC: 1.3 MG/DL (ref 0.3–1.2)

## 2023-03-20 PROCEDURE — 82248 BILIRUBIN DIRECT: CPT

## 2023-03-20 PROCEDURE — 36415 COLL VENOUS BLD VENIPUNCTURE: CPT

## 2023-03-20 PROCEDURE — 82247 BILIRUBIN TOTAL: CPT

## 2023-05-03 ENCOUNTER — HOSPITAL ENCOUNTER (OUTPATIENT)
Age: 78
Discharge: HOME | End: 2023-05-03
Payer: MEDICARE

## 2023-05-03 LAB
ABSOLUTE EOS #: 0.2 K/UL (ref 0–0.4)
ABSOLUTE LYMPH #: 5 K/UL (ref 1–4.8)
ABSOLUTE MONO #: 1.1 K/UL (ref 0.1–1.3)
BASOPHILS # BLD: 1 % (ref 0–2)
BASOPHILS ABSOLUTE: 0.1 K/UL (ref 0–0.2)
EOSINOPHILS RELATIVE PERCENT: 1 % (ref 0–4)
HCT VFR BLD AUTO: 38 % (ref 41–53)
HGB BLD-MCNC: 12.5 G/DL (ref 13.5–17.5)
LYMPHOCYTES # BLD: 42 % (ref 24–44)
MCH RBC QN AUTO: 28.4 PG (ref 26–34)
MCHC RBC AUTO-ENTMCNC: 33 G/DL (ref 31–37)
MCV RBC AUTO: 86.1 FL (ref 80–100)
MONOCYTES # BLD: 9 % (ref 1–7)
PDW BLD-RTO: 15.4 % (ref 11.5–14.9)
PLATELET # BLD AUTO: 251 K/UL (ref 150–450)
PMV BLD AUTO: 8.1 FL (ref 6–12)
RBC # BLD: 4.41 M/UL (ref 4.5–5.9)
SEG NEUTROPHILS: 47 % (ref 36–66)
SEGMENTED NEUTROPHILS ABSOLUTE COUNT: 5.5 K/UL (ref 1.3–9.1)
WBC # BLD AUTO: 11.9 K/UL (ref 3.5–11)

## 2023-05-03 PROCEDURE — 36415 COLL VENOUS BLD VENIPUNCTURE: CPT

## 2023-05-03 PROCEDURE — 85025 COMPLETE CBC W/AUTO DIFF WBC: CPT

## 2023-05-18 ENCOUNTER — OFFICE VISIT (OUTPATIENT)
Dept: PODIATRY | Age: 78
End: 2023-05-18
Payer: MEDICARE

## 2023-05-18 VITALS — BODY MASS INDEX: 41.98 KG/M2 | HEIGHT: 68 IN | WEIGHT: 277 LBS

## 2023-05-18 DIAGNOSIS — B35.1 ONYCHOMYCOSIS OF TOENAIL: Primary | ICD-10-CM

## 2023-05-18 DIAGNOSIS — E11.42 DIABETIC PERIPHERAL NEUROPATHY ASSOCIATED WITH TYPE 2 DIABETES MELLITUS (HCC): ICD-10-CM

## 2023-05-18 DIAGNOSIS — M79.674 PAIN OF TOES OF BOTH FEET: ICD-10-CM

## 2023-05-18 DIAGNOSIS — E11.51 TYPE 2 DIABETES MELLITUS WITH PERIPHERAL VASCULAR DISEASE (HCC): ICD-10-CM

## 2023-05-18 DIAGNOSIS — M79.675 PAIN OF TOES OF BOTH FEET: ICD-10-CM

## 2023-05-18 PROCEDURE — 11721 DEBRIDE NAIL 6 OR MORE: CPT | Performed by: PODIATRIST

## 2023-05-18 PROCEDURE — 99999 PR OFFICE/OUTPT VISIT,PROCEDURE ONLY: CPT | Performed by: PODIATRIST

## 2023-05-18 ASSESSMENT — ENCOUNTER SYMPTOMS
DIARRHEA: 0
NAUSEA: 0
SHORTNESS OF BREATH: 0
COLOR CHANGE: 0
BACK PAIN: 0

## 2023-05-18 NOTE — PROGRESS NOTES
SUBJECTIVE: Christopher Denise is a 76 y.o. male who returns to the office with chief complaint of painful fungal toenails. Patient relates toe nails are thickened/difficult to trim as well as painful with ambulation and with shoe gear. Chief Complaint   Patient presents with    Nail Problem     Nail trim/last saw Janet Granger 5/4/23    Diabetes     Last a1c 6.3     Review of Systems   Constitutional:  Negative for activity change, appetite change, chills, diaphoresis, fatigue and fever. Respiratory:  Negative for shortness of breath. Cardiovascular:  Negative for leg swelling. Gastrointestinal:  Negative for diarrhea and nausea. Endocrine: Negative for cold intolerance, heat intolerance and polyuria. Musculoskeletal:  Positive for arthralgias. Negative for back pain, gait problem, joint swelling and myalgias. Skin:  Negative for color change, pallor, rash and wound. Allergic/Immunologic: Negative for environmental allergies and food allergies. Neurological:  Negative for dizziness, weakness, light-headedness and numbness. Hematological:  Does not bruise/bleed easily. Psychiatric/Behavioral:  Negative for behavioral problems, confusion and self-injury. The patient is not nervous/anxious. OBJECTIVE: Clinical evaluation of patient reveals nails 1,2,3,4,5 of the right foot and nails 1,2,3,4,5 of the left foot to present with thickness, elongation, discoloration, brittleness, and subungual debris. There was pain with palpation and debridement of the toenails of the bilateral feet. No open lesions noted to either foot today. The right DP pulse is not palpable. The left DP pulse is not palpable. The right PT pulse is not palpable. The left PT pulse is not palpable. Protective sensation is absent to the right plantar foot as noted with a 5.07 [place] monofilament. Protective sensation is absent to the left plantar foot as noted with a 5.07 [place] monofilament.    HbA1c:

## 2023-05-23 NOTE — PROGRESS NOTES
[address]   Occupational Therapy Evaluation  Date: 3/17/21  Patient Name: Edil Gonzalez       Room: 5/5-01  MRN: [id number]  Account: [de-identified]   : 1945  (66 y.o.) Gender: male     Discharge Recommendations: The patient's needs are being met with no further Occupational Therapy recommended at discharge. Equipment Needed: (TBD)    Referring Practitioner: Dr. William Magana  Diagnosis: Renal Failure    Treatment Diagnosis: Impaired self-care status  Past Medical History:  has a past medical history of Atrioventricular block, Cancer ([address] Diabetes mellitus ([address] GERD (gastroesophageal reflux disease), [place] (hard of hearing), Hyperlipidemia, Hypertension, Kidney stones, Osteoarthritis, Pacemaker, and Sleep apnea. Past Surgical History:   has a past surgical history that includes Aortic valve replacement (); Kidney stone surgery; Foot surgery (Right); Colonoscopy; Knee arthroscopy (Left); tumor removal; pacemaker placement; Kidney surgery (Left); Cystoscopy; Cardiac surgery; back surgery; Tonsillectomy; eye surgery (Bilateral); Total knee arthroplasty (Left); joint replacement (Left, 2016); pre-malignant / benign skin lesion excision (Right, 2017); Facial reconstruction surgery (Right, 2017); pre-malignant / benign skin lesion excision (2019); and Neck surgery (N/A, 2019).     Restrictions  Restrictions/Precautions: Fall Risk, Up as Tolerated(peripheral IV right antecubital, troponins 163 on 3-, continuous pulse oximeter)  Implants present? : Metal implants, Pacemaker(left TKR, aortic valve replacement)  Required Braces or Orthoses  Right Upper Extremity Brace/Splint: (bilateral wrist braces for carpal keyonna)  Left Upper Extremity Brace/Splint: (bilateral wrist braces for carpal keyonna)  Required Braces or Orthoses?: Yes(bilateral wrist braces for carpal keyonna)     Vitals  Temp: 98.1 °F (36.7 °C)  Pulse: 65  Resp: 16  BP: (!) 88/44  Height: 5' 9\" (175.3 cm)  Weight: 276 lb 10.8 oz (125.5 kg)  BMI (Calculated): 40.9  Oxygen Therapy  SpO2: 98 %  Pulse Oximeter Device Mode: Intermittent  Pulse Oximeter Device Location: Finger  O2 Device: None (Room air)  FiO2 : 21 %  Blood Pressure Lyin/40  Pulse Lyin PER MINUTE  Blood Pressure Sittin/47  Pulse Sittin PER MINUTE  Blood Pressure Standin/45  Pulse Standin PER MINUTE  Level of Consciousness: Alert (0)    Subjective  Subjective: \"I don't know, that's hard to say, maybe like 40%\"  Comments: When asked how close he felt to being at his baseline (100%) for strength and endurance.   Overall Orientation Status: Within Functional Limits  Vision  Vision: Impaired  Vision Exceptions: Wears glasses for distance  Hearing  Hearing: Exceptions to [facility]  Hearing Exceptions: Hard of hearing/hearing concerns, Bilateral hearing aid  Social/Functional History  Lives With: Spouse  Type of Home: House(recently moved to [facility])  Home Layout: One level  Home Access: Level entry  Bathroom Shower/Tub: Walk-in shower, Curtain, Doors(\"curtain for prettiness only\"; also states that they have a walkin tub but doesn't use it)  Bathroom Toilet: Handicap height(has sink/ cabinent next to it)  [employer]: Grab bars in shower, Built-in shower seat(states that he is unable to use corner built in seat)  Bathroom Accessibility: Walker accessible  Home Equipment: [place], Quad cane(C-Pap)  Receives Help From: Family  ADL Assistance: Independent  Homemaking Assistance: Needs assistance(spouse is primary)  Homemaking Responsibilities: Yes(spouse is primary)  Ambulation Assistance: Independent(no device)  Transfer Assistance: Independent  Active : Yes  Mode of Transportation: SUV  Occupation: Retired  Type of occupation: [employer]  Leisure & Hobbies: watch baseball games, [employer], watch grandchildren's games  IADL Comments: sleeps in a flat bed  Additional Comments: spouse is retired and able to assist at D/C    Objective  Vision - Basic Assessment  Patient Visual Report: No visual complaint reported. Cognition  Overall Cognitive Status: WFL   Perception  Overall Perceptual Status: [facility]  Sensation  Overall Sensation Status: Impaired(C/O intermittant numbness and tingling bilateral hands from carpal keyonna)   ADL  Feeding: Independent  Grooming: Setup  UE Bathing: Stand by assistance  LE Bathing: Minimal assistance  UE Dressing: Stand by assistance  LE Dressing: Moderate assistance(Able to doff socks w/inc'd time; A to don socks)  Toileting: Stand by assistance(Standing EOB to use HH urinal)  Additional Comments: Above levels based on pt report, observation, and clinical reasoning unless otherwise noted. UE Function  LUE Strength  Gross LUE Strength: Exceptions to [facility]  L Hand General: 4+/5  LUE Strength Comment: Shoulder 3-/5     LUE Tone: Normotonic     LUE AROM (degrees)  LUE AROM : Exceptions  LUE General AROM: Active shoulder movement limited to ~50% (baseline deficit per pt); Other joints WFL     Left Hand AROM (degrees)  Left Hand AROM: WFL     RUE Strength  Gross RUE Strength: WFL  R Hand General: 4+/5      RUE Tone: Normotonic     RUE AROM (degrees)  RUE AROM : WFL     Right Hand AROM (degrees)  Right Hand AROM: WFL    Fine Motor Skills  Coordination  Movements Are Fluid And Coordinated: No  Coordination and Movement description: Gross motor impairments, Left UE(Baseline deficits; Dec'd ROM/strength L shoulder)     Mobility  Supine to Sit: Stand by assistance  Sit to Supine: Minimal assistance(assist to swing legs back into bed)       Balance  Sitting Balance: Independent  Standing Balance: Contact guard assistance     Functional Mobility  Functional Mobility Comments: Pt walked around bed and back again with RW and CGA.   Bed mobility  Rolling to Right: Stand by assistance  Supine to Sit: Stand by assistance  Sit to Supine: Minimal assistance(assist to swing legs back into bed)  Scooting: Stand by assistance     Transfers  Stand Step Transfers: Contact guard assistance  Stand Pivot Transfers: Contact guard assistance  Sit to stand: Contact guard assistance  Stand to sit: Contact guard assistance  Transfer Comments: w/RW  Functional Activity Tolerance  Functional Activity Tolerance: Tolerates 10 - 20 min exercise with multiple rests  Additional Comments: SpO2 maintained 93-96% on room air with all activity   Assessment  Performance deficits / Impairments: Decreased functional mobility , Decreased ADL status, Decreased endurance, Decreased balance  Treatment Diagnosis: Impaired self-care status  Prognosis: Good  Decision Making: Medium Complexity  REQUIRES OT FOLLOW UP: Yes  Activity Tolerance: Patient Tolerated treatment well, Patient limited by fatigue    Goals  Patient Goals   Patient goals : Return home with A from wife - hesitant about further therapy ([facility] or outpatient)  Short term goals  Time Frame for Short term goals: By Discharge  Short term goal 1: Pt will actively participate in self-care routine and complete all tasks with set-up/SBA and Good safety using AE if needed. Short term goal 2: Pt will complete toilet transfer and toileting with Mod I and Good safety using least restrictive device. Short term goal 3: Pt will tolerate standing for 5+ minutes with 1-2 UE support and no LOB while completing a functional task. Short term goal 4: Pt will actively participate in 15-20 minutes of therapeutic exercise/activity to promote increased independence and safety with self-care and mobility.     Plan  Safety Devices  Safety Devices in place: Yes  Type of devices: Patient at risk for falls, Gait belt, Left in bed, Call light within reach     Plan  Times per week: 5-7  Times per day: Daily  Current Treatment Recommendations: Balance Training, Functional Mobility Training, Endurance Training, Safety Education & Training, Patient/Caregiver Education & Training, Equipment Evaluation, Education, & procurement, Self-Care / ADL    Equipment Recommendations  Equipment Needed: (TBD)  OT Individual Minutes  Time In: 1011  Time Out: [facility]  Minutes: 37    Electronically signed by Angie Boateng on 3/17/21 at 1:30 PM EDT Mirvaso Counseling: Mirvaso is a topical medication which can decrease superficial blood flow where applied. Side effects are uncommon and include stinging, redness and allergic reactions.

## 2023-08-08 ENCOUNTER — HOSPITAL ENCOUNTER (OUTPATIENT)
Age: 78
Discharge: HOME | End: 2023-08-08
Payer: MEDICARE

## 2023-08-08 LAB
BASOPHILS # BLD: 0 K/UL (ref 0–0.2)
BASOPHILS NFR BLD: 0 % (ref 0–2)
BILIRUB SERPL-MCNC: 1.2 MG/DL (ref 0.3–1.2)
EOSINOPHIL # BLD: 0.2 K/UL (ref 0–0.4)
EOSINOPHILS RELATIVE PERCENT: 3 % (ref 0–4)
ERYTHROCYTE [DISTWIDTH] IN BLOOD BY AUTOMATED COUNT: 14.8 % (ref 11.5–14.9)
HCT VFR BLD AUTO: 36.9 % (ref 41–53)
HGB BLD-MCNC: 12.4 G/DL (ref 13.5–17.5)
LYMPHOCYTES NFR BLD: 2.7 K/UL (ref 1–4.8)
LYMPHOCYTES RELATIVE PERCENT: 34 % (ref 24–44)
MCH RBC QN AUTO: 29.5 PG (ref 26–34)
MCHC RBC AUTO-ENTMCNC: 33.5 G/DL (ref 31–37)
MCV RBC AUTO: 88.2 FL (ref 80–100)
MONOCYTES NFR BLD: 0.8 K/UL (ref 0.1–1.3)
MONOCYTES NFR BLD: 10 % (ref 1–7)
NEUTROPHILS NFR BLD: 53 % (ref 36–66)
NEUTS SEG NFR BLD: 4.4 K/UL (ref 1.3–9.1)
PLATELET # BLD AUTO: 229 K/UL (ref 150–450)
PMV BLD AUTO: 7.9 FL (ref 6–12)
RBC # BLD AUTO: 4.19 M/UL (ref 4.5–5.9)
RETICS # AUTO: 0.03 M/UL (ref 0.02–0.1)
RETICS/RBC NFR AUTO: 0.6 % (ref 0.5–2)
WBC OTHER # BLD: 8.2 K/UL (ref 3.5–11)

## 2023-08-08 PROCEDURE — 85045 AUTOMATED RETICULOCYTE COUNT: CPT

## 2023-08-08 PROCEDURE — 82247 BILIRUBIN TOTAL: CPT

## 2023-08-08 PROCEDURE — 85025 COMPLETE CBC W/AUTO DIFF WBC: CPT

## 2023-08-08 PROCEDURE — 36415 COLL VENOUS BLD VENIPUNCTURE: CPT

## 2023-08-09 LAB
PATH REV BLD -IMP: NORMAL
SURGICAL PATHOLOGY REPORT: NORMAL

## 2023-08-17 ENCOUNTER — OFFICE VISIT (OUTPATIENT)
Dept: PODIATRY | Age: 78
End: 2023-08-17
Payer: MEDICARE

## 2023-08-17 VITALS — WEIGHT: 277 LBS | BODY MASS INDEX: 41.98 KG/M2 | HEIGHT: 68 IN

## 2023-08-17 DIAGNOSIS — E11.42 DIABETIC PERIPHERAL NEUROPATHY ASSOCIATED WITH TYPE 2 DIABETES MELLITUS (HCC): ICD-10-CM

## 2023-08-17 DIAGNOSIS — B35.1 ONYCHOMYCOSIS OF TOENAIL: Primary | ICD-10-CM

## 2023-08-17 DIAGNOSIS — M79.675 PAIN OF TOES OF BOTH FEET: ICD-10-CM

## 2023-08-17 DIAGNOSIS — E11.51 TYPE 2 DIABETES MELLITUS WITH PERIPHERAL VASCULAR DISEASE (HCC): ICD-10-CM

## 2023-08-17 DIAGNOSIS — M79.674 PAIN OF TOES OF BOTH FEET: ICD-10-CM

## 2023-08-17 PROCEDURE — 99999 PR OFFICE/OUTPT VISIT,PROCEDURE ONLY: CPT | Performed by: PODIATRIST

## 2023-08-17 PROCEDURE — 11721 DEBRIDE NAIL 6 OR MORE: CPT | Performed by: PODIATRIST

## 2023-08-17 ASSESSMENT — ENCOUNTER SYMPTOMS
NAUSEA: 0
BACK PAIN: 0
COLOR CHANGE: 0
SHORTNESS OF BREATH: 0
DIARRHEA: 0

## 2023-08-17 NOTE — PROGRESS NOTES
SUBJECTIVE: Danielle Guillermo is a 76 y.o. male who returns to the office with chief complaint of painful fungal toenails. Patient relates toe nails are thickened/difficult to trim as well as painful with ambulation and with shoe gear. Chief Complaint   Patient presents with    Nail Problem     Bilateral nail trim  Pcp Amairani Lush trim/last saw Rebecca Nicole 5/4/23    Diabetes     A1C 6  Blood sugar 120     Review of Systems   Constitutional:  Negative for activity change, appetite change, chills, diaphoresis, fatigue and fever. Respiratory:  Negative for shortness of breath. Cardiovascular:  Negative for leg swelling. Gastrointestinal:  Negative for diarrhea and nausea. Endocrine: Negative for cold intolerance, heat intolerance and polyuria. Musculoskeletal:  Positive for arthralgias. Negative for back pain, gait problem, joint swelling and myalgias. Skin:  Negative for color change, pallor, rash and wound. Allergic/Immunologic: Negative for environmental allergies and food allergies. Neurological:  Negative for dizziness, weakness, light-headedness and numbness. Hematological:  Does not bruise/bleed easily. Psychiatric/Behavioral:  Negative for behavioral problems, confusion and self-injury. The patient is not nervous/anxious. OBJECTIVE: Clinical evaluation of patient reveals nails 1,2,3,4,5 of the right foot and nails 1,2,3,4,5 of the left foot to present with thickness, elongation, discoloration, brittleness, and subungual debris. There was pain with palpation and debridement of the toenails of the bilateral feet. No open lesions noted to either foot today. The right DP pulse is not palpable. The left DP pulse is not palpable. The right PT pulse is not palpable. The left PT pulse is not palpable. Protective sensation is absent to the right plantar foot as noted with a 5.07 Pyrites-Inocente monofilament.    Protective sensation is absent to the left plantar foot as noted

## 2023-11-16 ENCOUNTER — OFFICE VISIT (OUTPATIENT)
Dept: PODIATRY | Age: 78
End: 2023-11-16

## 2023-11-16 VITALS — WEIGHT: 277 LBS | HEIGHT: 68 IN | BODY MASS INDEX: 41.98 KG/M2

## 2023-11-16 DIAGNOSIS — M79.675 PAIN OF TOES OF BOTH FEET: ICD-10-CM

## 2023-11-16 DIAGNOSIS — E11.51 TYPE 2 DIABETES MELLITUS WITH PERIPHERAL VASCULAR DISEASE (HCC): ICD-10-CM

## 2023-11-16 DIAGNOSIS — B35.1 ONYCHOMYCOSIS OF TOENAIL: Primary | ICD-10-CM

## 2023-11-16 DIAGNOSIS — M79.674 PAIN OF TOES OF BOTH FEET: ICD-10-CM

## 2023-11-16 DIAGNOSIS — E11.42 DIABETIC PERIPHERAL NEUROPATHY ASSOCIATED WITH TYPE 2 DIABETES MELLITUS (HCC): ICD-10-CM

## 2023-11-16 RX ORDER — NITROGLYCERIN 0.4 MG/1
TABLET SUBLINGUAL
COMMUNITY
Start: 2023-09-21

## 2023-11-17 ASSESSMENT — ENCOUNTER SYMPTOMS
SHORTNESS OF BREATH: 0
NAUSEA: 0
COLOR CHANGE: 0
BACK PAIN: 0
DIARRHEA: 0

## 2024-02-15 ENCOUNTER — OFFICE VISIT (OUTPATIENT)
Dept: PODIATRY | Age: 79
End: 2024-02-15
Payer: MEDICARE

## 2024-02-15 VITALS — BODY MASS INDEX: 38.8 KG/M2 | WEIGHT: 256 LBS | HEIGHT: 68 IN

## 2024-02-15 DIAGNOSIS — M79.675 PAIN OF TOES OF BOTH FEET: ICD-10-CM

## 2024-02-15 DIAGNOSIS — E11.51 TYPE 2 DIABETES MELLITUS WITH PERIPHERAL VASCULAR DISEASE (HCC): ICD-10-CM

## 2024-02-15 DIAGNOSIS — B35.1 ONYCHOMYCOSIS OF TOENAIL: Primary | ICD-10-CM

## 2024-02-15 DIAGNOSIS — M79.674 PAIN OF TOES OF BOTH FEET: ICD-10-CM

## 2024-02-15 DIAGNOSIS — E11.42 DIABETIC PERIPHERAL NEUROPATHY ASSOCIATED WITH TYPE 2 DIABETES MELLITUS (HCC): ICD-10-CM

## 2024-02-15 PROCEDURE — 11721 DEBRIDE NAIL 6 OR MORE: CPT | Performed by: PODIATRIST

## 2024-02-15 PROCEDURE — 99999 PR OFFICE/OUTPT VISIT,PROCEDURE ONLY: CPT | Performed by: PODIATRIST

## 2024-02-19 ASSESSMENT — ENCOUNTER SYMPTOMS
BACK PAIN: 0
NAUSEA: 0
COLOR CHANGE: 0
SHORTNESS OF BREATH: 0
DIARRHEA: 0

## 2024-02-19 NOTE — PROGRESS NOTES
monofilament.   HbA1c: 6.3 %.    Class A Findings (1 needed)   [] Non-traumatic amputation of foot or integral skeleton portion thereof.   [] Q7.      Class B Findings (2 needed)   1. [x] Absent posterior tibial pulse   2. [x] Absent dorsalis pedis pulse   3. [] Advanced trophic changes; three of the following are required:   ·         [] hair growth (decrease or absence)   ·         [] nail changes (thickening)   ·         [] pigmentary changes (discoloration)   ·         [] skin texture (thin, shiny)   ·         [] skin color (rubor or redness)   [x] Q8.      Class C Findings (1 Class B, 2 Class C needed)   1. [] Claudication   2. [] Temperature changes   3. [] Edema   4. [] Paresthesia   5. [] Burning   [] Q9.     ASSESSMENT:    Diagnosis Orders   1. Onychomycosis of toenail  MT DEBRIDEMENT NAIL ANY METHOD 6/>    HM DIABETES FOOT EXAM      2. Pain of toes of both feet  MT DEBRIDEMENT NAIL ANY METHOD 6/>    HM DIABETES FOOT EXAM      3. Type 2 diabetes mellitus with peripheral vascular disease (HCC)  MT DEBRIDEMENT NAIL ANY METHOD 6/>    HM DIABETES FOOT EXAM      4. Diabetic peripheral neuropathy associated with type 2 diabetes mellitus (HCC)  MT DEBRIDEMENT NAIL ANY METHOD 6/>    HM DIABETES FOOT EXAM        PLAN: Toenails 1,2,3,4,5 of the right foot and 1,2,3,4,5 of the left foot were debrided in length and thickness using a nail nipper and a . Return in about 3 months (around 5/15/2024) for At risk diabetic foot care.   2/15/2024      Phil Orozco DPM

## 2024-03-20 ENCOUNTER — HOSPITAL ENCOUNTER (OUTPATIENT)
Age: 79
Setting detail: SPECIMEN
Discharge: HOME | End: 2024-03-20

## 2024-03-21 ENCOUNTER — HOSPITAL ENCOUNTER (OUTPATIENT)
Age: 79
Discharge: HOME | End: 2024-03-21
Payer: MEDICARE

## 2024-03-21 ENCOUNTER — TRANSCRIBE ORDERS (OUTPATIENT)
Dept: ADMINISTRATIVE | Age: 79
End: 2024-03-21

## 2024-03-21 ENCOUNTER — HOSPITAL ENCOUNTER (OUTPATIENT)
Dept: CT IMAGING | Age: 79
Discharge: HOME | End: 2024-03-23
Attending: FAMILY MEDICINE
Payer: MEDICARE

## 2024-03-21 DIAGNOSIS — R74.8 ELEVATED LIVER ENZYMES: Primary | ICD-10-CM

## 2024-03-21 DIAGNOSIS — R74.8 ELEVATED LIVER ENZYMES: ICD-10-CM

## 2024-03-21 LAB
ALBUMIN SERPL-MCNC: 3.9 G/DL (ref 3.5–5.2)
ALP SERPL-CCNC: 145 U/L (ref 40–129)
ALT SERPL-CCNC: 20 U/L (ref 5–41)
AMMONIA PLAS-SCNC: 15 UMOL/L (ref 16–60)
ANION GAP SERPL CALCULATED.3IONS-SCNC: 17 MMOL/L (ref 9–17)
AST SERPL-CCNC: 29 U/L
BASOPHILS # BLD: 0.1 K/UL (ref 0–0.2)
BASOPHILS NFR BLD: 1 % (ref 0–2)
BILIRUB SERPL-MCNC: 3.8 MG/DL (ref 0.3–1.2)
BUN SERPL-MCNC: 14 MG/DL (ref 8–23)
CALCIUM SERPL-MCNC: 8.6 MG/DL (ref 8.6–10.4)
CHLORIDE SERPL-SCNC: 96 MMOL/L (ref 98–107)
CHOLEST SERPL-MCNC: 118 MG/DL
CHOLESTEROL/HDL RATIO: 2.5
CK SERPL-CCNC: 63 U/L (ref 39–308)
CO2 SERPL-SCNC: 22 MMOL/L (ref 20–31)
CREAT SERPL-MCNC: 0.7 MG/DL (ref 0.7–1.2)
EOSINOPHIL # BLD: 0.1 K/UL (ref 0–0.4)
EOSINOPHILS RELATIVE PERCENT: 1 % (ref 0–4)
ERYTHROCYTE [DISTWIDTH] IN BLOOD BY AUTOMATED COUNT: 16.1 % (ref 11.5–14.9)
ERYTHROCYTE [SEDIMENTATION RATE] IN BLOOD BY PHOTOMETRIC METHOD: 11 MM/HR (ref 0–20)
GFR SERPL CREATININE-BSD FRML MDRD: >60 ML/MIN/1.73M2
GLUCOSE SERPL-MCNC: 134 MG/DL (ref 70–99)
HCT VFR BLD AUTO: 35.2 % (ref 41–53)
HDLC SERPL-MCNC: 47 MG/DL
HGB BLD-MCNC: 11.2 G/DL (ref 13.5–17.5)
LDLC SERPL CALC-MCNC: 45 MG/DL (ref 0–130)
LYMPHOCYTES NFR BLD: 1.6 K/UL (ref 1–4.8)
LYMPHOCYTES RELATIVE PERCENT: 13 % (ref 24–44)
MCH RBC QN AUTO: 26.3 PG (ref 26–34)
MCHC RBC AUTO-ENTMCNC: 31.9 G/DL (ref 31–37)
MCV RBC AUTO: 82.5 FL (ref 80–100)
MICROORGANISM SPEC CULT: NO GROWTH
MONOCYTES NFR BLD: 1.1 K/UL (ref 0.1–1.3)
MONOCYTES NFR BLD: 9 % (ref 1–7)
NEUTROPHILS NFR BLD: 76 % (ref 36–66)
NEUTS SEG NFR BLD: 9 K/UL (ref 1.3–9.1)
PLATELET # BLD AUTO: 360 K/UL (ref 150–450)
PMV BLD AUTO: 7.9 FL (ref 6–12)
POTASSIUM SERPL-SCNC: 3.5 MMOL/L (ref 3.7–5.3)
PROT SERPL-MCNC: 7.1 G/DL (ref 6.4–8.3)
RBC # BLD AUTO: 4.26 M/UL (ref 4.5–5.9)
SODIUM SERPL-SCNC: 135 MMOL/L (ref 135–144)
SPECIMEN DESCRIPTION: NORMAL
T4 FREE SERPL-MCNC: 1.4 NG/DL (ref 0.9–1.7)
TRIGL SERPL-MCNC: 129 MG/DL
TSH SERPL DL<=0.05 MIU/L-ACNC: 2.88 UIU/ML (ref 0.3–5)
WBC OTHER # BLD: 11.9 K/UL (ref 3.5–11)

## 2024-03-21 PROCEDURE — 85652 RBC SED RATE AUTOMATED: CPT

## 2024-03-21 PROCEDURE — 80053 COMPREHEN METABOLIC PANEL: CPT

## 2024-03-21 PROCEDURE — 84443 ASSAY THYROID STIM HORMONE: CPT

## 2024-03-21 PROCEDURE — 74177 CT ABD & PELVIS W/CONTRAST: CPT

## 2024-03-21 PROCEDURE — 84439 ASSAY OF FREE THYROXINE: CPT

## 2024-03-21 PROCEDURE — 36415 COLL VENOUS BLD VENIPUNCTURE: CPT

## 2024-03-21 PROCEDURE — 80061 LIPID PANEL: CPT

## 2024-03-21 PROCEDURE — 85025 COMPLETE CBC W/AUTO DIFF WBC: CPT

## 2024-03-21 PROCEDURE — 2580000003 HC RX 258: Performed by: FAMILY MEDICINE

## 2024-03-21 PROCEDURE — 6360000004 HC RX CONTRAST MEDICATION: Performed by: FAMILY MEDICINE

## 2024-03-21 PROCEDURE — 82140 ASSAY OF AMMONIA: CPT

## 2024-03-21 PROCEDURE — 82550 ASSAY OF CK (CPK): CPT

## 2024-03-21 RX ORDER — SODIUM CHLORIDE 0.9 % (FLUSH) 0.9 %
10 SYRINGE (ML) INJECTION PRN
Status: DISCONTINUED | OUTPATIENT
Start: 2024-03-21 | End: 2024-03-24 | Stop reason: HOSPADM

## 2024-03-21 RX ORDER — 0.9 % SODIUM CHLORIDE 0.9 %
100 INTRAVENOUS SOLUTION INTRAVENOUS ONCE
Status: COMPLETED | OUTPATIENT
Start: 2024-03-21 | End: 2024-03-21

## 2024-03-21 RX ADMIN — SODIUM CHLORIDE 100 ML: 9 INJECTION, SOLUTION INTRAVENOUS at 13:06

## 2024-03-21 RX ADMIN — SODIUM CHLORIDE, PRESERVATIVE FREE 10 ML: 5 INJECTION INTRAVENOUS at 13:06

## 2024-03-21 RX ADMIN — IOPAMIDOL 75 ML: 755 INJECTION, SOLUTION INTRAVENOUS at 13:05

## 2024-03-24 ENCOUNTER — HOSPITAL ENCOUNTER (INPATIENT)
Age: 79
LOS: 16 days | DRG: 871 | End: 2024-04-09
Attending: STUDENT IN AN ORGANIZED HEALTH CARE EDUCATION/TRAINING PROGRAM | Admitting: FAMILY MEDICINE
Payer: MEDICARE

## 2024-03-24 ENCOUNTER — APPOINTMENT (OUTPATIENT)
Dept: GENERAL RADIOLOGY | Age: 79
DRG: 871 | End: 2024-03-24
Payer: MEDICARE

## 2024-03-24 DIAGNOSIS — I50.9 CONGESTIVE HEART FAILURE OF UNKNOWN ETIOLOGY (HCC): ICD-10-CM

## 2024-03-24 DIAGNOSIS — J18.9 COMMUNITY ACQUIRED PNEUMONIA, UNSPECIFIED LATERALITY: Primary | ICD-10-CM

## 2024-03-24 DIAGNOSIS — I50.32 CHRONIC DIASTOLIC CONGESTIVE HEART FAILURE (HCC): ICD-10-CM

## 2024-03-24 LAB
ALBUMIN SERPL-MCNC: 3.7 G/DL (ref 3.5–5.2)
ALP SERPL-CCNC: 256 U/L (ref 40–129)
ALT SERPL-CCNC: 40 U/L (ref 5–41)
ANION GAP SERPL CALCULATED.3IONS-SCNC: 16 MMOL/L (ref 9–17)
AST SERPL-CCNC: 51 U/L
BASOPHILS # BLD: 0.14 K/UL (ref 0–0.2)
BASOPHILS NFR BLD: 1 % (ref 0–2)
BILIRUB SERPL-MCNC: 4.4 MG/DL (ref 0.3–1.2)
BNP SERPL-MCNC: ABNORMAL PG/ML
BUN SERPL-MCNC: 12 MG/DL (ref 8–23)
CALCIUM SERPL-MCNC: 8.5 MG/DL (ref 8.6–10.4)
CHLORIDE SERPL-SCNC: 90 MMOL/L (ref 98–107)
CO2 SERPL-SCNC: 22 MMOL/L (ref 20–31)
CREAT SERPL-MCNC: 0.8 MG/DL (ref 0.7–1.2)
EOSINOPHIL # BLD: 0.14 K/UL (ref 0–0.4)
EOSINOPHILS RELATIVE PERCENT: 1 % (ref 0–4)
ERYTHROCYTE [DISTWIDTH] IN BLOOD BY AUTOMATED COUNT: 16.7 % (ref 11.5–14.9)
FLUAV RNA RESP QL NAA+PROBE: NOT DETECTED
FLUBV RNA RESP QL NAA+PROBE: NOT DETECTED
GFR SERPL CREATININE-BSD FRML MDRD: >60 ML/MIN/1.73M2
GLUCOSE SERPL-MCNC: 124 MG/DL (ref 70–99)
HCT VFR BLD AUTO: 34.9 % (ref 41–53)
HGB BLD-MCNC: 11.2 G/DL (ref 13.5–17.5)
INR PPP: 4.2
LACTATE BLDV-SCNC: 2.1 MMOL/L (ref 0.5–2.2)
LYMPHOCYTES NFR BLD: 1.49 K/UL (ref 1–4.8)
LYMPHOCYTES RELATIVE PERCENT: 11 % (ref 24–44)
MAGNESIUM SERPL-MCNC: 1.4 MG/DL (ref 1.6–2.6)
MCH RBC QN AUTO: 25.9 PG (ref 26–34)
MCHC RBC AUTO-ENTMCNC: 32.2 G/DL (ref 31–37)
MCV RBC AUTO: 80.6 FL (ref 80–100)
MONOCYTES NFR BLD: 1.22 K/UL (ref 0.1–1.3)
MONOCYTES NFR BLD: 9 % (ref 1–7)
MORPHOLOGY: ABNORMAL
MORPHOLOGY: ABNORMAL
NEUTROPHILS NFR BLD: 78 % (ref 36–66)
NEUTS SEG NFR BLD: 10.51 K/UL (ref 1.3–9.1)
PLATELET # BLD AUTO: 400 K/UL (ref 150–450)
PMV BLD AUTO: 8 FL (ref 6–12)
POTASSIUM SERPL-SCNC: 3.6 MMOL/L (ref 3.7–5.3)
PROT SERPL-MCNC: 6.8 G/DL (ref 6.4–8.3)
PROTHROMBIN TIME: 40.5 SEC (ref 11.8–14.6)
RBC # BLD AUTO: 4.33 M/UL (ref 4.5–5.9)
SARS-COV-2 RNA RESP QL NAA+PROBE: NOT DETECTED
SODIUM SERPL-SCNC: 128 MMOL/L (ref 135–144)
SOURCE: NORMAL
SPECIMEN DESCRIPTION: NORMAL
TROPONIN I SERPL HS-MCNC: 27 NG/L (ref 0–22)
WBC OTHER # BLD: 13.5 K/UL (ref 3.5–11)

## 2024-03-24 PROCEDURE — 94640 AIRWAY INHALATION TREATMENT: CPT

## 2024-03-24 PROCEDURE — 36415 COLL VENOUS BLD VENIPUNCTURE: CPT

## 2024-03-24 PROCEDURE — 84484 ASSAY OF TROPONIN QUANT: CPT

## 2024-03-24 PROCEDURE — 83735 ASSAY OF MAGNESIUM: CPT

## 2024-03-24 PROCEDURE — 2700000000 HC OXYGEN THERAPY PER DAY

## 2024-03-24 PROCEDURE — 6360000002 HC RX W HCPCS: Performed by: STUDENT IN AN ORGANIZED HEALTH CARE EDUCATION/TRAINING PROGRAM

## 2024-03-24 PROCEDURE — 71045 X-RAY EXAM CHEST 1 VIEW: CPT

## 2024-03-24 PROCEDURE — 2500000003 HC RX 250 WO HCPCS: Performed by: STUDENT IN AN ORGANIZED HEALTH CARE EDUCATION/TRAINING PROGRAM

## 2024-03-24 PROCEDURE — 2580000003 HC RX 258: Performed by: STUDENT IN AN ORGANIZED HEALTH CARE EDUCATION/TRAINING PROGRAM

## 2024-03-24 PROCEDURE — 85610 PROTHROMBIN TIME: CPT

## 2024-03-24 PROCEDURE — 6370000000 HC RX 637 (ALT 250 FOR IP): Performed by: STUDENT IN AN ORGANIZED HEALTH CARE EDUCATION/TRAINING PROGRAM

## 2024-03-24 PROCEDURE — 87040 BLOOD CULTURE FOR BACTERIA: CPT

## 2024-03-24 PROCEDURE — 93005 ELECTROCARDIOGRAM TRACING: CPT | Performed by: FAMILY MEDICINE

## 2024-03-24 PROCEDURE — 87636 SARSCOV2 & INF A&B AMP PRB: CPT

## 2024-03-24 PROCEDURE — 93005 ELECTROCARDIOGRAM TRACING: CPT | Performed by: STUDENT IN AN ORGANIZED HEALTH CARE EDUCATION/TRAINING PROGRAM

## 2024-03-24 PROCEDURE — 2060000000 HC ICU INTERMEDIATE R&B

## 2024-03-24 PROCEDURE — 85025 COMPLETE CBC W/AUTO DIFF WBC: CPT

## 2024-03-24 PROCEDURE — 99285 EMERGENCY DEPT VISIT HI MDM: CPT

## 2024-03-24 PROCEDURE — 94761 N-INVAS EAR/PLS OXIMETRY MLT: CPT

## 2024-03-24 PROCEDURE — 83605 ASSAY OF LACTIC ACID: CPT

## 2024-03-24 PROCEDURE — 80053 COMPREHEN METABOLIC PANEL: CPT

## 2024-03-24 PROCEDURE — 96374 THER/PROPH/DIAG INJ IV PUSH: CPT

## 2024-03-24 PROCEDURE — 83880 ASSAY OF NATRIURETIC PEPTIDE: CPT

## 2024-03-24 RX ORDER — FUROSEMIDE 10 MG/ML
20 INJECTION INTRAMUSCULAR; INTRAVENOUS 2 TIMES DAILY
Status: DISCONTINUED | OUTPATIENT
Start: 2024-03-25 | End: 2024-03-28

## 2024-03-24 RX ORDER — LISINOPRIL 10 MG/1
10 TABLET ORAL DAILY
Status: DISCONTINUED | OUTPATIENT
Start: 2024-03-25 | End: 2024-04-05

## 2024-03-24 RX ORDER — PANTOPRAZOLE SODIUM 40 MG/1
40 TABLET, DELAYED RELEASE ORAL
Status: DISCONTINUED | OUTPATIENT
Start: 2024-03-25 | End: 2024-04-08

## 2024-03-24 RX ORDER — DOXYCYCLINE 100 MG/1
100 CAPSULE ORAL ONCE
Status: COMPLETED | OUTPATIENT
Start: 2024-03-24 | End: 2024-03-24

## 2024-03-24 RX ORDER — IPRATROPIUM BROMIDE AND ALBUTEROL SULFATE 2.5; .5 MG/3ML; MG/3ML
1 SOLUTION RESPIRATORY (INHALATION)
Status: DISCONTINUED | OUTPATIENT
Start: 2024-03-24 | End: 2024-03-27

## 2024-03-24 RX ORDER — POTASSIUM CHLORIDE 20 MEQ/1
20 TABLET, EXTENDED RELEASE ORAL
Status: DISCONTINUED | OUTPATIENT
Start: 2024-03-25 | End: 2024-04-10 | Stop reason: HOSPADM

## 2024-03-24 RX ORDER — ACETAMINOPHEN 325 MG/1
650 TABLET ORAL EVERY 6 HOURS PRN
Status: DISCONTINUED | OUTPATIENT
Start: 2024-03-24 | End: 2024-04-10 | Stop reason: HOSPADM

## 2024-03-24 RX ORDER — ALBUTEROL SULFATE 2.5 MG/3ML
2.5 SOLUTION RESPIRATORY (INHALATION)
Status: DISCONTINUED | OUTPATIENT
Start: 2024-03-24 | End: 2024-03-27

## 2024-03-24 RX ORDER — MAGNESIUM SULFATE HEPTAHYDRATE 40 MG/ML
2000 INJECTION, SOLUTION INTRAVENOUS ONCE
Status: COMPLETED | OUTPATIENT
Start: 2024-03-24 | End: 2024-03-24

## 2024-03-24 RX ORDER — ALBUTEROL SULFATE 2.5 MG/3ML
15 SOLUTION RESPIRATORY (INHALATION)
Status: DISCONTINUED | OUTPATIENT
Start: 2024-03-24 | End: 2024-03-27

## 2024-03-24 RX ORDER — FUROSEMIDE 10 MG/ML
40 INJECTION INTRAMUSCULAR; INTRAVENOUS ONCE
Status: COMPLETED | OUTPATIENT
Start: 2024-03-24 | End: 2024-03-24

## 2024-03-24 RX ADMIN — CEFTRIAXONE SODIUM 1000 MG: 1 INJECTION, POWDER, FOR SOLUTION INTRAMUSCULAR; INTRAVENOUS at 22:32

## 2024-03-24 RX ADMIN — MAGNESIUM SULFATE HEPTAHYDRATE 2000 MG: 40 INJECTION, SOLUTION INTRAVENOUS at 21:42

## 2024-03-24 RX ADMIN — IPRATROPIUM BROMIDE AND ALBUTEROL SULFATE 1 DOSE: .5; 2.5 SOLUTION RESPIRATORY (INHALATION) at 20:27

## 2024-03-24 RX ADMIN — DOXYCYCLINE 100 MG: 100 CAPSULE ORAL at 22:30

## 2024-03-24 RX ADMIN — FUROSEMIDE 40 MG: 10 INJECTION, SOLUTION INTRAMUSCULAR; INTRAVENOUS at 22:30

## 2024-03-24 ASSESSMENT — LIFESTYLE VARIABLES
HOW OFTEN DO YOU HAVE A DRINK CONTAINING ALCOHOL: NEVER
HOW MANY STANDARD DRINKS CONTAINING ALCOHOL DO YOU HAVE ON A TYPICAL DAY: PATIENT DOES NOT DRINK

## 2024-03-25 ENCOUNTER — APPOINTMENT (OUTPATIENT)
Age: 79
DRG: 871 | End: 2024-03-25
Attending: FAMILY MEDICINE
Payer: MEDICARE

## 2024-03-25 LAB
ANION GAP SERPL CALCULATED.3IONS-SCNC: 14 MMOL/L (ref 9–17)
BASOPHILS # BLD: 0.2 K/UL (ref 0–0.2)
BASOPHILS NFR BLD: 1 % (ref 0–2)
BUN SERPL-MCNC: 11 MG/DL (ref 8–23)
CALCIUM SERPL-MCNC: 8.1 MG/DL (ref 8.6–10.4)
CHLORIDE SERPL-SCNC: 94 MMOL/L (ref 98–107)
CO2 SERPL-SCNC: 23 MMOL/L (ref 20–31)
CREAT SERPL-MCNC: 0.7 MG/DL (ref 0.7–1.2)
EKG ATRIAL RATE: 60 BPM
EKG Q-T INTERVAL: 550 MS
EKG QRS DURATION: 216 MS
EKG QTC CALCULATION (BAZETT): 550 MS
EKG R AXIS: -70 DEGREES
EKG T AXIS: 98 DEGREES
EKG VENTRICULAR RATE: 60 BPM
EOSINOPHIL # BLD: 0.1 K/UL (ref 0–0.4)
EOSINOPHILS RELATIVE PERCENT: 0 % (ref 0–4)
ERYTHROCYTE [DISTWIDTH] IN BLOOD BY AUTOMATED COUNT: 16.2 % (ref 11.5–14.9)
GFR SERPL CREATININE-BSD FRML MDRD: >60 ML/MIN/1.73M2
GLUCOSE SERPL-MCNC: 112 MG/DL (ref 70–99)
HCT VFR BLD AUTO: 32.1 % (ref 41–53)
HGB BLD-MCNC: 10.4 G/DL (ref 13.5–17.5)
INR PPP: 4.2
LYMPHOCYTES NFR BLD: 1.6 K/UL (ref 1–4.8)
LYMPHOCYTES RELATIVE PERCENT: 12 % (ref 24–44)
MAGNESIUM SERPL-MCNC: 1.6 MG/DL (ref 1.6–2.6)
MCH RBC QN AUTO: 26.2 PG (ref 26–34)
MCHC RBC AUTO-ENTMCNC: 32.4 G/DL (ref 31–37)
MCV RBC AUTO: 81 FL (ref 80–100)
MONOCYTES NFR BLD: 1.5 K/UL (ref 0.1–1.3)
MONOCYTES NFR BLD: 11 % (ref 1–7)
NEUTROPHILS NFR BLD: 76 % (ref 36–66)
NEUTS SEG NFR BLD: 9.9 K/UL (ref 1.3–9.1)
PLATELET # BLD AUTO: 345 K/UL (ref 150–450)
PMV BLD AUTO: 7.9 FL (ref 6–12)
POTASSIUM SERPL-SCNC: 2.9 MMOL/L (ref 3.7–5.3)
POTASSIUM SERPL-SCNC: 3.2 MMOL/L (ref 3.7–5.3)
PROCALCITONIN SERPL-MCNC: 0.17 NG/ML
PROTHROMBIN TIME: 40.1 SEC (ref 11.8–14.6)
RBC # BLD AUTO: 3.96 M/UL (ref 4.5–5.9)
SODIUM SERPL-SCNC: 131 MMOL/L (ref 135–144)
TROPONIN I SERPL HS-MCNC: 29 NG/L (ref 0–22)
WBC OTHER # BLD: 13.3 K/UL (ref 3.5–11)

## 2024-03-25 PROCEDURE — 93010 ELECTROCARDIOGRAM REPORT: CPT | Performed by: INTERNAL MEDICINE

## 2024-03-25 PROCEDURE — 85610 PROTHROMBIN TIME: CPT

## 2024-03-25 PROCEDURE — 84145 PROCALCITONIN (PCT): CPT

## 2024-03-25 PROCEDURE — 6360000002 HC RX W HCPCS: Performed by: FAMILY MEDICINE

## 2024-03-25 PROCEDURE — 36415 COLL VENOUS BLD VENIPUNCTURE: CPT

## 2024-03-25 PROCEDURE — 84132 ASSAY OF SERUM POTASSIUM: CPT

## 2024-03-25 PROCEDURE — 83735 ASSAY OF MAGNESIUM: CPT

## 2024-03-25 PROCEDURE — 2060000000 HC ICU INTERMEDIATE R&B

## 2024-03-25 PROCEDURE — 6370000000 HC RX 637 (ALT 250 FOR IP): Performed by: FAMILY MEDICINE

## 2024-03-25 PROCEDURE — 6360000004 HC RX CONTRAST MEDICATION: Performed by: INTERNAL MEDICINE

## 2024-03-25 PROCEDURE — C8929 TTE W OR WO FOL WCON,DOPPLER: HCPCS

## 2024-03-25 PROCEDURE — 2500000003 HC RX 250 WO HCPCS: Performed by: FAMILY MEDICINE

## 2024-03-25 PROCEDURE — 84484 ASSAY OF TROPONIN QUANT: CPT

## 2024-03-25 PROCEDURE — 85025 COMPLETE CBC W/AUTO DIFF WBC: CPT

## 2024-03-25 PROCEDURE — 80048 BASIC METABOLIC PNL TOTAL CA: CPT

## 2024-03-25 RX ORDER — POTASSIUM CHLORIDE 20 MEQ/1
30 TABLET, EXTENDED RELEASE ORAL ONCE
Status: COMPLETED | OUTPATIENT
Start: 2024-03-25 | End: 2024-03-25

## 2024-03-25 RX ORDER — MAGNESIUM SULFATE HEPTAHYDRATE 40 MG/ML
2000 INJECTION, SOLUTION INTRAVENOUS ONCE
Status: COMPLETED | OUTPATIENT
Start: 2024-03-25 | End: 2024-03-25

## 2024-03-25 RX ORDER — POTASSIUM CHLORIDE 20 MEQ/1
60 TABLET, EXTENDED RELEASE ORAL ONCE
Status: COMPLETED | OUTPATIENT
Start: 2024-03-25 | End: 2024-03-25

## 2024-03-25 RX ADMIN — PERFLUTREN 1.5 ML: 6.52 INJECTION, SUSPENSION INTRAVENOUS at 14:53

## 2024-03-25 RX ADMIN — METOPROLOL TARTRATE 25 MG: 25 TABLET, FILM COATED ORAL at 00:08

## 2024-03-25 RX ADMIN — PANTOPRAZOLE SODIUM 40 MG: 40 TABLET, DELAYED RELEASE ORAL at 06:14

## 2024-03-25 RX ADMIN — METOPROLOL TARTRATE 25 MG: 25 TABLET, FILM COATED ORAL at 20:35

## 2024-03-25 RX ADMIN — LISINOPRIL 10 MG: 10 TABLET ORAL at 00:08

## 2024-03-25 RX ADMIN — POTASSIUM CHLORIDE 60 MEQ: 1500 TABLET, EXTENDED RELEASE ORAL at 09:14

## 2024-03-25 RX ADMIN — FUROSEMIDE 20 MG: 10 INJECTION, SOLUTION INTRAMUSCULAR; INTRAVENOUS at 09:13

## 2024-03-25 RX ADMIN — POTASSIUM CHLORIDE 30 MEQ: 1500 TABLET, EXTENDED RELEASE ORAL at 19:45

## 2024-03-25 RX ADMIN — METOPROLOL TARTRATE 25 MG: 25 TABLET, FILM COATED ORAL at 09:13

## 2024-03-25 RX ADMIN — FUROSEMIDE 20 MG: 10 INJECTION, SOLUTION INTRAMUSCULAR; INTRAVENOUS at 17:46

## 2024-03-25 RX ADMIN — MAGNESIUM SULFATE HEPTAHYDRATE 2000 MG: 40 INJECTION, SOLUTION INTRAVENOUS at 20:24

## 2024-03-25 NOTE — CARE COORDINATION
Advance Care Planning     Advance Care Planning Activator (Inpatient)  Conversation Note      Date of ACP Conversation: 3/25/2024     Conversation Conducted with: Patient with Decision Making Capacity    ACP Activator: Emilia Nuñez RN        Health Care Decision Maker:     Current Designated Health Care Decision Maker:     Primary Decision Maker: Bhumika Stacy - Spouse - [phone]  Click here to complete Healthcare Decision Makers including section of the Healthcare Decision Maker Relationship (ie \"Primary\")  Today we documented Decision Maker(s) consistent with Legal Next of Kin hierarchy.    Care Preferences    Ventilation:  \"If you were in your present state of health and suddenly became very ill and were unable to breathe on your own, what would your preference be about the use of a ventilator (breathing machine) if it were available to you?\"      Would the patient desire the use of ventilator (breathing machine)?: yes    \"If your health worsens and it becomes clear that your chance of recovery is unlikely, what would your preference be about the use of a ventilator (breathing machine) if it were available to you?\"     Would the patient desire the use of ventilator (breathing machine)?: Yes      Resuscitation  \"CPR works best to restart the heart when there is a sudden event, like a heart attack, in someone who is otherwise healthy. Unfortunately, CPR does not typically restart the heart for people who have serious health conditions or who are very sick.\"    \"In the event your heart stopped as a result of an underlying serious health condition, would you want attempts to be made to restart your heart (answer \"yes\" for attempt to resuscitate) or would you prefer a natural death (answer \"no\" for do not attempt to resuscitate)?\" yes       [] Yes   [] No   Educated Patient / Decision Maker regarding differences between Advance Directives and portable DNR orders.    Length of ACP Conversation in minutes:

## 2024-03-25 NOTE — ACP (ADVANCE CARE PLANNING)
Advance Care Planning      Advance Care Planning Activator (Inpatient)  Conversation Note        Date of ACP Conversation: 3/25/2024      Conversation Conducted with: Patient with Decision Making Capacity     ACP Activator: Emilia Nuñez RN           Health Care Decision Maker:      Current Designated Health Care Decision Maker:     Primary Decision Maker: Bhumika Stacy - Spouse - [phone]  Click here to complete Healthcare Decision Makers including section of the Healthcare Decision Maker Relationship (ie \"Primary\")  Today we documented Decision Maker(s) consistent with Legal Next of Kin hierarchy.     Care Preferences     Ventilation:  \"If you were in your present state of health and suddenly became very ill and were unable to breathe on your own, what would your preference be about the use of a ventilator (breathing machine) if it were available to you?\"       Would the patient desire the use of ventilator (breathing machine)?: yes     \"If your health worsens and it becomes clear that your chance of recovery is unlikely, what would your preference be about the use of a ventilator (breathing machine) if it were available to you?\"      Would the patient desire the use of ventilator (breathing machine)?: Yes        Resuscitation  \"CPR works best to restart the heart when there is a sudden event, like a heart attack, in someone who is otherwise healthy. Unfortunately, CPR does not typically restart the heart for people who have serious health conditions or who are very sick.\"     \"In the event your heart stopped as a result of an underlying serious health condition, would you want attempts to be made to restart your heart (answer \"yes\" for attempt to resuscitate) or would you prefer a natural death (answer \"no\" for do not attempt to resuscitate)?\" yes         [] Yes   [] No   Educated Patient / Decision Maker regarding differences between Advance Directives and portable DNR orders.     Length of ACP  Conversation in minutes:       Conversation Outcomes:  ACP discussion completed     Follow-up plan:    [] Schedule follow-up conversation to continue planning  [] Referred individual to Provider for additional questions/concerns   [] Advised patient/agent/surrogate to review completed ACP document and update if needed with changes in condition, patient preferences or care setting     [] This note routed to one or more involved healthcare providers

## 2024-03-25 NOTE — ED PROVIDER NOTES
[facility]  Emergency Department Encounter  Emergency Medicine Physician     Pt Name: Saurav Stacy  MRN: [id number]  Birthdate 1945  Date of evaluation: 3/25/24  PCP:  Jose Raul Manley MD    CHIEF COMPLAINT       Chief Complaint   Patient presents with    Shortness of Breath       HISTORY OF PRESENT ILLNESS  (Location/Symptom, Timing/Onset, Context/Setting, Quality, Duration, Modifying Factors, Severity.)    Saurav Stacy is a 78 y.o. male who presents with shortness of breath, chest pain, ongoing for the past few days but worsening over the past few days as well.  States he is currently being treated for pneumonia by his primary care provider with azithromycin which she has been taking as prescribed.  He states however that his shortness of breath is actually been worsening.  States that he also has been diagnosed with urinary tract infection for which she is taking a different antibiotic.  Denies any abdominal pain or nausea or vomiting.  Does endorse fatigue, shortness of breath.        PAST MEDICAL / SURGICAL / SOCIAL / FAMILY HISTORY    has a past medical history of Atrioventricular block, Cancer (HCC), Diabetes mellitus (HCC), GERD (gastroesophageal reflux disease), [place] (hard of hearing), Hyperlipidemia, Hypertension, Kidney stones, Osteoarthritis, Pacemaker, and Sleep apnea.     has a past surgical history that includes Aortic valve replacement (2012/2013); Kidney stone surgery; Foot surgery (Right); Colonoscopy; Knee arthroscopy (Left); tumor removal; pacemaker placement; Kidney surgery (Left); Cystoscopy; Cardiac surgery; back surgery; Tonsillectomy; eye surgery (Bilateral); Total knee arthroplasty (Left); joint replacement (Left, 08/23/2016); pre-malignant / benign skin lesion excision (Right, 04/17/2017); Facial reconstruction surgery (Right, 4/17/2017); pre-malignant / benign skin lesion excision (09/18/2019); and Neck surgery (N/A, 9/18/2019).    Social History     Socioeconomic  overload and/or the pneumonia.  After discussion with Dr. Estrada, he agreed to admit the patient.    Given the fact the patient was tachypneic and had a white blood cell have concern for pneumonia, patient did not meet sepsis criteria.    MIPS:  [None]    Social determinants of health impacting treatment or disposition:  none    Code Status Discussion:  Full Code      PROCEDURES:  CARDIAC ULTRASOUND:     A limited, bedside ultrasound of the heart was performed.  The medical necessity was to evaluate for a ejection fraction, IVC assessment, pericardial effusion.  The structures studied were the heart and pericardium.    FINDINGS:  Ejection Fraction: 50-35%  IVC Assessment: <50% collapsibility   Pericardial effusion was not identified.  The study was technically adequate    IMAGES:  Electronically uploaded to the PACS system    THORACIC ULTRASOUND:    A limited, bedside ultrasound of the thorax was performed. The structures studied were the overlying soft tissue, ribs, and pleural space.  The purpose was to evaluate for pulmonary edema and pneumothorax. Evaluation for B-lines for pulmonary edema, lung sliding and comet tails for pneumothorax was performed.    Findings:  Study was positive for focal B-lines.  Study was positive for bilateral lung sliding.  Study was indeterminate  consolidation.  The study was technically adequate.      IMAGES:  Electronically uploaded to the PACS system      CONSULTS:  IP CONSULT TO INTERNAL MEDICINE    CRITICAL CARE:  There was a high probability of clinically significant/life threatening deterioration in this patient's condition which required my urgent intervention.  Total critical care time was 15 minutes.  This excludes any time for separately reportable procedures.     FINAL IMPRESSION     1. Community acquired pneumonia, unspecified laterality          DISPOSITION / PLAN   DISPOSITION Admitted 03/24/2024 10:29:07 PM        PATIENT REFERRED TO:  No follow-up provider

## 2024-03-25 NOTE — CARE COORDINATION
Case Management Assessment  Initial Evaluation    Date/Time of Evaluation: 3/25/2024 2:16 PM  Assessment Completed by: Emilia Nuñez RN    If patient is discharged prior to next notation, then this note serves as note for discharge by case management.    Patient Name: Saurav Stacy                   YOB: 1945  Diagnosis: Congestive heart failure of unknown etiology (HCC) [I50.9]  Community acquired pneumonia, unspecified laterality [J18.9]                   Date / Time: 3/24/2024  7:53 PM    Patient Admission Status: Inpatient   Readmission Risk (Low < 19, Mod (19-27), High > 27): Readmission Risk Score: 13.2    Current PCP: Jose Raul Manley MD  PCP verified by CM? Yes    Chart Reviewed: Yes      History Provided by: Patient  Patient Orientation: Alert and Oriented    Patient Cognition: Alert    Hospitalization in the last 30 days (Readmission):  No    If yes, Readmission Assessment in  Navigator will be completed.    Advance Directives:      Code Status: Full Code   Patient's Primary Decision Maker is: Legal Next of Kin    Primary Decision Maker: Bhumika Stacy - Spouse - [phone]    Discharge Planning:    Patient lives with: Spouse/Significant Other Type of Home: House  Primary Care Giver: Self  Patient Support Systems include: Spouse/Significant Other, Family Members, Children   Current Financial resources: Medicare  Current community resources: None  Current services prior to admission: Durable Medical Equipment            Current DME: Walker            Type of Home Care services:  PT, OT, Nursing Services    ADLS  Prior functional level: Independent in ADLs/IADLs  Current functional level: Independent in ADLs/IADLs    PT AM-PAC:   /24  OT AM-PAC:   /24    Family can provide assistance at DC: Yes  Would you like Case Management to discuss the discharge plan with any other family members/significant others, and if so, who? No  Plans to Return to Present Housing: Yes  Other Identified

## 2024-03-25 NOTE — H&P
Dr. Judy Estrada        History and Physical Examination   Admission Note    CHIEF COMPLAINT:   Chief Complaint   Patient presents with    Shortness of Breath       Reason for Admission: Short of breath    History Obtained From:  Patient     HISTORY OF PRESENT ILLNESS:      The patient is a pleasant 78 y.o. male with significant medical history of CAD pacemaker hypertension and sleep apnea presented with increasing shortness of breath in the last week or so regarding the patient some coughing.  But more short of breath with exertion he says.  He sees Dr. Jones similarly for his cardiology.  He had no chest pain no fevers no chills.  He is tired this morning did not sleep much she said he was getting up every couple hours to urinate he did receive Lasix from the ER.  He was presented with question about infiltrate from the ER but his BNP was high and sounded more he is more in an acute CHF than infiltrate but he was on antibiotic outpatient  Presenting from his PCP for treatment of pneumonia his testing and viral testing were negative.  He feels a little better this morning.    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    Diabetes mellitus (HCC)     GERD (gastroesophageal reflux disease)     BETTER WITH CPAP    [place] (hard of hearing)     BILAT HEARING AIDS    Hyperlipidemia     Hypertension     Kidney stones     Osteoarthritis     Pacemaker 2010    Sleep apnea     Cpap machine     Patient Active Problem List   Diagnosis Code    Basal cell carcinoma of right forehead C44.319    Neoplasm of uncertain behavior of skin D48.5    Acute renal failure with tubular necrosis (HCC) N17.0    Essential hypertension I10    Class 3 severe obesity due to excess calories with body mass index (BMI) of 40.0 to 44.9 in adult (HCC) E66.01, Z68.41    Transaminitis R74.01    ASCVD (arteriosclerotic cardiovascular disease) I25.10

## 2024-03-25 NOTE — PROGRESS NOTES
Patient unsure of medications at this time, knows the names but not the dosages, stated we can call his wife or pharmacy in the morning.

## 2024-03-26 ENCOUNTER — APPOINTMENT (OUTPATIENT)
Dept: GENERAL RADIOLOGY | Age: 79
DRG: 871 | End: 2024-03-26
Payer: MEDICARE

## 2024-03-26 LAB
ANION GAP SERPL CALCULATED.3IONS-SCNC: 12 MMOL/L (ref 9–17)
BASOPHILS # BLD: 0.1 K/UL (ref 0–0.2)
BASOPHILS NFR BLD: 1 % (ref 0–2)
BUN SERPL-MCNC: 13 MG/DL (ref 8–23)
CALCIUM SERPL-MCNC: 8 MG/DL (ref 8.6–10.4)
CHLORIDE SERPL-SCNC: 98 MMOL/L (ref 98–107)
CO2 SERPL-SCNC: 26 MMOL/L (ref 20–31)
CREAT SERPL-MCNC: 0.7 MG/DL (ref 0.7–1.2)
ECHO AO ROOT DIAM: 2.8 CM
ECHO AO ROOT INDEX: 1.23 CM/M2
ECHO AV MEAN GRADIENT: 10 MMHG
ECHO AV MEAN VELOCITY: 1.5 M/S
ECHO AV PEAK GRADIENT: 21 MMHG
ECHO AV PEAK VELOCITY: 2.3 M/S
ECHO AV VELOCITY RATIO: 0.43
ECHO AV VTI: 38.2 CM
ECHO BSA: 2.36 M2
ECHO EST RA PRESSURE: 3 MMHG
ECHO LA AREA 2C: 29 CM2
ECHO LA AREA 4C: 25 CM2
ECHO LA DIAMETER INDEX: 2.07 CM/M2
ECHO LA DIAMETER: 4.7 CM
ECHO LA MAJOR AXIS: 6.2 CM
ECHO LA MINOR AXIS: 6 CM
ECHO LA TO AORTIC ROOT RATIO: 1.68
ECHO LA VOL BP: 95 ML (ref 18–58)
ECHO LA VOL MOD A2C: 113 ML (ref 18–58)
ECHO LA VOL MOD A4C: 78 ML (ref 18–58)
ECHO LA VOL/BSA BIPLANE: 42 ML/M2 (ref 16–34)
ECHO LA VOLUME INDEX MOD A2C: 50 ML/M2 (ref 16–34)
ECHO LA VOLUME INDEX MOD A4C: 34 ML/M2 (ref 16–34)
ECHO LV E' LATERAL VELOCITY: 4 CM/S
ECHO LV E' SEPTAL VELOCITY: 5 CM/S
ECHO LV EDV A2C: 136 ML
ECHO LV EDV A4C: 153 ML
ECHO LV EDV INDEX A4C: 67 ML/M2
ECHO LV EDV NDEX A2C: 60 ML/M2
ECHO LV EJECTION FRACTION A2C: 59 %
ECHO LV EJECTION FRACTION A4C: 52 %
ECHO LV EJECTION FRACTION BIPLANE: 57 % (ref 55–100)
ECHO LV ESV A2C: 55 ML
ECHO LV ESV A4C: 73 ML
ECHO LV ESV INDEX A2C: 24 ML/M2
ECHO LV ESV INDEX A4C: 32 ML/M2
ECHO LV FRACTIONAL SHORTENING: 9 % (ref 28–44)
ECHO LV INTERNAL DIMENSION DIASTOLE INDEX: 2.33 CM/M2
ECHO LV INTERNAL DIMENSION DIASTOLIC: 5.3 CM (ref 4.2–5.9)
ECHO LV INTERNAL DIMENSION SYSTOLIC INDEX: 2.11 CM/M2
ECHO LV INTERNAL DIMENSION SYSTOLIC: 4.8 CM
ECHO LV IVSD: 0.9 CM (ref 0.6–1)
ECHO LV MASS 2D: 174.5 G (ref 88–224)
ECHO LV MASS INDEX 2D: 76.9 G/M2 (ref 49–115)
ECHO LV POSTERIOR WALL DIASTOLIC: 0.9 CM (ref 0.6–1)
ECHO LV RELATIVE WALL THICKNESS RATIO: 0.34
ECHO LVOT AV VTI INDEX: 0.46
ECHO LVOT MEAN GRADIENT: 2 MMHG
ECHO LVOT PEAK GRADIENT: 4 MMHG
ECHO LVOT PEAK VELOCITY: 1 M/S
ECHO LVOT VTI: 17.6 CM
ECHO MV A VELOCITY: 0.51 M/S
ECHO MV E DECELERATION TIME (DT): 268 MS
ECHO MV E VELOCITY: 1.26 M/S
ECHO MV E/A RATIO: 2.47
ECHO MV E/E' LATERAL: 31.5
ECHO MV E/E' RATIO (AVERAGED): 28.35
ECHO PV MAX VELOCITY: 1.1 M/S
ECHO PV PEAK GRADIENT: 5 MMHG
ECHO RA AREA 4C: 21 CM2
ECHO RA END SYSTOLIC VOLUME APICAL 4 CHAMBER INDEX BSA: 31 ML/M2
ECHO RA VOLUME: 71 ML
ECHO RIGHT VENTRICULAR SYSTOLIC PRESSURE (RVSP): 54 MMHG
ECHO RV BASAL DIMENSION: 5.2 CM
ECHO RV TAPSE: 1.6 CM (ref 1.7–?)
ECHO TV REGURGITANT MAX VELOCITY: 3.56 M/S
ECHO TV REGURGITANT PEAK GRADIENT: 51 MMHG
EKG ATRIAL RATE: 60 BPM
EKG Q-T INTERVAL: 550 MS
EKG QRS DURATION: 216 MS
EKG QTC CALCULATION (BAZETT): 550 MS
EKG R AXIS: -70 DEGREES
EKG T AXIS: 98 DEGREES
EKG VENTRICULAR RATE: 60 BPM
EOSINOPHIL # BLD: 0.2 K/UL (ref 0–0.4)
EOSINOPHILS RELATIVE PERCENT: 2 % (ref 0–4)
ERYTHROCYTE [DISTWIDTH] IN BLOOD BY AUTOMATED COUNT: 16.4 % (ref 11.5–14.9)
GFR SERPL CREATININE-BSD FRML MDRD: >90 ML/MIN/1.73M2
GLUCOSE SERPL-MCNC: 133 MG/DL (ref 70–99)
HCT VFR BLD AUTO: 31.8 % (ref 41–53)
HGB BLD-MCNC: 10.2 G/DL (ref 13.5–17.5)
INR PPP: 4.9
LYMPHOCYTES NFR BLD: 1.4 K/UL (ref 1–4.8)
LYMPHOCYTES RELATIVE PERCENT: 11 % (ref 24–44)
MAGNESIUM SERPL-MCNC: 1.9 MG/DL (ref 1.6–2.6)
MCH RBC QN AUTO: 26 PG (ref 26–34)
MCHC RBC AUTO-ENTMCNC: 32.1 G/DL (ref 31–37)
MCV RBC AUTO: 80.9 FL (ref 80–100)
MONOCYTES NFR BLD: 1.4 K/UL (ref 0.1–1.3)
MONOCYTES NFR BLD: 11 % (ref 1–7)
NEUTROPHILS NFR BLD: 75 % (ref 36–66)
NEUTS SEG NFR BLD: 10.1 K/UL (ref 1.3–9.1)
PLATELET # BLD AUTO: 346 K/UL (ref 150–450)
PMV BLD AUTO: 7.7 FL (ref 6–12)
POTASSIUM SERPL-SCNC: 3.5 MMOL/L (ref 3.7–5.3)
PROTHROMBIN TIME: 45.4 SEC (ref 11.8–14.6)
RBC # BLD AUTO: 3.94 M/UL (ref 4.5–5.9)
SODIUM SERPL-SCNC: 136 MMOL/L (ref 135–144)
WBC OTHER # BLD: 13.2 K/UL (ref 3.5–11)

## 2024-03-26 PROCEDURE — 2060000000 HC ICU INTERMEDIATE R&B

## 2024-03-26 PROCEDURE — 93010 ELECTROCARDIOGRAM REPORT: CPT | Performed by: INTERNAL MEDICINE

## 2024-03-26 PROCEDURE — 71045 X-RAY EXAM CHEST 1 VIEW: CPT

## 2024-03-26 PROCEDURE — 83735 ASSAY OF MAGNESIUM: CPT

## 2024-03-26 PROCEDURE — 80048 BASIC METABOLIC PNL TOTAL CA: CPT

## 2024-03-26 PROCEDURE — 6370000000 HC RX 637 (ALT 250 FOR IP): Performed by: INTERNAL MEDICINE

## 2024-03-26 PROCEDURE — 6370000000 HC RX 637 (ALT 250 FOR IP): Performed by: FAMILY MEDICINE

## 2024-03-26 PROCEDURE — 85610 PROTHROMBIN TIME: CPT

## 2024-03-26 PROCEDURE — 6360000002 HC RX W HCPCS: Performed by: FAMILY MEDICINE

## 2024-03-26 PROCEDURE — 85025 COMPLETE CBC W/AUTO DIFF WBC: CPT

## 2024-03-26 PROCEDURE — 36415 COLL VENOUS BLD VENIPUNCTURE: CPT

## 2024-03-26 RX ORDER — SPIRONOLACTONE 25 MG/1
25 TABLET ORAL DAILY
Status: DISCONTINUED | OUTPATIENT
Start: 2024-03-26 | End: 2024-03-28

## 2024-03-26 RX ORDER — POTASSIUM CHLORIDE 1.5 G/1.58G
20 POWDER, FOR SOLUTION ORAL ONCE
Status: COMPLETED | OUTPATIENT
Start: 2024-03-26 | End: 2024-03-26

## 2024-03-26 RX ADMIN — EMPAGLIFLOZIN 10 MG: 10 TABLET, FILM COATED ORAL at 18:06

## 2024-03-26 RX ADMIN — PANTOPRAZOLE SODIUM 40 MG: 40 TABLET, DELAYED RELEASE ORAL at 07:20

## 2024-03-26 RX ADMIN — METOPROLOL TARTRATE 25 MG: 25 TABLET, FILM COATED ORAL at 20:11

## 2024-03-26 RX ADMIN — METOPROLOL TARTRATE 25 MG: 25 TABLET, FILM COATED ORAL at 08:49

## 2024-03-26 RX ADMIN — POTASSIUM CHLORIDE 20 MEQ: 1500 TABLET, EXTENDED RELEASE ORAL at 08:49

## 2024-03-26 RX ADMIN — FUROSEMIDE 20 MG: 10 INJECTION, SOLUTION INTRAMUSCULAR; INTRAVENOUS at 08:49

## 2024-03-26 RX ADMIN — SPIRONOLACTONE 25 MG: 25 TABLET ORAL at 18:06

## 2024-03-26 RX ADMIN — FUROSEMIDE 20 MG: 10 INJECTION, SOLUTION INTRAMUSCULAR; INTRAVENOUS at 18:06

## 2024-03-26 RX ADMIN — LISINOPRIL 10 MG: 10 TABLET ORAL at 08:48

## 2024-03-26 RX ADMIN — POTASSIUM CHLORIDE 20 MEQ: 1.5 FOR SOLUTION ORAL at 13:53

## 2024-03-26 NOTE — PROGRESS NOTES
DR HERNÁNDEZ      PROGRESS NOTE        Patient:  Saurav Stacy  YOB: 1945    MRN: [id number]     Acct: [id number]     Admit date: 3/24/2024    Pt seen and Chart reviewed.  Consultant notes reviewed and care evaluated.    Subjective: Patient is sleeping he has x-ray evidence of sleep apnea as he still he has his oxygen but looks comfortable.  His INR was 4.9 this morning so for his pulmonary has been on hold but he is on Zithromax and he was on it outpatient now he is on Doxy which could be affecting his levels.    Diet:  ADULT DIET; Regular; Low Fat/Low Chol/High Fiber/2 gm Na      Medications:Current Inpatient    Scheduled Meds:   warfarin placeholder: dosing by provider   Other RX Placeholder    furosemide  20 mg IntraVENous BID    lisinopril  10 mg Oral Daily    metoprolol tartrate  25 mg Oral BID    pantoprazole  40 mg Oral QAM AC    potassium chloride  20 mEq Oral Daily with breakfast     Continuous Infusions:  PRN Meds:ipratropium 0.5 mg-albuterol 2.5 mg **OR** albuterol, albuterol **AND** ipratropium, acetaminophen        Physical Exam:  Vitals: BP (!) 152/68   Pulse 58   Temp 99 °F (37.2 °C) (Oral)   Resp 18   Ht 1.727 m (5' 8\")   Wt 116.1 kg (256 lb)   SpO2 90%   BMI 38.92 kg/m²   24 hour intake/output:  Intake/Output Summary (Last 24 hours) at 3/26/2024 0742  Last data filed at 3/25/2024 1808  Gross per 24 hour   Intake 722 ml   Output 1525 ml   Net -803 ml     Last 3 weights:  Wt Readings from Last 3 Encounters:   03/25/24 116.1 kg (256 lb)   02/15/24 116.1 kg (256 lb)   11/16/23 125.6 kg (277 lb)       Physical Examination:   General appearance - a patient sleeping looks comfortable but shows evidence of sleep apnea  Mental status - alert, oriented to person, place, and time  neck supple with midline trachea  Chest - clear to auscultation, no wheezes, rales or rhonchi, symmetric air entry  Heart - normal rate, regular

## 2024-03-26 NOTE — PROGRESS NOTES
Dr. Estrada notified of of critical INR 4.9.Message read, no new orders received at this time.     0643- Dr. Estrada returned page. Orders received to hold coumadin until INR comes down.

## 2024-03-26 NOTE — PROGRESS NOTES
Date:   3/26/2024  Patient name: Saurav Stacy  Date of admission:  3/24/2024  7:53 PM  MRN:   [id number]  YOB: 1945  PCP: Jose Raul Manley MD    Reason for Admission: Congestive heart failure of unknown etiology (HCC) [I50.9]  Community acquired pneumonia, unspecified laterality [J18.9]    Cardiology follow-up: Congestive heart failure diastolic acute on chronic, A-fib, permanent pacemaker, history of aortic valve replacement       Referring physician: Dr Judy Estrada     Impression     Admission 3/24/2024 with increasing shortness of breath, chest discomfort, influenza A, influenza B, COVID not detected  CHF diastolic, pulmonary congestion as per chest x-ray, bilateral pleural effusion more on the right as per CT abdomen  Ejection fraction 50 to 55%, moderate pulmonary hypertension RVSP 51 mm heather  Aortic Valve replacement  S/P Pacemaker / V Paced, A-fib patient is on warfarin  Obesity BMI 39, sleep apnea  Diabetes mellitus  Hyperlipidemia  Kidney stone  Impaired hearing     Past surgical history includes aortic valve replacement, right foot surgery, colonoscopies, bilateral knee arthroplasty, eye surgery, facial reconstruction surgery right side, premalignant benign skin lesion excision, neck surgery    History of present illness     78 years old  male, ex smoker, morbid obesity with a past medical history of aortic valve replacement, permanent pacemaker placement got hospitalized 3/24/2024 with increasing shortness of breath, generalized weakness ongoing for past few days.  He has received antibiotics for suspected pneumonia.  His symptoms got worse and he came to the emergency department.  Chest x-ray on admission showed pulmonary congestion.  CT abdomen few days ago showed bilateral pleural effusion.  ECG on admission showed ventricular paced rhythm.  He received IV diuretics in the ER.     He had right knee replacement in January 2024 prior to that he was doing well and he was not short    Medications reviewed  1: Congestive heart failure diastolic, pulmonary congestion, pleural effusion continue Lasix 20 mg IV twice a day, continue current dose of lisinopril 10 mg, , added Jardiance 10 mg and Aldactone 25 mg  2: A-fib, ventricle paced continue current dose of warfarin, current dose of metoprolol 25 mg twice daily  3: Hypoxia continue with oxygen by nasal cannula keep oxygen saturation above 92%  4: History of sleep apnea has not been on CPAP for more than 5 years        Check BMP and magnesium daily  Continue ECG monitoring    Electronically signed by Teo Marie MD on 3/26/2024 at 4:56 PM

## 2024-03-26 NOTE — PLAN OF CARE
Problem: Discharge Planning  Goal: Discharge to home or other facility with appropriate resources  Outcome: Progressing  Flowsheets (Taken 3/25/2024 2663)  Discharge to home or other facility with appropriate resources:   Identify barriers to discharge with patient and caregiver   Arrange for needed discharge resources and transportation as appropriate   Identify discharge learning needs (meds, wound care, etc)   Refer to discharge planning if patient needs post-hospital services based on physician order or complex needs related to functional status, cognitive ability or social support system     Problem: Safety - Adult  Goal: Free from fall injury  Outcome: Progressing     Problem: ABCDS Injury Assessment  Goal: Absence of physical injury  Outcome: Progressing

## 2024-03-26 NOTE — CARE COORDINATION
Letter pended for approval/signature for . ONGOING DISCHARGE PLAN:    Patient is alert and oriented x4.    Spoke with patient regarding discharge plan and patient confirms that plan is still to discharge to home with no needs    Home coumadin until INR is between 2-3    Replace potassium     Diuresing     CXR ordered     Will continue to follow for additional discharge needs.    If patient is discharged prior to next notation, then this note serves as note for discharge by case management.    Electronically signed by Annamarie Morris RN on 3/26/2024 at 1:58 PM

## 2024-03-26 NOTE — PLAN OF CARE
Problem: Discharge Planning  Goal: Discharge to home or other facility with appropriate resources  3/26/2024 0436 by Nawaf Pickard, RN  Outcome: Progressing     Problem: Safety - Adult  Goal: Free from fall injury  3/26/2024 0436 by Nawaf Pickard, RN  Outcome: Progressing

## 2024-03-26 NOTE — CONSULTS
Date:   3/25/2024  Patient name: Saurav Stacy  Date of admission:  3/24/2024  7:53 PM  MRN:   [id number]  YOB: 1945  PCP: Jose Raul Manley MD    Reason for Admission: Congestive heart failure of unknown etiology (HCC) [I50.9]  Community acquired pneumonia, unspecified laterality [J18.9]    Cardiology consult:       Referring physician: Dr Judy Estrada    Impression    Admission 3/24/2024 with increasing shortness of breath, chest discomfort, influenza A, influenza B, COVID not detected  CHF diastolic, pulmonary congestion as per chest x-ray, bilateral pleural effusion more on the right as per CT abdomen  Ejection fraction 50 to 55%, moderate pulmonary hypertension RVSP 51 mm heather  Aortic Valve replacement  S/P Pacemaker / V Paced, A-fib patient is on warfarin  Obesity BMI 39, sleep apnea  Diabetes mellitus  Hyperlipidemia  Kidney stone  Impaired hearing    Past surgical history includes aortic valve replacement, right foot surgery, colonoscopies, bilateral knee arthroplasty, eye surgery, facial reconstruction surgery right side, premalignant benign skin lesion excision, neck surgery      History of present illness    78 years old  male, ex smoker, morbid obesity with a past medical history of aortic valve replacement, permanent pacemaker placement got hospitalized 3/24/2024 with increasing shortness of breath, generalized weakness ongoing for past few days.  He has received antibiotics for suspected pneumonia.  His symptoms got worse and he came to the emergency department.  Chest x-ray on admission showed pulmonary congestion.  CT abdomen few days ago showed bilateral pleural effusion.  ECG on admission showed ventricular paced rhythm.  He received IV diuretics in the ER.    He had right knee replacement in January 2024 prior to that he was doing well and he was not short of breath during his activities of daily living.  Postsurgery he was doing well.    Lab work on admission  INR 4

## 2024-03-27 ENCOUNTER — APPOINTMENT (OUTPATIENT)
Dept: GENERAL RADIOLOGY | Age: 79
DRG: 871 | End: 2024-03-27
Payer: MEDICARE

## 2024-03-27 LAB
ANION GAP SERPL CALCULATED.3IONS-SCNC: 13 MMOL/L (ref 9–17)
B PARAP IS1001 DNA NPH QL NAA+NON-PROBE: NOT DETECTED
B PERT DNA SPEC QL NAA+PROBE: NOT DETECTED
BASOPHILS # BLD: 0.13 K/UL (ref 0–0.2)
BASOPHILS NFR BLD: 1 % (ref 0–2)
BNP SERPL-MCNC: 5397 PG/ML
BUN SERPL-MCNC: 16 MG/DL (ref 8–23)
C PNEUM DNA NPH QL NAA+NON-PROBE: NOT DETECTED
CALCIUM SERPL-MCNC: 8.5 MG/DL (ref 8.6–10.4)
CHLORIDE SERPL-SCNC: 96 MMOL/L (ref 98–107)
CO2 SERPL-SCNC: 24 MMOL/L (ref 20–31)
CREAT SERPL-MCNC: 0.8 MG/DL (ref 0.7–1.2)
EOSINOPHIL # BLD: 0.13 K/UL (ref 0–0.4)
EOSINOPHILS RELATIVE PERCENT: 1 % (ref 0–4)
ERYTHROCYTE [DISTWIDTH] IN BLOOD BY AUTOMATED COUNT: 16.8 % (ref 11.5–14.9)
FLUAV RNA NPH QL NAA+NON-PROBE: NOT DETECTED
FLUBV RNA NPH QL NAA+NON-PROBE: NOT DETECTED
GFR SERPL CREATININE-BSD FRML MDRD: 89 ML/MIN/1.73M2
GLUCOSE BLD-MCNC: 99 MG/DL (ref 75–110)
GLUCOSE SERPL-MCNC: 114 MG/DL (ref 70–99)
HADV DNA NPH QL NAA+NON-PROBE: NOT DETECTED
HCOV 229E RNA NPH QL NAA+NON-PROBE: NOT DETECTED
HCOV HKU1 RNA NPH QL NAA+NON-PROBE: NOT DETECTED
HCOV NL63 RNA NPH QL NAA+NON-PROBE: NOT DETECTED
HCOV OC43 RNA NPH QL NAA+NON-PROBE: NOT DETECTED
HCT VFR BLD AUTO: 31.3 % (ref 41–53)
HGB BLD-MCNC: 10.1 G/DL (ref 13.5–17.5)
HMPV RNA NPH QL NAA+NON-PROBE: NOT DETECTED
HPIV1 RNA NPH QL NAA+NON-PROBE: NOT DETECTED
HPIV2 RNA NPH QL NAA+NON-PROBE: NOT DETECTED
HPIV3 RNA NPH QL NAA+NON-PROBE: NOT DETECTED
HPIV4 RNA NPH QL NAA+NON-PROBE: NOT DETECTED
INR PPP: 4.1
LYMPHOCYTES NFR BLD: 1.47 K/UL (ref 1–4.8)
LYMPHOCYTES RELATIVE PERCENT: 11 % (ref 24–44)
M PNEUMO DNA NPH QL NAA+NON-PROBE: NOT DETECTED
MCH RBC QN AUTO: 25.8 PG (ref 26–34)
MCHC RBC AUTO-ENTMCNC: 32.2 G/DL (ref 31–37)
MCV RBC AUTO: 80.3 FL (ref 80–100)
MONOCYTES NFR BLD: 1.34 K/UL (ref 0.1–1.3)
MONOCYTES NFR BLD: 10 % (ref 1–7)
MORPHOLOGY: ABNORMAL
NEUTROPHILS NFR BLD: 77 % (ref 36–66)
NEUTS SEG NFR BLD: 10.33 K/UL (ref 1.3–9.1)
PLATELET # BLD AUTO: 328 K/UL (ref 150–450)
PMV BLD AUTO: 7.8 FL (ref 6–12)
POTASSIUM SERPL-SCNC: 4.1 MMOL/L (ref 3.7–5.3)
PROCALCITONIN SERPL-MCNC: 0.21 NG/ML
PROTHROMBIN TIME: 39.6 SEC (ref 11.8–14.6)
RBC # BLD AUTO: 3.9 M/UL (ref 4.5–5.9)
RSV RNA NPH QL NAA+NON-PROBE: NOT DETECTED
RV+EV RNA NPH QL NAA+NON-PROBE: NOT DETECTED
SARS-COV-2 RNA NPH QL NAA+NON-PROBE: NOT DETECTED
SODIUM SERPL-SCNC: 133 MMOL/L (ref 135–144)
SPECIMEN DESCRIPTION: NORMAL
WBC OTHER # BLD: 13.4 K/UL (ref 3.5–11)

## 2024-03-27 PROCEDURE — 6370000000 HC RX 637 (ALT 250 FOR IP): Performed by: FAMILY MEDICINE

## 2024-03-27 PROCEDURE — 6370000000 HC RX 637 (ALT 250 FOR IP): Performed by: INTERNAL MEDICINE

## 2024-03-27 PROCEDURE — 83880 ASSAY OF NATRIURETIC PEPTIDE: CPT

## 2024-03-27 PROCEDURE — 80048 BASIC METABOLIC PNL TOTAL CA: CPT

## 2024-03-27 PROCEDURE — 2580000003 HC RX 258: Performed by: FAMILY MEDICINE

## 2024-03-27 PROCEDURE — 36415 COLL VENOUS BLD VENIPUNCTURE: CPT

## 2024-03-27 PROCEDURE — 2700000000 HC OXYGEN THERAPY PER DAY

## 2024-03-27 PROCEDURE — 85025 COMPLETE CBC W/AUTO DIFF WBC: CPT

## 2024-03-27 PROCEDURE — 94660 CPAP INITIATION&MGMT: CPT

## 2024-03-27 PROCEDURE — 85610 PROTHROMBIN TIME: CPT

## 2024-03-27 PROCEDURE — 84145 PROCALCITONIN (PCT): CPT

## 2024-03-27 PROCEDURE — 87641 MR-STAPH DNA AMP PROBE: CPT

## 2024-03-27 PROCEDURE — 5A09457 ASSISTANCE WITH RESPIRATORY VENTILATION, 24-96 CONSECUTIVE HOURS, CONTINUOUS POSITIVE AIRWAY PRESSURE: ICD-10-PCS | Performed by: FAMILY MEDICINE

## 2024-03-27 PROCEDURE — 6360000002 HC RX W HCPCS: Performed by: FAMILY MEDICINE

## 2024-03-27 PROCEDURE — 2060000000 HC ICU INTERMEDIATE R&B

## 2024-03-27 PROCEDURE — 82947 ASSAY GLUCOSE BLOOD QUANT: CPT

## 2024-03-27 PROCEDURE — 71045 X-RAY EXAM CHEST 1 VIEW: CPT

## 2024-03-27 PROCEDURE — 0202U NFCT DS 22 TRGT SARS-COV-2: CPT

## 2024-03-27 PROCEDURE — 94761 N-INVAS EAR/PLS OXIMETRY MLT: CPT

## 2024-03-27 RX ORDER — FUROSEMIDE 10 MG/ML
20 INJECTION INTRAMUSCULAR; INTRAVENOUS ONCE
Status: COMPLETED | OUTPATIENT
Start: 2024-03-27 | End: 2024-03-27

## 2024-03-27 RX ORDER — LEVALBUTEROL INHALATION SOLUTION 1.25 MG/3ML
1.25 SOLUTION RESPIRATORY (INHALATION) EVERY 4 HOURS PRN
Status: DISCONTINUED | OUTPATIENT
Start: 2024-03-27 | End: 2024-04-02

## 2024-03-27 RX ADMIN — LISINOPRIL 10 MG: 10 TABLET ORAL at 08:20

## 2024-03-27 RX ADMIN — PANTOPRAZOLE SODIUM 40 MG: 40 TABLET, DELAYED RELEASE ORAL at 07:26

## 2024-03-27 RX ADMIN — FUROSEMIDE 20 MG: 10 INJECTION, SOLUTION INTRAMUSCULAR; INTRAVENOUS at 18:19

## 2024-03-27 RX ADMIN — EMPAGLIFLOZIN 10 MG: 10 TABLET, FILM COATED ORAL at 08:19

## 2024-03-27 RX ADMIN — CEFEPIME 2000 MG: 2 INJECTION, POWDER, FOR SOLUTION INTRAVENOUS at 20:53

## 2024-03-27 RX ADMIN — METOPROLOL TARTRATE 25 MG: 25 TABLET, FILM COATED ORAL at 08:19

## 2024-03-27 RX ADMIN — FUROSEMIDE 20 MG: 10 INJECTION, SOLUTION INTRAMUSCULAR; INTRAVENOUS at 09:39

## 2024-03-27 RX ADMIN — FUROSEMIDE 20 MG: 10 INJECTION, SOLUTION INTRAMUSCULAR; INTRAVENOUS at 08:19

## 2024-03-27 RX ADMIN — SPIRONOLACTONE 25 MG: 25 TABLET ORAL at 08:19

## 2024-03-27 RX ADMIN — WATER 40 MG: 1 INJECTION INTRAMUSCULAR; INTRAVENOUS; SUBCUTANEOUS at 15:29

## 2024-03-27 RX ADMIN — WATER 40 MG: 1 INJECTION INTRAMUSCULAR; INTRAVENOUS; SUBCUTANEOUS at 20:43

## 2024-03-27 RX ADMIN — METOPROLOL TARTRATE 25 MG: 25 TABLET, FILM COATED ORAL at 20:43

## 2024-03-27 RX ADMIN — WATER 40 MG: 1 INJECTION INTRAMUSCULAR; INTRAVENOUS; SUBCUTANEOUS at 09:39

## 2024-03-27 RX ADMIN — POTASSIUM CHLORIDE 20 MEQ: 1500 TABLET, EXTENDED RELEASE ORAL at 08:19

## 2024-03-27 RX ADMIN — AZITHROMYCIN MONOHYDRATE 500 MG: 500 INJECTION, POWDER, LYOPHILIZED, FOR SOLUTION INTRAVENOUS at 12:01

## 2024-03-27 RX ADMIN — CEFEPIME 2000 MG: 2 INJECTION, POWDER, FOR SOLUTION INTRAVENOUS at 11:57

## 2024-03-27 NOTE — CONSULTS
[facility]   CONSULT NOTE:      DATE OF CONSULT 3/27/2024    REASON FOR CONSULTATION:  Critical care and pulmonary management      PCP Jose Raul Manley MD     CHIEF COMPLAINT: Hypoxemia    HISTORY OF PRESENT ILLNESS:     Lui is a very pleasant 78-year-old male that I see for obstructive sleep apnea who is on BiPAP.  I had last seen him in November of this year, at that time things were going reasonably well his compliance was 77% on his auto BiPAP machine.  He presents with a 10-day history of increasing shortness of breath and generalized weakness.  He also had a cough which at times is productive.  He did not have any hemoptysis.  He did not have any fevers or chills.  He came into the emergency room March 24 because \"he could not take it anymore\".  He denied any hemoptysis or chest pain.  He did have some increase in his lower extremity edema.  When he came in, his COVID and influenza swabs were negative.  He did have a white count of 13.5 he also had a markedly elevated proBNP at 10,337 and was noted to be wheezing.  He was started on ceftriaxone and doxycycline.  He was also given IV Lasix.    He has no documented history of asthma/COPD.  He is a former smoker, quitting over 40 years ago.  He only smoked for about 10 to 15 years.  He was in the [employer] but had no exposure to any chemicals or asbestos.    He is on an auto BiPAP with settings of a maximum IPAP of 25, minimum EPAP of 18 and pressure support of 4.  I did download showed 77% compliance and an AHI of 1 when I seen him in November.  However apparently his machine is broken and he called the office both February 26 and just 2 days ago to get a new prescription.  On February 26 we had faxed a prescription for his new machine, but apparently the DME company did not get it            ALLERGIES:  Allergies   Allergen Reactions    Seasonal      Other reaction(s): Unknown       HOME MEDICATIONS:  Medications  file   Stress: Not on file   Social Connections: Not on file   Intimate Partner Violence: Not on file   Housing Stability: Low Risk  (3/24/2024)    Housing Stability Vital Sign     Unable to Pay for Housing in the Last Year: No     Number of Places Lived in the Last Year: 1     Unstable Housing in the Last Year: No       FAMILY HISTORY:  Family History   Problem Relation Age of Onset    Cancer Mother     Diabetes Paternal Grandmother        REVIEW OF SYSTEMS:  All other systems reviewed and are negative.      PHYSICAL EXAM:  Vital Signs Blood pressure (!) 135/91, pulse 60, temperature 98.7 °F (37.1 °C), resp. rate 20, height 1.727 m (5' 8\"), weight 116.1 kg (256 lb), SpO2 91 %.  Oxygen Amount and Delivery: O2 Flow Rate (L/min): 6 L/min    Admission Weight Weight - Scale: 116.1 kg (256 lb)    General Appearance     Head  Normocephalic, without obvious abnormality, atraumatic    Eyes  conjunctivae/corneas clear. PERRL, EOM's intact.     ENT macroglossia low overhanging soft palate  Neck  no adenopathy, no carotid bruit, no JVD, supple, symmetrical, trachea midline and thyroid not enlarged, symmetric, no tenderness/mass/nodules  Lungs coarse bilateral crackles  Heart: regular rate and rhythm, S1, S2 normal, no murmur, click, rub or gallop  Abdomen  soft, non-tender; bowel sounds normal; no masses,  no organomegaly  Extremities  Trace edema  Skin  Skin color, texture, turgor normal. No rashes or lesions  Neurologic: Alert and oriented X 3, normal strength and tone.         Imaging    Chest x-ray shows diffuse bilateral infiltrates, clearly worse since admit    Lab Review  CBC     Lab Results   Component Value Date/Time    WBC 13.4 03/27/2024 05:24 AM    RBC 3.90 03/27/2024 05:24 AM    HGB 10.1 03/27/2024 05:24 AM    HCT 31.3 03/27/2024 05:24 AM     03/27/2024 05:24 AM    MCV 80.3 03/27/2024 05:24 AM    MCH 25.8 03/27/2024 05:24 AM    MCHC 32.2 03/27/2024 05:24 AM    RDW 16.8 03/27/2024 05:24 AM    LYMPHOPCT 11

## 2024-03-27 NOTE — PROGRESS NOTES
DR HERNÁNDEZ      PROGRESS NOTE        Patient:  Saurav Stacy  YOB: 1945    MRN: [id number]     Acct: [id number]     Admit date: 3/24/2024    Pt seen and Chart reviewed.  Consultant notes reviewed and care evaluated.    Subjective: Patient is sitting in the chair he says he does not sleep much at night he is always sleepy in the morning he sounds like he has a machine at home for sleep apnea but he says has been broken for about 6 months and they have not fixed it.  He tells me he feels his breathing is okay cough a little bit denies any increased shortness of breath or chest pain this morning his x-ray from yesterday x-ray revealing left looks worse with more opacification throughout his lungs worse on the right question if patient aspirated.  His BNP dropped from 10,000.    Diet:  ADULT DIET; Regular; Low Fat/Low Chol/High Fiber/2 gm Na      Medications:Current Inpatient    Scheduled Meds:   spironolactone  25 mg Oral Daily    empagliflozin  10 mg Oral Daily    warfarin placeholder: dosing by provider   Other RX Placeholder    furosemide  20 mg IntraVENous BID    lisinopril  10 mg Oral Daily    metoprolol tartrate  25 mg Oral BID    pantoprazole  40 mg Oral QAM AC    potassium chloride  20 mEq Oral Daily with breakfast     Continuous Infusions:  PRN Meds:ipratropium 0.5 mg-albuterol 2.5 mg **OR** albuterol, albuterol **AND** ipratropium, acetaminophen        Physical Exam:  Vitals: BP (!) 118/92 Comment: nurse anastacio is aware  Pulse 59   Temp 99.1 °F (37.3 °C) (Axillary)   Resp 20   Ht 1.727 m (5' 8\")   Wt 116.1 kg (256 lb)   SpO2 96%   BMI 38.92 kg/m²   24 hour intake/output:  Intake/Output Summary (Last 24 hours) at 3/27/2024 0813  Last data filed at 3/27/2024 0449  Gross per 24 hour   Intake 1051 ml   Output 825 ml   Net 226 ml     Last 3 weights:  Wt Readings from Last 3 Encounters:   03/25/24 116.1 kg (256 lb)   02/15/24

## 2024-03-27 NOTE — CARE COORDINATION
ONGOING DISCHARGE PLAN:    Patient is alert and oriented x4.    Spoke with patient regarding discharge plan and patient confirms that plan is still home. Wife is at the bedside. Discussed VNS, and they are both agreeable. Wife states pt had [facility] - [facility] when he had his knee replaced in January. Referral sent to [facility] and notified [facility] from [facility]    Active order for IV Zithro, IV Cefepime, IV Lasix 20mg BID, IV solu-medrol 40mg every 6 hours.    Currently on O2 at 6L NC, and pt is not on O2 at home. Will follow for home O2.    INR 4.1, Coumadin remains on hold. Has follow up at [facility] 4/5 @ 1:45pm.    Will continue to follow for additional discharge needs.    If patient is discharged prior to next notation, then this note serves as note for discharge by case management.    Electronically signed by Emilia Nuñez RN on 3/27/2024 at 2:34 PM

## 2024-03-27 NOTE — PROGRESS NOTES
Visited pt at bedside to discuss CHF clinic referral and follow-up with appointment to be scheduled upon discharge. Pt provided with card with CHF clinic phone number and encouraged to call if he has any questions.     Reviewed and given hand outs, \" Heart Zones\" and \" A Guide for living with Heart Failure\" to patient.    Verbally reviewed importance of medication compliance with patient; patient verbalized understanding.    Discussed 1500mg/day sodium restricted diet; patient verbalized understanding.    Moderate daily exercise encouraged as tolerated. Discussed rest breaks as needed; patient verbalized understanding.    Patient instructed to weigh self at the same time of each day, using same clothes and same scale; reinforced teaching to monitor for 3-5 lb weight increase over 1-2 days, and to notify the CHF clinic at [phone] or physician office if weight change noted.  Patient verbalized understanding.    Risks of smoking discussed with the patient if applicable; patient strongly discouraged to smoke.  Patient verbalized understanding.    Signs and symptoms of CHF discussed with patient, such as feeling more tired than normal, feeling short of breath, coughing that increases when you lie down, sudden weight gain, swelling of your feet, legs or belly.  Patient verbalized understanding to notify the CHF clinic at [phone] or physician office if these symptoms occur.    Compliance with plan of care and further disease process causes discussed with patient, patient encouraged to keep all follow up appointments.  Patient verbalized understanding.

## 2024-03-27 NOTE — PROGRESS NOTES
03/27/24 1241   Encounter Summary   Encounter Overview/Reason  Volunteer Encounter   Service Provided For: Patient   Referral/Consult From: [facility]   Last Encounter  03/27/24   Complexity of Encounter Low   Spiritual/Emotional needs   Type Spiritual Support   Rituals, Rites and Sacraments   Type Voodoo Communion

## 2024-03-27 NOTE — CARE COORDINATION
DISCHARGE PLANNING NOTE:    Attempted to make follow up appt with Dr. Manley, however had to leave .    Pt does have follow up at [facility] 4/5 @ 1:45pm.    Electronically signed by Emilia Nuñez RN on 3/27/2024 at 9:19 AM

## 2024-03-28 LAB
ANION GAP SERPL CALCULATED.3IONS-SCNC: 17 MMOL/L (ref 9–17)
BASOPHILS # BLD: 0.1 K/UL (ref 0–0.2)
BASOPHILS NFR BLD: 1 % (ref 0–2)
BNP SERPL-MCNC: 8244 PG/ML
BUN SERPL-MCNC: 20 MG/DL (ref 8–23)
CALCIUM SERPL-MCNC: 8.7 MG/DL (ref 8.6–10.4)
CHLORIDE SERPL-SCNC: 97 MMOL/L (ref 98–107)
CO2 SERPL-SCNC: 23 MMOL/L (ref 20–31)
CREAT SERPL-MCNC: 0.8 MG/DL (ref 0.7–1.2)
EOSINOPHIL # BLD: 0 K/UL (ref 0–0.4)
EOSINOPHILS RELATIVE PERCENT: 0 % (ref 0–4)
ERYTHROCYTE [DISTWIDTH] IN BLOOD BY AUTOMATED COUNT: 16.8 % (ref 11.5–14.9)
GFR SERPL CREATININE-BSD FRML MDRD: 89 ML/MIN/1.73M2
GLUCOSE SERPL-MCNC: 147 MG/DL (ref 70–99)
HCT VFR BLD AUTO: 32.1 % (ref 41–53)
HGB BLD-MCNC: 10.5 G/DL (ref 13.5–17.5)
INR PPP: 4.9
LYMPHOCYTES NFR BLD: 0.7 K/UL (ref 1–4.8)
LYMPHOCYTES RELATIVE PERCENT: 6 % (ref 24–44)
MCH RBC QN AUTO: 26.3 PG (ref 26–34)
MCHC RBC AUTO-ENTMCNC: 32.9 G/DL (ref 31–37)
MCV RBC AUTO: 80.1 FL (ref 80–100)
MONOCYTES NFR BLD: 0.4 K/UL (ref 0.1–1.3)
MONOCYTES NFR BLD: 3 % (ref 1–7)
MRSA, DNA, NASAL: NEGATIVE
NEUTROPHILS NFR BLD: 90 % (ref 36–66)
NEUTS SEG NFR BLD: 11.2 K/UL (ref 1.3–9.1)
PLATELET # BLD AUTO: 374 K/UL (ref 150–450)
PMV BLD AUTO: 7.9 FL (ref 6–12)
POTASSIUM SERPL-SCNC: 3.8 MMOL/L (ref 3.7–5.3)
PROCALCITONIN SERPL-MCNC: 0.21 NG/ML
PROTHROMBIN TIME: 45.2 SEC (ref 11.8–14.6)
RBC # BLD AUTO: 4 M/UL (ref 4.5–5.9)
SODIUM SERPL-SCNC: 137 MMOL/L (ref 135–144)
SPECIMEN DESCRIPTION: NORMAL
WBC OTHER # BLD: 12.3 K/UL (ref 3.5–11)

## 2024-03-28 PROCEDURE — 6360000002 HC RX W HCPCS: Performed by: FAMILY MEDICINE

## 2024-03-28 PROCEDURE — 2000000000 HC ICU R&B

## 2024-03-28 PROCEDURE — 85610 PROTHROMBIN TIME: CPT

## 2024-03-28 PROCEDURE — 2580000003 HC RX 258: Performed by: INTERNAL MEDICINE

## 2024-03-28 PROCEDURE — 94761 N-INVAS EAR/PLS OXIMETRY MLT: CPT

## 2024-03-28 PROCEDURE — 80048 BASIC METABOLIC PNL TOTAL CA: CPT

## 2024-03-28 PROCEDURE — 84145 PROCALCITONIN (PCT): CPT

## 2024-03-28 PROCEDURE — 2580000003 HC RX 258: Performed by: FAMILY MEDICINE

## 2024-03-28 PROCEDURE — 36415 COLL VENOUS BLD VENIPUNCTURE: CPT

## 2024-03-28 PROCEDURE — 2060000000 HC ICU INTERMEDIATE R&B

## 2024-03-28 PROCEDURE — 85025 COMPLETE CBC W/AUTO DIFF WBC: CPT

## 2024-03-28 PROCEDURE — 6360000002 HC RX W HCPCS: Performed by: INTERNAL MEDICINE

## 2024-03-28 PROCEDURE — 6370000000 HC RX 637 (ALT 250 FOR IP): Performed by: FAMILY MEDICINE

## 2024-03-28 PROCEDURE — 2700000000 HC OXYGEN THERAPY PER DAY

## 2024-03-28 PROCEDURE — 83880 ASSAY OF NATRIURETIC PEPTIDE: CPT

## 2024-03-28 PROCEDURE — 6370000000 HC RX 637 (ALT 250 FOR IP): Performed by: INTERNAL MEDICINE

## 2024-03-28 RX ORDER — SPIRONOLACTONE 25 MG/1
50 TABLET ORAL DAILY
Status: DISCONTINUED | OUTPATIENT
Start: 2024-03-29 | End: 2024-04-03

## 2024-03-28 RX ORDER — FUROSEMIDE 10 MG/ML
40 INJECTION INTRAMUSCULAR; INTRAVENOUS 2 TIMES DAILY
Status: DISCONTINUED | OUTPATIENT
Start: 2024-03-28 | End: 2024-03-29

## 2024-03-28 RX ADMIN — WATER 40 MG: 1 INJECTION INTRAMUSCULAR; INTRAVENOUS; SUBCUTANEOUS at 17:08

## 2024-03-28 RX ADMIN — WATER 40 MG: 1 INJECTION INTRAMUSCULAR; INTRAVENOUS; SUBCUTANEOUS at 08:41

## 2024-03-28 RX ADMIN — EMPAGLIFLOZIN 10 MG: 10 TABLET, FILM COATED ORAL at 08:39

## 2024-03-28 RX ADMIN — METOPROLOL TARTRATE 25 MG: 25 TABLET, FILM COATED ORAL at 08:39

## 2024-03-28 RX ADMIN — SPIRONOLACTONE 25 MG: 25 TABLET ORAL at 08:39

## 2024-03-28 RX ADMIN — WATER 40 MG: 1 INJECTION INTRAMUSCULAR; INTRAVENOUS; SUBCUTANEOUS at 23:34

## 2024-03-28 RX ADMIN — CEFEPIME 2000 MG: 2 INJECTION, POWDER, FOR SOLUTION INTRAVENOUS at 23:37

## 2024-03-28 RX ADMIN — FUROSEMIDE 40 MG: 10 INJECTION, SOLUTION INTRAMUSCULAR; INTRAVENOUS at 18:36

## 2024-03-28 RX ADMIN — PANTOPRAZOLE SODIUM 40 MG: 40 TABLET, DELAYED RELEASE ORAL at 06:09

## 2024-03-28 RX ADMIN — METOPROLOL TARTRATE 25 MG: 25 TABLET, FILM COATED ORAL at 21:19

## 2024-03-28 RX ADMIN — POTASSIUM CHLORIDE 20 MEQ: 1500 TABLET, EXTENDED RELEASE ORAL at 08:39

## 2024-03-28 RX ADMIN — WATER 40 MG: 1 INJECTION INTRAMUSCULAR; INTRAVENOUS; SUBCUTANEOUS at 02:32

## 2024-03-28 RX ADMIN — CEFEPIME 2000 MG: 2 INJECTION, POWDER, FOR SOLUTION INTRAVENOUS at 17:13

## 2024-03-28 RX ADMIN — AZITHROMYCIN MONOHYDRATE 500 MG: 500 INJECTION, POWDER, LYOPHILIZED, FOR SOLUTION INTRAVENOUS at 08:52

## 2024-03-28 RX ADMIN — LISINOPRIL 10 MG: 10 TABLET ORAL at 08:39

## 2024-03-28 RX ADMIN — FUROSEMIDE 40 MG: 10 INJECTION, SOLUTION INTRAMUSCULAR; INTRAVENOUS at 08:42

## 2024-03-28 NOTE — PROGRESS NOTES
Switched pt to Salter high flow nasal cannula.    Increased to 10lpm to keep above 88% SpO2 while eating. Continuous pulse oximetry remains on with alarms set and volume up.    Will titrate as able.    RN aware of changes.

## 2024-03-28 NOTE — PROGRESS NOTES
Rn informed Dr. Le of pt increase oxygen demand from 6L to 10lpm salter high flow nasal cannula by respiratory. Pt sitting up in bed while eating 88%. Pt asymptomatic. New orders to transfer pt to ICU intermediate. Charge nurse aware.    SW contacts patients  Mono Zheng; he was happy to hear from hospital staff today; provided  with update on patients mood today; also provided visiting hours; possible length of stay;  to visit patient daily; encouraged to call [facility] with any questions or concerns

## 2024-03-28 NOTE — DISCHARGE INSTR - COC
Continuity of Care Form    Patient Name: Saurav Stacy   :  1945  MRN:  [id number]    Admit date:  3/24/2024  Discharge date:  ***    Code Status Order: Full Code   Advance Directives:     Admitting Physician:  Judy Estrada DO  PCP: Jose Raul Manley MD    Discharging Nurse: ***  Discharging Hospital Unit/Room#:   Discharging Unit Phone Number: ***    Emergency Contact:   Extended Emergency Contact Information  Primary Emergency Contact: Bhumika Stacy  Address: [address] [place] [address]  Home Phone: [phone]  Relation: Spouse  Secondary Emergency Contact: [place] Stacy  Home Phone: [phone]  Relation: Child    Past Surgical History:  Past Surgical History:   Procedure Laterality Date    AORTIC VALVE REPLACEMENT      Mechanical Valve    BACK SURGERY      LUMBAR    CARDIAC SURGERY      AORTIC VALVE REPLACEMENT    COLONOSCOPY      CYSTOSCOPY      EYE SURGERY Bilateral     CATARACTS    FACIAL RECONSTRUCTION SURGERY Right 2017    EXCISION LESION RIGHT SIDE OF FOREHEAD performed by Randi Archer MD at [facility] OR    FOOT SURGERY Right     Bone Spur removed    JOINT REPLACEMENT Left 2016    TKA    KIDNEY STONE SURGERY      KIDNEY SURGERY Left     STONES REMOVED, WAS DONE YEARS AGO    KNEE ARTHROSCOPY Left     NECK SURGERY N/A 2019    EXCISION MASS POSTERIOR NECK performed by Randi Archer MD at [facility] OR    PACEMAKER PLACEMENT      PRE-MALIGNANT / BENIGN SKIN LESION EXCISION Right 2017    forehead    PRE-MALIGNANT / BENIGN SKIN LESION EXCISION  2019    posterior neck mass    TONSILLECTOMY      TOTAL KNEE ARTHROPLASTY Left     with biomet and GPS product application    TUMOR REMOVAL      Ear       Immunization History:   Immunization History   Administered Date(s) Administered    COVID-19, MODERNA Bivalent, (age 12y+), IM, 50 mcg/0.5 mL 10/03/2022    COVID-19, MODERNA, (- formula), (age 12y+), IM, 50mcg/0.5mL 2023

## 2024-03-28 NOTE — PLAN OF CARE
Problem: Discharge Planning  Goal: Discharge to home or other facility with appropriate resources  Outcome: Progressing  Flowsheets (Taken 3/27/2024 2058)  Discharge to home or other facility with appropriate resources:   Identify barriers to discharge with patient and caregiver   Arrange for needed discharge resources and transportation as appropriate   Identify discharge learning needs (meds, wound care, etc)   Refer to discharge planning if patient needs post-hospital services based on physician order or complex needs related to functional status, cognitive ability or social support system     Problem: Safety - Adult  Goal: Free from fall injury  Outcome: Progressing     Problem: ABCDS Injury Assessment  Goal: Absence of physical injury  Outcome: Progressing     Problem: Chronic Conditions and Co-morbidities  Goal: Patient's chronic conditions and co-morbidity symptoms are monitored and maintained or improved  Outcome: Progressing

## 2024-03-28 NOTE — PROGRESS NOTES
ICU Progress Note (Non-Vent)  [facility]    Patient - Saurav Stacy,  Age - 78 y.o.    - 1945      Room Number -    MRN -  [id number]   [facility] # - [id number]  Date of Admission -  3/24/2024  7:53 PM    Events of Past 24 Hours   Patient transferred to intermediate ICU because of worsening FiO2 requirements.  Clinically, he looks about the same in terms of his work of breathing and dyspnea.  He denies any worsening shortness of breath.  He is still focused on his broken CPAP.  Procalcitonin level minimally elevated but proBNP has jumped.  Respiratory viral panel including COVID was negative  Vitals    height is 1.727 m (5' 8\") and weight is 116.1 kg (256 lb). His oral temperature is 97.4 °F (36.3 °C). His blood pressure is 131/34 (abnormal) and his pulse is 60. His respiration is 24 and oxygen saturation is 86% (abnormal).       Temperature Range: Temp: 97.4 °F (36.3 °C) Temp  Av.7 °F (36.5 °C)  Min: 97.3 °F (36.3 °C)  Max: 98.3 °F (36.8 °C)  BP Range:  Systolic (24hrs), Av , Min:131 , Max:155     Diastolic (24hrs), Av, Min:34, Max:118    Pulse Range: Pulse  Av.6  Min: 60  Max: 86  Respiration Range: Resp  Av  Min: 15  Max: 35  Current Pulse Ox::  SpO2: (!) 86 % (10L high flow, increased to 12)  24HR Pulse Ox Range:  SpO2  Av.5 %  Min: 86 %  Max: 97 %  Oxygen Amount and Delivery: O2 Flow Rate (L/min): 10 L/min    Wt Readings from Last 3 Encounters:   24 116.1 kg (256 lb)   02/15/24 116.1 kg (256 lb)   23 125.6 kg (277 lb)     I/O     Intake/Output Summary (Last 24 hours) at 3/28/2024 1018  Last data filed at 3/28/2024 0920  Gross per 24 hour   Intake --   Output 1450 ml   Net -1450 ml     DRAIN/TUBE OUTPUT       Invasive Lines   ICP PRESSURE RANGE  No data recorded  CVP PRESSURE RANGE  No data recorded      Medications      furosemide  40 mg IntraVENous BID

## 2024-03-28 NOTE — PROGRESS NOTES
DR HERNÁNDEZ      PROGRESS NOTE        Patient:  Saurav Stacy  YOB: 1945    MRN: [id number]     Acct: [id number]     Admit date: 3/24/2024    Pt seen and Chart reviewed.  Consultant notes reviewed and care evaluated.    Subjective: Patient seen in her provide looks like they prompted his oxygen almost 10 L with salter oxygenation in the setting 93 percentile he says when he moves he gets short of breath otherwise stating he is okay he still have a little bit of cough.  No chest pain there are but some shortness of breath he says his BNP went up to 8000 this morning his RN tells me Dr. Kunz asked her to move him to intermediate ICU area I agree with that.  Yesterday he called his family and left a message on their phone in regard to his condition    Diet:  ADULT DIET; Regular      Medications:Current Inpatient    Scheduled Meds:   cefepime  2,000 mg IntraVENous Q12H    azithromycin  500 mg IntraVENous Q24H    methylPREDNISolone  40 mg IntraVENous Q6H    spironolactone  25 mg Oral Daily    empagliflozin  10 mg Oral Daily    warfarin placeholder: dosing by provider   Other RX Placeholder    furosemide  20 mg IntraVENous BID    lisinopril  10 mg Oral Daily    metoprolol tartrate  25 mg Oral BID    pantoprazole  40 mg Oral QAM AC    potassium chloride  20 mEq Oral Daily with breakfast     Continuous Infusions:  PRN Meds:levalbuterol, acetaminophen        Physical Exam:  Vitals: BP (!) 147/64   Pulse 61   Temp 98.3 °F (36.8 °C) (Oral)   Resp 22   Ht 1.727 m (5' 8\")   Wt 116.1 kg (256 lb)   SpO2 (!) 89% Comment: RN notified  BMI 38.92 kg/m²   24 hour intake/output:  Intake/Output Summary (Last 24 hours) at 3/28/2024 0752  Last data filed at 3/27/2024 1522  Gross per 24 hour   Intake --   Output 975 ml   Net -975 ml     Last 3 weights:  Wt Readings from Last 3 Encounters:   03/25/24 116.1 kg (256 lb)   02/15/24 116.1 kg (256 lb)

## 2024-03-28 NOTE — PROGRESS NOTES
03/28/24  0556   WBC 13.2* 13.4* 12.3*   HGB 10.2* 10.1* 10.5*    328 374     BMP:    Recent Labs     03/26/24  0521 03/27/24  0524 03/28/24  0556    133* 137   K 3.5* 4.1 3.8   CL 98 96* 97*   CO2 26 24 23   BUN 13 16 20   CREATININE 0.7 0.8 0.8   GLUCOSE 133* 114* 147*     Hepatic: No results for input(s): \"AST\", \"ALT\", \"ALB\", \"BILITOT\", \"ALKPHOS\" in the last 72 hours.  Troponin: No results for input(s): \"TROPONINI\" in the last 72 hours.  BNP: No results for input(s): \"BNP\" in the last 72 hours.  Lipids: No results for input(s): \"CHOL\", \"HDL\" in the last 72 hours.    Invalid input(s): \"LDLCALCU\"  INR:   Recent Labs     03/26/24  0521 03/27/24  0524 03/28/24  0556   INR 4.9* 4.1 4.9*       Objective:   Vitals: BP (!) 123/93   Pulse 62   Temp 97.4 °F (36.3 °C) (Oral)   Resp 25   Ht 1.727 m (5' 8\")   Wt 116 kg (255 lb 11.7 oz)   SpO2 (!) 79%   BMI 38.88 kg/m²   General appearance: alert and cooperative with exam  HEENT: Head: Normal, normocephalic, atraumatic.  Neck: supple, symmetrical, trachea midline  Lungs: diminished breath sounds bibasilar  Heart:  Cardiac apical impulse not palpable heart sounds distant  Abdomen:  Obese soft bowel sound  Extremities:  Mild edema no calf tenderness  Neurologic: Mental status: Alert, oriented, thought content appropriate    EKG: Ventricular paced rhythm A-fib.  ECHO: reviewed.   Ejection fraction: 50-55%, mild LVH, RVSP 51, Prosthetic Aortic valve  Stress Test: reviewed.  Cardiac Angiography: not obtained.    Assessment / Acute Cardiac Problems:     Congestive heart failure diastolic acute on chronic  Status post pacemaker left infraclavicular region, ventricle paced, A-fib  Status post aortic valve replacement bioprosthetic  Morbid obesity  Hypertension stable  Diabetes mellitus  Mild anemia      Patient Active Problem List:     Basal cell carcinoma of right forehead     Neoplasm of uncertain behavior of skin     Acute renal failure with tubular necrosis  (HCC)     Essential hypertension     Class 3 severe obesity due to excess calories with body mass index (BMI) of 40.0 to 44.9 in adult (HCC)     Transaminitis     ASCVD (arteriosclerotic cardiovascular disease)     Chronic diastolic congestive heart failure (HCC)     Current use of long term anticoagulation     Obstructive sleep apnea     Renal failure     Non-traumatic rhabdomyolysis     Elevated LFTs     Congestive heart failure of unknown etiology ([facility])      Plan of Treatment:   Medications reviewed  1: Congestive heart failure diastolic, pulmonary congestion, pleural effusion continue Lasix 40 mg IV twice a day, continue current dose of lisinopril 10 mg, , added Jardiance 10 mg and Aldactone 50 mg  Blood pressure and electrolytes stable  2: A-fib, ventricle paced continue current dose of warfarin, current dose of metoprolol 25 mg twice daily  3: Hypoxia /pneumonia continue with high flow oxygen  keep oxygen saturation above 92%, on prednisone 40 mg IV every 8 hours and Zithromax 500 mg IV every 24 hours and cefepime 2000 mg IV every 8 hours  4: History of sleep apnea has not been on CPAP for more than 5 years, at present on BiPAP    Electronically signed by Teo Marie MD on 3/28/2024 at 11:20 AM

## 2024-03-28 NOTE — PLAN OF CARE
Problem: Discharge Planning  Goal: Discharge to home or other facility with appropriate resources  Outcome: Progressing  Flowsheets (Taken 3/28/2024 0945)  Discharge to home or other facility with appropriate resources:   Identify barriers to discharge with patient and caregiver   Refer to discharge planning if patient needs post-hospital services based on physician order or complex needs related to functional status, cognitive ability or social support system     Problem: Safety - Adult  Goal: Free from fall injury  Outcome: Progressing  Flowsheets (Taken 3/28/2024 0945)  Free From Fall Injury: Instruct family/caregiver on patient safety     Problem: ABCDS Injury Assessment  Goal: Absence of physical injury  Outcome: Progressing  Flowsheets (Taken 3/28/2024 0945)  Absence of Physical Injury: Implement safety measures based on patient assessment     Problem: Chronic Conditions and Co-morbidities  Goal: Patient's chronic conditions and co-morbidity symptoms are monitored and maintained or improved  Outcome: Progressing  Flowsheets (Taken 3/28/2024 0945)  Care Plan - Patient's Chronic Conditions and Co-Morbidity Symptoms are Monitored and Maintained or Improved:   Monitor and assess patient's chronic conditions and comorbid symptoms for stability, deterioration, or improvement   Collaborate with multidisciplinary team to address chronic and comorbid conditions and prevent exacerbation or deterioration   Update acute care plan with appropriate goals if chronic or comorbid symptoms are exacerbated and prevent overall improvement and discharge

## 2024-03-28 NOTE — FLOWSHEET NOTE
Patient agitated, climbing out of bed, writer re-oriented patient and assisted him back to bed. Bed alarm remains on, Tele-sitter continued.

## 2024-03-28 NOTE — CARE COORDINATION
ONGOING DISCHARGE PLAN:    Patient is alert and oriented x4.    Spoke with patient regarding discharge plan and patient confirms that plan is still home with [facility].    Pt is now on O2 10L salter. Does not wear O2 at home. Following for home O2.    Active order for IV Zithro, IV Cefepime,  IV Solu-medrol 40mg every 8 hours, and IV Lasix 40mg BID.    Coumadin remains on hold, INR 4.9 today. Has follow up appt at [facility] 4/5 @ 1:45pm.    Will continue to follow for additional discharge needs.    If patient is discharged prior to next notation, then this note serves as note for discharge by case management.    Electronically signed by Emilia Nuñez RN on 3/28/2024 at 1:04 PM

## 2024-03-29 ENCOUNTER — APPOINTMENT (OUTPATIENT)
Dept: GENERAL RADIOLOGY | Age: 79
DRG: 871 | End: 2024-03-29
Payer: MEDICARE

## 2024-03-29 LAB
ABSOLUTE BANDS: 0.35 K/UL (ref 0–1)
ANION GAP SERPL CALCULATED.3IONS-SCNC: 15 MMOL/L (ref 9–17)
BANDS: 2 % (ref 0–10)
BASOPHILS # BLD: 0 K/UL (ref 0–0.2)
BASOPHILS NFR BLD: 0 % (ref 0–2)
BNP SERPL-MCNC: 9189 PG/ML
BUN SERPL-MCNC: 25 MG/DL (ref 8–23)
CALCIUM SERPL-MCNC: 8.3 MG/DL (ref 8.6–10.4)
CHLORIDE SERPL-SCNC: 99 MMOL/L (ref 98–107)
CO2 SERPL-SCNC: 25 MMOL/L (ref 20–31)
CREAT SERPL-MCNC: 0.8 MG/DL (ref 0.7–1.2)
EKG ATRIAL RATE: 240 BPM
EKG Q-T INTERVAL: 574 MS
EKG QRS DURATION: 216 MS
EKG QTC CALCULATION (BAZETT): 574 MS
EKG R AXIS: -67 DEGREES
EKG T AXIS: 95 DEGREES
EKG VENTRICULAR RATE: 60 BPM
EOSINOPHIL # BLD: 0 K/UL (ref 0–0.4)
EOSINOPHILS RELATIVE PERCENT: 0 % (ref 0–4)
ERYTHROCYTE [DISTWIDTH] IN BLOOD BY AUTOMATED COUNT: 17 % (ref 11.5–14.9)
GFR SERPL CREATININE-BSD FRML MDRD: 89 ML/MIN/1.73M2
GLUCOSE BLD-MCNC: 181 MG/DL (ref 75–110)
GLUCOSE SERPL-MCNC: 168 MG/DL (ref 70–99)
HCT VFR BLD AUTO: 31.6 % (ref 41–53)
HGB BLD-MCNC: 10.3 G/DL (ref 13.5–17.5)
INR PPP: 3.6
LYMPHOCYTES NFR BLD: 0.69 K/UL (ref 1–4.8)
LYMPHOCYTES RELATIVE PERCENT: 4 % (ref 24–44)
MAGNESIUM SERPL-MCNC: 2.1 MG/DL (ref 1.6–2.6)
MCH RBC QN AUTO: 26 PG (ref 26–34)
MCHC RBC AUTO-ENTMCNC: 32.6 G/DL (ref 31–37)
MCV RBC AUTO: 79.8 FL (ref 80–100)
MICROORGANISM SPEC CULT: NORMAL
MICROORGANISM SPEC CULT: NORMAL
MONOCYTES NFR BLD: 0.87 K/UL (ref 0.1–1.3)
MONOCYTES NFR BLD: 5 % (ref 1–7)
MORPHOLOGY: ABNORMAL
MORPHOLOGY: ABNORMAL
NEUTROPHILS NFR BLD: 89 % (ref 36–66)
NEUTS SEG NFR BLD: 15.39 K/UL (ref 1.3–9.1)
PLATELET # BLD AUTO: 406 K/UL (ref 150–450)
PMV BLD AUTO: 7.9 FL (ref 6–12)
POTASSIUM SERPL-SCNC: 3.6 MMOL/L (ref 3.7–5.3)
PROTHROMBIN TIME: 36.1 SEC (ref 11.8–14.6)
RBC # BLD AUTO: 3.97 M/UL (ref 4.5–5.9)
SERVICE CMNT-IMP: NORMAL
SERVICE CMNT-IMP: NORMAL
SODIUM SERPL-SCNC: 139 MMOL/L (ref 135–144)
SPECIMEN DESCRIPTION: NORMAL
SPECIMEN DESCRIPTION: NORMAL
WBC OTHER # BLD: 17.3 K/UL (ref 3.5–11)

## 2024-03-29 PROCEDURE — 93005 ELECTROCARDIOGRAM TRACING: CPT | Performed by: INTERNAL MEDICINE

## 2024-03-29 PROCEDURE — 83735 ASSAY OF MAGNESIUM: CPT

## 2024-03-29 PROCEDURE — 94761 N-INVAS EAR/PLS OXIMETRY MLT: CPT

## 2024-03-29 PROCEDURE — 2060000000 HC ICU INTERMEDIATE R&B

## 2024-03-29 PROCEDURE — 2700000000 HC OXYGEN THERAPY PER DAY

## 2024-03-29 PROCEDURE — 93010 ELECTROCARDIOGRAM REPORT: CPT | Performed by: INTERNAL MEDICINE

## 2024-03-29 PROCEDURE — 82947 ASSAY GLUCOSE BLOOD QUANT: CPT

## 2024-03-29 PROCEDURE — 80048 BASIC METABOLIC PNL TOTAL CA: CPT

## 2024-03-29 PROCEDURE — 85025 COMPLETE CBC W/AUTO DIFF WBC: CPT

## 2024-03-29 PROCEDURE — 2500000003 HC RX 250 WO HCPCS: Performed by: INTERNAL MEDICINE

## 2024-03-29 PROCEDURE — 6370000000 HC RX 637 (ALT 250 FOR IP): Performed by: INTERNAL MEDICINE

## 2024-03-29 PROCEDURE — 85610 PROTHROMBIN TIME: CPT

## 2024-03-29 PROCEDURE — 83880 ASSAY OF NATRIURETIC PEPTIDE: CPT

## 2024-03-29 PROCEDURE — 2580000003 HC RX 258: Performed by: INTERNAL MEDICINE

## 2024-03-29 PROCEDURE — 94660 CPAP INITIATION&MGMT: CPT

## 2024-03-29 PROCEDURE — 6360000002 HC RX W HCPCS: Performed by: FAMILY MEDICINE

## 2024-03-29 PROCEDURE — 36415 COLL VENOUS BLD VENIPUNCTURE: CPT

## 2024-03-29 PROCEDURE — 6360000002 HC RX W HCPCS: Performed by: INTERNAL MEDICINE

## 2024-03-29 PROCEDURE — 6370000000 HC RX 637 (ALT 250 FOR IP): Performed by: FAMILY MEDICINE

## 2024-03-29 PROCEDURE — 2580000003 HC RX 258: Performed by: FAMILY MEDICINE

## 2024-03-29 PROCEDURE — 71045 X-RAY EXAM CHEST 1 VIEW: CPT

## 2024-03-29 RX ORDER — QUETIAPINE FUMARATE 50 MG/1
25 TABLET, FILM COATED ORAL NIGHTLY
Status: DISCONTINUED | OUTPATIENT
Start: 2024-03-29 | End: 2024-03-29

## 2024-03-29 RX ORDER — DEXMEDETOMIDINE HYDROCHLORIDE 4 UG/ML
.1-1.5 INJECTION, SOLUTION INTRAVENOUS CONTINUOUS
Status: DISCONTINUED | OUTPATIENT
Start: 2024-03-29 | End: 2024-03-29 | Stop reason: SDUPTHER

## 2024-03-29 RX ORDER — FUROSEMIDE 10 MG/ML
20 INJECTION INTRAMUSCULAR; INTRAVENOUS ONCE
Status: DISCONTINUED | OUTPATIENT
Start: 2024-03-29 | End: 2024-03-29

## 2024-03-29 RX ORDER — DEXMEDETOMIDINE HYDROCHLORIDE 4 UG/ML
.1-1.5 INJECTION, SOLUTION INTRAVENOUS CONTINUOUS
Status: DISCONTINUED | OUTPATIENT
Start: 2024-03-29 | End: 2024-04-02

## 2024-03-29 RX ORDER — FUROSEMIDE 10 MG/ML
40 INJECTION INTRAMUSCULAR; INTRAVENOUS 3 TIMES DAILY
Status: COMPLETED | OUTPATIENT
Start: 2024-03-29 | End: 2024-03-30

## 2024-03-29 RX ORDER — QUETIAPINE FUMARATE 50 MG/1
25 TABLET, FILM COATED ORAL 2 TIMES DAILY
Status: DISCONTINUED | OUTPATIENT
Start: 2024-03-29 | End: 2024-03-29

## 2024-03-29 RX ORDER — POTASSIUM CHLORIDE 20 MEQ/1
20 TABLET, EXTENDED RELEASE ORAL ONCE
Status: COMPLETED | OUTPATIENT
Start: 2024-03-29 | End: 2024-03-29

## 2024-03-29 RX ADMIN — AZITHROMYCIN MONOHYDRATE 500 MG: 500 INJECTION, POWDER, LYOPHILIZED, FOR SOLUTION INTRAVENOUS at 10:24

## 2024-03-29 RX ADMIN — CEFEPIME 2000 MG: 2 INJECTION, POWDER, FOR SOLUTION INTRAVENOUS at 18:02

## 2024-03-29 RX ADMIN — POTASSIUM CHLORIDE 20 MEQ: 1500 TABLET, EXTENDED RELEASE ORAL at 09:41

## 2024-03-29 RX ADMIN — EMPAGLIFLOZIN 10 MG: 10 TABLET, FILM COATED ORAL at 09:07

## 2024-03-29 RX ADMIN — WATER 40 MG: 1 INJECTION INTRAMUSCULAR; INTRAVENOUS; SUBCUTANEOUS at 09:22

## 2024-03-29 RX ADMIN — PANTOPRAZOLE SODIUM 40 MG: 40 TABLET, DELAYED RELEASE ORAL at 14:00

## 2024-03-29 RX ADMIN — FUROSEMIDE 40 MG: 10 INJECTION, SOLUTION INTRAMUSCULAR; INTRAVENOUS at 09:04

## 2024-03-29 RX ADMIN — METOPROLOL TARTRATE 25 MG: 25 TABLET, FILM COATED ORAL at 09:04

## 2024-03-29 RX ADMIN — LISINOPRIL 10 MG: 10 TABLET ORAL at 09:04

## 2024-03-29 RX ADMIN — CEFEPIME 2000 MG: 2 INJECTION, POWDER, FOR SOLUTION INTRAVENOUS at 09:18

## 2024-03-29 RX ADMIN — SPIRONOLACTONE 50 MG: 25 TABLET, FILM COATED ORAL at 09:04

## 2024-03-29 RX ADMIN — POTASSIUM CHLORIDE 20 MEQ: 1500 TABLET, EXTENDED RELEASE ORAL at 09:04

## 2024-03-29 RX ADMIN — DEXMEDETOMIDINE HYDROCHLORIDE 0.1 MCG/KG/HR: 400 INJECTION INTRAVENOUS at 13:49

## 2024-03-29 RX ADMIN — FUROSEMIDE 40 MG: 10 INJECTION, SOLUTION INTRAMUSCULAR; INTRAVENOUS at 13:51

## 2024-03-29 RX ADMIN — FUROSEMIDE 40 MG: 10 INJECTION, SOLUTION INTRAMUSCULAR; INTRAVENOUS at 20:30

## 2024-03-29 ASSESSMENT — PAIN SCALES - GENERAL: PAINLEVEL_OUTOF10: 0

## 2024-03-29 NOTE — CARE COORDINATION
ONGOING DISCHARGE PLAN:    Patient is alert.    Spoke with patient's spouse and daughter at bedside regarding discharge plan and confirms that plan is still home with [id number].     92% on 6L NC, following for home O2    Cardiology consult-CHF  IV lasix 40 mg twice daily  Echo ef 50-55%    Discussed CHF clinic, wife states he follows with Dr Barragan OP.  Is agreeable once patient able to drive again,.   ORDER PLACED BY PREVIOUS CM     IV cefepime/zithromax    INR 3.6    Low dose precedex started due to agitation/confusion    Will continue to follow for additional discharge needs.    If patient is discharged prior to next notation, then this note serves as note for discharge by case management.    Electronically signed by Emelyn Julien RN on 3/29/2024 at 3:33 PM

## 2024-03-29 NOTE — PROGRESS NOTES
DR HERNÁNDEZ      PROGRESS NOTE        Patient:  Saurav Stacy  YOB: 1945    MRN: [id number]     Acct: [id number]     Admit date: 3/24/2024    Pt seen and Chart reviewed.  Consultant notes reviewed and care evaluated.    Subjective: Patient is doing okay this morning he says he does get short of breath when he moves looks like it is removed in the ICU was slightly a little bit of confusional episode but this morning he seems alert oriented x 3 he is 1 and always going on he tells me he did recognize me before I say my name.  He was able to say my name.  He wanted his hearing aid and repositioning in bed.  Denies any chest pain or nausea or vomiting    Diet:  ADULT DIET; Regular; Low Fat/Low Chol/High Fiber/2 gm Na      Medications:Current Inpatient    Scheduled Meds:   potassium chloride  20 mEq Oral Once    furosemide  40 mg IntraVENous BID    cefepime  2,000 mg IntraVENous Q8H    methylPREDNISolone  40 mg IntraVENous Q8H    spironolactone  50 mg Oral Daily    azithromycin  500 mg IntraVENous Q24H    empagliflozin  10 mg Oral Daily    warfarin placeholder: dosing by provider   Other RX Placeholder    lisinopril  10 mg Oral Daily    metoprolol tartrate  25 mg Oral BID    pantoprazole  40 mg Oral QAM AC    potassium chloride  20 mEq Oral Daily with breakfast     Continuous Infusions:  PRN Meds:levalbuterol, acetaminophen        Physical Exam:  Vitals: BP (!) 118/59   Pulse 59   Temp 98.3 °F (36.8 °C) (Axillary)   Resp (!) 32   Ht 1.727 m (5' 8\")   Wt 116 kg (255 lb 11.7 oz)   SpO2 91%   BMI 38.88 kg/m²   24 hour intake/output:  Intake/Output Summary (Last 24 hours) at 3/29/2024 0742  Last data filed at 3/28/2024 2119  Gross per 24 hour   Intake 509.51 ml   Output 2595 ml   Net -2085.49 ml     Last 3 weights:  Wt Readings from Last 3 Encounters:   03/28/24 116 kg (255 lb 11.7 oz)   02/15/24 116.1 kg (256 lb)   11/16/23 125.6 kg (277  mmol/L    Glucose 168 High  mg/dL    BUN 25 High  mg/dL    Creatinine 0.8 mg/dL    Est, Glom Filt Rate 89 mL/min/1.73m2    Comment:       These results are not intended for use in patients <18 years of age.       eGFR results are calculated without a race factor using the 2021 CKD-EPI equation.  Careful clinical correlation is recommended, particularly when comparing to results  calculated using previous equations.  The CKD-EPI equation is less accurate in patients with extremes of muscle mass, extra-renal  metabolism of creatine, excessive creatine ingestion, or following therapy that affects  renal tubular secretion.       Calcium 8.3 Low  mg/dL   Brain Natriuretic Peptide [[id number]] (Abnormal)    Collected: 03/29/24 0411    Updated: 03/29/24 0447    Specimen Type: Blood     Pro-BNP 9,189 High  pg/mL    Comment:       An age-independent cutoff point of 300 pg/ml has a 98% negative predictive value excluding  acute heart failure.      Culture, Blood 1 [[id number]]    Collected: 03/24/24 2217    Updated: 03/28/24 2337    Specimen Source: Blood     Specimen Description .BLOOD RAC 5MLG, 5MLO    Special Requests         Culture NO GROWTH 4 DAYS   Culture, Blood 1 [[id number]]    Collected: 03/24/24 2229    Updated: 03/28/24 2335    Specimen Source: Blood     Specimen Description .BLOOD LT HAND    Special Requests         Culture NO GROWTH 4 DAYS   MRSA DNA Probe, Nasal [[id number]]    Collected: 03/27/24 1309    Updated: 03/28/24 1044    Specimen Source: Nasal     Specimen Description .NASAL SWAB    MRSA, DNA, Nasal NEGATIVE    Comment: NEGATIVE:  MRSA DNA not detected by nucleic acid amplification.                                                   Results should be used as an adjunct to nosocomial control efforts to identify patients  needing enhanced precautions.    The test is not intended to identify patients with staphylococcal infections.  Results  should not be used to guide or monitor treatment for MRSA

## 2024-03-29 NOTE — PROGRESS NOTES
Dr. Estrada notified of pt having a 6 bt run of V-tach. Potassium level 3.6 and no magnesium level check this am. Per Dr. Estrada add on Magnesium level to am labs and give 20 mEq of oral potassium.

## 2024-03-29 NOTE — PROGRESS NOTES
Pulmonary/CCM    QT interval elevated on EKG therefore Seroquel discontinued.  Updated patient's wife in detail about current situation including his confusion.  Continue to monitor closely.  Options limited in what we can give him if he became agitated, especially with his sleep apnea.

## 2024-03-29 NOTE — PROGRESS NOTES
ICU Progress Note (Non-Vent)  [facility]    Patient - Saurav Stacy,  Age - 78 y.o.    - 1945      Room Number -    MRN -  [id number]   [id number]  Date of Admission -  3/24/2024  7:53 PM    Events of Past 24 Hours   Extremely confused overnight and this morning.  He actually called 911!  Agitated and wants to get out of the hospital  Down to 6 L nasal cannula, oxygen saturations around 88 to 92%    Vitals    height is 1.727 m (5' 8\") and weight is 116 kg (255 lb 11.7 oz). His oral temperature is 97.6 °F (36.4 °C). His blood pressure is 116/56 (abnormal) and his pulse is 62. His respiration is 26 and oxygen saturation is 93%.       Temperature Range: Temp: 97.6 °F (36.4 °C) Temp  Av.7 °F (36.5 °C)  Min: 97.4 °F (36.3 °C)  Max: 98.3 °F (36.8 °C)  BP Range:  Systolic (24hrs), Av , Min:109 , Max:168     Diastolic (24hrs), Av, Min:34, Max:116    Pulse Range: Pulse  Av  Min: 59  Max: 67  Respiration Range: Resp  Av.4  Min: 14  Max: 32  Current Pulse Ox::  SpO2: 93 %  24HR Pulse Ox Range:  SpO2  Av.3 %  Min: 84 %  Max: 97 %  Oxygen Amount and Delivery: O2 Flow Rate (L/min): 6 L/min    Wt Readings from Last 3 Encounters:   24 116 kg (255 lb 11.7 oz)   02/15/24 116.1 kg (256 lb)   23 125.6 kg (277 lb)     I/O     Intake/Output Summary (Last 24 hours) at 3/29/2024 0852  Last data filed at 3/28/2024 2119  Gross per 24 hour   Intake 509.51 ml   Output 2595 ml   Net -2085.49 ml     DRAIN/TUBE OUTPUT       Invasive Lines   ICP PRESSURE RANGE  No data recorded  CVP PRESSURE RANGE  No data recorded      Medications      potassium chloride  20 mEq Oral Once    furosemide  20 mg IntraVENous Once    furosemide  40 mg IntraVENous BID    cefepime  2,000 mg IntraVENous Q8H    methylPREDNISolone  40 mg IntraVENous Q8H    spironolactone  50 mg Oral Daily     Zithromax  Continue to hold Coumadin  Replace potassium  Continue BiPAP at night  Remains full code, this should be discussed with family I do not think he can make that decision  Discussed in detail with Dr. Estrada and his wife as well as nursing staff        Critical Care Time   35 min    Electronically signed by Fer Le MD on 3/29/2024 at 8:52 AM

## 2024-03-29 NOTE — PLAN OF CARE
Problem: Discharge Planning  Goal: Discharge to home or other facility with appropriate resources  3/29/2024 0512 by Margo Linton RN  Outcome: Progressing  Flowsheets (Taken 3/29/2024 0512)  Discharge to home or other facility with appropriate resources:   Identify barriers to discharge with patient and caregiver   Arrange for needed discharge resources and transportation as appropriate     Problem: Safety - Adult  Goal: Free from fall injury  3/29/2024 0512 by Margo Linton RN  Outcome: Progressing  Flowsheets (Taken 3/29/2024 0512)  Free From Fall Injury: Instruct family/caregiver on patient safety     Problem: Chronic Conditions and Co-morbidities  Goal: Patient's chronic conditions and co-morbidity symptoms are monitored and maintained or improved  3/29/2024 0512 by Margo Linton RN  Outcome: Progressing  Flowsheets (Taken 3/29/2024 0512)  Care Plan - Patient's Chronic Conditions and Co-Morbidity Symptoms are Monitored and Maintained or Improved:   Monitor and assess patient's chronic conditions and comorbid symptoms for stability, deterioration, or improvement   Collaborate with multidisciplinary team to address chronic and comorbid conditions and prevent exacerbation or deterioration   Update acute care plan with appropriate goals if chronic or comorbid symptoms are exacerbated and prevent overall improvement and discharge     Problem: Skin/Tissue Integrity  Goal: Absence of new skin breakdown  Description: 1.  Monitor for areas of redness and/or skin breakdown  2.  Assess vascular access sites hourly  3.  Every 4-6 hours minimum:  Change oxygen saturation probe site  4.  Every 4-6 hours:  If on nasal continuous positive airway pressure, respiratory therapy assess nares and determine need for appliance change or resting period.  Outcome: Progressing

## 2024-03-29 NOTE — PROGRESS NOTES
Pt trying to climb out of bed and pulled out IV. Attempted to reorient. Pt remains confused. Pts son and daughter coming in to help reorient pt.

## 2024-03-29 NOTE — PROGRESS NOTES
NONINVASIVE VENTILATION    PROVIDE OPTIMAL VENTILATION/ACCEPTABLE SPO2   IMPLEMENT NONINVASIVE VENTILATION PROTOCOL   MAINTAIN ACCEPTABLE SPO2   ASSESS SKIN INTEGRITY/BREAKDOWN SCORE   PATIENT EDUCATION AS NEEDED   BIPAP AS NEEDED

## 2024-03-29 NOTE — PROGRESS NOTES
03/29/24 1217   Encounter Summary   Encounter Overview/Reason  Volunteer Encounter   Service Provided For: Patient   Referral/Consult From: [facility]   Last Encounter  03/29/24   Complexity of Encounter Low   Spiritual/Emotional needs   Type Spiritual Support   Rituals, Rites and Sacraments   Type Taoism Communion   Assessment/Intervention/Outcome   Intervention Prayer (assurance of)/[place]   Plan and Referrals   Plan/Referrals Provided reading/devotional materials  (jose f humphrey)

## 2024-03-29 NOTE — PROGRESS NOTES
Call received from patient daughter, Melanie. Patient had called daughter very confused and afraid. Patient stated he thought we had kidnapped him and we were holding him against his will. Multiple RN on the floor tried to redirect patient. Melanie said she will come up and try to help re-orient patient.

## 2024-03-29 NOTE — PROGRESS NOTES
Patient daughter, Melanie, and son, [place], at bedside speaking with patient. Patient is now aware of his surroundings and that he is safe. RN placed a new IV and patient is on BiPAP.

## 2024-03-29 NOTE — PLAN OF CARE
Problem: Discharge Planning  Goal: Discharge to home or other facility with appropriate resources  Outcome: Progressing  Flowsheets  Taken 3/29/2024 1200  Discharge to home or other facility with appropriate resources:   Identify barriers to discharge with patient and caregiver   Refer to discharge planning if patient needs post-hospital services based on physician order or complex needs related to functional status, cognitive ability or social support system  Taken 3/29/2024 0800  Discharge to home or other facility with appropriate resources:   Identify barriers to discharge with patient and caregiver   Refer to discharge planning if patient needs post-hospital services based on physician order or complex needs related to functional status, cognitive ability or social support system     Problem: Safety - Adult  Goal: Free from fall injury  Outcome: Progressing  Flowsheets (Taken 3/29/2024 0800)  Free From Fall Injury: Instruct family/caregiver on patient safety     Problem: ABCDS Injury Assessment  Goal: Absence of physical injury  Outcome: Progressing  Flowsheets (Taken 3/29/2024 0800)  Absence of Physical Injury: Implement safety measures based on patient assessment     Problem: Chronic Conditions and Co-morbidities  Goal: Patient's chronic conditions and co-morbidity symptoms are monitored and maintained or improved  Outcome: Progressing  Flowsheets  Taken 3/29/2024 1200  Care Plan - Patient's Chronic Conditions and Co-Morbidity Symptoms are Monitored and Maintained or Improved:   Monitor and assess patient's chronic conditions and comorbid symptoms for stability, deterioration, or improvement   Collaborate with multidisciplinary team to address chronic and comorbid conditions and prevent exacerbation or deterioration   Update acute care plan with appropriate goals if chronic or comorbid symptoms are exacerbated and prevent overall improvement and discharge  Taken 3/29/2024 0800  Care Plan - Patient's Chronic

## 2024-03-30 ENCOUNTER — APPOINTMENT (OUTPATIENT)
Dept: GENERAL RADIOLOGY | Age: 79
DRG: 871 | End: 2024-03-30
Payer: MEDICARE

## 2024-03-30 LAB
ALBUMIN SERPL-MCNC: 3.3 G/DL (ref 3.5–5.2)
ANION GAP SERPL CALCULATED.3IONS-SCNC: 15 MMOL/L (ref 9–17)
ARTERIAL PATENCY WRIST A: ABNORMAL
BASOPHILS # BLD: 0 K/UL (ref 0–0.2)
BASOPHILS NFR BLD: 0 % (ref 0–2)
BDY SITE: ABNORMAL
BODY TEMPERATURE: 37
BUN SERPL-MCNC: 26 MG/DL (ref 8–23)
CALCIUM SERPL-MCNC: 8.3 MG/DL (ref 8.6–10.4)
CHLORIDE SERPL-SCNC: 100 MMOL/L (ref 98–107)
CO2 SERPL-SCNC: 27 MMOL/L (ref 20–31)
COHGB MFR BLD: 1.7 % (ref 0–5)
CREAT SERPL-MCNC: 0.6 MG/DL (ref 0.7–1.2)
EOSINOPHIL # BLD: 0 K/UL (ref 0–0.4)
EOSINOPHILS RELATIVE PERCENT: 0 % (ref 0–4)
ERYTHROCYTE [DISTWIDTH] IN BLOOD BY AUTOMATED COUNT: 17 % (ref 11.5–14.9)
GAS FLOW.O2 O2 DELIVERY SYS: ABNORMAL L/MIN
GFR SERPL CREATININE-BSD FRML MDRD: >90 ML/MIN/1.73M2
GLUCOSE SERPL-MCNC: 154 MG/DL (ref 70–99)
HCO3 ARTERIAL: 30.8 MMOL/L (ref 22–26)
HCT VFR BLD AUTO: 35.8 % (ref 41–53)
HGB BLD-MCNC: 11.3 G/DL (ref 13.5–17.5)
INR PPP: 3.9
LYMPHOCYTES NFR BLD: 0.82 K/UL (ref 1–4.8)
LYMPHOCYTES RELATIVE PERCENT: 5 % (ref 24–44)
MCH RBC QN AUTO: 25.3 PG (ref 26–34)
MCHC RBC AUTO-ENTMCNC: 31.5 G/DL (ref 31–37)
MCV RBC AUTO: 80.5 FL (ref 80–100)
METHEMOGLOBIN: 0 % (ref 0–1.9)
MONOCYTES NFR BLD: 0.98 K/UL (ref 0.1–1.3)
MONOCYTES NFR BLD: 6 % (ref 1–7)
MORPHOLOGY: ABNORMAL
NEUTROPHILS NFR BLD: 89 % (ref 36–66)
NEUTS SEG NFR BLD: 14.5 K/UL (ref 1.3–9.1)
O2 SAT, ARTERIAL: 89.2 % (ref 95–98)
PCO2 ARTERIAL: 39.9 MMHG (ref 35–45)
PH ARTERIAL: 7.5 (ref 7.35–7.45)
PLATELET # BLD AUTO: 416 K/UL (ref 150–450)
PMV BLD AUTO: 8 FL (ref 6–12)
PO2 ARTERIAL: 57.2 MMHG (ref 80–100)
POSITIVE BASE EXCESS, ART: 7.1 MMOL/L (ref 0–2)
POTASSIUM SERPL-SCNC: 4 MMOL/L (ref 3.7–5.3)
PROTHROMBIN TIME: 38.3 SEC (ref 11.8–14.6)
PT. POSITION: ABNORMAL
RBC # BLD AUTO: 4.45 M/UL (ref 4.5–5.9)
RESPIRATORY RATE: 28
SODIUM SERPL-SCNC: 142 MMOL/L (ref 135–144)
TEXT FOR RESPIRATORY: ABNORMAL
WBC OTHER # BLD: 16.3 K/UL (ref 3.5–11)

## 2024-03-30 PROCEDURE — 82040 ASSAY OF SERUM ALBUMIN: CPT

## 2024-03-30 PROCEDURE — 36600 WITHDRAWAL OF ARTERIAL BLOOD: CPT

## 2024-03-30 PROCEDURE — 2580000003 HC RX 258: Performed by: FAMILY MEDICINE

## 2024-03-30 PROCEDURE — 36415 COLL VENOUS BLD VENIPUNCTURE: CPT

## 2024-03-30 PROCEDURE — 85610 PROTHROMBIN TIME: CPT

## 2024-03-30 PROCEDURE — 6370000000 HC RX 637 (ALT 250 FOR IP): Performed by: FAMILY MEDICINE

## 2024-03-30 PROCEDURE — 80048 BASIC METABOLIC PNL TOTAL CA: CPT

## 2024-03-30 PROCEDURE — 85025 COMPLETE CBC W/AUTO DIFF WBC: CPT

## 2024-03-30 PROCEDURE — 2500000003 HC RX 250 WO HCPCS: Performed by: INTERNAL MEDICINE

## 2024-03-30 PROCEDURE — 2500000003 HC RX 250 WO HCPCS: Performed by: FAMILY MEDICINE

## 2024-03-30 PROCEDURE — 2060000000 HC ICU INTERMEDIATE R&B

## 2024-03-30 PROCEDURE — 6360000002 HC RX W HCPCS: Performed by: FAMILY MEDICINE

## 2024-03-30 PROCEDURE — 71045 X-RAY EXAM CHEST 1 VIEW: CPT

## 2024-03-30 PROCEDURE — 6360000002 HC RX W HCPCS: Performed by: INTERNAL MEDICINE

## 2024-03-30 PROCEDURE — 94761 N-INVAS EAR/PLS OXIMETRY MLT: CPT

## 2024-03-30 PROCEDURE — 82805 BLOOD GASES W/O2 SATURATION: CPT

## 2024-03-30 PROCEDURE — 2700000000 HC OXYGEN THERAPY PER DAY

## 2024-03-30 PROCEDURE — 94660 CPAP INITIATION&MGMT: CPT

## 2024-03-30 PROCEDURE — 6370000000 HC RX 637 (ALT 250 FOR IP): Performed by: INTERNAL MEDICINE

## 2024-03-30 RX ORDER — METOPROLOL TARTRATE 1 MG/ML
5 INJECTION, SOLUTION INTRAVENOUS ONCE
Status: COMPLETED | OUTPATIENT
Start: 2024-03-30 | End: 2024-03-30

## 2024-03-30 RX ORDER — FUROSEMIDE 10 MG/ML
40 INJECTION INTRAMUSCULAR; INTRAVENOUS 3 TIMES DAILY
Status: DISPENSED | OUTPATIENT
Start: 2024-03-30 | End: 2024-03-31

## 2024-03-30 RX ADMIN — WATER 30 MG: 1 INJECTION INTRAMUSCULAR; INTRAVENOUS; SUBCUTANEOUS at 13:56

## 2024-03-30 RX ADMIN — FUROSEMIDE 40 MG: 10 INJECTION, SOLUTION INTRAMUSCULAR; INTRAVENOUS at 07:52

## 2024-03-30 RX ADMIN — EMPAGLIFLOZIN 10 MG: 10 TABLET, FILM COATED ORAL at 07:52

## 2024-03-30 RX ADMIN — PANTOPRAZOLE SODIUM 40 MG: 40 TABLET, DELAYED RELEASE ORAL at 06:04

## 2024-03-30 RX ADMIN — METOPROLOL TARTRATE 25 MG: 25 TABLET, FILM COATED ORAL at 07:52

## 2024-03-30 RX ADMIN — CEFEPIME 2000 MG: 2 INJECTION, POWDER, FOR SOLUTION INTRAVENOUS at 00:50

## 2024-03-30 RX ADMIN — METOPROLOL TARTRATE 25 MG: 25 TABLET, FILM COATED ORAL at 19:34

## 2024-03-30 RX ADMIN — POTASSIUM CHLORIDE 20 MEQ: 1500 TABLET, EXTENDED RELEASE ORAL at 07:52

## 2024-03-30 RX ADMIN — SPIRONOLACTONE 50 MG: 25 TABLET, FILM COATED ORAL at 07:52

## 2024-03-30 RX ADMIN — METOPROLOL TARTRATE 5 MG: 1 INJECTION, SOLUTION INTRAVENOUS at 04:40

## 2024-03-30 RX ADMIN — FUROSEMIDE 40 MG: 10 INJECTION, SOLUTION INTRAMUSCULAR; INTRAVENOUS at 13:55

## 2024-03-30 RX ADMIN — FUROSEMIDE 40 MG: 10 INJECTION, SOLUTION INTRAMUSCULAR; INTRAVENOUS at 19:35

## 2024-03-30 RX ADMIN — CEFEPIME 2000 MG: 2 INJECTION, POWDER, FOR SOLUTION INTRAVENOUS at 07:55

## 2024-03-30 RX ADMIN — AZITHROMYCIN MONOHYDRATE 500 MG: 500 INJECTION, POWDER, LYOPHILIZED, FOR SOLUTION INTRAVENOUS at 07:51

## 2024-03-30 RX ADMIN — DEXMEDETOMIDINE HYDROCHLORIDE 0.2 MCG/KG/HR: 400 INJECTION INTRAVENOUS at 21:04

## 2024-03-30 RX ADMIN — LISINOPRIL 10 MG: 10 TABLET ORAL at 07:52

## 2024-03-30 RX ADMIN — BENZOCAINE 6 MG-MENTHOL 10 MG LOZENGES 1 LOZENGE: at 15:23

## 2024-03-30 RX ADMIN — CEFEPIME 2000 MG: 2 INJECTION, POWDER, FOR SOLUTION INTRAVENOUS at 17:05

## 2024-03-30 ASSESSMENT — PAIN SCALES - GENERAL
PAINLEVEL_OUTOF10: 2
PAINLEVEL_OUTOF10: 2

## 2024-03-30 NOTE — PROGRESS NOTES
DR HERNÁNDEZ      PROGRESS NOTE        Patient:  Saurav Stacy  YOB: 1945    MRN: [id number]     Acct: [id number]     Admit date: 3/24/2024    Pt seen and Chart reviewed.  Consultant notes reviewed and care evaluated.    Subjective: Patient oxygenation is poor he is being switched to high flow oxygen per pulmonary.  His wife and daughter are in the room.  Surprisingly patient still alert and oriented x 3 he is communicating very well he just feels short of breath.  And gets short of breath as he talks.  But denies any chest pain or shortness of breath or abdominal pain he tells me just not eating much neither at night patient does go through some confusion.  He was on Precedex last night.  His family and looks like patient or still want full code-the patient himself.  Initially he was hesitant about inpatient he said it was discussed and wishes for him.  But on other questions and his his daughter talk to him he would be willing to try intubation if needed.  Right now he is jake be on full flow oxygen chest x-ray has not changed much from yesterday.  He is diuresing very good.    Diet:  ADULT DIET; Regular; Low Fat/Low Chol/High Fiber/2 gm Na      Medications:Current Inpatient    Scheduled Meds:   furosemide  40 mg IntraVENous TID    cefepime  2,000 mg IntraVENous Q8H    spironolactone  50 mg Oral Daily    azithromycin  500 mg IntraVENous Q24H    empagliflozin  10 mg Oral Daily    warfarin placeholder: dosing by provider   Other RX Placeholder    lisinopril  10 mg Oral Daily    metoprolol tartrate  25 mg Oral BID    pantoprazole  40 mg Oral QAM AC    potassium chloride  20 mEq Oral Daily with breakfast     Continuous Infusions:   dexmedeTOMIDine HCl in NaCl Stopped (03/30/24 0829)     PRN Meds:levalbuterol, acetaminophen        Physical Exam:  Vitals: BP (!) 164/77   Pulse 63   Temp 97.4 °F (36.3 °C) (Axillary)   Resp 22   Ht 1.727 m  affects  renal tubular secretion.   Calcium8.3 Low mg/dLProtime-INR [[id number]] (Abnormal)Collected: 03/30/24 0344Updated: 03/30/24 0432Specimen Type: AhtwbSxddijy74.3 High secINR3.9  Comment:       Therapeutic Range:   Moderate Anticoagulant Intensity: INR = 2.0-3.0   High Anticoagulant Intensity: INR = 2.5-3.5   Culture, Blood 1 [[id number]]Collected: 03/24/24 2217Updated: 03/29/24 2338Specimen Source: BloodSpecimen Description.BLOOD RAC 5MLG, 5MLOSpecial Requests    CultureNO GROWTH 5 DAYSCulture, Blood 1 [[id number]]Collected: 03/24/24 2229Updated: 03/29/24 2338Specimen Source: BloodSpecimen Description.BLOOD LT HANDSpecial Requests    CultureNO GROWTH 5 DAYSEKG 12 Lead [[id number]]Collected: 03/29/24 1030Updated: 03/29/24 2045Ventricular Yqxj91IPQAoixkj Cjgx995KFCXLA Xijitxie796tzP-Z Tplkbmap943ewEAg Calculation (Bazett)574msR Axis-67degreesT Hjrq41sihgjwaPudcxkoam:  Ventricular-paced rhythm  Abnormal ECG  When compared with ECG of 24-MAR-2024 20:17,  No significant change was foundPOC Glucose Fingerstick [[id number]] (Abnormal)Collected: 03/29/24 1938Updated: 03/29/24 1944POC Evvwxfn670 High mg/dL       Assessment:  Principal Problem:    Congestive heart failure of unknown etiology (HCC)  Resolved Problems:    * No resolved hospital problems. *    Basal cell carcinoma of right forehead C44.319      Neoplasm of uncertain behavior of skin D48.5    Acute renal failure with tubular necrosis (HCC) N17.0    Essential hypertension I10    Class 3 severe obesity due to excess calories with body mass index (BMI) of 40.0 to 44.9 in adult (HCC) E66.01, Z68.41    Transaminitis R74.01    ASCVD (arteriosclerotic cardiovascular disease) I25.10    Chronic diastolic congestive heart failure (HCC) I50.32    Current use of long term anticoagulation Z79.01    Obstructive sleep apnea G47.33    Renal failure N19    Non-traumatic rhabdomyolysis M62.82    Elevated LFTs R79.89    Congestive heart failure of unknown etiology (HCC)

## 2024-03-30 NOTE — PROGRESS NOTES
The patient's son would like to be notified of any change in status before the patient's wife. The patient's wife agrees since she has had strokes in the past.

## 2024-03-30 NOTE — PROGRESS NOTES
03/30/24 1223   Oxygen Therapy/Pulse Ox   O2 Therapy Oxygen humidified   O2 Device Heated high flow cannula   O2 Flow Rate (L/min) 45 L/min   FiO2  73 %   Pulse 62   Respirations 25   SpO2 (!) 89 %   Pulse Oximeter Device Mode Continuous   Pulse Oximeter Device Location Other(comment)  (ear)     Heated high flow cannula initiated on 45L, 73%.

## 2024-03-30 NOTE — PROGRESS NOTES
Date:   3/30/2024  Patient name: Saurav Stacy  Date of admission:  3/24/2024  7:53 PM  MRN:   [id number]  YOB: 1945  PCP: Jose Raul Manley MD    Reason for Admission: Congestive heart failure of unknown etiology (HCC) [I50.9]  Community acquired pneumonia, unspecified laterality [J18.9]    Cardiology follow-up: Congestive heart failure diastolic acute on chronic, A-fib, permanent pacemaker, history of aortic valve replacement         Referring physician: Dr Judy Estrada     Impression     Admission 3/24/2024 with increasing shortness of breath, chest discomfort, influenza A, influenza B, COVID not detected  CHF diastolic, pulmonary congestion as per chest x-ray, bilateral pleural effusion more on the right as per CT abdomen  Ejection fraction 50 to 55%, moderate pulmonary hypertension RVSP 51 mmHg  Aortic Valve replacement  S/P Pacemaker / V Paced, A-fib patient is on warfarin  Obesity BMI 39, sleep apnea  Diabetes mellitus  Hyperlipidemia  Kidney stone  Impaired hearing     Past surgical history includes aortic valve replacement, right foot surgery, colonoscopies, bilateral knee arthroplasty, eye surgery, facial reconstruction surgery right side, premalignant benign skin lesion excision, neck surgery     History of present illness    History of present illness     78 years old  male, ex smoker, morbid obesity with a past medical history of aortic valve replacement, permanent pacemaker placement got hospitalized 3/24/2024 with increasing shortness of breath, generalized weakness ongoing for past few days.  He has received antibiotics for suspected pneumonia.  His symptoms got worse and he came to the emergency department.  Chest x-ray on admission showed pulmonary congestion.  CT abdomen few days ago showed bilateral pleural effusion.  ECG on admission showed ventricular paced rhythm.  He received IV diuretics in the ER.     He had right knee replacement in January 2024 prior to that he was

## 2024-03-30 NOTE — PROGRESS NOTES
Date:   3/29/2024  Patient name: Saurav Stacy  Date of admission:  3/24/2024  7:53 PM  MRN:   [id number]  YOB: 1945  PCP: Jose Raul Manley MD    Reason for Admission: Congestive heart failure of unknown etiology (HCC) [I50.9]  Community acquired pneumonia, unspecified laterality [J18.9]    Cardiology follow-up: Congestive heart failure diastolic acute on chronic, A-fib, permanent pacemaker, history of aortic valve replacement         Referring physician: Dr Judy Estrada     Impression     Admission 3/24/2024 with increasing shortness of breath, chest discomfort, influenza A, influenza B, COVID not detected  CHF diastolic, pulmonary congestion as per chest x-ray, bilateral pleural effusion more on the right as per CT abdomen  Ejection fraction 50 to 55%, moderate pulmonary hypertension RVSP 51 mmHg  Aortic Valve replacement  S/P Pacemaker / V Paced, A-fib patient is on warfarin  Obesity BMI 39, sleep apnea  Diabetes mellitus  Hyperlipidemia  Kidney stone  Impaired hearing    Past surgical history includes aortic valve replacement, right foot surgery, colonoscopies, bilateral knee arthroplasty, eye surgery, facial reconstruction surgery right side, premalignant benign skin lesion excision, neck surgery     History of present illness     78 years old  male, ex smoker, morbid obesity with a past medical history of aortic valve replacement, permanent pacemaker placement got hospitalized 3/24/2024 with increasing shortness of breath, generalized weakness ongoing for past few days.  He has received antibiotics for suspected pneumonia.  His symptoms got worse and he came to the emergency department.  Chest x-ray on admission showed pulmonary congestion.  CT abdomen few days ago showed bilateral pleural effusion.  ECG on admission showed ventricular paced rhythm.  He received IV diuretics in the ER.     He had right knee replacement in January 2024 prior to that he was doing well and he was not short of  breath during his activities of daily living.  Postsurgery he was doing well.    Lab work on admission  INR 4 point  Sodium 128, potassium 3.6, BUN 12, creatinine 0.8, magnesium 1.4, glucose 120  proBNP 10,337, high-sensitivity troponin 27, 29  Albumin 3.7, alkaline phosphatase 256  .5, hemoglobin 11.2, platelets 400  Blood cultures no growth 1 day     Current evaluation  Patient seen and examined, medications and labs checked   Transferred to ICU because of worsening of hypoxia  He had episodes of severe confusion and agitation  Chest x-ray showed bilateral lung infiltrates, minimal improvement  On high flow oxygen  He is on IV diuretics good urine output, normal electrolytes and maintaining blood pressure  Neck veins were normal, no obvious peripheral edema, he does have abdominal wall edema  ECG monitor ventricular paced rhythm    Blood pressure 140/90, heart rate 60, oxygen saturation 93% on 6 L  proBNP 9000 189  WBC 17.3, hemoglobin 10.3, platelets 406, neutrophil 89%  Sodium 139, potassium 3.6, BUN 25, creatinine 0.8, glucose 160  INR 3.6    Investigation workup     Chest x-ray 3/27/2024  Stable chest x-ray with cardiomegaly and diffuse bilateral infiltrate likely represent dating pulmonary edema or pneumonia     Chest x-ray 3/26/2024  Increased bilateral pulmonary opacities could represent edema or infection     Chest x-ray 3/24/2024  Diffuse airspace disease, differential consideration would include pulmonary edema     2D echo 3/25/2024   Normal LV size, ejection fraction 50 to 55% mild LVH unable to assess wall motion, septal wall motion appears abnormal  Right ventricle appears mildly dilated, moderate pulmonary hypertension right ventricular systolic pressure 51 mm heather  Moderate to severely dilated left atrium, dilated right atrium  Pacemaker leads noted in the right heart chambers  Bioprosthetic valve in a stable position mean gradient 10 mmHg no regurgitation  IVC not well-visualized     ECG

## 2024-03-30 NOTE — SIGNIFICANT EVENT
Pulmonary/CCM    Called to bedside, patient has suddenly worsened with increasing FiO2 requirements, he was at 10 L when I first saw him several hours ago.  But now he is at 60% FiO2 on the BiPAP.  I did call his wife and she is going to call her son and see if they want him to to be full code or no intubation.  She will call me back right away.    Addendum #1  Wife called me back, based on his previous instruction, he wishes to be a full code      Chest x-ray shows no changes, bilateral infiltrates          ABG shows pH of 7.5 with a pCO2 of 40 and pO2 of 57    At this point, we will hold off on intubation.    Critical care time spent 40 minutes

## 2024-03-30 NOTE — PLAN OF CARE
Problem: Discharge Planning  Goal: Discharge to home or other facility with appropriate resources  3/30/2024 0013 by Ziyad Goddard RN  Outcome: Progressing  3/29/2024 1926 by Sparkle Jessica RN  Outcome: Progressing  Flowsheets  Taken 3/29/2024 1200  Discharge to home or other facility with appropriate resources:   Identify barriers to discharge with patient and caregiver   Refer to discharge planning if patient needs post-hospital services based on physician order or complex needs related to functional status, cognitive ability or social support system  Taken 3/29/2024 0800  Discharge to home or other facility with appropriate resources:   Identify barriers to discharge with patient and caregiver   Refer to discharge planning if patient needs post-hospital services based on physician order or complex needs related to functional status, cognitive ability or social support system     Problem: Safety - Adult  Goal: Free from fall injury  3/30/2024 0013 by Ziyad Goddard RN  Outcome: Progressing  3/29/2024 1926 by Sparkle Jessica RN  Outcome: Progressing  Flowsheets (Taken 3/29/2024 0800)  Free From Fall Injury: Instruct family/caregiver on patient safety     Problem: ABCDS Injury Assessment  Goal: Absence of physical injury  3/30/2024 0013 by Ziyad Goddard RN  Outcome: Progressing  3/29/2024 1926 by Sparkle Jessica RN  Outcome: Progressing  Flowsheets (Taken 3/29/2024 0800)  Absence of Physical Injury: Implement safety measures based on patient assessment     Problem: Chronic Conditions and Co-morbidities  Goal: Patient's chronic conditions and co-morbidity symptoms are monitored and maintained or improved  3/30/2024 0013 by Ziyad Goddard RN  Outcome: Progressing  3/29/2024 1926 by Sparkle Jessica RN  Outcome: Progressing  Flowsheets  Taken 3/29/2024 1200  Care Plan - Patient's Chronic Conditions and Co-Morbidity Symptoms are Monitored and Maintained or Improved:   Monitor and assess patient's chronic conditions and

## 2024-03-30 NOTE — PROGRESS NOTES
The patient continues to desaturate at times, Dr Le notified of having up put the patient back on bipap and increasing to 60% FiO2 with a saturation of 88%. New orders for stat ABG.

## 2024-03-30 NOTE — PROGRESS NOTES
ICU Progress Note (Non-Vent)  [facility]    Patient - Saurav Stacy,  Age - 78 y.o.    - 1945      Room Number -    MRN -  [id number]   [facility] # - [id number]  Date of Admission -  3/24/2024  7:53 PM    Events of Past 24 Hours   Confused again overnight, but not agitated.  Had to start him on a Precedex drip yesterday afternoon because of not only agitation but confusion.  He seems to be calmer now.  Still at 10 L nasal cannula.  Used BiPAP overnight    Vitals    height is 1.727 m (5' 8\") and weight is 116 kg (255 lb 11.7 oz). His axillary temperature is 97.4 °F (36.3 °C). His blood pressure is 177/87 (abnormal) and his pulse is 60. His respiration is 28 and oxygen saturation is 90%.       Temperature Range: Temp: 97.4 °F (36.3 °C) Temp  Av.5 °F (36.4 °C)  Min: 97.4 °F (36.3 °C)  Max: 97.7 °F (36.5 °C)  BP Range:  Systolic (24hrs), Av , Min:104 , Max:199     Diastolic (24hrs), Av, Min:45, Max:112    Pulse Range: Pulse  Av.4  Min: 60  Max: 63  Respiration Range: Resp  Av.3  Min: 20  Max: 33  Current Pulse Ox::  SpO2: 90 %  24HR Pulse Ox Range:  SpO2  Av.8 %  Min: 85 %  Max: 97 %  Oxygen Amount and Delivery: O2 Flow Rate (L/min): 10 L/min    Wt Readings from Last 3 Encounters:   24 116 kg (255 lb 11.7 oz)   02/15/24 116.1 kg (256 lb)   23 125.6 kg (277 lb)     I/O     Intake/Output Summary (Last 24 hours) at 3/30/2024 0808  Last data filed at 3/30/2024 0628  Gross per 24 hour   Intake 1319.77 ml   Output 3600 ml   Net -2280.23 ml     DRAIN/TUBE OUTPUT       Invasive Lines   ICP PRESSURE RANGE  No data recorded  CVP PRESSURE RANGE  No data recorded      Medications      cefepime  2,000 mg IntraVENous Q8H    spironolactone  50 mg Oral Daily    azithromycin  500 mg IntraVENous Q24H    empagliflozin  10 mg Oral Daily    warfarin placeholder: dosing by provider    Other RX Placeholder    lisinopril  10 mg Oral Daily    metoprolol tartrate  25 mg Oral BID    pantoprazole  40 mg Oral QAM AC    potassium chloride  20 mEq Oral Daily with breakfast     levalbuterol, acetaminophen  IV Drips/Infusions   dexmedeTOMIDine HCl in NaCl 0.1 mcg/kg/hr (03/30/24 0800)       Diet/Nutrition   ADULT DIET; Regular; Low Fat/Low Chol/High Fiber/2 gm Na    Exam      Constitutional - Alert, arousable  General Appearance obese  HEENT -normocephalic, atraumatic. PERRLA; has some indentation over bridge of nose  Lungs - Chest expands equally, no wheezes, bilateral crackles  Cardiovascular - Heart sounds are normal.  normal rate and rhythm regular, no murmur, gallop or rub.  Abdomen - soft, nontender, nondistended, no masses or organomegaly  Neurologic - CN II-XII are grossly intact. There are no focal motor deficits  Skin - no bruising or bleeding  Extremities - no cyanosis, clubbing or edema    Lab Results   CBC     Lab Results   Component Value Date/Time    WBC 16.3 03/30/2024 03:44 AM    RBC 4.45 03/30/2024 03:44 AM    HGB 11.3 03/30/2024 03:44 AM    HCT 35.8 03/30/2024 03:44 AM     03/30/2024 03:44 AM    MCV 80.5 03/30/2024 03:44 AM    MCH 25.3 03/30/2024 03:44 AM    MCHC 31.5 03/30/2024 03:44 AM    RDW 17.0 03/30/2024 03:44 AM    LYMPHOPCT 5 03/30/2024 03:44 AM    MONOPCT 6 03/30/2024 03:44 AM    BASOPCT 0 03/30/2024 03:44 AM    MONOSABS 0.98 03/30/2024 03:44 AM    LYMPHSABS 0.82 03/30/2024 03:44 AM    EOSABS 0.00 03/30/2024 03:44 AM    BASOSABS 0.00 03/30/2024 03:44 AM    DIFFTYPE NOT REPORTED 03/20/2021 05:15 AM       BMP   Lab Results   Component Value Date/Time     03/30/2024 03:44 AM    K 4.0 03/30/2024 03:44 AM     03/30/2024 03:44 AM    CO2 27 03/30/2024 03:44 AM    BUN 26 03/30/2024 03:44 AM    CREATININE 0.6 03/30/2024 03:44 AM    GLUCOSE 154 03/30/2024 03:44 AM    MG 2.1 03/29/2024 04:11 AM       LFTS  Lab Results   Component Value Date/Time    ALKPHOS 256 03/24/2024

## 2024-03-31 ENCOUNTER — APPOINTMENT (OUTPATIENT)
Dept: GENERAL RADIOLOGY | Age: 79
DRG: 871 | End: 2024-03-31
Payer: MEDICARE

## 2024-03-31 LAB
ANION GAP SERPL CALCULATED.3IONS-SCNC: 13 MMOL/L (ref 9–17)
BASOPHILS # BLD: 0 K/UL (ref 0–0.2)
BASOPHILS NFR BLD: 0 % (ref 0–2)
BNP SERPL-MCNC: ABNORMAL PG/ML
BUN SERPL-MCNC: 27 MG/DL (ref 8–23)
CALCIUM SERPL-MCNC: 8.1 MG/DL (ref 8.6–10.4)
CHLORIDE SERPL-SCNC: 98 MMOL/L (ref 98–107)
CO2 SERPL-SCNC: 25 MMOL/L (ref 20–31)
CREAT SERPL-MCNC: 0.6 MG/DL (ref 0.7–1.2)
EOSINOPHIL # BLD: 0 K/UL (ref 0–0.4)
EOSINOPHILS RELATIVE PERCENT: 0 % (ref 0–4)
ERYTHROCYTE [DISTWIDTH] IN BLOOD BY AUTOMATED COUNT: 17.3 % (ref 11.5–14.9)
GFR SERPL CREATININE-BSD FRML MDRD: >90 ML/MIN/1.73M2
GLUCOSE SERPL-MCNC: 150 MG/DL (ref 70–99)
HCT VFR BLD AUTO: 36.7 % (ref 41–53)
HGB BLD-MCNC: 11.6 G/DL (ref 13.5–17.5)
INR PPP: 4.8
LYMPHOCYTES NFR BLD: 0.62 K/UL (ref 1–4.8)
LYMPHOCYTES RELATIVE PERCENT: 4 % (ref 24–44)
MCH RBC QN AUTO: 25 PG (ref 26–34)
MCHC RBC AUTO-ENTMCNC: 31.6 G/DL (ref 31–37)
MCV RBC AUTO: 79.2 FL (ref 80–100)
MONOCYTES NFR BLD: 0.31 K/UL (ref 0.1–1.3)
MONOCYTES NFR BLD: 2 % (ref 1–7)
MORPHOLOGY: ABNORMAL
NEUTROPHILS NFR BLD: 94 % (ref 36–66)
NEUTS SEG NFR BLD: 14.67 K/UL (ref 1.3–9.1)
PLATELET # BLD AUTO: 327 K/UL (ref 150–450)
PMV BLD AUTO: 8.3 FL (ref 6–12)
POTASSIUM SERPL-SCNC: 4.4 MMOL/L (ref 3.7–5.3)
PROCALCITONIN SERPL-MCNC: 0.11 NG/ML
PROTHROMBIN TIME: 45 SEC (ref 11.8–14.6)
RBC # BLD AUTO: 4.63 M/UL (ref 4.5–5.9)
SODIUM SERPL-SCNC: 136 MMOL/L (ref 135–144)
WBC OTHER # BLD: 15.6 K/UL (ref 3.5–11)

## 2024-03-31 PROCEDURE — 6370000000 HC RX 637 (ALT 250 FOR IP): Performed by: INTERNAL MEDICINE

## 2024-03-31 PROCEDURE — 94640 AIRWAY INHALATION TREATMENT: CPT

## 2024-03-31 PROCEDURE — 85610 PROTHROMBIN TIME: CPT

## 2024-03-31 PROCEDURE — 83880 ASSAY OF NATRIURETIC PEPTIDE: CPT

## 2024-03-31 PROCEDURE — 2580000003 HC RX 258: Performed by: FAMILY MEDICINE

## 2024-03-31 PROCEDURE — 6370000000 HC RX 637 (ALT 250 FOR IP): Performed by: FAMILY MEDICINE

## 2024-03-31 PROCEDURE — 2700000000 HC OXYGEN THERAPY PER DAY

## 2024-03-31 PROCEDURE — 71045 X-RAY EXAM CHEST 1 VIEW: CPT

## 2024-03-31 PROCEDURE — 6360000002 HC RX W HCPCS: Performed by: FAMILY MEDICINE

## 2024-03-31 PROCEDURE — 2060000000 HC ICU INTERMEDIATE R&B

## 2024-03-31 PROCEDURE — 80048 BASIC METABOLIC PNL TOTAL CA: CPT

## 2024-03-31 PROCEDURE — 94761 N-INVAS EAR/PLS OXIMETRY MLT: CPT

## 2024-03-31 PROCEDURE — 85025 COMPLETE CBC W/AUTO DIFF WBC: CPT

## 2024-03-31 PROCEDURE — 84145 PROCALCITONIN (PCT): CPT

## 2024-03-31 PROCEDURE — 6360000002 HC RX W HCPCS: Performed by: INTERNAL MEDICINE

## 2024-03-31 PROCEDURE — 36415 COLL VENOUS BLD VENIPUNCTURE: CPT

## 2024-03-31 PROCEDURE — 2500000003 HC RX 250 WO HCPCS: Performed by: INTERNAL MEDICINE

## 2024-03-31 RX ORDER — FUROSEMIDE 10 MG/ML
40 INJECTION INTRAMUSCULAR; INTRAVENOUS 3 TIMES DAILY
Status: COMPLETED | OUTPATIENT
Start: 2024-03-31 | End: 2024-03-31

## 2024-03-31 RX ORDER — LEVALBUTEROL INHALATION SOLUTION 1.25 MG/3ML
1.25 SOLUTION RESPIRATORY (INHALATION)
Status: DISCONTINUED | OUTPATIENT
Start: 2024-03-31 | End: 2024-04-02

## 2024-03-31 RX ORDER — LEVALBUTEROL INHALATION SOLUTION 1.25 MG/3ML
1.25 SOLUTION RESPIRATORY (INHALATION) EVERY 4 HOURS PRN
Status: DISCONTINUED | OUTPATIENT
Start: 2024-03-31 | End: 2024-03-31 | Stop reason: SDUPTHER

## 2024-03-31 RX ADMIN — FUROSEMIDE 40 MG: 10 INJECTION, SOLUTION INTRAMUSCULAR; INTRAVENOUS at 13:42

## 2024-03-31 RX ADMIN — LEVALBUTEROL HYDROCHLORIDE 1.25 MG: 1.25 SOLUTION RESPIRATORY (INHALATION) at 15:10

## 2024-03-31 RX ADMIN — FUROSEMIDE 40 MG: 10 INJECTION, SOLUTION INTRAMUSCULAR; INTRAVENOUS at 09:08

## 2024-03-31 RX ADMIN — CEFEPIME 2000 MG: 2 INJECTION, POWDER, FOR SOLUTION INTRAVENOUS at 00:55

## 2024-03-31 RX ADMIN — PANTOPRAZOLE SODIUM 40 MG: 40 TABLET, DELAYED RELEASE ORAL at 09:08

## 2024-03-31 RX ADMIN — FUROSEMIDE 40 MG: 10 INJECTION, SOLUTION INTRAMUSCULAR; INTRAVENOUS at 21:18

## 2024-03-31 RX ADMIN — LISINOPRIL 10 MG: 10 TABLET ORAL at 09:08

## 2024-03-31 RX ADMIN — AZITHROMYCIN MONOHYDRATE 500 MG: 500 INJECTION, POWDER, LYOPHILIZED, FOR SOLUTION INTRAVENOUS at 09:21

## 2024-03-31 RX ADMIN — SPIRONOLACTONE 50 MG: 25 TABLET, FILM COATED ORAL at 09:08

## 2024-03-31 RX ADMIN — DEXMEDETOMIDINE HYDROCHLORIDE 0.2 MCG/KG/HR: 400 INJECTION INTRAVENOUS at 07:18

## 2024-03-31 RX ADMIN — WATER 30 MG: 1 INJECTION INTRAMUSCULAR; INTRAVENOUS; SUBCUTANEOUS at 00:53

## 2024-03-31 RX ADMIN — EMPAGLIFLOZIN 10 MG: 10 TABLET, FILM COATED ORAL at 09:09

## 2024-03-31 RX ADMIN — CEFEPIME 2000 MG: 2 INJECTION, POWDER, FOR SOLUTION INTRAVENOUS at 16:58

## 2024-03-31 RX ADMIN — LEVALBUTEROL HYDROCHLORIDE 1.25 MG: 1.25 SOLUTION RESPIRATORY (INHALATION) at 11:31

## 2024-03-31 RX ADMIN — METOPROLOL TARTRATE 25 MG: 25 TABLET, FILM COATED ORAL at 09:08

## 2024-03-31 RX ADMIN — POTASSIUM CHLORIDE 20 MEQ: 1500 TABLET, EXTENDED RELEASE ORAL at 09:08

## 2024-03-31 RX ADMIN — CEFEPIME 2000 MG: 2 INJECTION, POWDER, FOR SOLUTION INTRAVENOUS at 09:20

## 2024-03-31 RX ADMIN — DEXMEDETOMIDINE HYDROCHLORIDE 0.5 MCG/KG/HR: 400 INJECTION INTRAVENOUS at 22:14

## 2024-03-31 RX ADMIN — LEVALBUTEROL HYDROCHLORIDE 1.25 MG: 1.25 SOLUTION RESPIRATORY (INHALATION) at 19:18

## 2024-03-31 RX ADMIN — METOPROLOL TARTRATE 25 MG: 25 TABLET, FILM COATED ORAL at 21:17

## 2024-03-31 ASSESSMENT — PAIN SCALES - GENERAL: PAINLEVEL_OUTOF10: 0

## 2024-03-31 NOTE — PROGRESS NOTES
Date:   3/31/2024  Patient name: Saurav Stacy  Date of admission:  3/24/2024  7:53 PM  MRN:   [id number]  YOB: 1945  PCP: Jose Raul Manley MD    Reason for Admission: Congestive heart failure of unknown etiology (HCC) [I50.9]  Community acquired pneumonia, unspecified laterality [J18.9]    Cardiology follow-up: Congestive heart failure diastolic acute on chronic, A-fib, permanent pacemaker, history of aortic valve replacement          Referring physician: Dr Judy Estrada     Impression     Admission 3/24/2024 with increasing shortness of breath, chest discomfort, influenza A, influenza B, COVID not detected  CHF diastolic, pulmonary congestion as per chest x-ray, bilateral pleural effusion more on the right as per CT abdomen  Ejection fraction 50 to 55%, moderate pulmonary hypertension RVSP 51 mmHg  Aortic Valve replacement  S/P Pacemaker / V Paced, A-fib patient is on warfarin  Obesity BMI 39, sleep apnea  Diabetes mellitus  Hyperlipidemia  Kidney stone  Impaired hearing     Past surgical history includes aortic valve replacement, right foot surgery, colonoscopies, bilateral knee arthroplasty, eye surgery, facial reconstruction surgery right side, premalignant benign skin lesion excision, neck surgery     History of present illness     78 years old  male, ex smoker, morbid obesity with a past medical history of aortic valve replacement, permanent pacemaker placement got hospitalized 3/24/2024 with increasing shortness of breath, generalized weakness ongoing for past few days.  He has received antibiotics for suspected pneumonia.  His symptoms got worse and he came to the emergency department.  Chest x-ray on admission showed pulmonary congestion.  CT abdomen few days ago showed bilateral pleural effusion.  ECG on admission showed ventricular paced rhythm.  He received IV diuretics in the ER.     He had right knee replacement in January 2024 prior to that he was doing well and he was not short  heart failure of unknown etiology (HCC)      Plan of Treatment:   Medications reviewed  1: Congestive heart failure diastolic, pulmonary congestion, pleural effusion continue Lasix 40 mg IV 3 times a day, continue current dose of lisinopril 10 mg, , added Jardiance 10 mg and Aldactone 50 mg  Blood pressure and electrolytes stable  2: A-fib, ventricle paced continue current dose of warfarin, current dose of metoprolol 25 mg twice daily  3: Hypoxia /pneumonia/pulmonary infiltrates continue with high flow oxygen  keep oxygen saturation above 92%, on prednisone 40 mg IV every 8 hours and Zithromax 500 mg IV every 24 hours and cefepime 2000 mg IV every 8 hours  4: Episodes of nonsustained V. tach, keep potassium above 4 and magnesium above 2 and maintain oxygen saturation above >90%  Continue ECG monitoring  Pulmonology notes reviewed  Some improvement in the pulmonary infiltrates as per chest x-ray continue to require high flow oxygen    Electronically signed by Teo Marie MD on 3/31/2024 at 11:16 AM

## 2024-03-31 NOTE — CARE COORDINATION
ONGOING DISCHARGE PLAN:    Patient is alert and oriented x4. Family at bedside.     Spoke with patient regarding discharge plan and patient confirms that plan is still to go home with spouse and [facility].     Pt on HFNC FIO2 50% O2 at 35L  88% sats    IV cefepime , IV lasix 40 mg TID    BNP 92794  WBC 15.6      INR 4.8 PT 45.0 Coumadin PTA follows with [facility].    OP CHF clinc ordered      Will continue to follow for additional discharge needs.    If patient is discharged prior to next notation, then this note serves as note for discharge by case management.    Electronically signed by Ewa Melvin RN on 3/31/2024 at 4:06 PM

## 2024-03-31 NOTE — PROGRESS NOTES
Patient desatting to low 80s after having conversation with writer and getting communion. BiPAP applied to get O2 sats up.

## 2024-03-31 NOTE — PROGRESS NOTES
DR HERNÁNDEZ      PROGRESS NOTE        Patient:  Saurav Stacy  YOB: 1945    MRN: [id number]     Acct: [id number]     Admit date: 3/24/2024    Pt seen and Chart reviewed.  Consultant notes reviewed and care evaluated.    Subjective: Patient sleeping resting he still on the high flow satting 97 percentile his heart rate is okay.  Looks like his x-ray has improved from yesterday.  He still had some agitation and confusion overnight.  No family in the room this morning    Diet:  ADULT DIET; Regular; Low Fat/Low Chol/High Fiber/2 gm Na  ADULT ORAL NUTRITION SUPPLEMENT; Breakfast, Lunch, Dinner; Diabetic Oral Supplement      Medications:Current Inpatient    Scheduled Meds:   furosemide  40 mg IntraVENous TID    levalbuterol  1.25 mg Nebulization Q4H While awake    cefepime  2,000 mg IntraVENous Q8H    spironolactone  50 mg Oral Daily    azithromycin  500 mg IntraVENous Q24H    empagliflozin  10 mg Oral Daily    warfarin placeholder: dosing by provider   Other RX Placeholder    lisinopril  10 mg Oral Daily    metoprolol tartrate  25 mg Oral BID    pantoprazole  40 mg Oral QAM AC    potassium chloride  20 mEq Oral Daily with breakfast     Continuous Infusions:   dexmedeTOMIDine HCl in NaCl 0.4 mcg/kg/hr (03/31/24 0759)     PRN Meds:Benzocaine-Menthol, levalbuterol, acetaminophen        Physical Exam:  Vitals: BP (!) 149/86   Pulse 65   Temp 97.7 °F (36.5 °C) (Axillary)   Resp 24   Ht 1.727 m (5' 7.99\")   Wt 116 kg (255 lb 11.7 oz)   SpO2 94%   BMI 38.89 kg/m²   24 hour intake/output:  Intake/Output Summary (Last 24 hours) at 3/31/2024 1005  Last data filed at 3/31/2024 0637  Gross per 24 hour   Intake 885.82 ml   Output 1550 ml   Net -664.18 ml     Last 3 weights:  Wt Readings from Last 3 Encounters:   03/28/24 116 kg (255 lb 11.7 oz)   02/15/24 116.1 kg (256 lb)   11/16/23 125.6 kg (277 lb)       Physical Examination:   General appearance

## 2024-03-31 NOTE — PROGRESS NOTES
03/31/24 1710   Encounter Summary   Encounter Overview/Reason  Volunteer Encounter   Service Provided For: Patient   Referral/Consult From: [facility]   Last Encounter  03/31/24   Complexity of Encounter Low   Begin Time 1300   End Time  1315   Total Time Calculated 15 min   Rituals, Rites and Sacraments   Type [facility] Communion

## 2024-03-31 NOTE — PLAN OF CARE
Problem: Discharge Planning  Goal: Discharge to home or other facility with appropriate resources  3/31/2024 0031 by Ziyad Goddard RN  Outcome: Progressing  3/30/2024 1805 by Pedro Smith RN  Outcome: Progressing     Problem: Safety - Adult  Goal: Free from fall injury  3/31/2024 0031 by Ziyad Goddard RN  Outcome: Progressing  3/30/2024 1805 by Pedro Smith RN  Outcome: Progressing     Problem: ABCDS Injury Assessment  Goal: Absence of physical injury  Outcome: Progressing     Problem: Chronic Conditions and Co-morbidities  Goal: Patient's chronic conditions and co-morbidity symptoms are monitored and maintained or improved  Outcome: Progressing     Problem: Skin/Tissue Integrity  Goal: Absence of new skin breakdown  Description: 1.  Monitor for areas of redness and/or skin breakdown  2.  Assess vascular access sites hourly  3.  Every 4-6 hours minimum:  Change oxygen saturation probe site  4.  Every 4-6 hours:  If on nasal continuous positive airway pressure, respiratory therapy assess nares and determine need for appliance change or resting period.  Outcome: Progressing

## 2024-03-31 NOTE — PROGRESS NOTES
Comprehensive Nutrition Assessment    Type and Reason for Visit:  Initial, RD Nutrition Re-Screen/LOS    Nutrition Recommendations/Plan:   Continue current diet.   Continue oral nutrition supplements.      Malnutrition Assessment:  Malnutrition Status:  Mild malnutrition (03/31/24 1404)    Context:  Acute Illness     Findings of the 6 clinical characteristics of malnutrition:  Energy Intake:  75% or less of estimated energy requirements for 7 or more days  Weight Loss:  Unable to assess (9.9% x 1 yr. More suspected but unable to evaluate due to fluid shifts.)     Body Fat Loss:  Unable to assess     Muscle Mass Loss:  Unable to assess    Fluid Accumulation:  Mild (May not be related to nutritional status but may mask wt loss.) Extremities   Strength:  Not Performed    Nutrition Assessment:    Pt admitted due to CHF. Transferred to intermediate ICU because of worsening FiO2 requirements on 3/28. Physician ordered oral nutrition supplements 3/30 due to reported decreased intake. Significant diuresis noted. Nurse states pt remains somewhat confused and may be drowsy from medication. Pt only took a few bites of fruit at breakfast. Nurse to encourage lunch intake if appropriate. At a minimum, pt appears mildly malnourished as evidenced by PO intake less than 75% for 7 day, most of which 50% or less. Wt loss noted of at least 9.9% over the past year; more suspected but masked by fluid retention.     Nutrition Related Findings:    Edema: +1 RUE, LUE; +1 pitting RLE, LLE. Hx: CAD, hypertension, DM, GERD. Random Glucose: 150. Jardiance. Other labs and meds reviewed.         Current Nutrition Intake & Therapies:    Average Meal Intake: 1-25%, 26-50% (Variable)  Average Supplements Intake: Unable to assess  ADULT DIET; Regular; Low Fat/Low Chol/High Fiber/2 gm Na  ADULT ORAL NUTRITION SUPPLEMENT; Breakfast, Lunch, Dinner; Diabetic Oral Supplement    Anthropometric Measures:  Height: 172.7 cm (5' 7.99\")  Ideal Body Weight

## 2024-03-31 NOTE — PROGRESS NOTES
Pt was very pleasantly confused at the beginning of the night, but was easily redirected. Pt is oriented x3 but he was trying to rip out his iv and his high flow. Several RNs went into patient room to redirect pt and telesitter is still at bedside. As the night went on pt was getting more and more confused with paranoia that we are trying to kill him. Pt thought that his high flow was his remote control and was removing it. Pt is also getting workup about all the lines and would desat down to 87% from just talking.  As the night went on patient is getting agitated and is saying that people are trying to kill him and that this he is not in a hospital and we are scamming him. RN at bedside to help redirect pt. Precedex had to be restarted to help patient calm down for tonight.

## 2024-03-31 NOTE — PLAN OF CARE
Problem: Discharge Planning  Goal: Discharge to home or other facility with appropriate resources  3/31/2024 1428 by Jam Wong RN  Outcome: Progressing  Flowsheets (Taken 3/31/2024 0800)  Discharge to home or other facility with appropriate resources: Identify barriers to discharge with patient and caregiver  3/31/2024 0031 by Ziyad Goddard RN  Outcome: Progressing     Problem: Safety - Adult  Goal: Free from fall injury  3/31/2024 1428 by Jam Wong RN  Outcome: Progressing  3/31/2024 0031 by Ziyad Goddard RN  Outcome: Progressing     Problem: ABCDS Injury Assessment  Goal: Absence of physical injury  3/31/2024 1428 by Jam Wong RN  Outcome: Progressing  3/31/2024 0031 by Ziyad Goddard RN  Outcome: Progressing     Problem: Chronic Conditions and Co-morbidities  Goal: Patient's chronic conditions and co-morbidity symptoms are monitored and maintained or improved  3/31/2024 1428 by Jam Wong RN  Outcome: Progressing  Flowsheets (Taken 3/31/2024 0800)  Care Plan - Patient's Chronic Conditions and Co-Morbidity Symptoms are Monitored and Maintained or Improved: Monitor and assess patient's chronic conditions and comorbid symptoms for stability, deterioration, or improvement  3/31/2024 0031 by Ziyad Goddard RN  Outcome: Progressing     Problem: Skin/Tissue Integrity  Goal: Absence of new skin breakdown  Description: 1.  Monitor for areas of redness and/or skin breakdown  2.  Assess vascular access sites hourly  3.  Every 4-6 hours minimum:  Change oxygen saturation probe site  4.  Every 4-6 hours:  If on nasal continuous positive airway pressure, respiratory therapy assess nares and determine need for appliance change or resting period.  3/31/2024 1428 by Jam Wong RN  Outcome: Progressing  3/31/2024 0031 by Ziyad Goddard RN  Outcome: Progressing     Problem: Nutrition Deficit:  Goal: Optimize nutritional status  Outcome: Progressing

## 2024-03-31 NOTE — PROGRESS NOTES
During report, patient began trying to rip off BiPAP mask and rip out IV's. Precedex at 0.2. Heated high flow applied to patient.

## 2024-03-31 NOTE — PROGRESS NOTES
ICU Progress Note (Non-Vent)  [facility]    Patient - Saurav Stacy,  Age - 78 y.o.    - 1945      Room Number -    MRN -  [id number]   [facility] # - [id number]  Date of Admission -  3/24/2024  7:53 PM    Events of Past 24 Hours   Continues to have significant agitation at night ,worse yesterday.  Precedex had to be restarted.  This a.m. while on Precedex, he is alert he recognizes me still confused but not agitated  He has had excellent response to diuretics, over -11 L yet proBNP continues to climb  Procalcitonin level now trending downward      Vitals    height is 1.727 m (5' 8\") and weight is 116 kg (255 lb 11.7 oz). His axillary temperature is 97.3 °F (36.3 °C). His blood pressure is 164/66 (abnormal) and his pulse is 63. His respiration is 20 and oxygen saturation is 98%.       Temperature Range: Temp: 97.3 °F (36.3 °C) Temp  Av.5 °F (36.4 °C)  Min: 97.3 °F (36.3 °C)  Max: 97.8 °F (36.6 °C)  BP Range:  Systolic (24hrs), Av , Min:93 , Max:177     Diastolic (24hrs), Av, Min:42, Max:106    Pulse Range: Pulse  Av.7  Min: 60  Max: 65  Respiration Range: Resp  Av.5  Min: 13  Max: 35  Current Pulse Ox::  SpO2: 98 %  24HR Pulse Ox Range:  SpO2  Av.7 %  Min: 81 %  Max: 100 %  Oxygen Amount and Delivery: O2 Flow Rate (L/min): 40 L/min    Wt Readings from Last 3 Encounters:   24 116 kg (255 lb 11.7 oz)   02/15/24 116.1 kg (256 lb)   23 125.6 kg (277 lb)     I/O     Intake/Output Summary (Last 24 hours) at 3/31/2024 0707  Last data filed at 3/31/2024 0637  Gross per 24 hour   Intake 885.82 ml   Output 3400 ml   Net -2514.18 ml     DRAIN/TUBE OUTPUT       Invasive Lines   ICP PRESSURE RANGE  No data recorded  CVP PRESSURE RANGE  No data recorded      Medications      furosemide  40 mg IntraVENous TID    methylPREDNISolone  30 mg IntraVENous Q12H    cefepime  2,000

## 2024-04-01 ENCOUNTER — APPOINTMENT (OUTPATIENT)
Dept: CT IMAGING | Age: 79
DRG: 871 | End: 2024-04-01
Payer: MEDICARE

## 2024-04-01 ENCOUNTER — APPOINTMENT (OUTPATIENT)
Dept: ULTRASOUND IMAGING | Age: 79
DRG: 871 | End: 2024-04-01
Payer: MEDICARE

## 2024-04-01 LAB
ALBUMIN SERPL-MCNC: 3.1 G/DL (ref 3.5–5.2)
ALP SERPL-CCNC: 204 U/L (ref 40–129)
ALT SERPL-CCNC: 33 U/L (ref 5–41)
AMMONIA PLAS-SCNC: 23 UMOL/L (ref 16–60)
ANION GAP SERPL CALCULATED.3IONS-SCNC: 14 MMOL/L (ref 9–17)
AST SERPL-CCNC: 28 U/L
BACTERIA URNS QL MICRO: ABNORMAL
BASOPHILS # BLD: 0 K/UL (ref 0–0.2)
BASOPHILS NFR BLD: 0 % (ref 0–2)
BILIRUB DIRECT SERPL-MCNC: 2.8 MG/DL
BILIRUB INDIRECT SERPL-MCNC: 1.3 MG/DL (ref 0–1)
BILIRUB SERPL-MCNC: 4.1 MG/DL (ref 0.3–1.2)
BILIRUB UR QL STRIP: ABNORMAL
BNP SERPL-MCNC: 7297 PG/ML
BUN SERPL-MCNC: 27 MG/DL (ref 8–23)
CALCIUM SERPL-MCNC: 8.3 MG/DL (ref 8.6–10.4)
CASTS #/AREA URNS LPF: ABNORMAL /LPF
CHLORIDE SERPL-SCNC: 95 MMOL/L (ref 98–107)
CLARITY UR: CLEAR
CO2 SERPL-SCNC: 27 MMOL/L (ref 20–31)
COLOR UR: ABNORMAL
CREAT SERPL-MCNC: 0.6 MG/DL (ref 0.7–1.2)
EOSINOPHIL # BLD: 0 K/UL (ref 0–0.4)
EOSINOPHILS RELATIVE PERCENT: 0 % (ref 0–4)
EPI CELLS #/AREA URNS HPF: ABNORMAL /HPF
ERYTHROCYTE [DISTWIDTH] IN BLOOD BY AUTOMATED COUNT: 17.2 % (ref 11.5–14.9)
GFR SERPL CREATININE-BSD FRML MDRD: >90 ML/MIN/1.73M2
GLUCOSE SERPL-MCNC: 141 MG/DL (ref 70–99)
GLUCOSE UR STRIP-MCNC: ABNORMAL MG/DL
HCT VFR BLD AUTO: 37.9 % (ref 41–53)
HGB BLD-MCNC: 11.8 G/DL (ref 13.5–17.5)
HGB UR QL STRIP.AUTO: ABNORMAL
INR PPP: 6
KETONES UR STRIP-MCNC: ABNORMAL MG/DL
LEUKOCYTE ESTERASE UR QL STRIP: NEGATIVE
LIPASE SERPL-CCNC: 41 U/L (ref 13–60)
LYMPHOCYTES NFR BLD: 0.86 K/UL (ref 1–4.8)
LYMPHOCYTES RELATIVE PERCENT: 4 % (ref 24–44)
MCH RBC QN AUTO: 25 PG (ref 26–34)
MCHC RBC AUTO-ENTMCNC: 31.1 G/DL (ref 31–37)
MCV RBC AUTO: 80.3 FL (ref 80–100)
MONOCYTES NFR BLD: 0 % (ref 1–7)
MONOCYTES NFR BLD: 0 K/UL (ref 0.1–1.3)
MORPHOLOGY: ABNORMAL
NEUTROPHILS NFR BLD: 96 % (ref 36–66)
NEUTS SEG NFR BLD: 20.54 K/UL (ref 1.3–9.1)
NITRITE UR QL STRIP: NEGATIVE
PH UR STRIP: 5.5 [PH] (ref 5–8)
PLATELET # BLD AUTO: 334 K/UL (ref 150–450)
PMV BLD AUTO: 8.2 FL (ref 6–12)
POTASSIUM SERPL-SCNC: 3.8 MMOL/L (ref 3.7–5.3)
PROCALCITONIN SERPL-MCNC: 0.11 NG/ML
PROT SERPL-MCNC: 6.4 G/DL (ref 6.4–8.3)
PROT UR STRIP-MCNC: ABNORMAL MG/DL
PROTHROMBIN TIME: 53.3 SEC (ref 11.8–14.6)
RBC # BLD AUTO: 4.72 M/UL (ref 4.5–5.9)
RBC #/AREA URNS HPF: ABNORMAL /HPF
SODIUM SERPL-SCNC: 136 MMOL/L (ref 135–144)
SP GR UR STRIP: 1.02 (ref 1–1.03)
UROBILINOGEN UR STRIP-ACNC: NORMAL EU/DL (ref 0–1)
WBC #/AREA URNS HPF: ABNORMAL /HPF
WBC OTHER # BLD: 21.4 K/UL (ref 3.5–11)

## 2024-04-01 PROCEDURE — 94660 CPAP INITIATION&MGMT: CPT

## 2024-04-01 PROCEDURE — 84145 PROCALCITONIN (PCT): CPT

## 2024-04-01 PROCEDURE — 6370000000 HC RX 637 (ALT 250 FOR IP): Performed by: INTERNAL MEDICINE

## 2024-04-01 PROCEDURE — 6360000002 HC RX W HCPCS: Performed by: INTERNAL MEDICINE

## 2024-04-01 PROCEDURE — 82140 ASSAY OF AMMONIA: CPT

## 2024-04-01 PROCEDURE — 94761 N-INVAS EAR/PLS OXIMETRY MLT: CPT

## 2024-04-01 PROCEDURE — 2580000003 HC RX 258: Performed by: FAMILY MEDICINE

## 2024-04-01 PROCEDURE — 80076 HEPATIC FUNCTION PANEL: CPT

## 2024-04-01 PROCEDURE — 6360000002 HC RX W HCPCS: Performed by: FAMILY MEDICINE

## 2024-04-01 PROCEDURE — 76705 ECHO EXAM OF ABDOMEN: CPT

## 2024-04-01 PROCEDURE — 87449 NOS EACH ORGANISM AG IA: CPT

## 2024-04-01 PROCEDURE — 6370000000 HC RX 637 (ALT 250 FOR IP): Performed by: FAMILY MEDICINE

## 2024-04-01 PROCEDURE — 2580000003 HC RX 258: Performed by: INTERNAL MEDICINE

## 2024-04-01 PROCEDURE — 2700000000 HC OXYGEN THERAPY PER DAY

## 2024-04-01 PROCEDURE — 36415 COLL VENOUS BLD VENIPUNCTURE: CPT

## 2024-04-01 PROCEDURE — 85025 COMPLETE CBC W/AUTO DIFF WBC: CPT

## 2024-04-01 PROCEDURE — 51701 INSERT BLADDER CATHETER: CPT

## 2024-04-01 PROCEDURE — 2500000003 HC RX 250 WO HCPCS: Performed by: FAMILY MEDICINE

## 2024-04-01 PROCEDURE — 87899 AGENT NOS ASSAY W/OPTIC: CPT

## 2024-04-01 PROCEDURE — 2500000003 HC RX 250 WO HCPCS: Performed by: INTERNAL MEDICINE

## 2024-04-01 PROCEDURE — 83690 ASSAY OF LIPASE: CPT

## 2024-04-01 PROCEDURE — 2060000000 HC ICU INTERMEDIATE R&B

## 2024-04-01 PROCEDURE — 81001 URINALYSIS AUTO W/SCOPE: CPT

## 2024-04-01 PROCEDURE — 85610 PROTHROMBIN TIME: CPT

## 2024-04-01 PROCEDURE — 83880 ASSAY OF NATRIURETIC PEPTIDE: CPT

## 2024-04-01 PROCEDURE — 94640 AIRWAY INHALATION TREATMENT: CPT

## 2024-04-01 PROCEDURE — 80048 BASIC METABOLIC PNL TOTAL CA: CPT

## 2024-04-01 PROCEDURE — 99223 1ST HOSP IP/OBS HIGH 75: CPT | Performed by: INTERNAL MEDICINE

## 2024-04-01 PROCEDURE — 74176 CT ABD & PELVIS W/O CONTRAST: CPT

## 2024-04-01 RX ORDER — 0.9 % SODIUM CHLORIDE 0.9 %
500 INTRAVENOUS SOLUTION INTRAVENOUS ONCE
Status: COMPLETED | OUTPATIENT
Start: 2024-04-01 | End: 2024-04-01

## 2024-04-01 RX ORDER — FUROSEMIDE 10 MG/ML
40 INJECTION INTRAMUSCULAR; INTRAVENOUS 2 TIMES DAILY
Status: DISCONTINUED | OUTPATIENT
Start: 2024-04-01 | End: 2024-04-02

## 2024-04-01 RX ORDER — PHYTONADIONE 5 MG/1
5 TABLET ORAL ONCE
Status: COMPLETED | OUTPATIENT
Start: 2024-04-01 | End: 2024-04-01

## 2024-04-01 RX ADMIN — PANTOPRAZOLE SODIUM 40 MG: 40 TABLET, DELAYED RELEASE ORAL at 06:37

## 2024-04-01 RX ADMIN — POTASSIUM CHLORIDE 20 MEQ: 1500 TABLET, EXTENDED RELEASE ORAL at 08:32

## 2024-04-01 RX ADMIN — SPIRONOLACTONE 50 MG: 25 TABLET, FILM COATED ORAL at 08:32

## 2024-04-01 RX ADMIN — EMPAGLIFLOZIN 10 MG: 10 TABLET, FILM COATED ORAL at 08:38

## 2024-04-01 RX ADMIN — LISINOPRIL 10 MG: 10 TABLET ORAL at 08:32

## 2024-04-01 RX ADMIN — BARIUM SULFATE 450 ML: 20 SUSPENSION ORAL at 13:06

## 2024-04-01 RX ADMIN — FUROSEMIDE 40 MG: 10 INJECTION, SOLUTION INTRAMUSCULAR; INTRAVENOUS at 17:03

## 2024-04-01 RX ADMIN — PIPERACILLIN AND TAZOBACTAM 3375 MG: 3; .375 INJECTION, POWDER, LYOPHILIZED, FOR SOLUTION INTRAVENOUS at 17:10

## 2024-04-01 RX ADMIN — ACETAMINOPHEN 650 MG: 325 TABLET ORAL at 20:13

## 2024-04-01 RX ADMIN — PIPERACILLIN AND TAZOBACTAM 3375 MG: 3; .375 INJECTION, POWDER, LYOPHILIZED, FOR SOLUTION INTRAVENOUS at 22:49

## 2024-04-01 RX ADMIN — DEXMEDETOMIDINE HYDROCHLORIDE 0.6 MCG/KG/HR: 400 INJECTION INTRAVENOUS at 19:38

## 2024-04-01 RX ADMIN — PHYTONADIONE 5 MG: 5 TABLET ORAL at 06:37

## 2024-04-01 RX ADMIN — LEVALBUTEROL HYDROCHLORIDE 1.25 MG: 1.25 SOLUTION RESPIRATORY (INHALATION) at 07:17

## 2024-04-01 RX ADMIN — CEFEPIME 2000 MG: 2 INJECTION, POWDER, FOR SOLUTION INTRAVENOUS at 01:01

## 2024-04-01 RX ADMIN — DEXMEDETOMIDINE HYDROCHLORIDE 0.2 MCG/KG/HR: 400 INJECTION INTRAVENOUS at 04:39

## 2024-04-01 RX ADMIN — METOPROLOL TARTRATE 25 MG: 25 TABLET, FILM COATED ORAL at 08:32

## 2024-04-01 RX ADMIN — LEVALBUTEROL HYDROCHLORIDE 1.25 MG: 1.25 SOLUTION RESPIRATORY (INHALATION) at 19:57

## 2024-04-01 RX ADMIN — PIPERACILLIN AND TAZOBACTAM 4500 MG: 4; .5 INJECTION, POWDER, FOR SOLUTION INTRAVENOUS at 08:46

## 2024-04-01 RX ADMIN — LEVALBUTEROL HYDROCHLORIDE 1.25 MG: 1.25 SOLUTION RESPIRATORY (INHALATION) at 10:50

## 2024-04-01 RX ADMIN — METOPROLOL TARTRATE 25 MG: 25 TABLET, FILM COATED ORAL at 20:13

## 2024-04-01 RX ADMIN — SODIUM CHLORIDE 500 ML: 9 INJECTION, SOLUTION INTRAVENOUS at 21:26

## 2024-04-01 ASSESSMENT — PAIN SCALES - GENERAL: PAINLEVEL_OUTOF10: 0

## 2024-04-01 NOTE — PLAN OF CARE
Problem: Discharge Planning  Goal: Discharge to home or other facility with appropriate resources  Outcome: Progressing     Problem: Safety - Adult  Goal: Free from fall injury  Outcome: Progressing     Problem: ABCDS Injury Assessment  Goal: Absence of physical injury  Outcome: Progressing     Problem: Chronic Conditions and Co-morbidities  Goal: Patient's chronic conditions and co-morbidity symptoms are monitored and maintained or improved  Outcome: Progressing     Problem: Skin/Tissue Integrity  Goal: Absence of new skin breakdown  Outcome: Progressing     Problem: Nutrition Deficit:  Goal: Optimize nutritional status  Outcome: Progressing

## 2024-04-01 NOTE — PROGRESS NOTES
ICU Progress Note (Non-Vent)  [facility]    Patient - Saurav Stacy,  Age - 78 y.o.    - 1945      Room Number -    MRN -  [id number]   [facility] # - [id number]  Date of Admission -  3/24/2024  7:53 PM    Events of Past 24 Hours   Patient appears to be answering questions without difficulty.  Currently he is on high flow at 50% oxygen 35 L/min.    Has been on and off the Precedex at this time    Vitals    height is 1.727 m (5' 7.99\") and weight is 106.3 kg (234 lb 5.6 oz). His oral temperature is 98.5 °F (36.9 °C). His blood pressure is 112/35 (abnormal) and his pulse is 61. His respiration is 25 and oxygen saturation is 94%.       Temperature Range: Temp: 98.5 °F (36.9 °C) Temp  Av.5 °F (36.9 °C)  Min: 98 °F (36.7 °C)  Max: 99 °F (37.2 °C)  BP Range:  Systolic (24hrs), Av , Min:109 , Max:178     Diastolic (24hrs), Av, Min:35, Max:127    Pulse Range: Pulse  Av.9  Min: 59  Max: 66  Respiration Range: Resp  Av.9  Min: 12  Max: 30  Current Pulse Ox::  SpO2: 94 %  24HR Pulse Ox Range:  SpO2  Av.9 %  Min: 85 %  Max: 100 %  Oxygen Amount and Delivery: O2 Flow Rate (L/min): 35 L/min    Wt Readings from Last 3 Encounters:   24 106.3 kg (234 lb 5.6 oz)   02/15/24 116.1 kg (256 lb)   23 125.6 kg (277 lb)     I/O     Intake/Output Summary (Last 24 hours) at 2024 1155  Last data filed at 2024 0639  Gross per 24 hour   Intake 977.28 ml   Output 4880 ml   Net -3902.72 ml     DRAIN/TUBE OUTPUT       Invasive Lines   ICP PRESSURE RANGE  No data recorded  CVP PRESSURE RANGE  No data recorded      Medications      piperacillin-tazobactam  3,375 mg IntraVENous Q8H    levalbuterol  1.25 mg Nebulization Q4H While awake    spironolactone  50 mg Oral Daily    empagliflozin  10 mg Oral Daily    warfarin placeholder: dosing by provider   Other RX Placeholder    lisinopril

## 2024-04-01 NOTE — FLOWSHEET NOTE
04/01/24 0614   Treatment Team Notification   Reason for Communication Critical results   Type of Critical Result Laboratory   Critical Lab Information INR 6.0   Critical Lab Result Type Coagulation  (INR)   Name of Team Member Notified Dr. Estrada   Treatment Team Role Attending Provider   Method of Communication Call   Response See orders   Notification Time 0615     Dr. Estrada informed of patient INR 6.0 for this morning. Order received for oral vitamin K 5 mg.add on  Liver function test

## 2024-04-01 NOTE — PROGRESS NOTES
04/01/24 1226   Encounter Summary   Encounter Overview/Reason  Volunteer Encounter   Service Provided For: Patient   Referral/Consult From: Humza   Last Encounter  04/01/24   Complexity of Encounter Low   Spiritual/Emotional needs   Type Spiritual Support   Rituals, Rites and Sacraments   Type [employer]

## 2024-04-01 NOTE — PROGRESS NOTES
Date:   4/1/2024  Patient name: Saurav Stacy  Date of admission:  3/24/2024  7:53 PM  MRN:   [id number]  YOB: 1945  PCP: Jose Raul Manley MD    Reason for Admission: Congestive heart failure of unknown etiology (HCC) [I50.9]  Community acquired pneumonia, unspecified laterality [J18.9]    Cardiology follow-up: Congestive heart failure diastolic acute on chronic, A-fib, permanent pacemaker, history of aortic valve replacement           Referring physician: Dr Judy Estrada     Impression     Admission 3/24/2024 with increasing shortness of breath, chest discomfort, influenza A, influenza B, COVID not detected  CHF diastolic, pulmonary congestion as per chest x-ray, bilateral pleural effusion more on the right as per CT abdomen  Ejection fraction 50 to 55%, moderate pulmonary hypertension RVSP 51 mmHg  Aortic Valve replacement  S/P Pacemaker / V Paced, A-fib patient is on warfarin  Obesity BMI 39, sleep apnea  Diabetes mellitus  Hyperlipidemia  Kidney stone  Impaired hearing     Past surgical history includes aortic valve replacement, right foot surgery, colonoscopies, bilateral knee arthroplasty, eye surgery, facial reconstruction surgery right side, premalignant benign skin lesion excision, neck surgery     History of present illness    78 years old  male, ex smoker, morbid obesity with a past medical history of aortic valve replacement, permanent pacemaker placement got hospitalized 3/24/2024 with increasing shortness of breath, generalized weakness ongoing for past few days.  He has received antibiotics for suspected pneumonia.  His symptoms got worse and he came to the emergency department.  Chest x-ray on admission showed pulmonary congestion.  CT abdomen few days ago showed bilateral pleural effusion.  ECG on admission showed ventricular paced rhythm.  He received IV diuretics in the ER.     He had right knee replacement in January 2024 prior to that he was doing well and he was not short  25   Ht 1.727 m (5' 7.99\")   Wt 106.3 kg (234 lb 5.6 oz)   SpO2 94%   BMI 35.64 kg/m²   General appearance: Tired looking elderly male  HEENT: Head: Normal, normocephalic, atraumatic.  Neck: no JVD and supple, symmetrical, trachea midline  Lungs:  Good air entry upper lungs, diminished breath sounds at bases with crackles  Heart:  Cardiac apical impulse not palpable heart sounds distant  Abdomen:  Obese soft bowel sounds present  Extremities: Homans sign is negative, no sign of DVT  Neurologic: Mental status: Follow verbal command    EKG: Ventricular paced rhythm A-fib.  ECHO: reviewed.   Ejection fraction: 50-55%, mild LVH, RVSP 51, Prosthetic Aortic valve  Stress Test: reviewed.  Cardiac Angiography: not obtained.    Assessment / Acute Cardiac Problems:     Congestive heart failure diastolic acute on chronic  Severe hypoxia on high flow oxygen episodes of severe confusion  Chest x-ray /massive pulmonary infiltrate  Status post pacemaker left infraclavicular region, ventricle paced, A-fib  Status post aortic valve replacement bioprosthetic  Ejection fraction 50 to 55%  Moderate pulmonary hypertension RVSP 51 mmHg 2D echo 3/25/2024  Morbid obesity  Hypertension stable  Diabetes mellitus  Mild anemia        Patient Active Problem List:     Basal cell carcinoma of right forehead     Neoplasm of uncertain behavior of skin     Acute renal failure with tubular necrosis (HCC)     Essential hypertension     Class 3 severe obesity due to excess calories with body mass index (BMI) of 40.0 to 44.9 in adult (HCC)     Transaminitis     ASCVD (arteriosclerotic cardiovascular disease)     Chronic diastolic congestive heart failure (HCC)     Current use of long term anticoagulation     Obstructive sleep apnea     Renal failure     Non-traumatic rhabdomyolysis     Elevated LFTs     Congestive heart failure of unknown etiology (HCC)      Plan of Treatment:   Medications reviewed  1: Congestive heart failure diastolic, pulmonary

## 2024-04-01 NOTE — CARE COORDINATION
ONGOING DISCHARGE PLAN:    Patient is sleeping.     Spoke with patient's spouse and daughter regarding discharge plans. First choice would be home with [address]. Discussed [facility], and then all SNF in [place] were discussed as options.     65%/35L HFNC   Precedex drip    IV lasix twice daily    IV zosyn per ID. WBC 21.4 elevated    CT abd/pelvis     Elevated LFTs    INR 6.0    Ammonia 23    Abd US-negative     Will continue to follow for additional discharge needs.    If patient is discharged prior to next notation, then this note serves as note for discharge by case management.    Electronically signed by Emelyn Julien RN on 4/1/2024 at 2:30 PM

## 2024-04-01 NOTE — PROGRESS NOTES
Patient daughter in law Eleonora called for update. She is not on HIPAA list, writer explained that I can provide any information at this time. Eleonora was understanding.

## 2024-04-01 NOTE — PROGRESS NOTES
04/01/24 1213   Encounter Summary   Encounter Overview/Reason   Encounter   Service Provided For: Patient   Referral/Consult From: Humza   Last Encounter  04/01/24   Complexity of Encounter Low   Spiritual/Emotional needs   Type Spiritual Support   Rituals, Rites and Sacraments   Type Sacrament of Sick

## 2024-04-01 NOTE — PROGRESS NOTES
BRONCHOSPASM/BRONCHOCONSTRICTION     [x]         IMPROVE AERATION/BREATH SOUNDS  [x]   ADMINISTER BRONCHODILATOR THERAPY AS APPROPRIATE  [x]   ASSESS BREATH SOUNDS  [x]   IMPLEMENT AEROSOL/MDI PROTOCOL  [x]   PATIENT EDUCATION AS NEEDED    NONINVASIVE VENTILATION    PROVIDE OPTIMAL VENTILATION/ACCEPTABLE SPO2   IMPLEMENT NONINVASIVE VENTILATION PROTOCOL   MAINTAIN ACCEPTABLE SPO2   ASSESS SKIN INTEGRITY/BREAKDOWN SCORE   PATIENT EDUCATION AS NEEDED   BIPAP AS NEEDED

## 2024-04-01 NOTE — PROGRESS NOTES
Meds:Benzocaine-Menthol, levalbuterol, acetaminophen        Physical Exam:  Vitals: BP (!) 178/67   Pulse 63   Temp 98.2 °F (36.8 °C) (Axillary)   Resp 21   Ht 1.727 m (5' 7.99\")   Wt 106.3 kg (234 lb 5.6 oz)   SpO2 94%   BMI 35.64 kg/m²   24 hour intake/output:  Intake/Output Summary (Last 24 hours) at 4/1/2024 0719  Last data filed at 4/1/2024 0639  Gross per 24 hour   Intake 977.28 ml   Output 4880 ml   Net -3902.72 ml     Last 3 weights:  Wt Readings from Last 3 Encounters:   04/01/24 106.3 kg (234 lb 5.6 oz)   02/15/24 116.1 kg (256 lb)   11/16/23 125.6 kg (277 lb)       Physical Examination:   General appearance - alert, he recognizes me this morning but he keeps saying they are trying to kill him.  But he is consolable he is asking for some water to drink  Mental status - alert, oriented to person,   oropharynx clear, no lesions  Chest - clear to auscultation, no wheezes, rales or rhonchi, symmetric air entry  Heart - normal rate, regular rhythm, normal S1, S2, no murmurs, rubs, clicks or gallops  Abdomen - soft, nontender, nondistended, no masses or organomegaly obese abdomen could not reproduce tenderness  Neurological - alert, oriented, normal speech, no focal findings or movement disorder noted} just gets anxious  Extremities - peripheral pulses normal, no pedal edema, no clubbing or cyanosis decrease edema  Skin - normal coloration and turgor, no rashes, no suspicious skin lesions noted   s     Component Value Units   CBC with Auto Differential [[id number]] (Abnormal)    Collected: 04/01/24 0444    Updated: 04/01/24 0632     WBC 21.4 High  k/uL    RBC 4.72 m/uL    Hemoglobin 11.8 Low  g/dL    Hematocrit 37.9 Low  %    MCV 80.3 fL    MCH 25.0 Low  pg    MCHC 31.1 g/dL    RDW 17.2 High  %    Platelets 334 k/uL    MPV 8.2 fL    Neutrophils % 96 High  %    Lymphocytes % 4 Low  %    Monocytes % 0 Low  %    Eosinophils % 0 %    Basophils % 0 %    Neutrophils Absolute 20.54 High  k/uL    Lymphocytes  pneumothorax.  The heart is prominent with  stable cardiac leads.  The upper abdomen is unremarkable.  The extrathoracic  soft tissues are unremarkable.   Impression:     Diffuse pulmonary infiltrates that are mildly less pronounced compared to  prior.       Component Value Units    Ammonia [[id number]]    Collected: 04/01/24 0744    Updated: 04/01/24 0807    Specimen Type: Blood     Ammonia 23 umol/L   Lipase [[id number]]    Collected: 04/01/24 0444    Updated: 04/01/24 0749     Lipase 41        Assessment:  Principal Problem:    Congestive heart failure of unknown etiology (HCC)  Resolved Problems:    * No resolved hospital problems. *    Basal cell carcinoma of right forehead C44.319      Neoplasm of uncertain behavior of skin D48.5    Acute renal failure with tubular necrosis (HCC) N17.0    Essential hypertension I10    Class 3 severe obesity due to excess calories with body mass index (BMI) of 40.0 to 44.9 in adult (HCC) E66.01, Z68.41    Transaminitis R74.01    ASCVD (arteriosclerotic cardiovascular disease) I25.10    Chronic diastolic congestive heart failure (HCC) I50.32    Current use of long term anticoagulation Z79.01    Obstructive sleep apnea G47.33    Renal failure N19    Non-traumatic rhabdomyolysis M62.82    Elevated LFTs R79.89    Congestive heart failure of unknown etiology (HCC) I50.9      Acute on chronic diastolic CHF  Pulmonary congestion on my reading his x-ray with cardiomegaly and evidence of a pacemaker  Dyspnea on exertion  Acute bronchitis with possible underlying infiltrate  STANLEY by history  Hypertension  Hypokalemia  Hypercoag INR 4.1   Worsening chest x-ray on my reading with opacification and questionable mucus or aspiration  His BNP has dropped  Increased oxygen requirement and short of breath  Yesterday chest x-ray still shows possible pulmonary edema  Increase in his BNP still higher this more  Leukocytosis probably secondary to  Episode of ICU psychosis last night resolved  Chest

## 2024-04-01 NOTE — PLAN OF CARE
Problem: Discharge Planning  Goal: Discharge to home or other facility with appropriate resources  4/1/2024 1821 by Belgica Franco RN  Outcome: Progressing  4/1/2024 0625 by Shruthi Chavez RN  Outcome: Progressing     Problem: Safety - Adult  Goal: Free from fall injury  4/1/2024 1821 by Belgica Franco RN  Outcome: Progressing  4/1/2024 0625 by Shruthi Chavez RN  Outcome: Progressing     Problem: ABCDS Injury Assessment  Goal: Absence of physical injury  4/1/2024 1821 by Belgica Franco RN  Outcome: Progressing  4/1/2024 0625 by Shruthi Chavez RN  Outcome: Progressing     Problem: Chronic Conditions and Co-morbidities  Goal: Patient's chronic conditions and co-morbidity symptoms are monitored and maintained or improved  4/1/2024 0625 by Shruthi Chavez RN  Outcome: Progressing

## 2024-04-02 ENCOUNTER — APPOINTMENT (OUTPATIENT)
Dept: GENERAL RADIOLOGY | Age: 79
DRG: 871 | End: 2024-04-02
Payer: MEDICARE

## 2024-04-02 LAB
AMORPH SED URNS QL MICRO: ABNORMAL
ANION GAP SERPL CALCULATED.3IONS-SCNC: 16 MMOL/L (ref 9–17)
APPEARANCE FLD: CLEAR
ARTERIAL PATENCY WRIST A: ABNORMAL
BACTERIA URNS QL MICRO: ABNORMAL
BASOPHILS # BLD: 0.19 K/UL (ref 0–0.2)
BASOPHILS NFR BLD: 1 % (ref 0–2)
BDY SITE: ABNORMAL
BILIRUB UR QL STRIP: ABNORMAL
BODY FLD TYPE: NORMAL
BODY TEMPERATURE: 37
BUN SERPL-MCNC: 24 MG/DL (ref 8–23)
CALCIUM SERPL-MCNC: 8.2 MG/DL (ref 8.6–10.4)
CASE NUMBER:: NORMAL
CASTS #/AREA URNS LPF: ABNORMAL /LPF
CASTS #/AREA URNS LPF: ABNORMAL /LPF
CHLORIDE SERPL-SCNC: 97 MMOL/L (ref 98–107)
CLARITY UR: ABNORMAL
CO2 SERPL-SCNC: 27 MMOL/L (ref 20–31)
COHGB MFR BLD: 1.3 % (ref 0–5)
COHGB MFR BLD: 1.5 % (ref 0–5)
COHGB MFR BLD: 1.6 % (ref 0–5)
COHGB MFR BLD: 2.9 % (ref 0–5)
COLOR FLD: COLORLESS
COLOR UR: ABNORMAL
CREAT SERPL-MCNC: 0.6 MG/DL (ref 0.7–1.2)
EOSINOPHIL # BLD: 0 K/UL (ref 0–0.4)
EOSINOPHILS RELATIVE PERCENT: 0 % (ref 0–4)
EPI CELLS #/AREA URNS HPF: ABNORMAL /HPF
ERYTHROCYTE [DISTWIDTH] IN BLOOD BY AUTOMATED COUNT: 17.6 % (ref 11.5–14.9)
FIO2 ON VENT: 100 %
FIO2 ON VENT: 60 %
FIO2 ON VENT: 90 %
GAS FLOW.O2 O2 DELIVERY SYS: ABNORMAL L/MIN
GAS FLOW.O2 SETTING OXYMISER: 28 L/MIN
GAS FLOW.O2 SETTING OXYMISER: 30 L/MIN
GAS FLOW.O2 SETTING OXYMISER: 30 L/MIN
GFR SERPL CREATININE-BSD FRML MDRD: >90 ML/MIN/1.73M2
GLUCOSE SERPL-MCNC: 126 MG/DL (ref 70–99)
GLUCOSE UR STRIP-MCNC: ABNORMAL MG/DL
HCO3 ARTERIAL: 28.9 MMOL/L (ref 22–26)
HCO3 ARTERIAL: 29 MMOL/L (ref 22–26)
HCO3 ARTERIAL: 29.5 MMOL/L (ref 22–26)
HCO3 ARTERIAL: 29.6 MMOL/L (ref 22–26)
HCT VFR BLD AUTO: 35 % (ref 41–53)
HGB BLD-MCNC: 11.1 G/DL (ref 13.5–17.5)
HGB UR QL STRIP.AUTO: ABNORMAL
INR PPP: 2.9
KETONES UR STRIP-MCNC: ABNORMAL MG/DL
L PNEUMO1 AG UR QL IA.RAPID: NEGATIVE
LACTATE BLDV-SCNC: 2.8 MMOL/L (ref 0.5–1.9)
LACTATE BLDV-SCNC: 4.7 MMOL/L (ref 0.5–1.9)
LEUKOCYTE ESTERASE UR QL STRIP: ABNORMAL
LYMPHOCYTES NFR BLD: 1.33 K/UL (ref 1–4.8)
LYMPHOCYTES RELATIVE PERCENT: 7 % (ref 24–44)
MCH RBC QN AUTO: 25.1 PG (ref 26–34)
MCHC RBC AUTO-ENTMCNC: 31.7 G/DL (ref 31–37)
MCV RBC AUTO: 79 FL (ref 80–100)
METHEMOGLOBIN: 0.5 % (ref 0–1.9)
METHEMOGLOBIN: 0.7 % (ref 0–1.9)
METHEMOGLOBIN: 0.9 % (ref 0–1.9)
METHEMOGLOBIN: 1.4 % (ref 0–1.9)
MONOCYTES NFR BLD: 0.95 K/UL (ref 0.1–1.3)
MONOCYTES NFR BLD: 5 % (ref 1–7)
MORPHOLOGY: ABNORMAL
NEGATIVE BASE EXCESS, ART: 0.3 MMOL/L (ref 0–2)
NEUTROPHILS NFR BLD: 87 % (ref 36–66)
NEUTS SEG NFR BLD: 16.53 K/UL (ref 1.3–9.1)
NITRITE UR QL STRIP: NEGATIVE
O2 SAT, ARTERIAL: 84.9 % (ref 95–98)
O2 SAT, ARTERIAL: 93.9 % (ref 95–98)
O2 SAT, ARTERIAL: 93.9 % (ref 95–98)
O2 SAT, ARTERIAL: 94.5 % (ref 95–98)
PCO2 ARTERIAL: 44.1 MMHG (ref 35–45)
PCO2 ARTERIAL: 61.3 MMHG (ref 35–45)
PCO2 ARTERIAL: 70.2 MMHG (ref 35–45)
PCO2 ARTERIAL: 96 MMHG (ref 35–45)
PEEP RESPIRATORY: 10 CM[H2O]
PH ARTERIAL: 7.1 (ref 7.35–7.45)
PH ARTERIAL: 7.22 (ref 7.35–7.45)
PH ARTERIAL: 7.29 (ref 7.35–7.45)
PH ARTERIAL: 7.43 (ref 7.35–7.45)
PH UR STRIP: 5 [PH] (ref 5–8)
PLATELET # BLD AUTO: 315 K/UL (ref 150–450)
PMV BLD AUTO: 7.9 FL (ref 6–12)
PO2 ARTERIAL: 110 MMHG (ref 80–100)
PO2 ARTERIAL: 82.3 MMHG (ref 80–100)
PO2 ARTERIAL: 86.1 MMHG (ref 80–100)
PO2 ARTERIAL: 95.8 MMHG (ref 80–100)
POSITIVE BASE EXCESS, ART: 1.2 MMOL/L (ref 0–2)
POSITIVE BASE EXCESS, ART: 3 MMOL/L (ref 0–2)
POSITIVE BASE EXCESS, ART: 4.6 MMOL/L (ref 0–2)
POTASSIUM SERPL-SCNC: 3.4 MMOL/L (ref 3.7–5.3)
PROCALCITONIN SERPL-MCNC: 0.36 NG/ML
PROT UR STRIP-MCNC: ABNORMAL MG/DL
PROTHROMBIN TIME: 30 SEC (ref 11.8–14.6)
PT. POSITION: ABNORMAL
RBC # BLD AUTO: 4.43 M/UL (ref 4.5–5.9)
RBC # FLD: 5 CELLS/UL
RBC #/AREA URNS HPF: ABNORMAL /HPF
RESPIRATORY RATE: 28
RESPIRATORY RATE: 30
RESPIRATORY RATE: 30
RESPIRATORY RATE: 45
S PNEUM AG SPEC QL: NEGATIVE
SODIUM SERPL-SCNC: 140 MMOL/L (ref 135–144)
SP GR UR STRIP: 1.02 (ref 1–1.03)
SPECIMEN DESCRIPTION: NORMAL
SPECIMEN SOURCE: NORMAL
TEXT FOR RESPIRATORY: ABNORMAL
TOTAL RATE: 28
TOTAL RATE: 30
TOTAL RATE: 30
UROBILINOGEN UR STRIP-ACNC: NORMAL EU/DL (ref 0–1)
VENTILATION MODE VENT: ABNORMAL
VT: 450
VT: 500
VT: 500
WBC # FLD: 12 CELLS/UL
WBC #/AREA URNS HPF: ABNORMAL /HPF
WBC OTHER # BLD: 19 K/UL (ref 3.5–11)

## 2024-04-02 PROCEDURE — 85025 COMPLETE CBC W/AUTO DIFF WBC: CPT

## 2024-04-02 PROCEDURE — 0BH17EZ INSERTION OF ENDOTRACHEAL AIRWAY INTO TRACHEA, VIA NATURAL OR ARTIFICIAL OPENING: ICD-10-PCS | Performed by: INTERNAL MEDICINE

## 2024-04-02 PROCEDURE — 36415 COLL VENOUS BLD VENIPUNCTURE: CPT

## 2024-04-02 PROCEDURE — 6360000002 HC RX W HCPCS

## 2024-04-02 PROCEDURE — 37799 UNLISTED PX VASCULAR SURGERY: CPT

## 2024-04-02 PROCEDURE — 5A1955Z RESPIRATORY VENTILATION, GREATER THAN 96 CONSECUTIVE HOURS: ICD-10-PCS | Performed by: INTERNAL MEDICINE

## 2024-04-02 PROCEDURE — 94645 CONT INHLJ TX EACH ADDL HOUR: CPT

## 2024-04-02 PROCEDURE — 6360000002 HC RX W HCPCS: Performed by: INTERNAL MEDICINE

## 2024-04-02 PROCEDURE — 80048 BASIC METABOLIC PNL TOTAL CA: CPT

## 2024-04-02 PROCEDURE — 71045 X-RAY EXAM CHEST 1 VIEW: CPT

## 2024-04-02 PROCEDURE — 85610 PROTHROMBIN TIME: CPT

## 2024-04-02 PROCEDURE — 2580000003 HC RX 258: Performed by: INTERNAL MEDICINE

## 2024-04-02 PROCEDURE — 86738 MYCOPLASMA ANTIBODY: CPT

## 2024-04-02 PROCEDURE — 6360000002 HC RX W HCPCS: Performed by: FAMILY MEDICINE

## 2024-04-02 PROCEDURE — 87206 SMEAR FLUORESCENT/ACID STAI: CPT

## 2024-04-02 PROCEDURE — 87070 CULTURE OTHR SPECIMN AEROBIC: CPT

## 2024-04-02 PROCEDURE — 88305 TISSUE EXAM BY PATHOLOGIST: CPT

## 2024-04-02 PROCEDURE — 2700000000 HC OXYGEN THERAPY PER DAY

## 2024-04-02 PROCEDURE — 36600 WITHDRAWAL OF ARTERIAL BLOOD: CPT

## 2024-04-02 PROCEDURE — 87116 MYCOBACTERIA CULTURE: CPT

## 2024-04-02 PROCEDURE — 81001 URINALYSIS AUTO W/SCOPE: CPT

## 2024-04-02 PROCEDURE — 87015 SPECIMEN INFECT AGNT CONCNTJ: CPT

## 2024-04-02 PROCEDURE — 2500000003 HC RX 250 WO HCPCS: Performed by: INTERNAL MEDICINE

## 2024-04-02 PROCEDURE — 0B9D8ZX DRAINAGE OF RIGHT MIDDLE LUNG LOBE, VIA NATURAL OR ARTIFICIAL OPENING ENDOSCOPIC, DIAGNOSTIC: ICD-10-PCS | Performed by: INTERNAL MEDICINE

## 2024-04-02 PROCEDURE — 03HY32Z INSERTION OF MONITORING DEVICE INTO UPPER ARTERY, PERCUTANEOUS APPROACH: ICD-10-PCS | Performed by: INTERNAL MEDICINE

## 2024-04-02 PROCEDURE — 94640 AIRWAY INHALATION TREATMENT: CPT

## 2024-04-02 PROCEDURE — 02HV33Z INSERTION OF INFUSION DEVICE INTO SUPERIOR VENA CAVA, PERCUTANEOUS APPROACH: ICD-10-PCS | Performed by: INTERNAL MEDICINE

## 2024-04-02 PROCEDURE — 0W9B30Z DRAINAGE OF LEFT PLEURAL CAVITY WITH DRAINAGE DEVICE, PERCUTANEOUS APPROACH: ICD-10-PCS | Performed by: INTERNAL MEDICINE

## 2024-04-02 PROCEDURE — 3E033XZ INTRODUCTION OF VASOPRESSOR INTO PERIPHERAL VEIN, PERCUTANEOUS APPROACH: ICD-10-PCS | Performed by: FAMILY MEDICINE

## 2024-04-02 PROCEDURE — 6370000000 HC RX 637 (ALT 250 FOR IP): Performed by: FAMILY MEDICINE

## 2024-04-02 PROCEDURE — 87040 BLOOD CULTURE FOR BACTERIA: CPT

## 2024-04-02 PROCEDURE — 87641 MR-STAPH DNA AMP PROBE: CPT

## 2024-04-02 PROCEDURE — 83605 ASSAY OF LACTIC ACID: CPT

## 2024-04-02 PROCEDURE — 31500 INSERT EMERGENCY AIRWAY: CPT

## 2024-04-02 PROCEDURE — 94002 VENT MGMT INPAT INIT DAY: CPT

## 2024-04-02 PROCEDURE — 84145 PROCALCITONIN (PCT): CPT

## 2024-04-02 PROCEDURE — 88112 CYTOPATH CELL ENHANCE TECH: CPT

## 2024-04-02 PROCEDURE — 2000000000 HC ICU R&B

## 2024-04-02 PROCEDURE — 94644 CONT INHLJ TX 1ST HOUR: CPT

## 2024-04-02 PROCEDURE — 31622 DX BRONCHOSCOPE/WASH: CPT

## 2024-04-02 PROCEDURE — A4216 STERILE WATER/SALINE, 10 ML: HCPCS | Performed by: INTERNAL MEDICINE

## 2024-04-02 PROCEDURE — 94660 CPAP INITIATION&MGMT: CPT

## 2024-04-02 PROCEDURE — 6370000000 HC RX 637 (ALT 250 FOR IP): Performed by: INTERNAL MEDICINE

## 2024-04-02 PROCEDURE — 94761 N-INVAS EAR/PLS OXIMETRY MLT: CPT

## 2024-04-02 PROCEDURE — 87205 SMEAR GRAM STAIN: CPT

## 2024-04-02 PROCEDURE — 82805 BLOOD GASES W/O2 SATURATION: CPT

## 2024-04-02 PROCEDURE — 99233 SBSQ HOSP IP/OBS HIGH 50: CPT | Performed by: INTERNAL MEDICINE

## 2024-04-02 PROCEDURE — 87102 FUNGUS ISOLATION CULTURE: CPT

## 2024-04-02 RX ORDER — WATER 10 ML/10ML
INJECTION INTRAMUSCULAR; INTRAVENOUS; SUBCUTANEOUS
Status: DISPENSED
Start: 2024-04-02 | End: 2024-04-02

## 2024-04-02 RX ORDER — VECURONIUM BROMIDE 1 MG/ML
INJECTION, POWDER, LYOPHILIZED, FOR SOLUTION INTRAVENOUS
Status: COMPLETED | OUTPATIENT
Start: 2024-04-02 | End: 2024-04-02

## 2024-04-02 RX ORDER — FENTANYL CITRATE 0.05 MG/ML
50 INJECTION, SOLUTION INTRAMUSCULAR; INTRAVENOUS
Status: DISCONTINUED | OUTPATIENT
Start: 2024-04-02 | End: 2024-04-02

## 2024-04-02 RX ORDER — PROPOFOL 10 MG/ML
5-50 INJECTION, EMULSION INTRAVENOUS CONTINUOUS
Status: DISCONTINUED | OUTPATIENT
Start: 2024-04-02 | End: 2024-04-02

## 2024-04-02 RX ORDER — LORAZEPAM 2 MG/ML
2 INJECTION INTRAMUSCULAR EVERY 6 HOURS PRN
Status: DISCONTINUED | OUTPATIENT
Start: 2024-04-02 | End: 2024-04-09

## 2024-04-02 RX ORDER — WARFARIN SODIUM 1 MG/1
1 TABLET ORAL
Status: COMPLETED | OUTPATIENT
Start: 2024-04-02 | End: 2024-04-02

## 2024-04-02 RX ORDER — MIDAZOLAM HYDROCHLORIDE 1 MG/ML
INJECTION INTRAMUSCULAR; INTRAVENOUS
Status: COMPLETED | OUTPATIENT
Start: 2024-04-02 | End: 2024-04-02

## 2024-04-02 RX ORDER — VECURONIUM BROMIDE 1 MG/ML
INJECTION, POWDER, LYOPHILIZED, FOR SOLUTION INTRAVENOUS
Status: DISPENSED
Start: 2024-04-02 | End: 2024-04-02

## 2024-04-02 RX ORDER — PROPOFOL 10 MG/ML
5-50 INJECTION, EMULSION INTRAVENOUS CONTINUOUS
Status: DISCONTINUED | OUTPATIENT
Start: 2024-04-02 | End: 2024-04-05

## 2024-04-02 RX ORDER — FENTANYL CITRATE 0.05 MG/ML
INJECTION, SOLUTION INTRAMUSCULAR; INTRAVENOUS
Status: COMPLETED | OUTPATIENT
Start: 2024-04-02 | End: 2024-04-02

## 2024-04-02 RX ORDER — FENTANYL CITRATE-0.9 % NACL/PF 10 MCG/ML
25-150 PLASTIC BAG, INJECTION (ML) INTRAVENOUS CONTINUOUS
Status: DISCONTINUED | OUTPATIENT
Start: 2024-04-02 | End: 2024-04-05

## 2024-04-02 RX ORDER — POTASSIUM CHLORIDE 7.45 MG/ML
10 INJECTION INTRAVENOUS
Status: COMPLETED | OUTPATIENT
Start: 2024-04-02 | End: 2024-04-02

## 2024-04-02 RX ORDER — PROPOFOL 10 MG/ML
INJECTION, EMULSION INTRAVENOUS CONTINUOUS PRN
Status: COMPLETED | OUTPATIENT
Start: 2024-04-02 | End: 2024-04-02

## 2024-04-02 RX ORDER — NOREPINEPHRINE BITARTRATE 0.06 MG/ML
INJECTION, SOLUTION INTRAVENOUS
Status: DISPENSED
Start: 2024-04-02 | End: 2024-04-02

## 2024-04-02 RX ORDER — PROPOFOL 10 MG/ML
INJECTION, EMULSION INTRAVENOUS
Status: COMPLETED
Start: 2024-04-02 | End: 2024-04-02

## 2024-04-02 RX ORDER — 0.9 % SODIUM CHLORIDE 0.9 %
1000 INTRAVENOUS SOLUTION INTRAVENOUS ONCE
Status: COMPLETED | OUTPATIENT
Start: 2024-04-02 | End: 2024-04-02

## 2024-04-02 RX ORDER — NOREPINEPHRINE BITARTRATE 0.06 MG/ML
1-100 INJECTION, SOLUTION INTRAVENOUS CONTINUOUS
Status: DISCONTINUED | OUTPATIENT
Start: 2024-04-02 | End: 2024-04-03

## 2024-04-02 RX ORDER — CHLORHEXIDINE GLUCONATE ORAL RINSE 1.2 MG/ML
15 SOLUTION DENTAL 2 TIMES DAILY
Status: DISCONTINUED | OUTPATIENT
Start: 2024-04-02 | End: 2024-04-09

## 2024-04-02 RX ADMIN — MIDAZOLAM 2 MG: 1 INJECTION INTRAMUSCULAR; INTRAVENOUS at 09:03

## 2024-04-02 RX ADMIN — VANCOMYCIN HYDROCHLORIDE 2500 MG: 1.5 INJECTION, POWDER, LYOPHILIZED, FOR SOLUTION INTRAVENOUS at 15:37

## 2024-04-02 RX ADMIN — Medication 50 MCG/HR: at 11:30

## 2024-04-02 RX ADMIN — VECURONIUM BROMIDE 10 MG: 10 INJECTION, POWDER, LYOPHILIZED, FOR SOLUTION INTRAVENOUS at 09:34

## 2024-04-02 RX ADMIN — PROPOFOL 15 MCG/KG/MIN: 10 INJECTION, EMULSION INTRAVENOUS at 22:20

## 2024-04-02 RX ADMIN — CISATRACURIUM BESYLATE 2.5 MCG/KG/MIN: 10 INJECTION INTRAVENOUS at 23:23

## 2024-04-02 RX ADMIN — Medication 4 MCG/MIN: at 15:29

## 2024-04-02 RX ADMIN — CHLORHEXIDINE GLUCONATE 0.12% ORAL RINSE 15 ML: 1.2 LIQUID ORAL at 20:28

## 2024-04-02 RX ADMIN — PROPOFOL 20 MG: 10 INJECTION, EMULSION INTRAVENOUS at 09:07

## 2024-04-02 RX ADMIN — POTASSIUM CHLORIDE 10 MEQ: 7.46 INJECTION, SOLUTION INTRAVENOUS at 07:47

## 2024-04-02 RX ADMIN — CISATRACURIUM BESYLATE 2 MCG/KG/MIN: 10 INJECTION INTRAVENOUS at 17:09

## 2024-04-02 RX ADMIN — PROPOFOL 20 MG: 10 INJECTION, EMULSION INTRAVENOUS at 09:08

## 2024-04-02 RX ADMIN — WARFARIN SODIUM 1 MG: 1 TABLET ORAL at 18:10

## 2024-04-02 RX ADMIN — MIDAZOLAM 2 MG: 1 INJECTION INTRAMUSCULAR; INTRAVENOUS at 09:05

## 2024-04-02 RX ADMIN — Medication 5 MCG/MIN: at 12:28

## 2024-04-02 RX ADMIN — LEVALBUTEROL HYDROCHLORIDE 1.25 MG: 1.25 SOLUTION RESPIRATORY (INHALATION) at 07:18

## 2024-04-02 RX ADMIN — CISATRACURIUM BESYLATE 2.5 MCG/KG/MIN: 10 INJECTION INTRAVENOUS at 18:56

## 2024-04-02 RX ADMIN — Medication 75 MCG/HR: at 20:24

## 2024-04-02 RX ADMIN — CISATRACURIUM BESYLATE 2 MCG/KG/MIN: 10 INJECTION INTRAVENOUS at 12:23

## 2024-04-02 RX ADMIN — CHLORHEXIDINE GLUCONATE 0.12% ORAL RINSE 15 ML: 1.2 LIQUID ORAL at 13:25

## 2024-04-02 RX ADMIN — Medication 75 MCG/HR: at 12:02

## 2024-04-02 RX ADMIN — EPOPROSTENOL 50 NG/KG/MIN: 1.5 INJECTION, POWDER, LYOPHILIZED, FOR SOLUTION INTRAVENOUS at 11:15

## 2024-04-02 RX ADMIN — CISATRACURIUM BESYLATE 2.5 MCG/KG/MIN: 10 INJECTION INTRAVENOUS at 12:45

## 2024-04-02 RX ADMIN — PROPOFOL 10 MCG/KG/MIN: 10 INJECTION, EMULSION INTRAVENOUS at 11:32

## 2024-04-02 RX ADMIN — EPOPROSTENOL 50 NG/KG/MIN: 1.5 INJECTION, POWDER, LYOPHILIZED, FOR SOLUTION INTRAVENOUS at 17:53

## 2024-04-02 RX ADMIN — POTASSIUM CHLORIDE 10 MEQ: 7.46 INJECTION, SOLUTION INTRAVENOUS at 06:29

## 2024-04-02 RX ADMIN — DEXMEDETOMIDINE HYDROCHLORIDE 0.4 MCG/KG/HR: 400 INJECTION INTRAVENOUS at 00:45

## 2024-04-02 RX ADMIN — FENTANYL CITRATE 100 MCG: 0.05 INJECTION, SOLUTION INTRAMUSCULAR; INTRAVENOUS at 09:03

## 2024-04-02 RX ADMIN — SODIUM CHLORIDE, PRESERVATIVE FREE 20 MG: 5 INJECTION INTRAVENOUS at 20:28

## 2024-04-02 RX ADMIN — DEXMEDETOMIDINE HYDROCHLORIDE 0.6 MCG/KG/HR: 400 INJECTION INTRAVENOUS at 06:57

## 2024-04-02 RX ADMIN — SODIUM CHLORIDE, PRESERVATIVE FREE 20 MG: 5 INJECTION INTRAVENOUS at 13:25

## 2024-04-02 RX ADMIN — CEFTAZIDIME, AVIBACTAM 2.5 G: 2; .5 POWDER, FOR SOLUTION INTRAVENOUS at 15:29

## 2024-04-02 RX ADMIN — SODIUM CHLORIDE 1000 ML: 9 INJECTION, SOLUTION INTRAVENOUS at 13:25

## 2024-04-02 RX ADMIN — CEFTAZIDIME, AVIBACTAM 2.5 G: 2; .5 POWDER, FOR SOLUTION INTRAVENOUS at 22:16

## 2024-04-02 RX ADMIN — PROPOFOL 5 MG: 10 INJECTION, EMULSION INTRAVENOUS at 09:05

## 2024-04-02 ASSESSMENT — PULMONARY FUNCTION TESTS
PIF_VALUE: 35
PIF_VALUE: 36
PIF_VALUE: 35
PIF_VALUE: 35
PIF_VALUE: 37
PIF_VALUE: 39
PIF_VALUE: 35
PIF_VALUE: 40
PIF_VALUE: 36
PIF_VALUE: 34
PIF_VALUE: 35
PIF_VALUE: 38
PIF_VALUE: 34
PIF_VALUE: 35
PIF_VALUE: 33
PIF_VALUE: 36
PIF_VALUE: 36
PIF_VALUE: 35
PIF_VALUE: 36
PIF_VALUE: 35
PIF_VALUE: 34
PIF_VALUE: 36
PIF_VALUE: 37
PIF_VALUE: 35
PIF_VALUE: 34
PIF_VALUE: 36
PIF_VALUE: 30
PIF_VALUE: 36
PIF_VALUE: 34
PIF_VALUE: 35
PIF_VALUE: 35
PIF_VALUE: 34
PIF_VALUE: 36
PIF_VALUE: 35
PIF_VALUE: 34
PIF_VALUE: 46
PIF_VALUE: 37
PIF_VALUE: 36
PIF_VALUE: 36
PIF_VALUE: 37
PIF_VALUE: 36
PIF_VALUE: 34
PIF_VALUE: 35
PIF_VALUE: 37
PIF_VALUE: 34
PIF_VALUE: 33
PIF_VALUE: 36
PIF_VALUE: 35
PIF_VALUE: 37
PIF_VALUE: 36
PIF_VALUE: 35
PIF_VALUE: 36
PIF_VALUE: 35
PIF_VALUE: 36
PIF_VALUE: 43
PIF_VALUE: 35
PIF_VALUE: 34
PIF_VALUE: 36

## 2024-04-02 NOTE — PROGRESS NOTES
Pt intubated with 8.0 ET at 26 cm at teeth per Dr. Agustin.ETCO2 color change and bilateral BS. Pt placed on vent with initial vent settings PRVC 20, 450, FIO2 100%, PEEP 12.

## 2024-04-02 NOTE — PROCEDURES
Procedure: Left brachial arterial line placement    Indication: Critically ill, hypotensive, need for frequent ABGs with difficult to obtain blood gas by respiratory therapy    Physician: ARDEN Agustin    Description:  Left brachial site prepped and draped in sterile fashion.  Artery accessed easily.  Guidewire inserted without difficulty.  Catheter inserted over guidewire.  Positive arterial waveform on manometer.  Line sutured in place with sterile dressing applied.  Estimated blood loss less than 5 mL.    No immediate complications    Richmond Agustin MD  Pulmonary and Critical Care  [phone] [employer]  [phone] Cell

## 2024-04-02 NOTE — PROGRESS NOTES
Attempted to put pt on BiPAP for sleep. Pt immediately became tachypneic, anxious and restless. RN increased Precedex with no improvement. Pt placed back on HHFNC, improved.

## 2024-04-02 NOTE — PROGRESS NOTES
Patient restless and desatting to 78% while on HHFNC. Precedex increased and patient placed on Bipap. Now satting at 93%.

## 2024-04-02 NOTE — PROGRESS NOTES
RT attempted to place opt on HHFNC, patient desattin ginto 70s, RR in 40s. Patient placed back on BiPAP at 75% fio2. Precedex gtt increased to 0.8 to decrease anxiety. Spo2 89%

## 2024-04-02 NOTE — PROGRESS NOTES
Date:   4/2/2024  Patient name: Saurav Stacy  Date of admission:  3/24/2024  7:53 PM  MRN:   [id number]  YOB: 1945  PCP: Jose Raul Manley MD    Reason for Admission: Congestive heart failure of unknown etiology (HCC) [I50.9]  Community acquired pneumonia, unspecified laterality [J18.9]    Cardiology follow-up: Congestive heart failure diastolic acute on chronic, A-fib, permanent pacemaker, history of aortic valve replacement        Referring physician: Dr Judy Estrada     Impression     Admission 3/24/2024 with increasing shortness of breath, chest discomfort, influenza A, influenza B, COVID not detected  CHF diastolic, pulmonary congestion as per chest x-ray, bilateral pleural effusion more on the right as per CT abdomen  Ejection fraction 50 to 55%, moderate pulmonary hypertension RVSP 51 mmHg  Aortic Valve replacement  S/P Pacemaker / V Paced, A-fib patient is on warfarin  Obesity BMI 39, sleep apnea  Diabetes mellitus  Hyperlipidemia  Kidney stone  Impaired hearing     Past surgical history includes aortic valve replacement, right foot surgery, colonoscopies, bilateral knee arthroplasty, eye surgery, facial reconstruction surgery right side, premalignant benign skin lesion excision, neck surgery    History of present illness     78 years old  male, ex smoker, morbid obesity with a past medical history of aortic valve replacement, permanent pacemaker placement got hospitalized 3/24/2024 with increasing shortness of breath, generalized weakness ongoing for past few days.  He has received antibiotics for suspected pneumonia.  His symptoms got worse and he came to the emergency department.  Chest x-ray on admission showed pulmonary congestion.  CT abdomen few days ago showed bilateral pleural effusion.  ECG on admission showed ventricular paced rhythm.  He received IV diuretics in the ER.     He had right knee replacement in January 2024 prior to that he was doing well and he was not short of  behavior of skin     Acute renal failure with tubular necrosis (HCC)     Essential hypertension     Class 3 severe obesity due to excess calories with body mass index (BMI) of 40.0 to 44.9 in adult (HCC)     Transaminitis     ASCVD (arteriosclerotic cardiovascular disease)     Chronic diastolic congestive heart failure (HCC)     Current use of long term anticoagulation     Obstructive sleep apnea     Renal failure     Non-traumatic rhabdomyolysis     Elevated LFTs     Congestive heart failure of unknown etiology (HCC)      Plan of Treatment:   Medications reviewed  Case discussed with the pulmonologist KHRIS Agustin  Patient had endotracheal intubation and bronchoscopy with right middle lobe BAL  Postprocedure revealed large pneumothorax, successful placement of pigtail catheter, significant improvement in the pneumothorax  Patient is on ventilator and sedated  Hold diuretics  Give normal saline 1000 mL 5 hours  Keep systolic blood pressure above 100 started on IV inotropes   May need IV metoprolol  Oral medications on hold  Prognosis guarded        Electronically signed by Teo Marie MD on 4/2/2024 at 12:04 PM

## 2024-04-02 NOTE — PROGRESS NOTES
Update provided to Dr. Estrada. New order received for one time dose of 20 IV lasix     Dr. Estrada called, spoke to Dr. Marie. They would like to hold off on lasix at this time

## 2024-04-02 NOTE — CONSULTS
[facility] -   Infectious diseases evaluation  admission date 3/24/2024    reason for consultation:   Pneumonia and leukocytosis    Impression :   Current:  CHF with possible pneumonia   Leukocytosis   Bilateral pleural effusion  Acute respiratory failure   S/P aortic valve replacement  S/P Pacemaker   Afib  DM  Obesity      HENCE:   IV Zosyn  CT abdomen pelvis was ordered, will follow  Urine for strep pneumo and Legionella antigen  Mycoplasma IgM  Nasal swab for MRSA was - 3/27/2024  No growth on blood cultures from 3/24/2024  Respiratory panel with COVID-19 - 3/27/2024  He received a course of Zithromax 3/27/2024 through 3/31/2024.  He received cefepime 3/27/2024 through 4/1/2024  Follow CBC and renal function  Echocardiogram from 3/25/2024 reviewed    Infection Control Recommendations   [place] Precautions      Antimicrobial Stewardship Recommendations   Simplification of therapy  Targeted therapy      History of Present Illness:   Initial history:  Saurav Stacy is a 78 y.o.-year-old male presented to hospital for worsening shortness of breath associated with cough and generalized weakness for several days prior to admission.  He also had bilateral lower extremity edema.-Symptoms are moderate to severe, no alleviating or aggravating factors.  Initial WBC was 13.5, proBNP elevated more than 10,000, procalcitonin 0.17  Chest x-ray showed diffuse bilateral infiltrates  He has been afebrile, on steroids.  He is on high flow oxygen, awake, denied increased cough or shortness of breath, denied abdominal pain, no diarrhea, no vomiting.  WBC increased to 21 on 4/1/2024, has been on steroids.  He had no Cr catheter, peripheral lines in place  Interval changes  4/1/2024           I have personally reviewed the past medical history, past surgical history, medications, social history, and family history, and I haveupdated the database accordingly.      Allergies:  Sign     Worried About Running Out of Food in the Last Year: Never true     Ran Out of Food in the Last Year: Never true   Transportation Needs: No Transportation Needs (3/24/2024)    PRAPARE - Transportation     Lack of Transportation (Medical): No     Lack of Transportation (Non-Medical): No   Physical Activity: Not on file   Stress: Not on file   Social Connections: Not on file   Intimate Partner Violence: Not on file   Housing Stability: Low Risk  (3/24/2024)    Housing Stability Vital Sign     Unable to Pay for Housing in the Last Year: No     Number of Places Lived in the Last Year: 1     Unstable Housing in the Last Year: No       Family History:     Family History   Problem Relation Age of Onset    Cancer Mother     Diabetes Paternal Grandmother       Medical Decision Making:   I have independently reviewed/ordered the following labs:    CBC with Differential:   Recent Labs     03/31/24  0436 04/01/24 0444   WBC 15.6* 21.4*   HGB 11.6* 11.8*   HCT 36.7* 37.9*    334   LYMPHOPCT 4* 4*   MONOPCT 2 0*     BMP:  Recent Labs     03/31/24  0436 04/01/24 0444    136   K 4.4 3.8   CL 98 95*   CO2 25 27   BUN 27* 27*   CREATININE 0.6* 0.6*     Hepatic Function Panel:   Recent Labs     03/30/24  0344 04/01/24 0444   PROT  --  6.4   LABALBU 3.3* 3.1*   BILIDIR  --  2.8*   IBILI  --  1.3*   BILITOT  --  4.1*   ALKPHOS  --  204*   ALT  --  33   AST  --  28       Lab Results   Component Value Date/Time    CREATININE 0.6 04/01/2024 04:44 AM    GLUCOSE 141 04/01/2024 04:44 AM       Detailed results:        Thank you for allowing us to participate in the care of this patient.Please call with questions.    This note is created with the assistance of a speech recognition program.  While intending to generate adocument that actually reflects the content of the visit, the document can still have some errors including those of syntax and sound a like substitutions which may escape proof reading.  It such

## 2024-04-02 NOTE — PROGRESS NOTES
Writer called [facility] office to relay pt's wife's request for the [facility]. A  will come in the morning.

## 2024-04-02 NOTE — PROGRESS NOTES
Pharmacy Consult      Saurav Stacy is a 78 y.o. male for whom pharmacy has been consulted to dose Avycaz.    Patient Active Problem List   Diagnosis    Basal cell carcinoma of right forehead    Neoplasm of uncertain behavior of skin    Acute renal failure with tubular necrosis (HCC)    Essential hypertension    Class 3 severe obesity due to excess calories with body mass index (BMI) of 40.0 to 44.9 in adult (HCC)    Transaminitis    ASCVD (arteriosclerotic cardiovascular disease)    Chronic diastolic congestive heart failure (HCC)    Current use of long term anticoagulation    Obstructive sleep apnea    Renal failure    Non-traumatic rhabdomyolysis    Elevated LFTs    Congestive heart failure of unknown etiology (HCC)       Allergies:  Seasonal     Recent Labs     04/01/24  0444 04/02/24  0405   CREATININE 0.6* 0.6*       Ht/Wt:   Ht Readings from Last 1 Encounters:   04/01/24 1.727 m (5' 7.99\")        Wt Readings from Last 1 Encounters:   04/01/24 106.3 kg (234 lb 5.6 oz)         Estimated Creatinine Clearance: 116 mL/min (A) (based on SCr of 0.6 mg/dL (L)).    Assessment/Plan:    Will initiate Avycaz 2.5 g q8h based on indication and renal function.     Thank you for the consult.  Will continue to follow.    Toma Ivey, PharmD, BCPS  4/2/2024 3:24 PM

## 2024-04-02 NOTE — PROGRESS NOTES
[facility] PULMONARY,CRITICAL CARE & SLEEP   Alek Lee MD/Fer Agustin MD/ Milton Rayo MD/Yuri MCKAY AGACNP-BC, NP-C      Marielle Cortez APRN NP-C     Merlyn MCKAY NP-C                                           Pulmonary Critical Care Progress Note    Patient - Saurav Stacy   Age - 78 y.o.   - 1945  MRN - [id number]  [facility] # - [id number]  Date of Admission - 3/24/2024  7:53 PM    Consulting Service/Physician:       Primary Care Physician: Jose Raul Manley MD    SUBJECTIVE:     Chief Complaint:   Chief Complaint   Patient presents with    Shortness of Breath   Acute hypoxemic respiratory failure  Subjective:    I was contacted earlier by nursing staff stating that the patient had had significant deterioration switching from high flow to noninvasive ventilation with high oxygen concentration needs as well as high respiratory rate with respiratory distress not resolving with initiation of Precedex.  On arrival the patient was saturating between 90 and 93% on 80% FiO2 with a respiratory rate of 44.  He looks in moderate respiratory distress.    I spoke with his wife Bhumika on the phone.  She states that she discussed things with her children and they wish for him to remain a full code inclusive of intubation.  She is agreeable for me to proceed with intubation at this time.    I do note that the patient was febrile overnight Tmax 100.5 with elevated white blood cell count currently 19,000.  MRSA nasal swab negative.  Blood cultures x 2 negative.  Urine Legionella and streptococcal antigen is pending.  Mycoplasma IgM antibody pending.  Patient had been on azithromycin and cefepime switched to Zosyn.  This really does not change the antimicrobial coverage in my opinion.  He also had a respiratory pathogen panel negative.    Apparently had some waxing and waning respiratory distress but severely decompensated this morning.    VITALS  BP (!) 133/53   Pulse 61

## 2024-04-02 NOTE — PROGRESS NOTES
DR HERNÁNDEZ      PROGRESS NOTE        Patient:  Saurav Stacy  YOB: 1945    MRN: [id number]     Acct: [id number]     Admit date: 3/24/2024    Pt seen and Chart reviewed.  Consultant notes reviewed and care evaluated.    Subjective: Patient is awake he still feels short of breath.  He still requiring the BiPAP to hold even with the high flow he desats.  His gases look better though.  He did have a CT abdomen pelvis yesterday that showed some anasarca like and evidence of may be cirrhosis which explains why his INR was high for almost a week finally had this down to 2.9 after the vitamin K his white count seems to be holding at 19 has not got up higher.  Was told he had a chest x-ray this morning but I cannot find any films or results yet.  He is getting Precedex this morning he is getting somewhat anxious according to his RN    Diet:  ADULT DIET; Regular; Low Fat/Low Chol/High Fiber/2 gm Na  ADULT ORAL NUTRITION SUPPLEMENT; Breakfast, Lunch, Dinner; Diabetic Oral Supplement      Medications:Current Inpatient    Scheduled Meds:   potassium chloride  10 mEq IntraVENous Q2H    piperacillin-tazobactam  3,375 mg IntraVENous Q8H    [Held by provider] furosemide  40 mg IntraVENous BID    levalbuterol  1.25 mg Nebulization Q4H While awake    spironolactone  50 mg Oral Daily    empagliflozin  10 mg Oral Daily    warfarin placeholder: dosing by provider   Other RX Placeholder    lisinopril  10 mg Oral Daily    metoprolol tartrate  25 mg Oral BID    pantoprazole  40 mg Oral QAM AC    potassium chloride  20 mEq Oral Daily with breakfast     Continuous Infusions:   dexmedeTOMIDine HCl in NaCl 0.6 mcg/kg/hr (04/02/24 0657)     PRN Meds:Benzocaine-Menthol, levalbuterol, acetaminophen        Physical Exam:  Vitals: BP (!) 115/41   Pulse 63   Temp 98.6 °F (37 °C) (Axillary)   Resp (!) 37   Ht 1.727 m (5' 7.99\")   Wt 106.3 kg (234 lb 5.6 oz)   SpO2 (!)  replacement.  Cardiomegaly. Cardiomediastinal silhouette and bony thorax are unchanged.  Extensive multifocal airspace disease is stable. No evidence of pleural  effusion or pneumothorax.   Impression:     Stable extensive multifocal airspace disease that may represent pneumonia  and/or ARDS.  Stable exam.         Assessment:  Principal Problem:    Congestive heart failure of unknown etiology (HCC)  Resolved Problems:    * No resolved hospital problems. *     Basal cell carcinoma of right forehead C44.319      Neoplasm of uncertain behavior of skin D48.5    Acute renal failure with tubular necrosis (HCC) N17.0    Essential hypertension I10    Class 3 severe obesity due to excess calories with body mass index (BMI) of 40.0 to 44.9 in adult (HCC) E66.01, Z68.41    Transaminitis R74.01    ASCVD (arteriosclerotic cardiovascular disease) I25.10    Chronic diastolic congestive heart failure (HCC) I50.32    Current use of long term anticoagulation Z79.01    Obstructive sleep apnea G47.33    Renal failure N19    Non-traumatic rhabdomyolysis M62.82    Elevated LFTs R79.89    Congestive heart failure of unknown etiology (HCC) I50.9      Acute on chronic diastolic CHF  Pulmonary congestion on my reading his x-ray with cardiomegaly and evidence of a pacemaker  Dyspnea on exertion  Acute bronchitis with possible underlying infiltrate  STANLEY by history  Hypertension  Hypokalemia  Hypercoag INR 4.1   Worsening chest x-ray on my reading with opacification and questionable mucus or aspiration  His BNP has dropped  Increased oxygen requirement and short of breath  Yesterday chest x-ray still shows possible pulmonary edema  Increase in his BNP still higher this more  Leukocytosis probably secondary to  Episode of ICU psychosis last night resolved  Chest x-ray on my reading may be slight improvement from yesterday at least on the left upper lobe but continued vascular congestion and opacification  Hypoxic respiratory failure  Hyper coag

## 2024-04-02 NOTE — PROGRESS NOTES
Secure message sent to Dr. Lee notifying him of patients condition. new order to change pt to ICU status

## 2024-04-02 NOTE — PROGRESS NOTES
Dr. Lee updated that this patient is tachypneic, anxious and not tolerating the Bipap. HHFNC placed back on patient and currently satting at 96%.

## 2024-04-02 NOTE — PROGRESS NOTES
Vent changes made RR 20, PEEP 15, 100%    0921 Increased PEEP to 20    0922 Increased RR to 24    0928 Increased RR to  26    0939 Increased RR to 28    1013 Decreased PEEP to 10

## 2024-04-02 NOTE — CARE COORDINATION
ONGOING DISCHARGE PLAN:    Intubated today  VENT 80% (DAY 1)  Nimbex, epoprostenol    4/2 Bronch    Chest Tube Left side    IV vanco/avycaz per ID    Levophed    INR 2.9    Lactic acid elevated, critically ill    Will continue to follow for additional discharge needs.    If patient is discharged prior to next notation, then this note serves as note for discharge by case management.    Electronically signed by Emelyn Julien RN on 4/2/2024 at 1:21 PM

## 2024-04-02 NOTE — PROGRESS NOTES
Vancomycin Dosing by Pharmacy - Initial Note   TODAY'S DATE:  4/2/2024  Patient name, age:  Saurav Stacy, 78 y.o.    Indication: Respiratory infection, MRSA suspected  Additional antimicrobials:  Avycaz    Allergies:  Seasonal   Actual Weight:    Wt Readings from Last 1 Encounters:   04/01/24 106.3 kg (234 lb 5.6 oz)     Labs/Ancillary Data  Estimated Creatinine Clearance: 116 mL/min (A) (based on SCr of 0.6 mg/dL (L)).  Recent Labs     03/31/24  0436 04/01/24  0444 04/02/24  0405   CREATININE 0.6* 0.6* 0.6*   BUN 27* 27* 24*   WBC 15.6* 21.4* 19.0*     Procalcitonin   Date Value Ref Range Status   04/02/2024 0.36 (H) <0.09 ng/mL Final     Comment:           Suspected Sepsis:  <0.50 ng/mL     Low likelihood of sepsis.  0.50-2.00 ng/mL     Increased likelihood of sepsis. Antibiotics encouraged.  >2.00 ng/mL     High risk of sepsis/shock. Antibiotics strongly encouraged.        Suspected Lower Resp Tract Infections:  <0.24 ng/mL     Low likelihood of bacterial infection.  >0.24 ng/mL     Increased likelihood of bacterial infection. Antibiotics encouraged.        With successful antibiotic therapy, PCT levels should decrease rapidly. (Half-life of 24 to   36 hours.)        Procalcitonin values from samples collected within the first 6 hours of systemic infection   may still be low. Retesting may be indicated.  Values from day 1 and day 4 can be entered into the Change in Procalcitonin Calculator   ([email or web address]-pct-[email or web address]) to determine the patient's Mortality Risk Prognosis        In healthy neonates, plasma Procalcitonin (PCT) concentrations increase gradually after   birth, reaching peak values at about 24 hours of age then decrease to normal values below   0.5 ng/mL by 48-72 hours of age.         Intake/Output Summary (Last 24 hours) at 4/2/2024 1429  Last data filed at 4/2/2024 0700  Gross per 24 hour   Intake 968.03 ml   Output 1300 ml   Net -331.97 ml     Temp: 98.6    Recent vancomycin administrations

## 2024-04-02 NOTE — PLAN OF CARE
Problem: Discharge Planning  Goal: Discharge to home or other facility with appropriate resources  4/2/2024 0304 by She Hopson, RN  Outcome: Progressing  Flowsheets (Taken 4/1/2024 2000)  Discharge to home or other facility with appropriate resources:   Identify barriers to discharge with patient and caregiver   Arrange for needed discharge resources and transportation as appropriate   Identify discharge learning needs (meds, wound care, etc)   Arrange for interpreters to assist at discharge as needed   Refer to discharge planning if patient needs post-hospital services based on physician order or complex needs related to functional status, cognitive ability or social support system     Problem: Safety - Adult  Goal: Free from fall injury  4/2/2024 0304 by She Hopson, RN  Outcome: Progressing  Flowsheets (Taken 4/1/2024 2055)  Free From Fall Injury: Instruct family/caregiver on patient safety     Problem: ABCDS Injury Assessment  Goal: Absence of physical injury  4/2/2024 0304 by She Hopson RN  Outcome: Progressing  Flowsheets (Taken 4/1/2024 2055)  Absence of Physical Injury: Implement safety measures based on patient assessment     Problem: Chronic Conditions and Co-morbidities  Goal: Patient's chronic conditions and co-morbidity symptoms are monitored and maintained or improved  Outcome: Progressing  Flowsheets (Taken 4/1/2024 2000)  Care Plan - Patient's Chronic Conditions and Co-Morbidity Symptoms are Monitored and Maintained or Improved:   Monitor and assess patient's chronic conditions and comorbid symptoms for stability, deterioration, or improvement   Collaborate with multidisciplinary team to address chronic and comorbid conditions and prevent exacerbation or deterioration   Update acute care plan with appropriate goals if chronic or comorbid symptoms are exacerbated and prevent overall improvement and discharge     Problem: Respiratory - Adult  Goal: Achieves optimal ventilation  scan as needed     Problem: Infection - Adult  Goal: Absence of infection at discharge  Outcome: Progressing  Flowsheets (Taken 4/1/2024 2000)  Absence of infection at discharge:   Assess and monitor for signs and symptoms of infection   Monitor lab/diagnostic results   Monitor all insertion sites i.e., indwelling lines, tubes and drains     Problem: Metabolic/Fluid and Electrolytes - Adult  Goal: Electrolytes maintained within normal limits  Outcome: Progressing  Flowsheets (Taken 4/1/2024 2000)  Electrolytes maintained within normal limits:   Monitor labs and assess patient for signs and symptoms of electrolyte imbalances   Administer electrolyte replacement as ordered   Monitor response to electrolyte replacements, including repeat lab results as appropriate   Fluid restriction as ordered   Instruct patient on fluid and nutrition restrictions as appropriate     Problem: Metabolic/Fluid and Electrolytes - Adult  Goal: Hemodynamic stability and optimal renal function maintained  Outcome: Progressing  Flowsheets (Taken 4/1/2024 2000)  Hemodynamic stability and optimal renal function maintained:   Monitor labs and assess for signs and symptoms of volume excess or deficit   Monitor intake, output and patient weight     Problem: Hematologic - Adult  Goal: Maintains hematologic stability  Outcome: Progressing  Flowsheets (Taken 4/1/2024 2000)  Maintains hematologic stability:   Assess for signs and symptoms of bleeding or hemorrhage   Monitor labs for bleeding or clotting disorders   Administer blood products/factors as ordered

## 2024-04-02 NOTE — PROGRESS NOTES
[facility] -   Infectious diseases evaluation  admission date 3/24/2024    reason for consultation:   Pneumonia and leukocytosis    Impression :   Current:  CHF   Multifocal pneumonia   Sepsis  Shock multifactorial  Status post bronchoscopy 4/2/24  Acute respiratory failure/intubated 4/2/24  S/P aortic valve replacement  S/P Pacemaker   Afib  DM  Obesity  QT prolongation QTc 574 on 3/29/2024      HENCE:   Discontinue IV Zosyn  Follow blood and BAL culture, adjust antibiotics as needed  IV vancomycin and Avycaz  Discussed with Dr. Agustin and Dr. Marie  Avoid Levaquin due to QT prolongation  Urine for strep pneumo and Legionella antigen negative  Mycoplasma IgM pending  Nasal swab for MRSA was - 3/27/2024  No growth on blood cultures from 3/24/2024  Respiratory panel with COVID-19 - 3/27/2024  He received a course of Zithromax 3/27/2024 through 3/31/2024.  He received cefepime 3/27/2024 through 4/1/2024  Follow CBC and renal function  Echocardiogram from 3/25/2024 reviewed    Infection Control Recommendations   [place] Precautions      Antimicrobial Stewardship Recommendations   Simplification of therapy  Targeted therapy      History of Present Illness:   Initial history:  Saurav Stacy is a 78 y.o.-year-old male presented to hospital for worsening shortness of breath associated with cough and generalized weakness for several days prior to admission.  He also had bilateral lower extremity edema.-Symptoms are moderate to severe, no alleviating or aggravating factors.  Initial WBC was 13.5, proBNP elevated more than 10,000, procalcitonin 0.17  Chest x-ray showed diffuse bilateral infiltrates    WBC increased to 21 on 4/1/2024, has been on steroids.  He had no Cr catheter, peripheral lines in place  Interval changes  4/2/2024   The patient deteriorated, required intubation, had a fever with a temperature max of 100.5, right IJ line was placed, bronchoscopy was done,  pack/day for 10.0 years (10.0 ttl pk-yrs)     Types: Cigarettes     Start date: 1974     Quit date: 1984     Years since quittin.6    Smokeless tobacco: Never   Vaping Use    Vaping Use: Never used   Substance and Sexual Activity    Alcohol use: Not Currently     Alcohol/week: 0.0 standard drinks of alcohol    Drug use: No    Sexual activity: Not on file   Other Topics Concern    Not on file   Social History Narrative    Not on file     Social Determinants of Health     Financial Resource Strain: Not on file   Food Insecurity: No Food Insecurity (3/24/2024)    Hunger Vital Sign     Worried About Running Out of Food in the Last Year: Never true     Ran Out of Food in the Last Year: Never true   Transportation Needs: No Transportation Needs (3/24/2024)    PRAPARE - Transportation     Lack of Transportation (Medical): No     Lack of Transportation (Non-Medical): No   Physical Activity: Not on file   Stress: Not on file   Social Connections: Not on file   Intimate Partner Violence: Not on file   Housing Stability: Low Risk  (3/24/2024)    Housing Stability Vital Sign     Unable to Pay for Housing in the Last Year: No     Number of Places Lived in the Last Year: 1     Unstable Housing in the Last Year: No       Family History:     Family History   Problem Relation Age of Onset    Cancer Mother     Diabetes Paternal Grandmother       Medical Decision Making:   I have independently reviewed/ordered the following labs:    CBC with Differential:   Recent Labs     244 24  0405   WBC 21.4* 19.0*   HGB 11.8* 11.1*   HCT 37.9* 35.0*    315   LYMPHOPCT 4* 7*   MONOPCT 0* 5       BMP:  Recent Labs     244 24  0405    140   K 3.8 3.4*   CL 95* 97*   CO2 27 27   BUN 27* 24*   CREATININE 0.6* 0.6*       Hepatic Function Panel:   Recent Labs     24  0444   PROT 6.4   LABALBU 3.1*   BILIDIR 2.8*   IBILI 1.3*   BILITOT 4.1*   ALKPHOS 204*   ALT 33   AST 28         Lab

## 2024-04-02 NOTE — PROGRESS NOTES
Comprehensive Nutrition Assessment    Type and Reason for Visit:  Consult (TF ordering and management)    Nutrition Recommendations/Plan:   Continue NPO as ordered.  Follow for nutrition progression.     Malnutrition Assessment:  Malnutrition Status:  Mild malnutrition (03/31/24 1404)    Context:  Acute Illness     Findings of the 6 clinical characteristics of malnutrition:  Energy Intake:  75% or less of estimated energy requirements for 7 or more days  Weight Loss:  Unable to assess (9.9% x 1 yr. More suspected but unable to evaluate due to fluid shifts.)     Body Fat Loss:  Unable to assess     Muscle Mass Loss:  Unable to assess    Fluid Accumulation:  Mild (May not be related to nutritional status but may mask wt loss.) Extremities   Strength:  Not Performed    Nutrition Assessment:    Through the night RN called physician that pt was tachypneic, anxious, and not tolerating bipap. This morning pt had significant deterioration switching from high flow to noninvasive ventilation. Pt had high oxygen concentration & high respiratory rate with wife giving consent to intubate.  No plans to start tube feedings today.    Nutrition Related Findings:    Edema: +1 RUE, LUE; +1 pitting RLE, LLE. Hx: CAD, hypertension, DM, GERD. Random Glucose: 150. Jardiance. Other labs and meds reviewed.         Current Nutrition Intake & Therapies:    Average Meal Intake: NPO  Average Supplements Intake: Unable to assess  Diet NPO    Anthropometric Measures:  Height: 172.7 cm (5' 7.99\")  Ideal Body Weight (IBW): 154 lbs (70 kg)    Admission Body Weight: 116.1 kg (255 lb 15.3 oz) (Method not specified)  Current Body Weight: 106.1 kg (234 lb), 151.9 % IBW. Weight Source: Bed Scale  Current BMI (kg/m2): 35.6  Usual Body Weight: 128.8 kg (283 lb 15.2 oz) (3/2/23. Method not specified.)  % Weight Change (Calculated): -9.9                    BMI Categories: Obese Class 2 (BMI 35.0 -39.9)    Estimated Daily Nutrient Needs:  Energy

## 2024-04-02 NOTE — PROCEDURES
Oral endotracheal intubation 8.0 endotracheal tube  Bronchoscopy with right middle lobe BAL  Right jugular central line placed under ultrasound guidance  Left-sided small bore chest tube placed anteriorly    Indication: Severe hypoxemic respiratory failure with respiratory distress, verbal consent obtained from the wife over the phone for the intubation and bronchoscopy and central line placement.  Chest tube was placed emergently.    Physician: ARDEN Agustin    Description:  Patient sedated sequentially with a total of 4 mg of Versed, 100 mcg of fentanyl, 90 mg of propofol.  Orally intubated without difficulty on first attempt 8.0 endotracheal tube fixed at 26 cm at the teeth.  Positive CO2 color change.    Bronchoscopy performed at bedside verified that the endotracheal tube was in the distal trachea in appropriate location.  Dedicated right middle lobe BAL performed which will be sent for routine cultures and cell count.    Right jugular site prepped and draped in sterile fashion.  Anesthesia with 1 mL 1% lidocaine.  Vein accessed on first attempt.  Guidewire inserted.  Catheter inserted over guidewire.  All 3 ports flush and draw easily.  Line sutured in place with sterile dressing applied.    Chest x-ray following procedure showed a large tension left-sided pneumothorax.  Presumably occurred due to resistance Ambu bagging the patient.  It is worthy of mention that the PEEP was not increased beyond 8 until after the patient started having oxygen desaturation not responsive to maneuvers.    Small bore anterior pigtail chest tube placed with air return.  Immediate oxygen saturation improvement from 70% to 93%.  Chest tube secured in place and connected to suction awaiting repeat chest x-ray to verify improvement in left-sided pneumothorax.    Estimated blood loss for combined procedures less than 5 mL    No obvious immediate complications appreciated.    Richmond Agustin MD  Pulmonary and Critical Care  [phone] Perfect

## 2024-04-02 NOTE — PLAN OF CARE
Problem: Discharge Planning  Goal: Discharge to home or other facility with appropriate resources  Outcome: Progressing     Problem: ABCDS Injury Assessment  Goal: Absence of physical injury  Outcome: Progressing     Problem: Chronic Conditions and Co-morbidities  Goal: Patient's chronic conditions and co-morbidity symptoms are monitored and maintained or improved  Outcome: Progressing

## 2024-04-02 NOTE — PROGRESS NOTES
RT entered room at 1957 to assess pt. Pt appeared very anxious with RR in 50s sustained on BiPAP. Settings adjusted to help with tachypnea, aerosol given, BS clear, exhaled VT on BiPAP >800ml. RN at bedside, adjusted Precedex. Pt stated that the BiPAP was making him anxious. RT removed it and put pt on HHFNC 40L 75% to see if tachypnea was d/t anxiety. Pt is tachypneic with RR in upper 20s-30s, however states that he feels much better.

## 2024-04-03 ENCOUNTER — APPOINTMENT (OUTPATIENT)
Dept: GENERAL RADIOLOGY | Age: 79
DRG: 871 | End: 2024-04-03
Attending: INTERNAL MEDICINE
Payer: MEDICARE

## 2024-04-03 LAB
ANION GAP SERPL CALCULATED.3IONS-SCNC: 14 MMOL/L (ref 9–17)
ARTERIAL PATENCY WRIST A: ABNORMAL
BLASTS NFR FLD: ABNORMAL %
BNP SERPL-MCNC: ABNORMAL PG/ML
BODY TEMPERATURE: 37
BUN SERPL-MCNC: 38 MG/DL (ref 8–23)
CALCIUM SERPL-MCNC: 8.3 MG/DL (ref 8.6–10.4)
CHLORIDE SERPL-SCNC: 103 MMOL/L (ref 98–107)
CO2 SERPL-SCNC: 26 MMOL/L (ref 20–31)
COHGB MFR BLD: 1.8 % (ref 0–5)
COHGB MFR BLD: 1.8 % (ref 0–5)
COHGB MFR BLD: 1.9 % (ref 0–5)
CREAT SERPL-MCNC: 1 MG/DL (ref 0.7–1.2)
DATE LAST DOSE: NORMAL
EOSINOPHIL NFR FLD: ABNORMAL %
ERYTHROCYTE [DISTWIDTH] IN BLOOD BY AUTOMATED COUNT: 17.9 % (ref 11.5–14.9)
FIO2 ON VENT: 50 %
FIO2 ON VENT: 50 %
FIO2 ON VENT: 55 %
GAS FLOW.O2 O2 DELIVERY SYS: ABNORMAL L/MIN
GAS FLOW.O2 SETTING OXYMISER: 30 L/MIN
GFR SERPL CREATININE-BSD FRML MDRD: 76 ML/MIN/1.73M2
GLUCOSE SERPL-MCNC: 121 MG/DL (ref 70–99)
HCO3 ARTERIAL: 29.1 MMOL/L (ref 22–26)
HCO3 ARTERIAL: 30.8 MMOL/L (ref 22–26)
HCO3 ARTERIAL: 31.6 MMOL/L (ref 22–26)
HCT VFR BLD AUTO: 36.1 % (ref 41–53)
HGB BLD-MCNC: 10.9 G/DL (ref 13.5–17.5)
INR PPP: 2
LYMPHOCYTES NFR FLD: 94 %
M PNEUMO IGM SER QL IA: 0.03
MAGNESIUM SERPL-MCNC: 2.5 MG/DL (ref 1.6–2.6)
MANUAL DIF COMMENT FLD-IMP: ABNORMAL
MCH RBC QN AUTO: 24.9 PG (ref 26–34)
MCHC RBC AUTO-ENTMCNC: 30.3 G/DL (ref 31–37)
MCV RBC AUTO: 82 FL (ref 80–100)
METHEMOGLOBIN: 0.7 % (ref 0–1.9)
METHEMOGLOBIN: 0.7 % (ref 0–1.9)
METHEMOGLOBIN: 0.8 % (ref 0–1.9)
MONOCYTES NFR FLD: ABNORMAL %
MRSA, DNA, NASAL: NEGATIVE
NEUTROPHILS NFR FLD: 6 %
O2 SAT, ARTERIAL: 92.4 % (ref 95–98)
O2 SAT, ARTERIAL: 94.3 % (ref 95–98)
O2 SAT, ARTERIAL: 94.9 % (ref 95–98)
PCO2 ARTERIAL: 56.7 MMHG (ref 35–45)
PCO2 ARTERIAL: 59.5 MMHG (ref 35–45)
PCO2 ARTERIAL: 60.5 MMHG (ref 35–45)
PEEP RESPIRATORY: 10 CM[H2O]
PH ARTERIAL: 7.32 (ref 7.35–7.45)
PH ARTERIAL: 7.32 (ref 7.35–7.45)
PH ARTERIAL: 7.33 (ref 7.35–7.45)
PLATELET # BLD AUTO: 285 K/UL (ref 150–450)
PMV BLD AUTO: 7.8 FL (ref 6–12)
PO2 ARTERIAL: 77.6 MMHG (ref 80–100)
PO2 ARTERIAL: 86.8 MMHG (ref 80–100)
PO2 ARTERIAL: 99 MMHG (ref 80–100)
POSITIVE BASE EXCESS, ART: 3 MMOL/L (ref 0–2)
POSITIVE BASE EXCESS, ART: 4.6 MMOL/L (ref 0–2)
POSITIVE BASE EXCESS, ART: 5.7 MMOL/L (ref 0–2)
POTASSIUM SERPL-SCNC: 4.3 MMOL/L (ref 3.7–5.3)
PROTHROMBIN TIME: 23 SEC (ref 11.8–14.6)
PT. POSITION: ABNORMAL
RBC # BLD AUTO: 4.4 M/UL (ref 4.5–5.9)
RESPIRATORY RATE: 30
SODIUM SERPL-SCNC: 143 MMOL/L (ref 135–144)
SPECIMEN DESCRIPTION: NORMAL
SURGICAL PATHOLOGY REPORT: NORMAL
TEXT FOR RESPIRATORY: ABNORMAL
TME LAST DOSE: 405 H
TOTAL RATE: 30
UNIDENT CELLS NFR FLD: ABNORMAL %
VANCOMYCIN DOSE: 1250 MG
VANCOMYCIN SERPL-MCNC: 25.3 UG/ML
VENTILATION MODE VENT: ABNORMAL
VT: 500
WBC OTHER # BLD: 23.7 K/UL (ref 3.5–11)

## 2024-04-03 PROCEDURE — 6360000002 HC RX W HCPCS: Performed by: INTERNAL MEDICINE

## 2024-04-03 PROCEDURE — 6370000000 HC RX 637 (ALT 250 FOR IP): Performed by: INTERNAL MEDICINE

## 2024-04-03 PROCEDURE — 71045 X-RAY EXAM CHEST 1 VIEW: CPT

## 2024-04-03 PROCEDURE — 94003 VENT MGMT INPAT SUBQ DAY: CPT

## 2024-04-03 PROCEDURE — 99233 SBSQ HOSP IP/OBS HIGH 50: CPT | Performed by: INTERNAL MEDICINE

## 2024-04-03 PROCEDURE — 83880 ASSAY OF NATRIURETIC PEPTIDE: CPT

## 2024-04-03 PROCEDURE — 94645 CONT INHLJ TX EACH ADDL HOUR: CPT

## 2024-04-03 PROCEDURE — 85610 PROTHROMBIN TIME: CPT

## 2024-04-03 PROCEDURE — A4216 STERILE WATER/SALINE, 10 ML: HCPCS | Performed by: INTERNAL MEDICINE

## 2024-04-03 PROCEDURE — 80048 BASIC METABOLIC PNL TOTAL CA: CPT

## 2024-04-03 PROCEDURE — 2000000000 HC ICU R&B

## 2024-04-03 PROCEDURE — 2700000000 HC OXYGEN THERAPY PER DAY

## 2024-04-03 PROCEDURE — 37799 UNLISTED PX VASCULAR SURGERY: CPT

## 2024-04-03 PROCEDURE — 83735 ASSAY OF MAGNESIUM: CPT

## 2024-04-03 PROCEDURE — 36415 COLL VENOUS BLD VENIPUNCTURE: CPT

## 2024-04-03 PROCEDURE — 2500000003 HC RX 250 WO HCPCS: Performed by: INTERNAL MEDICINE

## 2024-04-03 PROCEDURE — 94640 AIRWAY INHALATION TREATMENT: CPT

## 2024-04-03 PROCEDURE — 82805 BLOOD GASES W/O2 SATURATION: CPT

## 2024-04-03 PROCEDURE — 85027 COMPLETE CBC AUTOMATED: CPT

## 2024-04-03 PROCEDURE — 2580000003 HC RX 258: Performed by: INTERNAL MEDICINE

## 2024-04-03 PROCEDURE — 94761 N-INVAS EAR/PLS OXIMETRY MLT: CPT

## 2024-04-03 PROCEDURE — 80202 ASSAY OF VANCOMYCIN: CPT

## 2024-04-03 RX ORDER — FUROSEMIDE 10 MG/ML
20 INJECTION INTRAMUSCULAR; INTRAVENOUS ONCE
Status: DISCONTINUED | OUTPATIENT
Start: 2024-04-03 | End: 2024-04-03

## 2024-04-03 RX ORDER — NOREPINEPHRINE BITARTRATE 0.06 MG/ML
1-100 INJECTION, SOLUTION INTRAVENOUS CONTINUOUS
Status: DISCONTINUED | OUTPATIENT
Start: 2024-04-03 | End: 2024-04-05

## 2024-04-03 RX ORDER — MAGNESIUM SULFATE 1 G/100ML
1000 INJECTION INTRAVENOUS ONCE
Status: COMPLETED | OUTPATIENT
Start: 2024-04-03 | End: 2024-04-03

## 2024-04-03 RX ORDER — MINERAL OIL AND WHITE PETROLATUM 150; 830 MG/G; MG/G
OINTMENT OPHTHALMIC PRN
Status: DISCONTINUED | OUTPATIENT
Start: 2024-04-03 | End: 2024-04-10 | Stop reason: HOSPADM

## 2024-04-03 RX ADMIN — PROPOFOL 30 MCG/KG/MIN: 10 INJECTION, EMULSION INTRAVENOUS at 12:05

## 2024-04-03 RX ADMIN — CEFTAZIDIME, AVIBACTAM 2.5 G: 2; .5 POWDER, FOR SOLUTION INTRAVENOUS at 14:38

## 2024-04-03 RX ADMIN — EPOPROSTENOL 50 NG/KG/MIN: 1.5 INJECTION, POWDER, LYOPHILIZED, FOR SOLUTION INTRAVENOUS at 00:30

## 2024-04-03 RX ADMIN — VANCOMYCIN HYDROCHLORIDE 1250 MG: 1.25 INJECTION, POWDER, LYOPHILIZED, FOR SOLUTION INTRAVENOUS at 04:05

## 2024-04-03 RX ADMIN — CEFTAZIDIME, AVIBACTAM 2.5 G: 2; .5 POWDER, FOR SOLUTION INTRAVENOUS at 06:15

## 2024-04-03 RX ADMIN — Medication 125 MCG/HR: at 19:59

## 2024-04-03 RX ADMIN — EPOPROSTENOL 50 NG/KG/MIN: 1.5 INJECTION, POWDER, LYOPHILIZED, FOR SOLUTION INTRAVENOUS at 21:45

## 2024-04-03 RX ADMIN — SODIUM CHLORIDE, PRESERVATIVE FREE 20 MG: 5 INJECTION INTRAVENOUS at 20:25

## 2024-04-03 RX ADMIN — MAGNESIUM SULFATE HEPTAHYDRATE 1000 MG: 1 INJECTION, SOLUTION INTRAVENOUS at 10:59

## 2024-04-03 RX ADMIN — EPOPROSTENOL 50 NG/KG/MIN: 1.5 INJECTION, POWDER, LYOPHILIZED, FOR SOLUTION INTRAVENOUS at 07:02

## 2024-04-03 RX ADMIN — Medication 100 MCG/HR: at 09:47

## 2024-04-03 RX ADMIN — PROPOFOL 20 MCG/KG/MIN: 10 INJECTION, EMULSION INTRAVENOUS at 05:59

## 2024-04-03 RX ADMIN — PROPOFOL 45 MCG/KG/MIN: 10 INJECTION, EMULSION INTRAVENOUS at 19:46

## 2024-04-03 RX ADMIN — PROPOFOL 40 MCG/KG/MIN: 10 INJECTION, EMULSION INTRAVENOUS at 16:26

## 2024-04-03 RX ADMIN — Medication 100 MCG/HR: at 08:07

## 2024-04-03 RX ADMIN — SODIUM CHLORIDE, PRESERVATIVE FREE 20 MG: 5 INJECTION INTRAVENOUS at 08:09

## 2024-04-03 RX ADMIN — CISATRACURIUM BESYLATE 2 MCG/KG/MIN: 10 INJECTION INTRAVENOUS at 14:37

## 2024-04-03 RX ADMIN — Medication 5 MCG/MIN: at 09:02

## 2024-04-03 RX ADMIN — CEFTAZIDIME, AVIBACTAM 2.5 G: 2; .5 POWDER, FOR SOLUTION INTRAVENOUS at 22:29

## 2024-04-03 RX ADMIN — Medication 5 MCG/MIN: at 11:00

## 2024-04-03 RX ADMIN — EPOPROSTENOL 50 NG/KG/MIN: 1.5 INJECTION, POWDER, LYOPHILIZED, FOR SOLUTION INTRAVENOUS at 14:22

## 2024-04-03 RX ADMIN — CHLORHEXIDINE GLUCONATE 0.12% ORAL RINSE 15 ML: 1.2 LIQUID ORAL at 20:25

## 2024-04-03 RX ADMIN — CHLORHEXIDINE GLUCONATE 0.12% ORAL RINSE 15 ML: 1.2 LIQUID ORAL at 08:10

## 2024-04-03 ASSESSMENT — PULMONARY FUNCTION TESTS
PIF_VALUE: 34
PIF_VALUE: 35
PIF_VALUE: 31
PIF_VALUE: 34
PIF_VALUE: 31
PIF_VALUE: 34
PIF_VALUE: 32
PIF_VALUE: 35
PIF_VALUE: 36
PIF_VALUE: 38
PIF_VALUE: 33
PIF_VALUE: 31
PIF_VALUE: 35
PIF_VALUE: 34
PIF_VALUE: 34
PIF_VALUE: 33
PIF_VALUE: 34
PIF_VALUE: 39
PIF_VALUE: 36
PIF_VALUE: 33
PIF_VALUE: 35
PIF_VALUE: 34
PIF_VALUE: 35
PIF_VALUE: 35
PIF_VALUE: 33
PIF_VALUE: 34
PIF_VALUE: 32
PIF_VALUE: 39
PIF_VALUE: 33
PIF_VALUE: 41
PIF_VALUE: 38
PIF_VALUE: 33
PIF_VALUE: 33
PIF_VALUE: 34
PIF_VALUE: 35
PIF_VALUE: 33
PIF_VALUE: 30
PIF_VALUE: 33
PIF_VALUE: 33
PIF_VALUE: 36
PIF_VALUE: 30
PIF_VALUE: 33
PIF_VALUE: 34
PIF_VALUE: 34
PIF_VALUE: 36
PIF_VALUE: 33
PIF_VALUE: 35
PIF_VALUE: 36
PIF_VALUE: 34
PIF_VALUE: 33
PIF_VALUE: 34
PIF_VALUE: 31
PIF_VALUE: 34
PIF_VALUE: 35
PIF_VALUE: 33
PIF_VALUE: 31
PIF_VALUE: 35
PIF_VALUE: 34
PIF_VALUE: 35
PIF_VALUE: 36
PIF_VALUE: 33
PIF_VALUE: 33
PIF_VALUE: 31
PIF_VALUE: 34
PIF_VALUE: 34
PIF_VALUE: 35
PIF_VALUE: 37
PIF_VALUE: 33
PIF_VALUE: 33
PIF_VALUE: 30
PIF_VALUE: 33
PIF_VALUE: 35
PIF_VALUE: 33
PIF_VALUE: 31
PIF_VALUE: 40
PIF_VALUE: 34
PIF_VALUE: 33
PIF_VALUE: 34
PIF_VALUE: 32
PIF_VALUE: 31
PIF_VALUE: 34
PIF_VALUE: 36
PIF_VALUE: 32
PIF_VALUE: 33
PIF_VALUE: 33
PIF_VALUE: 34
PIF_VALUE: 32
PIF_VALUE: 33
PIF_VALUE: 38
PIF_VALUE: 32

## 2024-04-03 ASSESSMENT — PAIN SCALES - GENERAL: PAINLEVEL_OUTOF10: 0

## 2024-04-03 NOTE — PLAN OF CARE
Problem: Discharge Planning  Goal: Discharge to home or other facility with appropriate resources  4/3/2024 0329 by She Hopson RN  Outcome: Progressing  Flowsheets (Taken 4/2/2024 2000)  Discharge to home or other facility with appropriate resources:   Identify barriers to discharge with patient and caregiver   Arrange for needed discharge resources and transportation as appropriate   Identify discharge learning needs (meds, wound care, etc)   Arrange for interpreters to assist at discharge as needed   Refer to discharge planning if patient needs post-hospital services based on physician order or complex needs related to functional status, cognitive ability or social support system     Problem: Safety - Adult  Goal: Free from fall injury  4/3/2024 0329 by She Hopson, RN  Outcome: Progressing  Flowsheets (Taken 4/2/2024 2201)  Free From Fall Injury: Instruct family/caregiver on patient safety     Problem: ABCDS Injury Assessment  Goal: Absence of physical injury  4/3/2024 0329 by She Hopson, RN  Outcome: Progressing  Flowsheets (Taken 4/2/2024 2201)  Absence of Physical Injury: Implement safety measures based on patient assessment     Problem: Chronic Conditions and Co-morbidities  Goal: Patient's chronic conditions and co-morbidity symptoms are monitored and maintained or improved  4/3/2024 0329 by She Hopson RN  Outcome: Progressing  Flowsheets (Taken 4/2/2024 2000)  Care Plan - Patient's Chronic Conditions and Co-Morbidity Symptoms are Monitored and Maintained or Improved:   Monitor and assess patient's chronic conditions and comorbid symptoms for stability, deterioration, or improvement   Collaborate with multidisciplinary team to address chronic and comorbid conditions and prevent exacerbation or deterioration   Update acute care plan with appropriate goals if chronic or comorbid symptoms are exacerbated and prevent overall improvement and discharge     Problem: Respiratory -

## 2024-04-03 NOTE — PROGRESS NOTES
Vancomycin Dosing by Pharmacy - Daily Note   Vancomycin Therapy Day:  2  Indication: HAP    Allergies:  Seasonal   Actual Weight:    Wt Readings from Last 1 Encounters:   04/01/24 106.3 kg (234 lb 5.6 oz)       Labs/Ancillary Data  Estimated Creatinine Clearance: 70 mL/min (based on SCr of 1 mg/dL).  Recent Labs     04/01/24  0444 04/02/24  0405 04/03/24  0516   CREATININE 0.6* 0.6* 1.0   BUN 27* 24* 38*   WBC 21.4* 19.0* 23.7*     Procalcitonin   Date Value Ref Range Status   04/02/2024 0.36 (H) <0.09 ng/mL Final     Comment:           Suspected Sepsis:  <0.50 ng/mL     Low likelihood of sepsis.  0.50-2.00 ng/mL     Increased likelihood of sepsis. Antibiotics encouraged.  >2.00 ng/mL     High risk of sepsis/shock. Antibiotics strongly encouraged.        Suspected Lower Resp Tract Infections:  <0.24 ng/mL     Low likelihood of bacterial infection.  >0.24 ng/mL     Increased likelihood of bacterial infection. Antibiotics encouraged.        With successful antibiotic therapy, PCT levels should decrease rapidly. (Half-life of 24 to   36 hours.)        Procalcitonin values from samples collected within the first 6 hours of systemic infection   may still be low. Retesting may be indicated.  Values from day 1 and day 4 can be entered into the Change in Procalcitonin Calculator   ([email or web address]) to determine the patient's Mortality Risk Prognosis        In healthy neonates, plasma Procalcitonin (PCT) concentrations increase gradually after   birth, reaching peak values at about 24 hours of age then decrease to normal values below   0.5 ng/mL by 48-72 hours of age.         Intake/Output Summary (Last 24 hours) at 4/3/2024 1330  Last data filed at 4/3/2024 0615  Gross per 24 hour   Intake 2598.05 ml   Output 1170 ml   Net 1428.05 ml     Temp: 99.4    Culture Date / Source  /  Results  See micro  Recent vancomycin administrations                     vancomycin (VANCOCIN) 1,250 mg in sodium chloride 0.9 % 250

## 2024-04-03 NOTE — PROGRESS NOTES
[facility] PULMONARY,CRITICAL CARE & SLEEP   Alek Lee MD/Fer Agustin MD/ Milton Rayo MD/Yuri MCKAY AGACNP-BC, NP-C      Marielle Cortez APRN NP-C     Merlyn MCKAY NP-C                                           Pulmonary Critical Care Progress Note    Patient - Saurav Stacy   Age - 78 y.o.   - 1945  MRN - [id number]  [facility] # - [id number]  Date of Admission - 3/24/2024  7:53 PM    Consulting Service/Physician:       Primary Care Physician: Jose Raul Manley MD    SUBJECTIVE:     Chief Complaint:   Chief Complaint   Patient presents with    Shortness of Breath   Acute hypoxemic respiratory failure  Subjective:    Patient showing signs of improvement.  Patient is on maximal ventilator settings with very slow improvement in pCO2 and pH.  Oxygen has shown dramatic improvement with chest tube, PEEP and inhaled Veletri.  Currently on 50% FiO2.  He is not on pressors.    I had a very lengthy discussion with Dr. Marie yesterday.  He and I agreed that the patient was volume depleted.  He still appears relatively euvolemic to me despite the elevated proBNP which we are essentially ignoring at this point.  I do not see pleural effusions or peripheral edema.  I do not think he is ready for diuresis in fact we gave him 1 L of fluid back yesterday based on volume depleted state, soft blood pressures and low venous pressure during central line placement.    He is still paralyzed due to very high ventilator settings.  I do not think we are ready to get rid of those yet.    No significant airway secretions.    VITALS  BP (!) 79/14   Pulse 60   Temp 99.7 °F (37.6 °C) (Oral)   Resp 30   Ht 1.727 m (5' 7.99\")   Wt 106.3 kg (234 lb 5.6 oz)   SpO2 92%   BMI 35.64 kg/m²   Wt Readings from Last 3 Encounters:   24 106.3 kg (234 lb 5.6 oz)   02/15/24 116.1 kg (256 lb)   23 125.6 kg (277 lb)     I/O (24 Hours)    Intake/Output Summary (Last 24 hours) at  hearing  No COPD or asthma  Very distant history of smoking quit 40 years ago and was not a significant smoker  Full CODE STATUS-verified with wife Bhumika on the phone April 2  PLAN:   Plan to leave chest tube until he is extubated  Follow-up bronchoscopic cultures  Hold any diuresis for today and reassess on a daily basis but currently not showing signs of fluid overload despite elevated proBNP  Discontinue Levophed   Continue full vent support with ABG every 8 hours  Inhaled Veletri for now  If ABG corrects respiratory acidosis by tomorrow I would consider weaning off Veletri and paralytics  Repeat chest x-ray in the morning  Start trophic tube feeds  Maintain paralytics due to very high ventilator settings    Patient remains very critically ill.  I did assess the chest tube, current ventilator settings, inhaled Veletri circuit, IV fluid status and volume management, current active medication orders, sedatives and paralytics.  Extensive discussion at bedside with the nurse.  I also personally called his wife Bhumika on the phone and spoke with her including giving her a complete update.  She is very appreciative of the care.  Patient remains very ill.  He will require constant supervision  >65mCCT    Electronically signed by Richmond Agustin MD on 04/03/24     This progress note was completed using a voice transcription system. Every effort was made to ensure accuracy. However, inadvertent computerized transcription errors may be present.    Richmond Agustin MD  Pulmonary and Critical Care   [phone] Perfect Serve  [phone] Cell

## 2024-04-03 NOTE — PROGRESS NOTES
DR HERNÁNDEZ      PROGRESS NOTE        Patient:  Saurav Stacy  YOB: 1945    MRN: [id number]     Acct: [id number]     Admit date: 3/24/2024    Pt seen and Chart reviewed.  Consultant notes reviewed and care evaluated.    Subjective: Patient was intubated yesterday and had bronchoscopy he developed a large pneumothorax on the right of the chest tube was placed by pulmonary patient is still on the Band-Aid trying to adjust the settings according to his RN.  He is having some urine output 500 the last shift.  His white count went higher ID changed antibiotics his BNP also jumped to 30,000 last 1 was done couple days ago was about 7000.  His diuretics were held yesterday recommendation cardiology.  Did discuss with Dr. Marie yesterday.  Patient requiring 6 Levophed.  But his blood pressure shows he was 113 on the monitor when he was in the room.  His RN states he had about 5-7 runs of V. tach mostly    Diet:  Diet NPO      Medications:Current Inpatient    Scheduled Meds:   chlorhexidine  15 mL Mouth/Throat BID    famotidine (PEPCID) injection  20 mg IntraVENous BID    vancomycin (VANCOCIN) intermittent dosing (placeholder)   Other RX Placeholder    cefTAZidime-avibactam (AVYCAZ) 2.5 g in sodium chloride 0.9 % 100 mL IVPB  2.5 g IntraVENous Q8H    vancomycin  1,250 mg IntraVENous Q12H    spironolactone  50 mg Oral Daily    empagliflozin  10 mg Oral Daily    warfarin placeholder: dosing by provider   Other RX Placeholder    lisinopril  10 mg Oral Daily    metoprolol tartrate  25 mg Oral BID    pantoprazole  40 mg Oral QAM AC    potassium chloride  20 mEq Oral Daily with breakfast     Continuous Infusions:   norepinephrine 1 mcg/min (04/03/24 0653)    epoprostenol 1,500 mcg in sodium chloride 0.9 % 50 mL nebulization solution 50 ng/kg/min (04/03/24 0702)    fentaNYL 75 mcg/hr (04/02/24 2024)    propofol 20 mcg/kg/min (04/03/24 0559)     of Dana discussed with Cori's RN to watch his blood pressure as well as his JENNIFER's    No family in the room    Judy Estrada DO FAAFP DABFM            4/3/2024, 7:39 AM

## 2024-04-03 NOTE — CARE COORDINATION
ONGOING DISCHARGE PLAN:    Spoke with Patient's spouse today at bedside.    VENT 50% (DAY 2)  Nimbex, epoprostenol    4/2 Bronch    Chest Tube Left side    IV vanco/avycaz per ID  WBC 23.7, increased    Levophed stopped    INR 2.0      Will continue to follow for additional discharge needs.    If patient is discharged prior to next notation, then this note serves as note for discharge by case management.    Electronically signed by Emelyn Julien RN on 4/3/2024 at 4:01 PM

## 2024-04-03 NOTE — PROGRESS NOTES
[facility] -   Infectious diseases evaluation  admission date 3/24/2024    reason for consultation:   Pneumonia and leukocytosis    Impression :   Current:  CHF   Multifocal pneumonia   Sepsis  Shock multifactorial  Status post bronchoscopy 4/2/24  Acute respiratory failure/intubated 4/2/24  S/P aortic valve replacement  S/P Pacemaker   Afib  DM  Obesity  QT prolongation QTc 574 on 3/29/2024      HENCE:     Follow blood and BAL culture, adjust antibiotics as needed  IV vancomycin and Avycaz  Avoid Levaquin due to QT prolongation  Urine for strep pneumo and Legionella antigen negative  Mycoplasma IgM pending  Nasal swab for MRSA was - 3/27/2024  No growth on blood cultures from 3/24/2024  Respiratory panel with COVID-19 - 3/27/2024  He received a course of Zithromax 3/27/2024 through 3/31/2024.  He received cefepime 3/27/2024 through 4/1/2024  Follow CBC and renal function  Echocardiogram from 3/25/2024 reviewed    Infection Control Recommendations   [place] Precautions      Antimicrobial Stewardship Recommendations   Simplification of therapy  Targeted therapy      History of Present Illness:   Initial history:  Saurav Stacy is a 78 y.o.-year-old male presented to hospital for worsening shortness of breath associated with cough and generalized weakness for several days prior to admission.  He also had bilateral lower extremity edema.-Symptoms are moderate to severe, no alleviating or aggravating factors.  Initial WBC was 13.5, proBNP elevated more than 10,000, procalcitonin 0.17  Chest x-ray showed diffuse bilateral infiltrates    WBC increased to 21 on 4/1/2024 on steroids  On 4/2/24 The patient deteriorated, required intubation, had a fever with a temperature max of 100.5, right IJ line was placed, bronchoscopy was done, had a small left-sided pneumothorax, left chest tube was placed.  He was started on Levophed  CT chest and abdomen 4/1/2024 reviewed showed trace  Cigarettes     Start date: 1974     Quit date: 1984     Years since quittin.6    Smokeless tobacco: Never   Vaping Use    Vaping Use: Never used   Substance and Sexual Activity    Alcohol use: Not Currently     Alcohol/week: 0.0 standard drinks of alcohol    Drug use: No    Sexual activity: Not on file   Other Topics Concern    Not on file   Social History Narrative    Not on file     Social Determinants of Health     Financial Resource Strain: Not on file   Food Insecurity: No Food Insecurity (3/24/2024)    Hunger Vital Sign     Worried About Running Out of Food in the Last Year: Never true     Ran Out of Food in the Last Year: Never true   Transportation Needs: No Transportation Needs (3/24/2024)    PRAPARE - Transportation     Lack of Transportation (Medical): No     Lack of Transportation (Non-Medical): No   Physical Activity: Not on file   Stress: Not on file   Social Connections: Not on file   Intimate Partner Violence: Not on file   Housing Stability: Low Risk  (3/24/2024)    Housing Stability Vital Sign     Unable to Pay for Housing in the Last Year: No     Number of Places Lived in the Last Year: 1     Unstable Housing in the Last Year: No       Family History:     Family History   Problem Relation Age of Onset    Cancer Mother     Diabetes Paternal Grandmother       Medical Decision Making:   I have independently reviewed/ordered the following labs:    CBC with Differential:   Recent Labs     24  0405 24  0516   WBC 21.4* 19.0* 23.7*   HGB 11.8* 11.1* 10.9*   HCT 37.9* 35.0* 36.1*    315 285   LYMPHOPCT 4* 7*  --    MONOPCT 0* 5  --        BMP:  Recent Labs     24  0405 24  0516    143   K 3.4* 4.3   CL 97* 103   CO2 27 26   BUN 24* 38*   CREATININE 0.6* 1.0       Hepatic Function Panel:   Recent Labs     24   PROT 6.4   LABALBU 3.1*   BILIDIR 2.8*   IBILI 1.3*   BILITOT 4.1*   ALKPHOS 204*   ALT 33   AST 28         Lab Results

## 2024-04-03 NOTE — PROGRESS NOTES
Date:   4/3/2024  Patient name: Saurav Stacy  Date of admission:  3/24/2024  7:53 PM  MRN:   [id number]  YOB: 1945  PCP: Jose Raul Manley MD    Reason for Admission: Congestive heart failure of unknown etiology (HCC) [I50.9]  Community acquired pneumonia, unspecified laterality [J18.9]    Cardiology follow-up: Congestive heart failure diastolic acute on chronic, A-fib, permanent pacemaker, history of aortic valve replacement         Referring physician: Dr Judy Estrada     Impression     Admission 3/24/2024 with increasing shortness of breath, chest discomfort, influenza A, influenza B, COVID not detected  Multiple focal pneumonia  CHF diastolic, pulmonary congestion as per chest x-ray, bilateral pleural effusion more on the right as per CT abdomen  Intubation 4/2/2024 for severe hypoxia, pulmonary infiltrates/ARDS  Large pneumothorax left side status post intubation, bronchoscopy, BAL, chest tube placed, significant improvement  Ejection fraction 50 to 55%, moderate pulmonary hypertension RVSP 51 mmHg  Aortic Valve replacement  S/P Pacemaker / V Paced, A-fib patient is on warfarin  Obesity BMI 39, sleep apnea  Diabetes mellitus  Hyperlipidemia  Kidney stone  Impaired hearing     Past surgical history includes aortic valve replacement, right foot surgery, colonoscopies, bilateral knee arthroplasty, eye surgery, facial reconstruction surgery right side, premalignant benign skin lesion excision, neck surgery     History of present illness  78 years old  male, ex smoker, morbid obesity with a past medical history of aortic valve replacement, permanent pacemaker placement got hospitalized 3/24/2024 with increasing shortness of breath, generalized weakness ongoing for past few days.  He has received antibiotics for suspected pneumonia.  His symptoms got worse and he came to the emergency department.  Chest x-ray on admission showed pulmonary congestion.  CT abdomen few days ago showed bilateral

## 2024-04-03 NOTE — CONSULTS
Comprehensive Nutrition Assessment    Type and Reason for Visit:  Consult    Nutrition Recommendations/Plan:   Start trophic feed of Osmolite 1.2 at 10ml/hr providing 288kcal and 13g protein per day     Malnutrition Assessment:  Malnutrition Status:  Mild malnutrition (03/31/24 1404)    Context:  Acute Illness     Findings of the 6 clinical characteristics of malnutrition:  Energy Intake:  75% or less of estimated energy requirements for 7 or more days  Weight Loss:  Unable to assess (9.9% x 1 yr. More suspected but unable to evaluate due to fluid shifts.)     Body Fat Loss:  Unable to assess     Muscle Mass Loss:  Unable to assess    Fluid Accumulation:  Mild (May not be related to nutritional status but may mask wt loss.) Extremities   Strength:  Not Performed    Nutrition Assessment:    Consult for trophic tube feed due to paralytics. Pt is currently intubated and sedated with current propofol rate at 19.1ml/hr. Will start trophic feed of Osmolite 1.2 at 10ml/hr    Nutrition Related Findings:    Edema: BLE, RUE +1, LUE +2. Labs and meds reviewed.         Current Nutrition Intake & Therapies:    Average Meal Intake: NPO  Average Supplements Intake: Unable to assess  Current Tube Feeding (TF) Orders:  Feeding Route: Nasogastric  Formula: Standard without Fiber  Schedule: Continuous  Feeding Regimen: 10ml/hr  Current TF & Flush Orders Provides: 288kcal and 13g protein      Anthropometric Measures:  Height: 172.7 cm (5' 7.99\")  Ideal Body Weight (IBW): 154 lbs (70 kg)    Admission Body Weight: 116.1 kg (255 lb 15.3 oz) (Method not specified)  Current Body Weight: 106.1 kg (234 lb), 151.9 % IBW. Weight Source: Bed Scale  Current BMI (kg/m2): 35.6  Usual Body Weight: 128.8 kg (283 lb 15.2 oz) (3/2/23. Method not specified.)  % Weight Change (Calculated): -9.9    BMI Categories: Obese Class 2 (BMI 35.0 -39.9)    Estimated Daily Nutrient Needs:  Energy Requirements Based On: Formula  Weight Used for Energy

## 2024-04-03 NOTE — PLAN OF CARE
Problem: Safety - Medical Restraint  Goal: Remains free of injury from restraints (Restraint for Interference with Medical Device)  Description: INTERVENTIONS:  1. Determine that other, less restrictive measures have been tried or would not be effective before applying the restraint  2. Evaluate the patient's condition at the time of restraint application  3. Inform patient/family regarding the reason for restraint  4. Q2H: Monitor safety, psychosocial status, comfort, nutrition and hydration  4/3/2024 0331 by She Hopson RN  Outcome: Completed

## 2024-04-04 ENCOUNTER — APPOINTMENT (OUTPATIENT)
Dept: GENERAL RADIOLOGY | Age: 79
DRG: 871 | End: 2024-04-04
Payer: MEDICARE

## 2024-04-04 LAB
ANION GAP SERPL CALCULATED.3IONS-SCNC: 10 MMOL/L (ref 9–17)
BASOPHILS # BLD: 0 K/UL (ref 0–0.2)
BASOPHILS NFR BLD: 0 % (ref 0–2)
BODY TEMPERATURE: 37
BUN SERPL-MCNC: 45 MG/DL (ref 8–23)
CALCIUM SERPL-MCNC: 8.5 MG/DL (ref 8.6–10.4)
CHLORIDE SERPL-SCNC: 105 MMOL/L (ref 98–107)
CO2 SERPL-SCNC: 28 MMOL/L (ref 20–31)
COHGB MFR BLD: 1.5 % (ref 0–5)
CREAT SERPL-MCNC: 0.8 MG/DL (ref 0.7–1.2)
EOSINOPHIL # BLD: 0.56 K/UL (ref 0–0.4)
EOSINOPHILS RELATIVE PERCENT: 3 % (ref 0–4)
ERYTHROCYTE [DISTWIDTH] IN BLOOD BY AUTOMATED COUNT: 17.6 % (ref 11.5–14.9)
FIO2 ON VENT: 50 %
GAS FLOW.O2 O2 DELIVERY SYS: ABNORMAL L/MIN
GAS FLOW.O2 SETTING OXYMISER: 30 L/MIN
GFR SERPL CREATININE-BSD FRML MDRD: 89 ML/MIN/1.73M2
GLUCOSE SERPL-MCNC: 119 MG/DL (ref 70–99)
HCO3 ARTERIAL: 31.8 MMOL/L (ref 22–26)
HCT VFR BLD AUTO: 34.3 % (ref 41–53)
HGB BLD-MCNC: 10.6 G/DL (ref 13.5–17.5)
INR PPP: 2.1
LYMPHOCYTES NFR BLD: 2.23 K/UL (ref 1–4.8)
LYMPHOCYTES RELATIVE PERCENT: 12 % (ref 24–44)
MAGNESIUM SERPL-MCNC: 2.7 MG/DL (ref 1.6–2.6)
MCH RBC QN AUTO: 25.1 PG (ref 26–34)
MCHC RBC AUTO-ENTMCNC: 31 G/DL (ref 31–37)
MCV RBC AUTO: 81 FL (ref 80–100)
METHEMOGLOBIN: 0.9 % (ref 0–1.9)
MICROORGANISM SPEC CULT: NO GROWTH
MICROORGANISM/AGENT SPEC: NORMAL
MICROORGANISM/AGENT SPEC: NORMAL
MONOCYTES NFR BLD: 0.56 K/UL (ref 0.1–1.3)
MONOCYTES NFR BLD: 3 % (ref 1–7)
MORPHOLOGY: ABNORMAL
NEUTROPHILS NFR BLD: 82 % (ref 36–66)
NEUTS SEG NFR BLD: 15.25 K/UL (ref 1.3–9.1)
O2 SAT, ARTERIAL: 93.3 % (ref 95–98)
PCO2 ARTERIAL: 60 MMHG (ref 35–45)
PEEP RESPIRATORY: 10 CM[H2O]
PH ARTERIAL: 7.33 (ref 7.35–7.45)
PLATELET # BLD AUTO: 259 K/UL (ref 150–450)
PMV BLD AUTO: 8 FL (ref 6–12)
PO2 ARTERIAL: 81.5 MMHG (ref 80–100)
POSITIVE BASE EXCESS, ART: 6 MMOL/L (ref 0–2)
POTASSIUM SERPL-SCNC: 4 MMOL/L (ref 3.7–5.3)
PROTHROMBIN TIME: 24.1 SEC (ref 11.8–14.6)
PT. POSITION: ABNORMAL
RBC # BLD AUTO: 4.23 M/UL (ref 4.5–5.9)
RESPIRATORY RATE: 30
SERVICE CMNT-IMP: NORMAL
SODIUM SERPL-SCNC: 143 MMOL/L (ref 135–144)
SPECIMEN DESCRIPTION: NORMAL
TEXT FOR RESPIRATORY: ABNORMAL
TOTAL RATE: 30
VENTILATION MODE VENT: ABNORMAL
VT: 500
WBC OTHER # BLD: 18.6 K/UL (ref 3.5–11)

## 2024-04-04 PROCEDURE — 6370000000 HC RX 637 (ALT 250 FOR IP): Performed by: FAMILY MEDICINE

## 2024-04-04 PROCEDURE — 80048 BASIC METABOLIC PNL TOTAL CA: CPT

## 2024-04-04 PROCEDURE — 82805 BLOOD GASES W/O2 SATURATION: CPT

## 2024-04-04 PROCEDURE — 2000000000 HC ICU R&B

## 2024-04-04 PROCEDURE — 2580000003 HC RX 258: Performed by: INTERNAL MEDICINE

## 2024-04-04 PROCEDURE — 6360000002 HC RX W HCPCS: Performed by: INTERNAL MEDICINE

## 2024-04-04 PROCEDURE — 37799 UNLISTED PX VASCULAR SURGERY: CPT

## 2024-04-04 PROCEDURE — 85610 PROTHROMBIN TIME: CPT

## 2024-04-04 PROCEDURE — 83735 ASSAY OF MAGNESIUM: CPT

## 2024-04-04 PROCEDURE — 6370000000 HC RX 637 (ALT 250 FOR IP): Performed by: INTERNAL MEDICINE

## 2024-04-04 PROCEDURE — 99233 SBSQ HOSP IP/OBS HIGH 50: CPT | Performed by: INTERNAL MEDICINE

## 2024-04-04 PROCEDURE — 71045 X-RAY EXAM CHEST 1 VIEW: CPT

## 2024-04-04 PROCEDURE — 94761 N-INVAS EAR/PLS OXIMETRY MLT: CPT

## 2024-04-04 PROCEDURE — A4216 STERILE WATER/SALINE, 10 ML: HCPCS | Performed by: INTERNAL MEDICINE

## 2024-04-04 PROCEDURE — 85025 COMPLETE CBC W/AUTO DIFF WBC: CPT

## 2024-04-04 PROCEDURE — 94645 CONT INHLJ TX EACH ADDL HOUR: CPT

## 2024-04-04 PROCEDURE — 94003 VENT MGMT INPAT SUBQ DAY: CPT

## 2024-04-04 PROCEDURE — 2500000003 HC RX 250 WO HCPCS: Performed by: INTERNAL MEDICINE

## 2024-04-04 PROCEDURE — 36415 COLL VENOUS BLD VENIPUNCTURE: CPT

## 2024-04-04 PROCEDURE — 2700000000 HC OXYGEN THERAPY PER DAY

## 2024-04-04 RX ORDER — FUROSEMIDE 10 MG/ML
20 INJECTION INTRAMUSCULAR; INTRAVENOUS DAILY
Status: DISCONTINUED | OUTPATIENT
Start: 2024-04-04 | End: 2024-04-05

## 2024-04-04 RX ORDER — CARBOXYMETHYLCELLULOSE SODIUM 10 MG/ML
1 GEL OPHTHALMIC 4 TIMES DAILY
Status: DISCONTINUED | OUTPATIENT
Start: 2024-04-04 | End: 2024-04-09

## 2024-04-04 RX ADMIN — CARBOXYMETHYLCELLULOSE SODIUM 1 DROP: 10 GEL OPHTHALMIC at 14:04

## 2024-04-04 RX ADMIN — PROPOFOL 45 MCG/KG/MIN: 10 INJECTION, EMULSION INTRAVENOUS at 08:26

## 2024-04-04 RX ADMIN — VANCOMYCIN HYDROCHLORIDE 1250 MG: 1.25 INJECTION, POWDER, LYOPHILIZED, FOR SOLUTION INTRAVENOUS at 06:32

## 2024-04-04 RX ADMIN — PROPOFOL 45 MCG/KG/MIN: 10 INJECTION, EMULSION INTRAVENOUS at 14:03

## 2024-04-04 RX ADMIN — CEFTAZIDIME, AVIBACTAM 2.5 G: 2; .5 POWDER, FOR SOLUTION INTRAVENOUS at 07:07

## 2024-04-04 RX ADMIN — CARBOXYMETHYLCELLULOSE SODIUM 1 DROP: 10 GEL OPHTHALMIC at 17:08

## 2024-04-04 RX ADMIN — CEFTAZIDIME, AVIBACTAM 2.5 G: 2; .5 POWDER, FOR SOLUTION INTRAVENOUS at 14:54

## 2024-04-04 RX ADMIN — EPOPROSTENOL 40.94 NG/KG/MIN: 1.5 INJECTION, POWDER, LYOPHILIZED, FOR SOLUTION INTRAVENOUS at 11:33

## 2024-04-04 RX ADMIN — CHLORHEXIDINE GLUCONATE 0.12% ORAL RINSE 15 ML: 1.2 LIQUID ORAL at 21:07

## 2024-04-04 RX ADMIN — CEFTAZIDIME, AVIBACTAM 2.5 G: 2; .5 POWDER, FOR SOLUTION INTRAVENOUS at 23:45

## 2024-04-04 RX ADMIN — PROPOFOL 45 MCG/KG/MIN: 10 INJECTION, EMULSION INTRAVENOUS at 20:33

## 2024-04-04 RX ADMIN — CARBOXYMETHYLCELLULOSE SODIUM 1 DROP: 10 GEL OPHTHALMIC at 23:44

## 2024-04-04 RX ADMIN — EPOPROSTENOL 50 NG/KG/MIN: 1.5 INJECTION, POWDER, LYOPHILIZED, FOR SOLUTION INTRAVENOUS at 04:42

## 2024-04-04 RX ADMIN — SODIUM CHLORIDE, PRESERVATIVE FREE 20 MG: 5 INJECTION INTRAVENOUS at 08:29

## 2024-04-04 RX ADMIN — PROPOFOL 45 MCG/KG/MIN: 10 INJECTION, EMULSION INTRAVENOUS at 17:24

## 2024-04-04 RX ADMIN — Medication 125 MCG/HR: at 04:57

## 2024-04-04 RX ADMIN — PROPOFOL 45 MCG/KG/MIN: 10 INJECTION, EMULSION INTRAVENOUS at 05:17

## 2024-04-04 RX ADMIN — PROPOFOL 45 MCG/KG/MIN: 10 INJECTION, EMULSION INTRAVENOUS at 01:59

## 2024-04-04 RX ADMIN — Medication 125 MCG/HR: at 12:04

## 2024-04-04 RX ADMIN — FUROSEMIDE 20 MG: 10 INJECTION, SOLUTION INTRAMUSCULAR; INTRAVENOUS at 11:52

## 2024-04-04 RX ADMIN — Medication 125 MCG/HR: at 20:15

## 2024-04-04 RX ADMIN — CISATRACURIUM BESYLATE 2 MCG/KG/MIN: 10 INJECTION INTRAVENOUS at 05:10

## 2024-04-04 RX ADMIN — CHLORHEXIDINE GLUCONATE 0.12% ORAL RINSE 15 ML: 1.2 LIQUID ORAL at 08:29

## 2024-04-04 RX ADMIN — SODIUM CHLORIDE, PRESERVATIVE FREE 20 MG: 5 INJECTION INTRAVENOUS at 21:07

## 2024-04-04 RX ADMIN — PROPOFOL 45 MCG/KG/MIN: 10 INJECTION, EMULSION INTRAVENOUS at 11:55

## 2024-04-04 RX ADMIN — EPOPROSTENOL 30.7 NG/KG/MIN: 1.5 INJECTION, POWDER, LYOPHILIZED, FOR SOLUTION INTRAVENOUS at 18:57

## 2024-04-04 ASSESSMENT — PULMONARY FUNCTION TESTS
PIF_VALUE: 34
PIF_VALUE: 32
PIF_VALUE: 33
PIF_VALUE: 34
PIF_VALUE: 34
PIF_VALUE: 36
PIF_VALUE: 30
PIF_VALUE: 32
PIF_VALUE: 33
PIF_VALUE: 34
PIF_VALUE: 34
PIF_VALUE: 32
PIF_VALUE: 34
PIF_VALUE: 34
PIF_VALUE: 32
PIF_VALUE: 33
PIF_VALUE: 34
PIF_VALUE: 35
PIF_VALUE: 31
PIF_VALUE: 33
PIF_VALUE: 31
PIF_VALUE: 33
PIF_VALUE: 29
PIF_VALUE: 27
PIF_VALUE: 30
PIF_VALUE: 30
PIF_VALUE: 34
PIF_VALUE: 31
PIF_VALUE: 33
PIF_VALUE: 32
PIF_VALUE: 37
PIF_VALUE: 34
PIF_VALUE: 33
PIF_VALUE: 32
PIF_VALUE: 32
PIF_VALUE: 27
PIF_VALUE: 32
PIF_VALUE: 35
PIF_VALUE: 34
PIF_VALUE: 33
PIF_VALUE: 34
PIF_VALUE: 32
PIF_VALUE: 24
PIF_VALUE: 34
PIF_VALUE: 33
PIF_VALUE: 34
PIF_VALUE: 33
PIF_VALUE: 34
PIF_VALUE: 35
PIF_VALUE: 34
PIF_VALUE: 32
PIF_VALUE: 28
PIF_VALUE: 33
PIF_VALUE: 34
PIF_VALUE: 32
PIF_VALUE: 33
PIF_VALUE: 34
PIF_VALUE: 35
PIF_VALUE: 34
PIF_VALUE: 31
PIF_VALUE: 34
PIF_VALUE: 36
PIF_VALUE: 34
PIF_VALUE: 33
PIF_VALUE: 34
PIF_VALUE: 35
PIF_VALUE: 34
PIF_VALUE: 35
PIF_VALUE: 33
PIF_VALUE: 33
PIF_VALUE: 29
PIF_VALUE: 32
PIF_VALUE: 32
PIF_VALUE: 35
PIF_VALUE: 35
PIF_VALUE: 29

## 2024-04-04 NOTE — PLAN OF CARE
Problem: Discharge Planning  Goal: Discharge to home or other facility with appropriate resources  4/4/2024 0539 by García Jaimes RN  Outcome: Progressing  4/3/2024 1742 by Belgica Franco RN  Outcome: Progressing     Problem: Safety - Adult  Goal: Free from fall injury  4/4/2024 0539 by Gracía Jaimes RN  Outcome: Progressing  4/3/2024 1742 by Belgica Franco RN  Outcome: Progressing     Problem: ABCDS Injury Assessment  Goal: Absence of physical injury  4/4/2024 0539 by García Jaimes RN  Outcome: Progressing  4/3/2024 1742 by Belgica Franco RN  Outcome: Progressing     Problem: Chronic Conditions and Co-morbidities  Goal: Patient's chronic conditions and co-morbidity symptoms are monitored and maintained or improved  4/4/2024 0539 by García Jaimes RN  Outcome: Progressing  4/3/2024 1742 by Belgica Franco RN  Outcome: Progressing     Problem: Respiratory - Adult  Goal: Achieves optimal ventilation and oxygenation  Outcome: Progressing     Problem: Skin/Tissue Integrity  Goal: Absence of new skin breakdown  Description: 1.  Monitor for areas of redness and/or skin breakdown  2.  Assess vascular access sites hourly  3.  Every 4-6 hours minimum:  Change oxygen saturation probe site  4.  Every 4-6 hours:  If on nasal continuous positive airway pressure, respiratory therapy assess nares and determine need for appliance change or resting period.  Outcome: Progressing     Problem: Nutrition Deficit:  Goal: Optimize nutritional status  Outcome: Progressing     Problem: Neurosensory - Adult  Goal: Achieves stable or improved neurological status  Outcome: Progressing  Goal: Achieves maximal functionality and self care  Outcome: Progressing     Problem: Cardiovascular - Adult  Goal: Maintains optimal cardiac output and hemodynamic stability  Outcome: Progressing  Goal: Absence of cardiac dysrhythmias or at baseline  Outcome: Progressing     Problem: Skin/Tissue Integrity - Adult  Goal: Skin integrity remains intact  Outcome:  Progressing  Goal: Oral mucous membranes remain intact  Outcome: Progressing     Problem: Musculoskeletal - Adult  Goal: Return mobility to safest level of function  Outcome: Progressing  Goal: Return ADL status to a safe level of function  Outcome: Progressing     Problem: Genitourinary - Adult  Goal: Absence of urinary retention  Outcome: Progressing     Problem: Infection - Adult  Goal: Absence of infection at discharge  Outcome: Progressing     Problem: Metabolic/Fluid and Electrolytes - Adult  Goal: Electrolytes maintained within normal limits  Outcome: Progressing  Goal: Hemodynamic stability and optimal renal function maintained  Outcome: Progressing     Problem: Hematologic - Adult  Goal: Maintains hematologic stability  Outcome: Progressing     Problem: Pain  Goal: Verbalizes/displays adequate comfort level or baseline comfort level  Outcome: Progressing

## 2024-04-04 NOTE — PROGRESS NOTES
[facility] -   Infectious diseases evaluation  admission date 3/24/2024    reason for consultation:   Pneumonia and leukocytosis    Impression :   Current:  CHF   Multifocal pneumonia   Sepsis  Shock multifactorial  Status post bronchoscopy 4/2/24  Acute respiratory failure/intubated 4/2/24  S/P aortic valve replacement  S/P Pacemaker   Afib  DM  Obesity  QT prolongation QTc 574 on 3/29/2024      HENCE:     Follow blood and BAL culture, adjust antibiotics as needed  IV vancomycin and Avycaz  Avoid Levaquin due to QT prolongation  Urine for strep pneumo and Legionella antigen negative  Mycoplasma IgM negative  Nasal swab for MRSA was - 3/27/2024  No growth on blood cultures from 3/24/2024  Respiratory panel with COVID-19 - 3/27/2024  He received a course of Zithromax 3/27/2024 through 3/31/2024.  He received cefepime 3/27/2024 through 4/1/2024  Follow CBC and renal function  Echocardiogram from 3/25/2024 reviewed    Infection Control Recommendations   Clarkia Precautions      Antimicrobial Stewardship Recommendations   Simplification of therapy  Targeted therapy      History of Present Illness:   Initial history:  Saurav Stacy is a 78 y.o.-year-old male presented to hospital for worsening shortness of breath associated with cough and generalized weakness for several days prior to admission.  He also had bilateral lower extremity edema.-Symptoms are moderate to severe, no alleviating or aggravating factors.  Initial WBC was 13.5, proBNP elevated more than 10,000, procalcitonin 0.17  Chest x-ray showed diffuse bilateral infiltrates    WBC increased to 21 on 4/1/2024 on steroids  On 4/2/24 The patient deteriorated, required intubation, had a fever with a temperature max of 100.5, right IJ line was placed, bronchoscopy was done, had a small left-sided pneumothorax, left chest tube was placed.  He was started on Levophed  CT chest and abdomen 4/1/2024 reviewed showed  0.8   MG  --  2.5 2.7*       Hepatic Function Panel:   No results for input(s): \"PROT\", \"LABALBU\", \"BILIDIR\", \"IBILI\", \"BILITOT\", \"ALKPHOS\", \"ALT\", \"AST\" in the last 72 hours.      Lab Results   Component Value Date/Time    CREATININE 0.8 04/04/2024 04:23 AM    GLUCOSE 119 04/04/2024 04:23 AM       Detailed results:        Thank you for allowing us to participate in the care of this patient.Please call with questions.    This note is created with the assistance of a speech recognition program.  While intending to generate adocument that actually reflects the content of the visit, the document can still have some errors including those of syntax and sound a like substitutions which may escape proof reading.  It such instances, actual meaningcan be extrapolated by contextual diversion.    Mahesh Danielle MD  Office: [phone]  Perfect serve / office [phone]

## 2024-04-04 NOTE — PROGRESS NOTES
injection 2 mg, 2 mg, IntraVENous, Q6H PRN, Richmond Agustin MD    fentaNYL (SUBLIMAZE) 1,000 mcg in sodium chloride 0.9% 100 mL infusion,  mcg/hr, IntraVENous, Continuous, Richmond Agustin MD, Last Rate: 12.5 mL/hr at 04/04/24 0457, 125 mcg/hr at 04/04/24 0457    propofol infusion, 5-50 mcg/kg/min, IntraVENous, Continuous, Last Rate: 28.7 mL/hr at 04/04/24 0826, 45 mcg/kg/min at 04/04/24 0826 **AND** [START ON 4/5/2024] triglycerides, , , Q3 Days, Richmond Agustin MD    cisatracurium besylate (NIMBEX) 200 mg in sodium chloride 0.9 % 100 mL infusion, 0.5-10 mcg/kg/min, IntraVENous, Continuous, Richmond Agustin MD, Last Rate: 4.8 mL/hr at 04/04/24 0942, 1.5 mcg/kg/min at 04/04/24 0942    vancomycin (VANCOCIN) intermittent dosing (placeholder), , Other, RX [place], Mahesh Danielle MD    cefTAZidime-avibactam (AVYCAZ) 2.5 g in sodium chloride 0.9 % 100 mL IVPB, 2.5 g, IntraVENous, Q8H, Mahesh Danielle MD, Stopped at 04/04/24 0907    Benzocaine-Menthol (CEPACOL) 1 lozenge, 1 lozenge, Oral, Q2H PRN, Fer Le MD, 1 lozenge at 03/30/24 1523    empagliflozin (JARDIANCE) tablet 10 mg, 10 mg, Oral, Daily, Teo Marie MD, 10 mg at 04/01/24 0838    warfarin placeholder: dosing by provider, , Other, RX [place], Judy Estrada DO    lisinopril (PRINIVIL;ZESTRIL) tablet 10 mg, 10 mg, Oral, Daily, Judy Estrada DO, 10 mg at 04/01/24 0832    metoprolol tartrate (LOPRESSOR) tablet 25 mg, 25 mg, Oral, BID, Sean, Judy T, DO, 25 mg at 04/01/24 2013    acetaminophen (TYLENOL) tablet 650 mg, 650 mg, Oral, Q6H PRN, Sean, Judy T, DO, 650 mg at 04/01/24 2013    pantoprazole (PROTONIX) tablet 40 mg, 40 mg, Oral, QAM AC, Sean, Judy T, DO, 40 mg at 04/01/24 0637    potassium chloride (KLOR-CON M) extended release tablet 20 mEq, 20 mEq, Oral, Daily with breakfast, Sean, Judy T, DO, 20 mEq at 04/01/24 0832    Lab Results:     Lab Results   Component Value Date    WBC 18.6 (H) 04/04/2024    HGB 10.6 (L) 04/04/2024    HCT  34.3 (L) 04/04/2024    MCV 81.0 04/04/2024     04/04/2024     Lab Results   Component Value Date    CALCIUM 8.5 (L) 04/04/2024     04/04/2024    K 4.0 04/04/2024    CO2 28 04/04/2024     04/04/2024    BUN 45 (H) 04/04/2024    CREATININE 0.8 04/04/2024     No components found for: \"ABGSAMPLETYP\", \"ABGBODYTEMP\", \"ABGPHCORRFOR\", \"EPIDER4ZCFIRF\", \"ABGPHCORRFOOR\", \"ABGPH\", \"ABGPCO2\", \"ABGPO2\", \"ABGBASEEXCES\", \"ABGBASEDEFIC\", \"ABGHCO3\", \"VRPY2KPT\", \"ABGENDTIDALC\", \"ABGALLENSTES\", \"ABGSPO2\", \"ABGSAMEPLESIT\", \"[id number]\", \"ABGOXYGENSOU\"  Lab Results   Component Value Date    INR 2.1 04/04/2024    PROTIME 24.1 (H) 04/04/2024       Radiology:   [unfilled]  My reading of film: Chest x-ray still looks like ARDS.  Support tubes in place.  No pneumothorax      ASSESSMENT:     Acute hypoxemic respiratory failure.severe ARDS slowly improving now down to 50% FiO2 and PEEP of 10 with inhaled Veletri at 50  Fever and leukocytosis consistent with sepsis, pneumonia presumed source though currently microbial investigation has been negative, antibiotics broadened to Avycaz and vancomycin April 2, cultures from bronchoscopy still pending, fever resolved, white blood cell count trending downward  Chronic heart failure with preserved ejection fraction-appears roughly euvolemic, no pleural effusion, no leg edema  ICU delirium before intubation  Atrial fibrillation/flutter on warfarin, pharmacy managing warfarin currently therapeutic  STANLEY on auto BiPAP at home  Pacemaker  COVID/influenza negative, respiratory pathogen panel negative  Status post aortic valve replacement bioprosthetic valve  Pulmonary hypertension-suspect mostly group 2  Nonmorbid obesity  Type 2 diabetes  Hard of hearing  No COPD or asthma  Very distant history of smoking quit 40 years ago and was not a significant smoker  Full CODE STATUS-verified with wife Bhumika on the phone April 2  PLAN:   Plan to leave chest tube until he is extubated  Follow-up

## 2024-04-04 NOTE — PROGRESS NOTES
Patient is being removed from sedative medication.  Spouse fears \"the terrible thrashing\" previous to patient being medicated.  Spouse and children continue to hold raymond with patient.  Family has significant support from family, neighbors, and [employer].     04/04/24 1712   Encounter Summary   Encounter Overview/Reason  Spiritual/Emotional Needs   Service Provided For: Patient and family together   Referral/Consult From: Humza   Last Encounter  04/04/24   Complexity of Encounter Moderate   Begin Time 1530   End Time  1605   Total Time Calculated 35 min   Spiritual/Emotional needs   Type Spiritual Support   Assessment/Intervention/Outcome   Assessment Anxious;Coping;Fearful;Hopeful;Tearful   Intervention Active listening;Discussed belief system/[employer] practices/roger;Discussed death, afterlife;Discussed illness injury and it’s impact;Discussed meaning/purpose;Discussed relationship with God;Explored/Affirmed feelings, thoughts, concerns;Explored Coping Skills/Resources;Nurtured Hope;Prayer (assurance of)/[place];Sustaining Presence/Ministry of presence

## 2024-04-04 NOTE — PROGRESS NOTES
Date:   4/4/2024  Patient name: Saurav Stacy  Date of admission:  3/24/2024  7:53 PM  MRN:   [id number]  YOB: 1945  PCP: Jose Raul Manley MD    Reason for Admission: Congestive heart failure of unknown etiology (HCC) [I50.9]  Community acquired pneumonia, unspecified laterality [J18.9]    Cardiology follow-up: Congestive heart failure diastolic acute on chronic, A-fib, permanent pacemaker, history of aortic valve replacement         Referring physician: Dr Judy Estrada     Impression     Admission 3/24/2024 with increasing shortness of breath, chest discomfort, influenza A, influenza B, COVID not detected  Multiple focal pneumonia  CHF diastolic, pulmonary congestion as per chest x-ray, bilateral pleural effusion more on the right as per CT abdomen  Intubation 4/2/2024 for severe hypoxia, pulmonary infiltrates/ARDS  Large pneumothorax left side status post intubation, bronchoscopy, BAL, chest tube placed, significant improvement  Ejection fraction 50 to 55%, moderate pulmonary hypertension RVSP 51 mmHg  Aortic Valve replacement  S/P Pacemaker / V Paced, A-fib patient is on warfarin  Obesity BMI 39, sleep apnea  Diabetes mellitus  Hyperlipidemia  Kidney stone  Impaired hearing    Past surgical history includes aortic valve replacement, right foot surgery, colonoscopies, bilateral knee arthroplasty, eye surgery, facial reconstruction surgery right side, premalignant benign skin lesion excision, neck surgery     History of present illness  78 years old  male, ex smoker, morbid obesity with a past medical history of aortic valve replacement, permanent pacemaker placement got hospitalized 3/24/2024 with increasing shortness of breath, generalized weakness ongoing for past few days.  He has received antibiotics for suspected pneumonia.  His symptoms got worse and he came to the emergency department.  Chest x-ray on admission showed pulmonary congestion.  CT abdomen few days ago showed bilateral  MD on 4/4/2024 at 4:06 PM

## 2024-04-04 NOTE — PLAN OF CARE
RN  Outcome: Progressing  4/4/2024 0539 by García Jaimes RN  Outcome: Progressing     Problem: Infection - Adult  Goal: Absence of infection at discharge  4/4/2024 1737 by Penelope Pickering RN  Outcome: Progressing  4/4/2024 0539 by García Jaimes RN  Outcome: Progressing     Problem: Metabolic/Fluid and Electrolytes - Adult  Goal: Electrolytes maintained within normal limits  4/4/2024 1737 by Penelope Pickering RN  Outcome: Progressing  4/4/2024 0539 by García Jaimes RN  Outcome: Progressing  Goal: Hemodynamic stability and optimal renal function maintained  4/4/2024 1737 by Penelope Pickering RN  Outcome: Progressing  4/4/2024 0539 by García Jaimes RN  Outcome: Progressing     Problem: Hematologic - Adult  Goal: Maintains hematologic stability  4/4/2024 1737 by Penelope Pickering RN  Outcome: Progressing  4/4/2024 0539 by García Jaimes RN  Outcome: Progressing     Problem: Pain  Goal: Verbalizes/displays adequate comfort level or baseline comfort level  4/4/2024 1737 by Penelope Pickering RN  Outcome: Progressing  4/4/2024 0539 by García Jaimes RN  Outcome: Progressing     Problem: Safety - Medical Restraint  Goal: Remains free of injury from restraints (Restraint for Interference with Medical Device)  Description: INTERVENTIONS:  1. Determine that other, less restrictive measures have been tried or would not be effective before applying the restraint  2. Evaluate the patient's condition at the time of restraint application  3. Inform patient/family regarding the reason for restraint  4. Q2H: Monitor safety, psychosocial status, comfort, nutrition and hydration  Outcome: Progressing  Flowsheets (Taken 4/4/2024 1600 by Mackenzie Smith RN)  Remains free of injury from restraints (restraint for interference with medical device):   Determine that other, less restrictive measures have been tried or would not be effective before applying the restraint   Evaluate the patient's condition at the time of restraint application

## 2024-04-04 NOTE — CARE COORDINATION
ONGOING DISCHARGE PLAN:    Spoke with Patient's spouse and daughter today at bedside.They did ask about trach since Dr Agustin brought it up as a possibility. Mackenzie HOOK explained when it could happen. They will continue to discuss but don't think the patient would want it.     VENT 50% (DAY 3)  Nimbex-stopped    4/2 Bronch    Chest Tube Left side    IV lasix daily    IV vanco/avycaz per ID    Levophed     INR 2.1    Will continue to follow for additional discharge needs.    If patient is discharged prior to next notation, then this note serves as note for discharge by case management.    Electronically signed by Emelyn Julien RN on 4/4/2024 at 5:24 PM

## 2024-04-04 NOTE — PROGRESS NOTES
Secure message sent to Dr. Estrada asking if the patient can get different eye drops prescribed because the PRN eye drop ordered is back ordered per pharmacy.

## 2024-04-04 NOTE — PROGRESS NOTES
Comprehensive Nutrition Assessment    Type and Reason for Visit:  Consult (Once pt off paralytics can progress tube feeding)    Nutrition Recommendations/Plan:   Changed tube feeding from Osmolite 1.2 to Vital HP.  Follow for changes in Propofol rate.     Malnutrition Assessment:  Malnutrition Status:  Mild malnutrition (24 1404)    Context:  Acute Illness     Findings of the 6 clinical characteristics of malnutrition:  Energy Intake:  75% or less of estimated energy requirements for 7 or more days  Weight Loss:  Unable to assess (9.9% x 1 yr. More suspected but unable to evaluate due to fluid shifts.)     Body Fat Loss:  Unable to assess     Muscle Mass Loss:  Unable to assess    Fluid Accumulation:  Mild (May not be related to nutritional status but may mask wt loss.) Extremities   Strength:  Not Performed    Nutrition Assessment:    Pt remains intubated with Propofol at 28.7 ml providin kcals. To better meet needs changed tube feeding to Vital HP goal of 54 ml/hr to provide: 1296 kcals, 105 gm protein.    Nutrition Related Findings:    Edema: BLE, RUE +1, LUE +2. Labs and meds reviewed.         Current Nutrition Intake & Therapies:    Average Meal Intake: NPO  Average Supplements Intake: Unable to assess  Current Tube Feeding (TF) Orders:  Feeding Route: Nasogastric  Formula: Peptide Based High Protein  Schedule: Continuous  Feeding Regimen: goal 54 ml  Water Flushes: 30 ml every 6 hours  Goal TF & Flush Orders Provides: 1296 kcals, 105 gm protein (with Propofol 2054 kcals)    Anthropometric Measures:  Height: 172.7 cm (5' 7.99\")  Ideal Body Weight (IBW): 154 lbs (70 kg)    Admission Body Weight: 116.1 kg (255 lb 15.3 oz) (Method not specified)  Current Body Weight: 106.1 kg (234 lb), 151.9 % IBW. Weight Source: Bed Scale  Current BMI (kg/m2): 35.6  Usual Body Weight: 128.8 kg (283 lb 15.2 oz) (3/2/23. Method not specified.)  % Weight Change (Calculated): -9.9                    BMI Categories:

## 2024-04-04 NOTE — PROGRESS NOTES
Veletri protocol/ policy obtained from pharmacist Will, reviewed with RT and RN educator. Current dosing not the same as policy chart reduction. Reviewed with pharmacist Omid, he states to decrease by 20%. Writer reviewed policy with Dr. Agustin- he agreed with following protocol, decrease patient as per policy grid under the 70 kg weight and wean off Veletri decreasing by 10 every 4 hours as long as FiO2 does not increase by greater than 15% until off. Rt notified as well.

## 2024-04-04 NOTE — PROGRESS NOTES
DR HERNÁNDEZ      PROGRESS NOTE        Patient:  Saurav Stacy  YOB: 1945    MRN: [id number]     Acct: [id number]     Admit date: 3/24/2024    Pt seen and Chart reviewed.  Consultant notes reviewed and care evaluated.    Subjective: Patient still on the vent setting according to respiratory therapist about the same FiO2 50 rate 30 and PEEP of 10.  Patient still on pressors heart rate seems okay no report any other different arrhythmia last night such as V. tach V-fib.  Diuresing okay he started tube feeding looks like a 10 mm an hour he is so far tolerating diet.  His white count is down    Diet:  Diet NPO  ADULT TUBE FEEDING; Nasoenteric; Standard without Fiber; Continuous; 10; No; 30; Q 6 hours      Medications:Current Inpatient    Scheduled Meds:   vancomycin  1,250 mg IntraVENous Q24H    chlorhexidine  15 mL Mouth/Throat BID    famotidine (PEPCID) injection  20 mg IntraVENous BID    vancomycin (VANCOCIN) intermittent dosing (placeholder)   Other RX Placeholder    cefTAZidime-avibactam (AVYCAZ) 2.5 g in sodium chloride 0.9 % 100 mL IVPB  2.5 g IntraVENous Q8H    empagliflozin  10 mg Oral Daily    warfarin placeholder: dosing by provider   Other RX Placeholder    lisinopril  10 mg Oral Daily    metoprolol tartrate  25 mg Oral BID    pantoprazole  40 mg Oral QAM AC    potassium chloride  20 mEq Oral Daily with breakfast     Continuous Infusions:   norepinephrine 5 mcg/min (04/04/24 0515)    epoprostenol 1,500 mcg in sodium chloride 0.9 % 50 mL nebulization solution 50 ng/kg/min (04/04/24 0442)    fentaNYL 125 mcg/hr (04/04/24 0457)    propofol 45 mcg/kg/min (04/04/24 0517)    cisatracurium besylate (NIMBEX) 200 mg in sodium chloride 0.9 % 100 mL infusion 2 mcg/kg/min (04/04/24 0510)     PRN Meds:artificial tears, LORazepam, Benzocaine-Menthol, acetaminophen        Physical Exam:  Vitals: BP (!) 127/36   Pulse 64   Temp 98 °F (36.7 °C)  intubated  Pneumothorax with chest tube  Chest x-ray this morning still shows opacities throughout his lungs picture can be hard may be slight improvement from yesterday  Significant elevation in his BNP  Is INR 2.1 he has not received any Coumadin this is secondary to his liver problems and infectious process and medication    Plan:  Will keep holding his warfarin for now and monitor if his INR drops below 2 we will initiate small dose warfarin    Continue with other treatments    Prognosis is guarded    Noticed discussing with her pain    MONTSERRAT Diamond DABFM             4/4/2024, 7:13 AM

## 2024-04-04 NOTE — PROGRESS NOTES
Vancomycin Dosing by Pharmacy - Daily Note   Vancomycin Therapy Day:  3  Indication: HAP    Allergies:  Seasonal   Actual Weight:    Wt Readings from Last 1 Encounters:   04/01/24 106.3 kg (234 lb 5.6 oz)       Labs/Ancillary Data  Estimated Creatinine Clearance: 87 mL/min (based on SCr of 0.8 mg/dL).  Recent Labs     04/02/24  0405 04/03/24  0516 04/04/24  0423   CREATININE 0.6* 1.0 0.8   BUN 24* 38* 45*   WBC 19.0* 23.7* 18.6*     Procalcitonin   Date Value Ref Range Status   04/02/2024 0.36 (H) <0.09 ng/mL Final     Comment:           Suspected Sepsis:  <0.50 ng/mL     Low likelihood of sepsis.  0.50-2.00 ng/mL     Increased likelihood of sepsis. Antibiotics encouraged.  >2.00 ng/mL     High risk of sepsis/shock. Antibiotics strongly encouraged.        Suspected Lower Resp Tract Infections:  <0.24 ng/mL     Low likelihood of bacterial infection.  >0.24 ng/mL     Increased likelihood of bacterial infection. Antibiotics encouraged.        With successful antibiotic therapy, PCT levels should decrease rapidly. (Half-life of 24 to   36 hours.)        Procalcitonin values from samples collected within the first 6 hours of systemic infection   may still be low. Retesting may be indicated.  Values from day 1 and day 4 can be entered into the Change in Procalcitonin Calculator   ([email or web address]-pct-[email or web address]) to determine the patient's Mortality Risk Prognosis        In healthy neonates, plasma Procalcitonin (PCT) concentrations increase gradually after   birth, reaching peak values at about 24 hours of age then decrease to normal values below   0.5 ng/mL by 48-72 hours of age.         Intake/Output Summary (Last 24 hours) at 4/4/2024 0846  Last data filed at 4/4/2024 0707  Gross per 24 hour   Intake 1272.07 ml   Output 1200 ml   Net 72.07 ml     Temp: 97.8    Culture Date / Source  /  Results  4/2             Blood x 2       Pending  4/2            Sputum          Pending  3/27          MRSA Nasal       Neg  Recent  vancomycin administrations                     vancomycin (VANCOCIN) 1,250 mg in sodium chloride 0.9 % 250 mL IVPB ([id number]) (mg) 1,250 mg New Bag 04/04/24 0632    vancomycin (VANCOCIN) 1,250 mg in sodium chloride 0.9 % 250 mL IVPB ([id number]) (mg) 1,250 mg New Bag 04/03/24 0405    vancomycin (VANCOCIN) 2,500 mg in sodium chloride 0.9 % 500 mL IVPB (mg) 2,500 mg New Bag 04/02/24 1537                    Vancomycin Concentrations:   TROUGH:  No results for input(s): \"VANCOTROUGH\" in the last 72 hours.  RANDOM:    Recent Labs     04/03/24  1225   VANCORANDOM 25.3       MRSA Nasal Swab: was negative on 3/27, ordered for respiratory indication, pharmacy to contact provider about discontinuing vancomycin.  Dr Danielle aware of this    PLAN     Continue current dose of 1250 mg q24h IV  Ensured BUN/sCr ordered at baseline and every 48 hours x at least 3 levels, then at least weekly.  Repeat vancomycin concentration ordered for 4/5 @ 0600   Pharmacy will continue to monitor patient and adjust therapy as indicated      Vancomycin Target Concentration Parameters  Treatment  Population Target AUC/MICHAEL Target Trough   Invasive MRSA Infection (bacteremia, pneumonia, meningitis, endocarditis, osteomyelitis)  Sepsis (undifferentiated) 400-600 N/A   Infection due to non-MRSA pathogen  Empiric treatment of non-invasive MRSA infection  (SSTI, UTI) <500 10-15 mg/L   CrCl < 29 mL/min  Rapidly fluctuating serum creatinine   AURELIO N/A < 15 mg/L     Renal replacement therapy is dosed by levels, per hospital protocol.  Abbreviations  * Pauc: probability that AUC is >400 (efficacy); Pconc: probability that Ctrough is above 20 ?g/mL (toxicity); Tox: Probability of nephrotoxicity, based on Lodfermín et al. Clin Infect Dis 2009.        Thank you for the consult.  Pharmacy will continue to follow.   Ashvin Moreno RP,[facility], [place]   4/4/2024  8:47 AM

## 2024-04-05 ENCOUNTER — APPOINTMENT (OUTPATIENT)
Dept: GENERAL RADIOLOGY | Age: 79
DRG: 871 | End: 2024-04-05
Payer: MEDICARE

## 2024-04-05 LAB
ANION GAP SERPL CALCULATED.3IONS-SCNC: 11 MMOL/L (ref 9–17)
BASOPHILS # BLD: 0 K/UL (ref 0–0.2)
BASOPHILS NFR BLD: 0 % (ref 0–2)
BDY SITE: ABNORMAL
BUN SERPL-MCNC: 45 MG/DL (ref 8–23)
CALCIUM SERPL-MCNC: 8.5 MG/DL (ref 8.6–10.4)
CHLORIDE SERPL-SCNC: 107 MMOL/L (ref 98–107)
CO2 SERPL-SCNC: 28 MMOL/L (ref 20–31)
COHGB MFR BLD: 2.1 % (ref 0–5)
CREAT SERPL-MCNC: 0.9 MG/DL (ref 0.7–1.2)
DATE LAST DOSE: NORMAL
EOSINOPHIL # BLD: 0.69 K/UL (ref 0–0.4)
EOSINOPHILS RELATIVE PERCENT: 4 % (ref 0–4)
ERYTHROCYTE [DISTWIDTH] IN BLOOD BY AUTOMATED COUNT: 17.7 % (ref 11.5–14.9)
FIO2 ON VENT: 50 %
GAS FLOW.O2 O2 DELIVERY SYS: ABNORMAL L/MIN
GAS FLOW.O2 SETTING OXYMISER: 30 L/MIN
GFR SERPL CREATININE-BSD FRML MDRD: 86 ML/MIN/1.73M2
GLUCOSE SERPL-MCNC: 142 MG/DL (ref 70–99)
HCO3 ARTERIAL: 30.4 MMOL/L (ref 22–26)
HCT VFR BLD AUTO: 33.3 % (ref 41–53)
HGB BLD-MCNC: 10.5 G/DL (ref 13.5–17.5)
INR PPP: 2
LYMPHOCYTES NFR BLD: 1.56 K/UL (ref 1–4.8)
LYMPHOCYTES RELATIVE PERCENT: 9 % (ref 24–44)
MAGNESIUM SERPL-MCNC: 2.4 MG/DL (ref 1.6–2.6)
MCH RBC QN AUTO: 25.5 PG (ref 26–34)
MCHC RBC AUTO-ENTMCNC: 31.6 G/DL (ref 31–37)
MCV RBC AUTO: 80.7 FL (ref 80–100)
METHEMOGLOBIN: 0 % (ref 0–1.9)
MONOCYTES NFR BLD: 0.52 K/UL (ref 0.1–1.3)
MONOCYTES NFR BLD: 3 % (ref 1–7)
MORPHOLOGY: ABNORMAL
MORPHOLOGY: ABNORMAL
NEUTROPHILS NFR BLD: 84 % (ref 36–66)
NEUTS SEG NFR BLD: 14.53 K/UL (ref 1.3–9.1)
O2 SAT, ARTERIAL: 94.4 % (ref 95–98)
PCO2 ARTERIAL: 62 MMHG (ref 35–45)
PEEP RESPIRATORY: 10 CM[H2O]
PH ARTERIAL: 7.31 (ref 7.35–7.45)
PLATELET # BLD AUTO: 236 K/UL (ref 150–450)
PMV BLD AUTO: 8.2 FL (ref 6–12)
PO2 ARTERIAL: 86.3 MMHG (ref 80–100)
POSITIVE BASE EXCESS, ART: 2.8 MMOL/L (ref 0–2)
POTASSIUM SERPL-SCNC: 4.3 MMOL/L (ref 3.7–5.3)
PROTHROMBIN TIME: 23.1 SEC (ref 11.8–14.6)
PT. POSITION: ABNORMAL
RBC # BLD AUTO: 4.13 M/UL (ref 4.5–5.9)
RESPIRATORY RATE: 30
SODIUM SERPL-SCNC: 146 MMOL/L (ref 135–144)
TEXT FOR RESPIRATORY: ABNORMAL
TME LAST DOSE: 632 H
TOTAL RATE: 33
TRIGL SERPL-MCNC: 208 MG/DL
VANCOMYCIN DOSE: NORMAL MG
VANCOMYCIN SERPL-MCNC: 14 UG/ML
VENTILATION MODE VENT: ABNORMAL
VT: 500
WBC OTHER # BLD: 17.3 K/UL (ref 3.5–11)

## 2024-04-05 PROCEDURE — 6370000000 HC RX 637 (ALT 250 FOR IP): Performed by: INTERNAL MEDICINE

## 2024-04-05 PROCEDURE — 99233 SBSQ HOSP IP/OBS HIGH 50: CPT | Performed by: INTERNAL MEDICINE

## 2024-04-05 PROCEDURE — 94003 VENT MGMT INPAT SUBQ DAY: CPT

## 2024-04-05 PROCEDURE — 2500000003 HC RX 250 WO HCPCS: Performed by: INTERNAL MEDICINE

## 2024-04-05 PROCEDURE — 2580000003 HC RX 258: Performed by: INTERNAL MEDICINE

## 2024-04-05 PROCEDURE — 80048 BASIC METABOLIC PNL TOTAL CA: CPT

## 2024-04-05 PROCEDURE — 80202 ASSAY OF VANCOMYCIN: CPT

## 2024-04-05 PROCEDURE — 85025 COMPLETE CBC W/AUTO DIFF WBC: CPT

## 2024-04-05 PROCEDURE — 83735 ASSAY OF MAGNESIUM: CPT

## 2024-04-05 PROCEDURE — 6370000000 HC RX 637 (ALT 250 FOR IP): Performed by: FAMILY MEDICINE

## 2024-04-05 PROCEDURE — 37799 UNLISTED PX VASCULAR SURGERY: CPT

## 2024-04-05 PROCEDURE — A4216 STERILE WATER/SALINE, 10 ML: HCPCS | Performed by: INTERNAL MEDICINE

## 2024-04-05 PROCEDURE — 82805 BLOOD GASES W/O2 SATURATION: CPT

## 2024-04-05 PROCEDURE — 84478 ASSAY OF TRIGLYCERIDES: CPT

## 2024-04-05 PROCEDURE — 6360000002 HC RX W HCPCS: Performed by: INTERNAL MEDICINE

## 2024-04-05 PROCEDURE — 71045 X-RAY EXAM CHEST 1 VIEW: CPT

## 2024-04-05 PROCEDURE — 2000000000 HC ICU R&B

## 2024-04-05 PROCEDURE — 94761 N-INVAS EAR/PLS OXIMETRY MLT: CPT

## 2024-04-05 PROCEDURE — 85610 PROTHROMBIN TIME: CPT

## 2024-04-05 PROCEDURE — 2700000000 HC OXYGEN THERAPY PER DAY

## 2024-04-05 PROCEDURE — 36415 COLL VENOUS BLD VENIPUNCTURE: CPT

## 2024-04-05 RX ORDER — PROPOFOL 10 MG/ML
5-50 INJECTION, EMULSION INTRAVENOUS CONTINUOUS
Status: DISCONTINUED | OUTPATIENT
Start: 2024-04-05 | End: 2024-04-09

## 2024-04-05 RX ORDER — FENTANYL CITRATE-0.9 % NACL/PF 10 MCG/ML
25-150 PLASTIC BAG, INJECTION (ML) INTRAVENOUS CONTINUOUS
Status: DISCONTINUED | OUTPATIENT
Start: 2024-04-05 | End: 2024-04-08

## 2024-04-05 RX ORDER — FUROSEMIDE 10 MG/ML
20 INJECTION INTRAMUSCULAR; INTRAVENOUS 2 TIMES DAILY
Status: DISCONTINUED | OUTPATIENT
Start: 2024-04-05 | End: 2024-04-10 | Stop reason: HOSPADM

## 2024-04-05 RX ORDER — POLYETHYLENE GLYCOL 3350 17 G/17G
17 POWDER, FOR SOLUTION ORAL DAILY
Status: DISCONTINUED | OUTPATIENT
Start: 2024-04-05 | End: 2024-04-09

## 2024-04-05 RX ORDER — NOREPINEPHRINE BITARTRATE 0.06 MG/ML
1-100 INJECTION, SOLUTION INTRAVENOUS CONTINUOUS
Status: DISCONTINUED | OUTPATIENT
Start: 2024-04-05 | End: 2024-04-08

## 2024-04-05 RX ADMIN — CEFTAZIDIME, AVIBACTAM 2.5 G: 2; .5 POWDER, FOR SOLUTION INTRAVENOUS at 22:17

## 2024-04-05 RX ADMIN — DOXYCYCLINE 100 MG: 100 INJECTION, POWDER, LYOPHILIZED, FOR SOLUTION INTRAVENOUS at 09:52

## 2024-04-05 RX ADMIN — CEFTAZIDIME, AVIBACTAM 2.5 G: 2; .5 POWDER, FOR SOLUTION INTRAVENOUS at 06:11

## 2024-04-05 RX ADMIN — CARBOXYMETHYLCELLULOSE SODIUM 1 DROP: 10 GEL OPHTHALMIC at 11:44

## 2024-04-05 RX ADMIN — ACETAMINOPHEN 650 MG: 325 TABLET ORAL at 20:15

## 2024-04-05 RX ADMIN — DOCUSATE SODIUM LIQUID 100 MG: 100 LIQUID ORAL at 10:37

## 2024-04-05 RX ADMIN — Medication 125 MCG/HR: at 04:33

## 2024-04-05 RX ADMIN — CARBOXYMETHYLCELLULOSE SODIUM 1 DROP: 10 GEL OPHTHALMIC at 20:11

## 2024-04-05 RX ADMIN — PROPOFOL 45 MCG/KG/MIN: 10 INJECTION, EMULSION INTRAVENOUS at 06:09

## 2024-04-05 RX ADMIN — EMPAGLIFLOZIN 10 MG: 10 TABLET, FILM COATED ORAL at 10:11

## 2024-04-05 RX ADMIN — PROPOFOL 45 MCG/KG/MIN: 10 INJECTION, EMULSION INTRAVENOUS at 21:43

## 2024-04-05 RX ADMIN — DOCUSATE SODIUM LIQUID 100 MG: 100 LIQUID ORAL at 20:12

## 2024-04-05 RX ADMIN — SODIUM CHLORIDE, PRESERVATIVE FREE 20 MG: 5 INJECTION INTRAVENOUS at 20:10

## 2024-04-05 RX ADMIN — DOXYCYCLINE 100 MG: 100 INJECTION, POWDER, LYOPHILIZED, FOR SOLUTION INTRAVENOUS at 20:23

## 2024-04-05 RX ADMIN — PROPOFOL 45 MCG/KG/MIN: 10 INJECTION, EMULSION INTRAVENOUS at 19:04

## 2024-04-05 RX ADMIN — PROPOFOL 45 MCG/KG/MIN: 10 INJECTION, EMULSION INTRAVENOUS at 09:13

## 2024-04-05 RX ADMIN — FUROSEMIDE 20 MG: 10 INJECTION, SOLUTION INTRAMUSCULAR; INTRAVENOUS at 07:43

## 2024-04-05 RX ADMIN — Medication 5 MCG/MIN: at 05:38

## 2024-04-05 RX ADMIN — POLYETHYLENE GLYCOL 3350 17 G: 17 POWDER, FOR SOLUTION ORAL at 10:37

## 2024-04-05 RX ADMIN — PROPOFOL 45 MCG/KG/MIN: 10 INJECTION, EMULSION INTRAVENOUS at 12:54

## 2024-04-05 RX ADMIN — CARBOXYMETHYLCELLULOSE SODIUM 1 DROP: 10 GEL OPHTHALMIC at 07:43

## 2024-04-05 RX ADMIN — SODIUM CHLORIDE, PRESERVATIVE FREE 20 MG: 5 INJECTION INTRAVENOUS at 07:43

## 2024-04-05 RX ADMIN — FUROSEMIDE 20 MG: 10 INJECTION, SOLUTION INTRAMUSCULAR; INTRAVENOUS at 17:17

## 2024-04-05 RX ADMIN — CARBOXYMETHYLCELLULOSE SODIUM 1 DROP: 10 GEL OPHTHALMIC at 17:17

## 2024-04-05 RX ADMIN — PROPOFOL 45 MCG/KG/MIN: 10 INJECTION, EMULSION INTRAVENOUS at 15:31

## 2024-04-05 RX ADMIN — PROPOFOL 45 MCG/KG/MIN: 10 INJECTION, EMULSION INTRAVENOUS at 03:00

## 2024-04-05 RX ADMIN — PROPOFOL 45 MCG/KG/MIN: 10 INJECTION, EMULSION INTRAVENOUS at 00:24

## 2024-04-05 RX ADMIN — VANCOMYCIN HYDROCHLORIDE 1250 MG: 1.25 INJECTION, POWDER, LYOPHILIZED, FOR SOLUTION INTRAVENOUS at 07:39

## 2024-04-05 RX ADMIN — CHLORHEXIDINE GLUCONATE 0.12% ORAL RINSE 15 ML: 1.2 LIQUID ORAL at 07:43

## 2024-04-05 RX ADMIN — CHLORHEXIDINE GLUCONATE 0.12% ORAL RINSE 15 ML: 1.2 LIQUID ORAL at 20:12

## 2024-04-05 RX ADMIN — CEFTAZIDIME, AVIBACTAM 2.5 G: 2; .5 POWDER, FOR SOLUTION INTRAVENOUS at 14:36

## 2024-04-05 ASSESSMENT — PULMONARY FUNCTION TESTS
PIF_VALUE: 33
PIF_VALUE: 31
PIF_VALUE: 33
PIF_VALUE: 33
PIF_VALUE: 26
PIF_VALUE: 35
PIF_VALUE: 28
PIF_VALUE: 32
PIF_VALUE: 37
PIF_VALUE: 26
PIF_VALUE: 34
PIF_VALUE: 30
PIF_VALUE: 36
PIF_VALUE: 32
PIF_VALUE: 27
PIF_VALUE: 32
PIF_VALUE: 33
PIF_VALUE: 35
PIF_VALUE: 30
PIF_VALUE: 36
PIF_VALUE: 25
PIF_VALUE: 29
PIF_VALUE: 29
PIF_VALUE: 25
PIF_VALUE: 33
PIF_VALUE: 32
PIF_VALUE: 30
PIF_VALUE: 30
PIF_VALUE: 29
PIF_VALUE: 36
PIF_VALUE: 29
PIF_VALUE: 34
PIF_VALUE: 35
PIF_VALUE: 34
PIF_VALUE: 34

## 2024-04-05 ASSESSMENT — PAIN SCALES - GENERAL: PAINLEVEL_OUTOF10: 0

## 2024-04-05 NOTE — PROGRESS NOTES
Vancomycin Dosing by Pharmacy - Daily Note   Vancomycin Therapy Day:  3  Indication: HAP.    Allergies:  Seasonal   Actual Weight:    Wt Readings from Last 1 Encounters:   04/01/24 106.3 kg (234 lb 5.6 oz)       Labs/Ancillary Data  Estimated Creatinine Clearance: 77 mL/min (based on SCr of 0.9 mg/dL).  Recent Labs     04/03/24  0516 04/04/24  0423 04/05/24  0507 04/05/24  0508   CREATININE 1.0 0.8 0.9  --    BUN 38* 45* 45*  --    WBC 23.7* 18.6*  --  17.3*     Procalcitonin   Date Value Ref Range Status   04/02/2024 0.36 (H) <0.09 ng/mL Final     Comment:           Suspected Sepsis:  <0.50 ng/mL     Low likelihood of sepsis.  0.50-2.00 ng/mL     Increased likelihood of sepsis. Antibiotics encouraged.  >2.00 ng/mL     High risk of sepsis/shock. Antibiotics strongly encouraged.        Suspected Lower Resp Tract Infections:  <0.24 ng/mL     Low likelihood of bacterial infection.  >0.24 ng/mL     Increased likelihood of bacterial infection. Antibiotics encouraged.        With successful antibiotic therapy, PCT levels should decrease rapidly. (Half-life of 24 to   36 hours.)        Procalcitonin values from samples collected within the first 6 hours of systemic infection   may still be low. Retesting may be indicated.  Values from day 1 and day 4 can be entered into the Change in Procalcitonin Calculator   ([email or web address]-pct-calculator.com) to determine the patient's Mortality Risk Prognosis        In healthy neonates, plasma Procalcitonin (PCT) concentrations increase gradually after   birth, reaching peak values at about 24 hours of age then decrease to normal values below   0.5 ng/mL by 48-72 hours of age.         Intake/Output Summary (Last 24 hours) at 4/5/2024 0617  Last data filed at 4/4/2024 1855  Gross per 24 hour   Intake 1065.48 ml   Output 1520 ml   Net -454.52 ml     Temp: 99.4 F    Culture Date / Source  /  Results  See micro.  Recent vancomycin administrations                     vancomycin (VANCOCIN) 1,250  mg in sodium chloride 0.9 % 250 mL IVPB ([id number]) (mg) 1,250 mg New Bag 04/04/24 0632    vancomycin (VANCOCIN) 1,250 mg in sodium chloride 0.9 % 250 mL IVPB ([id number]) (mg) 1,250 mg New Bag 04/03/24 0405    vancomycin (VANCOCIN) 2,500 mg in sodium chloride 0.9 % 500 mL IVPB (mg) 2,500 mg New Bag 04/02/24 1537                    Vancomycin Concentrations:   TROUGH:  No results for input(s): \"VANCOTROUGH\" in the last 72 hours.  RANDOM:    Recent Labs     04/03/24  1225 04/05/24  0507   VANCORANDOM 25.3 14.0       MRSA Nasal Swab: was negative on 03/27, ordered for respiratory indication, pharmacy to contact provider about discontinuing vancomycin.    PLAN     Continue current dose of 1250 mg q24h IV  Ensured BUN/sCr ordered at baseline and every 48 hours x at least 3 levels, then at least weekly.  Pharmacy will continue to monitor patient and adjust therapy as indicated      Vancomycin Target Concentration Parameters  Treatment  Population Target AUC/MICHAEL Target Trough   Invasive MRSA Infection (bacteremia, pneumonia, meningitis, endocarditis, osteomyelitis)  Sepsis (undifferentiated) 400-600 N/A   Infection due to non-MRSA pathogen  Empiric treatment of non-invasive MRSA infection  (SSTI, UTI) <500 10-15 mg/L   CrCl < 29 mL/min  Rapidly fluctuating serum creatinine   AURELIO N/A < 15 mg/L     Renal replacement therapy is dosed by levels, per hospital protocol.  Abbreviations  * Pauc: probability that AUC is >400 (efficacy); Pconc: probability that Ctrough is above 20 ?g/mL (toxicity); Tox: Probability of nephrotoxicity, based on Clair et al. Clin Infect Dis 2009.    Loading dose: N/A  Regimen: 1250 mg IV every 24 hours.  Start time: 06:32 on 04/05/2024  Exposure target: AUC24 (range)400-600 mg/L.hr   AUC24,ss: 450 mg/L.hr  Probability of AUC24 > 400: 83 %  Ctrough,ss: 12.4 mg/L  Probability of Ctrough,ss > 20: 0 %  Probability of nephrotoxicity (Clair TRU 2009): 8 %    Thank you for the consult.  Pharmacy will continue

## 2024-04-05 NOTE — PLAN OF CARE
Problem: Discharge Planning  Goal: Discharge to home or other facility with appropriate resources  4/5/2024 0516 by García Jaimes RN  Outcome: Progressing  4/4/2024 1737 by Penelope Pickering RN  Outcome: Progressing     Problem: Safety - Adult  Goal: Free from fall injury  4/5/2024 0516 by García Jaimes RN  Outcome: Progressing  4/4/2024 1737 by Penelope Pickering RN  Outcome: Progressing     Problem: ABCDS Injury Assessment  Goal: Absence of physical injury  4/5/2024 0516 by García Jaimes RN  Outcome: Progressing  4/4/2024 1737 by Penelope Pickering RN  Outcome: Progressing     Problem: Chronic Conditions and Co-morbidities  Goal: Patient's chronic conditions and co-morbidity symptoms are monitored and maintained or improved  4/5/2024 0516 by García Jaimes RN  Outcome: Progressing  4/4/2024 1737 by Penelope Pickering RN  Outcome: Progressing     Problem: Respiratory - Adult  Goal: Achieves optimal ventilation and oxygenation  4/5/2024 0516 by García Jaimes RN  Outcome: Progressing  4/4/2024 1737 by Penelope Pickering RN  Outcome: Progressing     Problem: Skin/Tissue Integrity  Goal: Absence of new skin breakdown  Description: 1.  Monitor for areas of redness and/or skin breakdown  2.  Assess vascular access sites hourly  3.  Every 4-6 hours minimum:  Change oxygen saturation probe site  4.  Every 4-6 hours:  If on nasal continuous positive airway pressure, respiratory therapy assess nares and determine need for appliance change or resting period.  4/5/2024 0516 by García Jaimes RN  Outcome: Progressing  4/4/2024 1737 by Penelope Pickering RN  Outcome: Progressing     Problem: Nutrition Deficit:  Goal: Optimize nutritional status  4/5/2024 0516 by García Jaimes RN  Outcome: Progressing  4/4/2024 1737 by Penelope Pickering RN  Outcome: Progressing  Flowsheets (Taken 4/4/2024 1627 by Geovanna Snell, RD, LD)  Nutrient intake appropriate for improving, restoring, or maintaining nutritional needs: Recommend, monitor, and adjust

## 2024-04-05 NOTE — PROGRESS NOTES
[facility] -   Infectious diseases evaluation  admission date 3/24/2024    reason for consultation:   Pneumonia and leukocytosis    Impression :   Current:  CHF /possible ARDS  Multifocal pneumonia   Sepsis  Shock multifactorial  Status post bronchoscopy 4/2/24  Pneumothorax status post left chest tube placement 4/2/24  Acute respiratory failure/intubated 4/2/24  S/P aortic valve replacement  S/P Pacemaker   Afib  DM  Obesity  QT prolongation QTc 574 on 3/29/2024      HENCE:     Follow blood and BAL cultures from 4/2/24, no growth to date adjust antibiotics as needed  Continue Avycaz  Discontinue vancomycin  IV doxycycline  Avoid Levaquin due to QT prolongation  Urine for strep pneumo and Legionella antigen negative  Mycoplasma IgM negative  Nasal swab for MRSA was - 3/27/2024  No growth on blood cultures from 3/24/2024  Respiratory panel with COVID-19 - 3/27/2024  He received a course of Zithromax 3/27/2024 through 3/31/2024.  He received cefepime 3/27/2024 through 4/1/2024  He received IV ceftriaxone and doxycycline on 3/24/2024  He received IV Zosyn on 4/1/2024  Follow CBC and renal function  Echocardiogram from 3/25/2024 reviewed, no vegetations  Discussed with Dr. Estrada    Infection Control Recommendations   Romney Precautions      Antimicrobial Stewardship Recommendations   Simplification of therapy  Targeted therapy      History of Present Illness:   Initial history:  Saurav Stacy is a 78 y.o.-year-old male presented to hospital for worsening shortness of breath associated with cough and generalized weakness for several days prior to admission.  He also had bilateral lower extremity edema.-Symptoms are moderate to severe, no alleviating or aggravating factors.  Initial WBC was 13.5, proBNP elevated more than 10,000, procalcitonin 0.17  Chest x-ray showed diffuse bilateral infiltrates    WBC increased to 21 on 4/1/2024 on steroids  On 4/2/24 The patient  block     Cancer (HCC) 1978    Mastoid tumor removed right ear    Diabetes mellitus (HCC)     GERD (gastroesophageal reflux disease)     BETTER WITH CPAP    [place] (hard of hearing)     BILAT HEARING AIDS    Hyperlipidemia     Hypertension     Kidney stones     Osteoarthritis     Pacemaker 2010    Sleep apnea     Cpap machine       Past Surgical  History:     Past Surgical History:   Procedure Laterality Date    AORTIC VALVE REPLACEMENT  2012/2013    Mechanical Valve    BACK SURGERY      LUMBAR    CARDIAC SURGERY      AORTIC VALVE REPLACEMENT    COLONOSCOPY      CYSTOSCOPY      EYE SURGERY Bilateral     CATARACTS    FACIAL RECONSTRUCTION SURGERY Right 4/17/2017    EXCISION LESION RIGHT SIDE OF FOREHEAD performed by Randi Archer MD at [facility]    FOOT SURGERY Right     Bone Spur removed    JOINT REPLACEMENT Left 08/23/2016    TKA    KIDNEY STONE SURGERY      KIDNEY SURGERY Left     STONES REMOVED, WAS DONE YEARS AGO    KNEE ARTHROSCOPY Left     NECK SURGERY N/A 9/18/2019    EXCISION MASS POSTERIOR NECK performed by Randi Archer MD at [facility]    PACEMAKER PLACEMENT      PRE-MALIGNANT / BENIGN SKIN LESION EXCISION Right 04/17/2017    forehead    PRE-MALIGNANT / BENIGN SKIN LESION EXCISION  09/18/2019    posterior neck mass    TONSILLECTOMY      TOTAL KNEE ARTHROPLASTY Left     with biomet and GPS product application    TUMOR REMOVAL      Ear       Medications:      furosemide  20 mg IntraVENous Daily    carboxymethylcellulose  1 drop Both Eyes 4x Daily    vancomycin  1,250 mg IntraVENous Q24H    chlorhexidine  15 mL Mouth/Throat BID    famotidine (PEPCID) injection  20 mg IntraVENous BID    vancomycin (VANCOCIN) intermittent dosing (placeholder)   Other RX Placeholder    cefTAZidime-avibactam (AVYCAZ) 2.5 g in sodium chloride 0.9 % 100 mL IVPB  2.5 g IntraVENous Q8H    empagliflozin  10 mg Oral Daily    warfarin placeholder: dosing by provider   Other RX Placeholder    lisinopril  10 mg Oral

## 2024-04-05 NOTE — PROGRESS NOTES
Epoprostenol weaned and stopped at midnight, patient desat to 85% during bath at 0300 but recovered quickly to 92%. Levophed increased to 5mcg related to MAP less than 65.

## 2024-04-05 NOTE — PLAN OF CARE
Problem: Discharge Planning  Goal: Discharge to home or other facility with appropriate resources  4/5/2024 1753 by Penelope Pickering RN  Outcome: Progressing  4/5/2024 0516 by García Jaimes RN  Outcome: Progressing     Problem: Safety - Adult  Goal: Free from fall injury  4/5/2024 1753 by Penelope Pickering RN  Outcome: Progressing  4/5/2024 0516 by García Jaimes RN  Outcome: Progressing     Problem: ABCDS Injury Assessment  Goal: Absence of physical injury  4/5/2024 1753 by Penelope Pickering RN  Outcome: Progressing  4/5/2024 0516 by García Jaimes RN  Outcome: Progressing     Problem: Chronic Conditions and Co-morbidities  Goal: Patient's chronic conditions and co-morbidity symptoms are monitored and maintained or improved  4/5/2024 1753 by Penelope Pickering RN  Outcome: Progressing  4/5/2024 0516 by García Jaimes RN  Outcome: Progressing     Problem: Respiratory - Adult  Goal: Achieves optimal ventilation and oxygenation  4/5/2024 1753 by Penelope Pickering RN  Outcome: Progressing  4/5/2024 0516 by García Jaimes RN  Outcome: Progressing     Problem: Skin/Tissue Integrity  Goal: Absence of new skin breakdown  Description: 1.  Monitor for areas of redness and/or skin breakdown  2.  Assess vascular access sites hourly  3.  Every 4-6 hours minimum:  Change oxygen saturation probe site  4.  Every 4-6 hours:  If on nasal continuous positive airway pressure, respiratory therapy assess nares and determine need for appliance change or resting period.  4/5/2024 1753 by Penelope Pickering RN  Outcome: Progressing  4/5/2024 0516 by García Jaimes RN  Outcome: Progressing     Problem: Nutrition Deficit:  Goal: Optimize nutritional status  4/5/2024 1753 by Penelope Pickering RN  Outcome: Progressing  4/5/2024 0516 by García Jaimes RN  Outcome: Progressing     Problem: Neurosensory - Adult  Goal: Achieves stable or improved neurological status  4/5/2024 1753 by Penelope Pickering RN  Outcome: Progressing  4/5/2024 0516 by García Jaimes

## 2024-04-05 NOTE — CARE COORDINATION
ONGOING DISCHARGE PLAN:    Patient is intubated on vent FIO2 50% Day 4     Spoke with patient's spouse and dtr regarding discharge plan and patient confirms that plan is still are unsure. Pt was from home with spouse and VNS [id number].     Spoke to dtr and spouse about LTACH, FOC list provided.     Dr Garduno talked about poss trach in the future, family is unsure about what they want to do.     IV Avycaz, Doxycycline, IV lasix  20mg BID. Sedated with fentanyl/propofol , levophed at 3mcg.     Will continue to follow for additional discharge needs.    If patient is discharged prior to next notation, then this note serves as note for discharge by case management.    Electronically signed by Ewa Melvin RN on 4/5/2024 at 3:14 PM

## 2024-04-05 NOTE — PROGRESS NOTES
04/05/24 1511   Encounter Summary   Encounter Overview/Reason  Spiritual/Emotional Needs   Service Provided For: Patient   Referral/Consult From: [facility]   Last Encounter  04/05/24   Complexity of Encounter Low   Spiritual/Emotional needs   Type Spiritual Support   Assessment/Intervention/Outcome   Intervention Prayer (assurance of)/[place]

## 2024-04-05 NOTE — PROGRESS NOTES
[facility] PULMONARY,CRITICAL CARE & SLEEP   Alek Lee MD/Fer Agustin MD/ Milton Rayo MD/Yuri MCKAY AGACNP-BC, NP-C      Marielle Cortez APRN NP-C     Merlyn MCKAY NP-C                                           Pulmonary Critical Care Progress Note    Patient - Saurav Stacy   Age - 78 y.o.   - 1945  MRN - [id number]  [facility] # - [id number]  Date of Admission - 3/24/2024  7:53 PM    Consulting Service/Physician:       Primary Care Physician: Jose Raul Manley MD    SUBJECTIVE:     Chief Complaint:   Chief Complaint   Patient presents with    Shortness of Breath   Acute hypoxemic respiratory failure  Subjective:    No acute events overnight.  We were successfully able to get him off the Veletri and off the paralytics without any change in FiO2 which is currently at 50%.    Despite Lasix yesterday he is still in a positive fluid balance.    Tube feed was stopped for residual of about 150 mL which has not really a good indication to stop it.  I did asked the nurse to resume the tube feeding and we will start a bowel regimen.  Usual threshold for holding tube feed is greater than 250 mL residual    We are currently giving metoprolol and lisinopril while also concurrently on Levophed.  I will hold these medications for now until we are off the Levophed then slowly return them.    I spoke with the nurse.  Total output from the chest tube is 100 mL without airleak.  We will try to clamp the chest tube and see how it goes.    VITALS  BP (!) 135/39   Pulse 69   Temp 98.3 °F (36.8 °C) (Axillary)   Resp 23   Ht 1.727 m (5' 7.99\")   Wt 106.3 kg (234 lb 5.6 oz)   SpO2 92%   BMI 35.64 kg/m²   Wt Readings from Last 3 Encounters:   24 106.3 kg (234 lb 5.6 oz)   02/15/24 116.1 kg (256 lb)   23 125.6 kg (277 lb)     I/O (24 Hours)    Intake/Output Summary (Last 24 hours) at 2024 1001  Last data filed at 2024 0611  Gross per 24 hour   Intake  Richmond Agustin MD, 15 mL at 04/05/24 0743    famotidine (PEPCID) 20 mg in sodium chloride (PF) 0.9 % 10 mL injection, 20 mg, IntraVENous, BID, Richmond Agustin MD, 20 mg at 04/05/24 0743    LORazepam (ATIVAN) injection 2 mg, 2 mg, IntraVENous, Q6H PRN, Richmond Agustin MD    fentaNYL (SUBLIMAZE) 1,000 mcg in sodium chloride 0.9% 100 mL infusion,  mcg/hr, IntraVENous, Continuous, Richmond Agustin MD, Last Rate: 12.5 mL/hr at 04/05/24 0433, 125 mcg/hr at 04/05/24 0433    propofol infusion, 5-50 mcg/kg/min, IntraVENous, Continuous, Last Rate: 28.7 mL/hr at 04/05/24 0913, 45 mcg/kg/min at 04/05/24 0913 **AND** triglycerides, , , Q3 Days, Richmond Agustin MD    cisatracurium besylate (NIMBEX) 200 mg in sodium chloride 0.9 % 100 mL infusion, 0.5-10 mcg/kg/min, IntraVENous, Continuous, Richmond Agustin MD, Stopped at 04/04/24 1421    cefTAZidime-avibactam (AVYCAZ) 2.5 g in sodium chloride 0.9 % 100 mL IVPB, 2.5 g, IntraVENous, Q8H, Mahesh Danielle MD, Stopped at 04/05/24 0811    Benzocaine-Menthol (CEPACOL) 1 lozenge, 1 lozenge, Oral, Q2H PRN, Fer eL MD, 1 lozenge at 03/30/24 1523    empagliflozin (JARDIANCE) tablet 10 mg, 10 mg, Oral, Daily, Teo Marie MD, 10 mg at 04/01/24 0838    warfarin placeholder: dosing by provider, , Other, RX Placeholder, Judy Estrada DO    lisinopril (PRINIVIL;ZESTRIL) tablet 10 mg, 10 mg, Oral, Daily, Judy Estrada DO, 10 mg at 04/01/24 0832    metoprolol tartrate (LOPRESSOR) tablet 25 mg, 25 mg, Oral, BID, Sean, Judy T, DO, 25 mg at 04/01/24 2013    acetaminophen (TYLENOL) tablet 650 mg, 650 mg, Oral, Q6H PRN, Sean, Judy T, DO, 650 mg at 04/01/24 2013    pantoprazole (PROTONIX) tablet 40 mg, 40 mg, Oral, QAM AC, Sean, Judy T, DO, 40 mg at 04/01/24 0637    potassium chloride (KLOR-CON M) extended release tablet 20 mEq, 20 mEq, Oral, Daily with breakfast, Sean, Judy T, DO, 20 mEq at 04/01/24 0832    Lab Results:     Lab Results   Component Value Date    WBC 17.3 (H)

## 2024-04-05 NOTE — PROGRESS NOTES
Rounded with Dr. Agustin at bedside. Fentanyl decreased from 125 mcg to 100 mcg. Chest tube clamped by Dr. Agustin at this time.    See new orders placed by Dr. Agustin.

## 2024-04-05 NOTE — PROGRESS NOTES
Rounded at bedside with Dr. Estrada. Notified him of high tube feed residuals throughout the night. He states if it is over 100mL this morning to hold the tube feed for an hour.

## 2024-04-05 NOTE — PROGRESS NOTES
Tube feed residual 150mL per Dr. Estrada writer is holding it for 1 hour because residual is over 100mL.

## 2024-04-06 ENCOUNTER — APPOINTMENT (OUTPATIENT)
Dept: GENERAL RADIOLOGY | Age: 79
DRG: 871 | End: 2024-04-06
Payer: MEDICARE

## 2024-04-06 PROBLEM — J18.9 MULTIFOCAL PNEUMONIA: Status: ACTIVE | Noted: 2024-04-06

## 2024-04-06 PROBLEM — J96.01 SEPSIS WITH ACUTE HYPOXIC RESPIRATORY FAILURE AND SEPTIC SHOCK (HCC): Status: ACTIVE | Noted: 2024-04-06

## 2024-04-06 PROBLEM — A41.9 SEPSIS WITH ACUTE HYPOXIC RESPIRATORY FAILURE AND SEPTIC SHOCK (HCC): Status: ACTIVE | Noted: 2024-04-06

## 2024-04-06 PROBLEM — D72.829 LEUKOCYTOSIS: Status: ACTIVE | Noted: 2024-04-06

## 2024-04-06 PROBLEM — R94.31 QT PROLONGATION: Status: ACTIVE | Noted: 2024-04-06

## 2024-04-06 PROBLEM — R65.21 SEPSIS WITH ACUTE HYPOXIC RESPIRATORY FAILURE AND SEPTIC SHOCK (HCC): Status: ACTIVE | Noted: 2024-04-06

## 2024-04-06 LAB
ABSOLUTE BANDS: 1.01 K/UL (ref 0–1)
ANION GAP SERPL CALCULATED.3IONS-SCNC: 9 MMOL/L (ref 9–17)
ARTERIAL PATENCY WRIST A: ABNORMAL
BANDS: 5 % (ref 0–10)
BASOPHILS # BLD: 0 K/UL (ref 0–0.2)
BASOPHILS NFR BLD: 0 % (ref 0–2)
BDY SITE: ABNORMAL
BNP SERPL-MCNC: ABNORMAL PG/ML
BODY TEMPERATURE: 37
BUN SERPL-MCNC: 40 MG/DL (ref 8–23)
CALCIUM SERPL-MCNC: 8.5 MG/DL (ref 8.6–10.4)
CHLORIDE SERPL-SCNC: 109 MMOL/L (ref 98–107)
CO2 SERPL-SCNC: 29 MMOL/L (ref 20–31)
COHGB MFR BLD: 1.5 % (ref 0–5)
CREAT SERPL-MCNC: 0.6 MG/DL (ref 0.7–1.2)
EOSINOPHIL # BLD: 0.6 K/UL (ref 0–0.4)
EOSINOPHILS RELATIVE PERCENT: 3 % (ref 0–4)
ERYTHROCYTE [DISTWIDTH] IN BLOOD BY AUTOMATED COUNT: 17.9 % (ref 11.5–14.9)
FIO2 ON VENT: 50 %
GAS FLOW.O2 O2 DELIVERY SYS: ABNORMAL L/MIN
GAS FLOW.O2 SETTING OXYMISER: 30 L/MIN
GFR SERPL CREATININE-BSD FRML MDRD: >90 ML/MIN/1.73M2
GLUCOSE SERPL-MCNC: 157 MG/DL (ref 70–99)
HCO3 ARTERIAL: 32.6 MMOL/L (ref 22–26)
HCT VFR BLD AUTO: 35 % (ref 41–53)
HGB BLD-MCNC: 10.9 G/DL (ref 13.5–17.5)
INR PPP: 2
LYMPHOCYTES NFR BLD: 1.41 K/UL (ref 1–4.8)
LYMPHOCYTES RELATIVE PERCENT: 7 % (ref 24–44)
MAGNESIUM SERPL-MCNC: 2.2 MG/DL (ref 1.6–2.6)
MCH RBC QN AUTO: 25.4 PG (ref 26–34)
MCHC RBC AUTO-ENTMCNC: 31.1 G/DL (ref 31–37)
MCV RBC AUTO: 81.7 FL (ref 80–100)
METHEMOGLOBIN: 1 % (ref 0–1.9)
MONOCYTES NFR BLD: 0.6 K/UL (ref 0.1–1.3)
MONOCYTES NFR BLD: 3 % (ref 1–7)
MORPHOLOGY: ABNORMAL
NEUTROPHILS NFR BLD: 82 % (ref 36–66)
NEUTS SEG NFR BLD: 16.48 K/UL (ref 1.3–9.1)
O2 SAT, ARTERIAL: 94.5 % (ref 95–98)
PCO2 ARTERIAL: 61.7 MMHG (ref 35–45)
PEEP RESPIRATORY: 10 CM[H2O]
PH ARTERIAL: 7.33 (ref 7.35–7.45)
PLATELET # BLD AUTO: 224 K/UL (ref 150–450)
PMV BLD AUTO: 8.2 FL (ref 6–12)
PO2 ARTERIAL: 90.6 MMHG (ref 80–100)
POSITIVE BASE EXCESS, ART: 6.7 MMOL/L (ref 0–2)
POTASSIUM SERPL-SCNC: 4.3 MMOL/L (ref 3.7–5.3)
PROTHROMBIN TIME: 22.8 SEC (ref 11.8–14.6)
PT. POSITION: ABNORMAL
RBC # BLD AUTO: 4.28 M/UL (ref 4.5–5.9)
RESPIRATORY RATE: 30
SODIUM SERPL-SCNC: 147 MMOL/L (ref 135–144)
TEXT FOR RESPIRATORY: ABNORMAL
TOTAL RATE: 30
VENTILATION MODE VENT: ABNORMAL
VT: 500
WBC OTHER # BLD: 20.1 K/UL (ref 3.5–11)

## 2024-04-06 PROCEDURE — 83880 ASSAY OF NATRIURETIC PEPTIDE: CPT

## 2024-04-06 PROCEDURE — 71045 X-RAY EXAM CHEST 1 VIEW: CPT

## 2024-04-06 PROCEDURE — 99232 SBSQ HOSP IP/OBS MODERATE 35: CPT | Performed by: NURSE PRACTITIONER

## 2024-04-06 PROCEDURE — 37799 UNLISTED PX VASCULAR SURGERY: CPT

## 2024-04-06 PROCEDURE — 2580000003 HC RX 258: Performed by: INTERNAL MEDICINE

## 2024-04-06 PROCEDURE — 82805 BLOOD GASES W/O2 SATURATION: CPT

## 2024-04-06 PROCEDURE — 2500000003 HC RX 250 WO HCPCS: Performed by: INTERNAL MEDICINE

## 2024-04-06 PROCEDURE — A4216 STERILE WATER/SALINE, 10 ML: HCPCS | Performed by: INTERNAL MEDICINE

## 2024-04-06 PROCEDURE — 2700000000 HC OXYGEN THERAPY PER DAY

## 2024-04-06 PROCEDURE — 85610 PROTHROMBIN TIME: CPT

## 2024-04-06 PROCEDURE — 83735 ASSAY OF MAGNESIUM: CPT

## 2024-04-06 PROCEDURE — 6370000000 HC RX 637 (ALT 250 FOR IP): Performed by: INTERNAL MEDICINE

## 2024-04-06 PROCEDURE — 6360000002 HC RX W HCPCS: Performed by: INTERNAL MEDICINE

## 2024-04-06 PROCEDURE — 93005 ELECTROCARDIOGRAM TRACING: CPT | Performed by: INTERNAL MEDICINE

## 2024-04-06 PROCEDURE — 36415 COLL VENOUS BLD VENIPUNCTURE: CPT

## 2024-04-06 PROCEDURE — 94003 VENT MGMT INPAT SUBQ DAY: CPT

## 2024-04-06 PROCEDURE — 80048 BASIC METABOLIC PNL TOTAL CA: CPT

## 2024-04-06 PROCEDURE — 6370000000 HC RX 637 (ALT 250 FOR IP): Performed by: FAMILY MEDICINE

## 2024-04-06 PROCEDURE — 94761 N-INVAS EAR/PLS OXIMETRY MLT: CPT

## 2024-04-06 PROCEDURE — 2000000000 HC ICU R&B

## 2024-04-06 PROCEDURE — 85025 COMPLETE CBC W/AUTO DIFF WBC: CPT

## 2024-04-06 RX ORDER — WARFARIN SODIUM 1 MG/1
1 TABLET ORAL
Status: COMPLETED | OUTPATIENT
Start: 2024-04-06 | End: 2024-04-06

## 2024-04-06 RX ORDER — METOPROLOL TARTRATE 1 MG/ML
5 INJECTION, SOLUTION INTRAVENOUS EVERY 4 HOURS
Status: DISCONTINUED | OUTPATIENT
Start: 2024-04-06 | End: 2024-04-09

## 2024-04-06 RX ADMIN — DOCUSATE SODIUM LIQUID 100 MG: 100 LIQUID ORAL at 08:08

## 2024-04-06 RX ADMIN — FUROSEMIDE 20 MG: 10 INJECTION, SOLUTION INTRAMUSCULAR; INTRAVENOUS at 18:10

## 2024-04-06 RX ADMIN — DOXYCYCLINE 100 MG: 100 INJECTION, POWDER, LYOPHILIZED, FOR SOLUTION INTRAVENOUS at 22:30

## 2024-04-06 RX ADMIN — CARBOXYMETHYLCELLULOSE SODIUM 1 DROP: 10 GEL OPHTHALMIC at 19:59

## 2024-04-06 RX ADMIN — CHLORHEXIDINE GLUCONATE 0.12% ORAL RINSE 15 ML: 1.2 LIQUID ORAL at 08:08

## 2024-04-06 RX ADMIN — CARBOXYMETHYLCELLULOSE SODIUM 1 DROP: 10 GEL OPHTHALMIC at 16:20

## 2024-04-06 RX ADMIN — METOPROLOL TARTRATE 5 MG: 1 INJECTION, SOLUTION INTRAVENOUS at 16:01

## 2024-04-06 RX ADMIN — METOPROLOL TARTRATE 5 MG: 1 INJECTION, SOLUTION INTRAVENOUS at 23:43

## 2024-04-06 RX ADMIN — PROPOFOL 10 MCG/KG/MIN: 10 INJECTION, EMULSION INTRAVENOUS at 16:09

## 2024-04-06 RX ADMIN — CARBOXYMETHYLCELLULOSE SODIUM 1 DROP: 10 GEL OPHTHALMIC at 14:03

## 2024-04-06 RX ADMIN — CHLORHEXIDINE GLUCONATE 0.12% ORAL RINSE 15 ML: 1.2 LIQUID ORAL at 19:59

## 2024-04-06 RX ADMIN — CEFTAZIDIME, AVIBACTAM 2.5 G: 2; .5 POWDER, FOR SOLUTION INTRAVENOUS at 22:27

## 2024-04-06 RX ADMIN — SODIUM CHLORIDE, PRESERVATIVE FREE 20 MG: 5 INJECTION INTRAVENOUS at 08:08

## 2024-04-06 RX ADMIN — PROPOFOL 40 MCG/KG/MIN: 10 INJECTION, EMULSION INTRAVENOUS at 01:33

## 2024-04-06 RX ADMIN — LORAZEPAM 2 MG: 2 INJECTION, SOLUTION INTRAMUSCULAR; INTRAVENOUS at 11:23

## 2024-04-06 RX ADMIN — DOXYCYCLINE 100 MG: 100 INJECTION, POWDER, LYOPHILIZED, FOR SOLUTION INTRAVENOUS at 11:25

## 2024-04-06 RX ADMIN — CEFTAZIDIME, AVIBACTAM 2.5 G: 2; .5 POWDER, FOR SOLUTION INTRAVENOUS at 06:09

## 2024-04-06 RX ADMIN — CEFTAZIDIME, AVIBACTAM 2.5 G: 2; .5 POWDER, FOR SOLUTION INTRAVENOUS at 14:04

## 2024-04-06 RX ADMIN — EMPAGLIFLOZIN 10 MG: 10 TABLET, FILM COATED ORAL at 08:08

## 2024-04-06 RX ADMIN — METOPROLOL TARTRATE 5 MG: 1 INJECTION, SOLUTION INTRAVENOUS at 12:52

## 2024-04-06 RX ADMIN — WARFARIN SODIUM 1 MG: 1 TABLET ORAL at 16:20

## 2024-04-06 RX ADMIN — FUROSEMIDE 20 MG: 10 INJECTION, SOLUTION INTRAMUSCULAR; INTRAVENOUS at 08:08

## 2024-04-06 RX ADMIN — METOPROLOL TARTRATE 5 MG: 1 INJECTION, SOLUTION INTRAVENOUS at 19:59

## 2024-04-06 RX ADMIN — DOCUSATE SODIUM LIQUID 100 MG: 100 LIQUID ORAL at 19:59

## 2024-04-06 RX ADMIN — PROPOFOL 35 MCG/KG/MIN: 10 INJECTION, EMULSION INTRAVENOUS at 04:56

## 2024-04-06 RX ADMIN — POLYETHYLENE GLYCOL 3350 17 G: 17 POWDER, FOR SOLUTION ORAL at 08:08

## 2024-04-06 RX ADMIN — SODIUM CHLORIDE, PRESERVATIVE FREE 20 MG: 5 INJECTION INTRAVENOUS at 19:59

## 2024-04-06 RX ADMIN — CARBOXYMETHYLCELLULOSE SODIUM 1 DROP: 10 GEL OPHTHALMIC at 08:08

## 2024-04-06 ASSESSMENT — PULMONARY FUNCTION TESTS
PIF_VALUE: 30
PIF_VALUE: 27
PIF_VALUE: 30
PIF_VALUE: 27
PIF_VALUE: 29
PIF_VALUE: 32
PIF_VALUE: 22
PIF_VALUE: 27
PIF_VALUE: 27
PIF_VALUE: 29
PIF_VALUE: 31
PIF_VALUE: 31
PIF_VALUE: 32
PIF_VALUE: 29
PIF_VALUE: 25
PIF_VALUE: 30
PIF_VALUE: 32
PIF_VALUE: 25
PIF_VALUE: 31
PIF_VALUE: 30
PIF_VALUE: 31
PIF_VALUE: 30
PIF_VALUE: 31
PIF_VALUE: 24
PIF_VALUE: 30
PIF_VALUE: 33
PIF_VALUE: 28
PIF_VALUE: 35
PIF_VALUE: 33
PIF_VALUE: 26
PIF_VALUE: 28
PIF_VALUE: 35
PIF_VALUE: 28
PIF_VALUE: 33
PIF_VALUE: 29
PIF_VALUE: 29
PIF_VALUE: 30
PIF_VALUE: 30
PIF_VALUE: 20
PIF_VALUE: 30
PIF_VALUE: 27
PIF_VALUE: 29
PIF_VALUE: 33
PIF_VALUE: 26
PIF_VALUE: 33
PIF_VALUE: 30
PIF_VALUE: 33
PIF_VALUE: 21
PIF_VALUE: 30
PIF_VALUE: 19
PIF_VALUE: 31

## 2024-04-06 ASSESSMENT — PAIN SCALES - GENERAL
PAINLEVEL_OUTOF10: 0
PAINLEVEL_OUTOF10: 0

## 2024-04-06 NOTE — PLAN OF CARE
Problem: Respiratory - Adult  Goal: Achieves optimal ventilation and oxygenation  4/6/2024 0308 by Michael Han RCP  Outcome: Progressing  Flowsheets  Taken 4/6/2024 0000 by Nan Badillo RN  Achieves optimal ventilation and oxygenation: Assess for changes in respiratory status  Taken 4/5/2024 2000 by Nan Badillo RN  Achieves optimal ventilation and oxygenation: Assess for changes in respiratory status     MECHANICAL VENTILATION   [x]  PROVIDE OPTIMAL VENTILATION  [x]   ASSESS FOR EXTUBATION READINESS  [x]   ASSESS FOR WEANING READINESS  []  EXTUBATE AS TOLERATED  [x]  IMPLEMENT ADULT MECHANICAL VENTILATION PROTOCOL  [x]  MAINTAIN ADEQUATE OXYGENATION  []  PERFORM SPONTANEOUS WEANING TRIAL AS TOLERATED

## 2024-04-06 NOTE — PROGRESS NOTES
Xray tech in the room to do lateral CXR, patient going in and out of SVT into the 180s, and hypertensive. XR cancelled and patient repositioned. Dr. Jesus szymanski.

## 2024-04-06 NOTE — PROGRESS NOTES
Date:   4/6/2024  Patient name: Saurav Stacy  Date of admission:  3/24/2024  7:53 PM  MRN:   [id number]  YOB: 1945  PCP: Jose Raul Manley MD    Reason for Admission: Congestive heart failure of unknown etiology (HCC) [I50.9]  Community acquired pneumonia, unspecified laterality [J18.9]    Cardiology follow-up: Congestive heart failure diastolic acute on chronic, A-fib, permanent pacemaker, history of aortic valve replacement         Referring physician: Dr Judy Estrada     Impression     Admission 3/24/2024 with increasing shortness of breath, chest discomfort, influenza A, influenza B, COVID not detected  Multiple focal pneumonia/diffuse pulmonary infiltrates  CHF diastolic, pulmonary congestion as per chest x-ray, bilateral pleural effusion more on the right as per CT abdomen 3/21/2024  Intubation 4/2/2024 for severe hypoxia, pulmonary infiltrates/ARDS  Large pneumothorax left side status post intubation, bronchoscopy, BAL, chest tube placed, significant improvement  Ejection fraction 50 to 55%, moderate pulmonary hypertension RVSP 51 mmHg  Aortic Valve replacement  S/P Pacemaker / V Paced, A-fib patient is on warfarin  Obesity BMI 39, sleep apnea  Diabetes mellitus  Hyperlipidemia  Kidney stone  Impaired hearing    Past surgical history includes aortic valve replacement, right foot surgery, colonoscopies, bilateral knee arthroplasty, eye surgery, facial reconstruction surgery right side, premalignant benign skin lesion excision, neck surgery     History of present illness  78 years old  male, ex smoker, morbid obesity with a past medical history of aortic valve replacement, permanent pacemaker placement got hospitalized 3/24/2024 with increasing shortness of breath, generalized weakness ongoing for past few days.  He has received antibiotics for suspected pneumonia.  His symptoms got worse and he came to the emergency department.  Chest x-ray on admission showed pulmonary congestion.  CT  Leukocytosis     QT prolongation      Plan of Treatment:   Medications reviewed  CODE STATUS changed to DNR CCA  Hypercapnic and hypoxic respiratory failure requiring vent support, no change in the x-ray findings  Continue with the full vent support,  Had prolonged episode of supraventricular tachycardia heart rate up to 170  Start IV Lopressor 5 mg every 4 hours hold for blood pressure below 90  Continue with the Lasix 20 mg IV twice daily  He is also on doxycycline and ceftazidime  Prognosis remains guarded    Electronically signed by Teo Marie MD on 4/6/2024 at 10:55 AM

## 2024-04-06 NOTE — PLAN OF CARE
Problem: Discharge Planning  Goal: Discharge to home or other facility with appropriate resources  4/6/2024 1826 by Mackenzie Smith RN  Outcome: Progressing     Problem: Safety - Adult  Goal: Free from fall injury  4/6/2024 1826 by Mackenzie Smith RN  Outcome: Progressing  Flowsheets (Taken 4/6/2024 0842)  Free From Fall Injury: Instruct family/caregiver on patient safety     Problem: ABCDS Injury Assessment  Goal: Absence of physical injury  4/6/2024 1826 by Mackenzie Smith RN  Outcome: Progressing  Flowsheets (Taken 4/6/2024 0842)  Absence of Physical Injury: Implement safety measures based on patient assessment     Problem: Chronic Conditions and Co-morbidities  Goal: Patient's chronic conditions and co-morbidity symptoms are monitored and maintained or improved  4/6/2024 1826 by Mackenzie Smith RN  Outcome: Progressing     Problem: Respiratory - Adult  Description: Care plan initiated.   Monitor patients respiratory status.  Titrate FiO2 to keep SpO2 greater than 92%.  Collaborate with RT and care team for patient's respiratory needs.  Monitor labs and ABG's.  Notify physician as needed regarding labs, ABG's and of patient's respiratory status.  Goal: Achieves optimal ventilation and oxygenation  4/6/2024 1826 by Mackenzie Smith RN  Outcome: Progressing  Flowsheets  Taken 4/6/2024 1600  Achieves optimal ventilation and oxygenation:   Assess for changes in respiratory status   Assess for changes in mentation and behavior   Oxygen supplementation based on oxygen saturation or arterial blood gases  Taken 4/6/2024 1200  Achieves optimal ventilation and oxygenation:   Assess for changes in respiratory status   Assess for changes in mentation and behavior   Position to facilitate oxygenation and minimize respiratory effort  Taken 4/6/2024 0800  Achieves optimal ventilation and oxygenation:   Assess for changes in mentation and behavior   Position to facilitate oxygenation and minimize respiratory effort   Assess for  changes in respiratory status     Problem: Neurosensory - Adult  Goal: Achieves stable or improved neurological status  4/6/2024 1826 by Mackenzie Smith RN  Outcome: Progressing  Flowsheets  Taken 4/6/2024 1600  Achieves stable or improved neurological status:   Assess for and report changes in neurological status   Initiate measures to prevent increased intracranial pressure   Maintain blood pressure and fluid volume within ordered parameters to optimize cerebral perfusion and minimize risk of hemorrhage  Taken 4/6/2024 1200  Achieves stable or improved neurological status:   Initiate measures to prevent increased intracranial pressure   Maintain blood pressure and fluid volume within ordered parameters to optimize cerebral perfusion and minimize risk of hemorrhage   Assess for and report changes in neurological status  Taken 4/6/2024 0800  Achieves stable or improved neurological status:   Assess for and report changes in neurological status   Initiate measures to prevent increased intracranial pressure     Problem: Neurosensory - Adult  Goal: Achieves maximal functionality and self care  4/6/2024 1826 by Mackenzie Smith RN  Outcome: Progressing  Flowsheets  Taken 4/6/2024 1200  Achieves maximal functionality and self care:   Monitor swallowing and airway patency with patient fatigue and changes in neurological status   Encourage and assist patient to increase activity and self care with guidance from physical therapy/occupational therapy   Encourage visually impaired, hearing impaired and aphasic patients to use assistive/communication devices  Taken 4/6/2024 0800  Achieves maximal functionality and self care:   Encourage and assist patient to increase activity and self care with guidance from physical therapy/occupational therapy   Monitor swallowing and airway patency with patient fatigue and changes in neurological status     Problem: Cardiovascular - Adult  Goal: Maintains optimal cardiac output and hemodynamic

## 2024-04-06 NOTE — PROGRESS NOTES
Dr. Marie notified of patient going in and out of SVT. EKG ordered and completed, MD to come see patient shortly.

## 2024-04-06 NOTE — PROGRESS NOTES
[facility] -   Infectious diseases evaluation  admission date 3/24/2024    reason for consultation:   Pneumonia and leukocytosis    Impression :   Current:  CHF /possible ARDS  Multifocal pneumonia   Sepsis  Shock multifactorial  Leukocytosis  Status post bronchoscopy 4/2/24  Pneumothorax status post left chest tube placement 4/2/24  Acute respiratory failure/intubated 4/2/24  S/P aortic valve replacement  S/P Pacemaker   Afib  DM  Obesity  QT prolongation QTc 574 on 3/29/2024      HENCE:   Avycaz 2.5 gm IV every 8 hours for now.  Day 5  Doxycycline 100 mg IV every 12 hours for now.  Day 2.  Follow blood and BAL cultures from 4/2/24, no growth to date adjust antibiotics as needed  Avoid Levaquin due to QT prolongation  Urine for strep pneumo and Legionella antigen negative  Mycoplasma IgM negative  Nasal swab for MRSA was - 3/27/2024  No growth on blood cultures from 3/24/2024  Respiratory panel with COVID-19 - 3/27/2024  He received a course of Zithromax 3/27/2024 through 3/31/2024.  He received cefepime 3/27/2024 through 4/1/2024  He received IV ceftriaxone and doxycycline on 3/24/2024  He received IV Zosyn on 4/1/2024  Follow CBC and renal function  Echocardiogram from 3/25/2024 reviewed, no vegetations  Discussed with patient's wife at the bedside, Dr. Lee.    Infection Control Recommendations   [place] Precautions    Antimicrobial Stewardship Recommendations   Simplification of therapy  Targeted therapy      History of Present Illness:   Initial history:  aSurav Stacy is a 78 y.o.-year-old male presented to hospital for worsening shortness of breath associated with cough and generalized weakness for several days prior to admission.  He also had bilateral lower extremity edema.-Symptoms are moderate to severe, no alleviating or aggravating factors.  Initial WBC was 13.5, proBNP elevated more than 10,000, procalcitonin 0.17  Chest x-ray showed diffuse  bilateral infiltrates    WBC increased to 21 on 4/1/2024 on steroids  On 4/2/24 The patient deteriorated, required intubation, had a fever with a temperature max of 100.5, right IJ line was placed, bronchoscopy was done, had a small left-sided pneumothorax, left chest tube was placed.  He was started on Levophed  CT chest and abdomen 4/1/2024 reviewed showed trace free fluid in the pelvis, progressive patchy airspace opacities at the lung bases concerning for pneumonia.    Interval changes  4/6/2024   Patient seen and examined in the ICU.  Intubated.  FIO2 50% Breathing over the vent at 34.  Rate set at 30.  No pressors.  Propofol gtt at 10 mcg/kg.  WBC trending up;17.3-20.1  Right IJ TLC.  Site clean.  Left brachial art line.  Site clean.  L chest tube-sero-sang.  Cr catheter in place with clear urine.  Repeat BC from 4/2 remain negative to date.    I have personally reviewed the past medical history, past surgical history, medications, social history, and family history, and I haveupdated the database accordingly.      Allergies:   Seasonal     Review of Systems:     Review of Systems  Intubated, unable to provide  Physical Examination :       Physical Exam  Vitals and nursing note reviewed.   Constitutional:       Appearance: He is ill-appearing.      Comments: Intubated, sedated   HENT:      Head: Normocephalic and atraumatic.      Right Ear: External ear normal.      Left Ear: External ear normal.      Nose: Nose normal.      Comments: NG     Mouth/Throat:      Mouth: Mucous membranes are dry.   Eyes:      General:         Right eye: No discharge.         Left eye: No discharge.   Cardiovascular:      Rate and Rhythm: Normal rate and regular rhythm.      Heart sounds: Normal heart sounds. No murmur heard.  Pulmonary:      Comments: FIO2 50%.  Dyspneic, tachypneic... Lungs diminished t/o.  Abdominal:      General: There is no distension.      Palpations: Abdomen is soft.      Tenderness: There is no abdominal

## 2024-04-06 NOTE — PLAN OF CARE
Problem: Discharge Planning  Goal: Discharge to home or other facility with appropriate resources  4/6/2024 0552 by Nan Badillo RN  Outcome: Progressing  Flowsheets (Taken 4/6/2024 0000)  Discharge to home or other facility with appropriate resources: Identify barriers to discharge with patient and caregiver     Problem: Safety - Adult  Goal: Free from fall injury  4/6/2024 0552 by Nan Badillo RN  Outcome: Progressing  Flowsheets (Taken 4/5/2024 1958)  Free From Fall Injury: Instruct family/caregiver on patient safety     Problem: ABCDS Injury Assessment  Goal: Absence of physical injury  4/6/2024 0552 by Nan Badillo RN  Outcome: Progressing  Flowsheets (Taken 4/5/2024 1958)  Absence of Physical Injury: Implement safety measures based on patient assessment     Problem: Chronic Conditions and Co-morbidities  Goal: Patient's chronic conditions and co-morbidity symptoms are monitored and maintained or improved  4/6/2024 0552 by Nan Badillo RN  Outcome: Progressing  Flowsheets (Taken 4/6/2024 0000)  Care Plan - Patient's Chronic Conditions and Co-Morbidity Symptoms are Monitored and Maintained or Improved: Monitor and assess patient's chronic conditions and comorbid symptoms for stability, deterioration, or improvement     Problem: Skin/Tissue Integrity  Goal: Absence of new skin breakdown  Description: 1.  Monitor for areas of redness and/or skin breakdown  2.  Assess vascular access sites hourly  3.  Every 4-6 hours minimum:  Change oxygen saturation probe site  4.  Every 4-6 hours:  If on nasal continuous positive airway pressure, respiratory therapy assess nares and determine need for appliance change or resting period.  4/6/2024 0552 by Nan Badillo RN  Outcome: Progressing     Problem: Skin/Tissue Integrity - Adult  Goal: Skin integrity remains intact  4/6/2024 0552 by Nan Badillo RN  Outcome: Progressing  Flowsheets  Taken 4/6/2024 0000  Skin Integrity Remains Intact: Monitor for areas of

## 2024-04-06 NOTE — PROGRESS NOTES
nutrition and medications effect.  He has not had any warfarin for almost a week  Improvement in his chest x-ray  Still ICU delirium worse at night he is off Solu-Medrol now  Leukocytosis  Hyper coag state with INR jumped to 6 and abnormal LFTs with his alk phos high as well as his bilirubin is high patient might have underlying liver disease such as fatty liver or cirrhosis we will screen him with ultrasound and possibly CAT scan once he is stable  Leukocytosis could be effect from steroid but we will run testing make sure no new infection  His BMP finally coming down  CT with evidence of possible cirrhosis  Also some evidence of may be some sort  INR is therapeutic now at 2.9  Hypokalemia replace potassium  Was able to review the chest x-ray from this morning worsening patchy infiltrate with edema compared to couple days ago was clearing.  Status post intubated  Pneumothorax with chest tube  Chest x-ray this morning still shows opacities throughout his lungs picture can be hard may be slight improvement from yesterday  Significant elevation in his BNP  Is INR 2.0 he has not received any Coumadin this is secondary to his liver problems and infectious process and medication  Will continue holding his Coumadin for now he is at the 2 range for now but if it drops below 2 we will get initiated just small dose, and to keep him in 2 range  Patient is back on Lasix and I agree to keep him on low-dose diuretic  Chest x-ray shows some improvement on my reading except the right base with opacity still  Leukocytosis improving still today at 20,000  X-rays still show some opacities may be a little bit less but is still shows ARDStype picture  INR still at 2 patient has not been on any anticoagulant       Plan:  As mentioned has long discussion with the family in regard to his conditions and his guarded condition they seem to understand    Patient is a DNR CCA    Will try 1 mg Coumadin tonight and just keep a close eye on his  INR just to make sure he stays between the 2 and 3 range because of his chronic heart conditions    Continue tube feeding at 54 mill an hour    Prognosis still guarded    Judy Estrada DO FAAFP DABFM            4/6/2024, 10:53 AM

## 2024-04-06 NOTE — PROGRESS NOTES
this    Gastrointestinal/Nutrition       Renal     Creatinine 0.6.  Infectious Disease   On doxycycline on ceftazidime avibactam    Hematology/Oncology   Anemia.  Hemoglobin 7 stable at 10.9.    Endocrine       Social/Spiritual/DNR/Disposition/Other     Spoke to patient's wife Bhumika and updated her.  I did readdress CODE STATUS given patient's critical illness.  Patient wife has respectfully requested to change the patient's CODE STATUS to DNR CC arrest.  Witnessed by patient's bedside nurse.  We will change the order and fill out paperwork    Critical Care Time   45 min    Electronically signed by Alek Lee MD on 4/6/2024 at 10:40 AM

## 2024-04-06 NOTE — PROGRESS NOTES
Date:   4/5/2024  Patient name: Saurav Stacy  Date of admission:  3/24/2024  7:53 PM  MRN:   [id number]  YOB: 1945  PCP: Jose Raul Manley MD    Reason for Admission: Congestive heart failure of unknown etiology (HCC) [I50.9]  Community acquired pneumonia, unspecified laterality [J18.9]    Cardiology follow-up: Congestive heart failure diastolic acute on chronic, A-fib, permanent pacemaker, history of aortic valve replacement         Referring physician: Dr Judy Estrada     Impression     Admission 3/24/2024 with increasing shortness of breath, chest discomfort, influenza A, influenza B, COVID not detected  Multiple focal pneumonia  CHF diastolic, pulmonary congestion as per chest x-ray, bilateral pleural effusion more on the right as per CT abdomen  Intubation 4/2/2024 for severe hypoxia, pulmonary infiltrates/ARDS  Large pneumothorax left side status post intubation, bronchoscopy, BAL, chest tube placed, significant improvement  Ejection fraction 50 to 55%, moderate pulmonary hypertension RVSP 51 mmHg  Aortic Valve replacement  S/P Pacemaker / V Paced, A-fib patient is on warfarin  Obesity BMI 39, sleep apnea  Diabetes mellitus  Hyperlipidemia  Kidney stone  Impaired hearing       Past surgical history includes aortic valve replacement, right foot surgery, colonoscopies, bilateral knee arthroplasty, eye surgery, facial reconstruction surgery right side, premalignant benign skin lesion excision, neck surgery     History of present illness  78 years old  male, ex smoker, morbid obesity with a past medical history of aortic valve replacement, permanent pacemaker placement got hospitalized 3/24/2024 with increasing shortness of breath, generalized weakness ongoing for past few days.  He has received antibiotics for suspected pneumonia.  His symptoms got worse and he came to the emergency department.  Chest x-ray on admission showed pulmonary congestion.  CT abdomen few days ago showed bilateral    CREATININE 1.0 0.8 0.9   GLUCOSE 121* 119* 142*     Hepatic: No results for input(s): \"AST\", \"ALT\", \"ALB\", \"BILITOT\", \"ALKPHOS\" in the last 72 hours.  Troponin: No results for input(s): \"TROPONINI\" in the last 72 hours.  BNP: No results for input(s): \"BNP\" in the last 72 hours.  Lipids: No results for input(s): \"CHOL\", \"HDL\" in the last 72 hours.    Invalid input(s): \"LDLCALCU\"  INR:   Recent Labs     04/03/24  0516 04/04/24  0423 04/05/24  0508   INR 2.0 2.1 2.0       Objective:   Vitals: BP (!) 113/11   Pulse 63   Temp 100.2 °F (37.9 °C) (Axillary)   Resp 29   Ht 1.727 m (5' 7.99\")   Wt 106.3 kg (234 lb 5.6 oz)   SpO2 91%   BMI 35.64 kg/m²   General appearance: Pt on vent  HEENT: Head: Normal, normocephalic, atraumatic.  Neck:  Difficult to assess neck veins  Lungs:  Reduced BS at bases  Heart:  HS Distant  Abdomen:  Hypoactive BS  Extremities: Homans sign is negative, no sign of DVT  Neurologic: Mental status: Sedated     EKG: Ventricular paced rhythm A-fib.  ECHO: reviewed.   Ejection fraction: 50-55%, mild LVH, RVSP 51, Prosthetic Aortic valve  Stress Test: reviewed.  Cardiac Angiography: not obtained.    Assessment / Acute Cardiac Problems:   Congestive heart failure diastolic   Severe hypoxia, hypercapnia,  got intubated 4/2/2024  Large pneumothorax status post bronchoscopy,, chest tube placement left side  Chest x-ray 4-3-24 no pneumothorax still has massive pulmonary infiltrates/ARDS  Status post pacemaker left infraclavicular region, ventricle paced, A-fib  Status post aortic valve replacement bioprosthetic  Ejection fraction 50 to 55%  Moderate pulmonary hypertension RVSP 51 mmHg 2D echo 3/25/2024  Morbid obesity  Hypertension stable  Diabetes mellitus  Mild anemia      4/5/2024 patient remains on ventilator on norepinephrine 3 mcg/min  Off the paralytic agent, still has chest tube, V Paced    Patient Active Problem List:     Basal cell carcinoma of right forehead     Neoplasm of uncertain

## 2024-04-06 NOTE — PROGRESS NOTES
Dr. Lee at bedside to meet with the patient's wife and family friend. After extensive discussion, family made the decision to change code status to DNR CCA. Form completed and placed in physical chart. Physician order in EMR to follow.

## 2024-04-07 ENCOUNTER — APPOINTMENT (OUTPATIENT)
Dept: GENERAL RADIOLOGY | Age: 79
DRG: 871 | End: 2024-04-07
Payer: MEDICARE

## 2024-04-07 LAB
ABSOLUTE BANDS: 0.17 K/UL (ref 0–1)
ANION GAP SERPL CALCULATED.3IONS-SCNC: 9 MMOL/L (ref 9–17)
ARTERIAL PATENCY WRIST A: ABNORMAL
BANDS: 1 % (ref 0–10)
BASOPHILS # BLD: 0 K/UL (ref 0–0.2)
BASOPHILS NFR BLD: 0 % (ref 0–2)
BDY SITE: ABNORMAL
BODY TEMPERATURE: 37
BUN SERPL-MCNC: 55 MG/DL (ref 8–23)
CALCIUM SERPL-MCNC: 8.8 MG/DL (ref 8.6–10.4)
CHLORIDE SERPL-SCNC: 114 MMOL/L (ref 98–107)
CO2 SERPL-SCNC: 29 MMOL/L (ref 20–31)
COHGB MFR BLD: 1.6 % (ref 0–5)
CREAT SERPL-MCNC: 0.8 MG/DL (ref 0.7–1.2)
EOSINOPHIL # BLD: 0 K/UL (ref 0–0.4)
EOSINOPHILS RELATIVE PERCENT: 0 % (ref 0–4)
ERYTHROCYTE [DISTWIDTH] IN BLOOD BY AUTOMATED COUNT: 18.7 % (ref 11.5–14.9)
FIO2 ON VENT: 50 %
GAS FLOW.O2 O2 DELIVERY SYS: ABNORMAL L/MIN
GAS FLOW.O2 SETTING OXYMISER: 30 L/MIN
GFR SERPL CREATININE-BSD FRML MDRD: 89 ML/MIN/1.73M2
GLUCOSE SERPL-MCNC: 176 MG/DL (ref 70–99)
HCO3 ARTERIAL: 34 MMOL/L (ref 22–26)
HCT VFR BLD AUTO: 39.3 % (ref 41–53)
HGB BLD-MCNC: 11.7 G/DL (ref 13.5–17.5)
INR PPP: 1.8
LYMPHOCYTES NFR BLD: 1 K/UL (ref 1–4.8)
LYMPHOCYTES RELATIVE PERCENT: 6 % (ref 24–44)
MCH RBC QN AUTO: 24.9 PG (ref 26–34)
MCHC RBC AUTO-ENTMCNC: 29.7 G/DL (ref 31–37)
MCV RBC AUTO: 84 FL (ref 80–100)
METAMYELOCYTES ABSOLUTE COUNT: 0.17 K/UL
METAMYELOCYTES: 1 %
METHEMOGLOBIN: 1.2 % (ref 0–1.9)
MICROORGANISM SPEC CULT: NORMAL
MICROORGANISM SPEC CULT: NORMAL
MONOCYTES NFR BLD: 1 K/UL (ref 0.1–1.3)
MONOCYTES NFR BLD: 6 % (ref 1–7)
MORPHOLOGY: ABNORMAL
NEUTROPHILS NFR BLD: 86 % (ref 36–66)
NEUTS SEG NFR BLD: 14.26 K/UL (ref 1.3–9.1)
O2 SAT, ARTERIAL: 93.7 % (ref 95–98)
PCO2 ARTERIAL: 74.8 MMHG (ref 35–45)
PEEP RESPIRATORY: 10 CM[H2O]
PH ARTERIAL: 7.27 (ref 7.35–7.45)
PLATELET # BLD AUTO: 259 K/UL (ref 150–450)
PMV BLD AUTO: 8.3 FL (ref 6–12)
PO2 ARTERIAL: 89.1 MMHG (ref 80–100)
POSITIVE BASE EXCESS, ART: 7.1 MMOL/L (ref 0–2)
POTASSIUM SERPL-SCNC: 5 MMOL/L (ref 3.7–5.3)
PROTHROMBIN TIME: 21.4 SEC (ref 11.8–14.6)
PT. POSITION: ABNORMAL
RBC # BLD AUTO: 4.68 M/UL (ref 4.5–5.9)
RESPIRATORY RATE: 30
SERVICE CMNT-IMP: NORMAL
SERVICE CMNT-IMP: NORMAL
SODIUM SERPL-SCNC: 152 MMOL/L (ref 135–144)
SPECIMEN DESCRIPTION: NORMAL
SPECIMEN DESCRIPTION: NORMAL
TEXT FOR RESPIRATORY: ABNORMAL
TOTAL RATE: 32
VENTILATION MODE VENT: ABNORMAL
VT: 500
WBC OTHER # BLD: 16.6 K/UL (ref 3.5–11)

## 2024-04-07 PROCEDURE — 6370000000 HC RX 637 (ALT 250 FOR IP): Performed by: INTERNAL MEDICINE

## 2024-04-07 PROCEDURE — 2500000003 HC RX 250 WO HCPCS: Performed by: INTERNAL MEDICINE

## 2024-04-07 PROCEDURE — 94003 VENT MGMT INPAT SUBQ DAY: CPT

## 2024-04-07 PROCEDURE — 80048 BASIC METABOLIC PNL TOTAL CA: CPT

## 2024-04-07 PROCEDURE — 99232 SBSQ HOSP IP/OBS MODERATE 35: CPT | Performed by: NURSE PRACTITIONER

## 2024-04-07 PROCEDURE — 82805 BLOOD GASES W/O2 SATURATION: CPT

## 2024-04-07 PROCEDURE — 85025 COMPLETE CBC W/AUTO DIFF WBC: CPT

## 2024-04-07 PROCEDURE — 36415 COLL VENOUS BLD VENIPUNCTURE: CPT

## 2024-04-07 PROCEDURE — 2580000003 HC RX 258: Performed by: INTERNAL MEDICINE

## 2024-04-07 PROCEDURE — 6360000002 HC RX W HCPCS: Performed by: INTERNAL MEDICINE

## 2024-04-07 PROCEDURE — 6370000000 HC RX 637 (ALT 250 FOR IP): Performed by: FAMILY MEDICINE

## 2024-04-07 PROCEDURE — 71045 X-RAY EXAM CHEST 1 VIEW: CPT

## 2024-04-07 PROCEDURE — A4216 STERILE WATER/SALINE, 10 ML: HCPCS | Performed by: INTERNAL MEDICINE

## 2024-04-07 PROCEDURE — 37799 UNLISTED PX VASCULAR SURGERY: CPT

## 2024-04-07 PROCEDURE — 2000000000 HC ICU R&B

## 2024-04-07 PROCEDURE — 85610 PROTHROMBIN TIME: CPT

## 2024-04-07 RX ORDER — WARFARIN SODIUM 2 MG/1
2 TABLET ORAL
Status: COMPLETED | OUTPATIENT
Start: 2024-04-07 | End: 2024-04-07

## 2024-04-07 RX ADMIN — CARBOXYMETHYLCELLULOSE SODIUM 1 DROP: 10 GEL OPHTHALMIC at 20:26

## 2024-04-07 RX ADMIN — DOCUSATE SODIUM LIQUID 100 MG: 100 LIQUID ORAL at 07:43

## 2024-04-07 RX ADMIN — METOPROLOL TARTRATE 5 MG: 1 INJECTION, SOLUTION INTRAVENOUS at 15:38

## 2024-04-07 RX ADMIN — SODIUM CHLORIDE, PRESERVATIVE FREE 20 MG: 5 INJECTION INTRAVENOUS at 07:43

## 2024-04-07 RX ADMIN — METOPROLOL TARTRATE 5 MG: 1 INJECTION, SOLUTION INTRAVENOUS at 09:49

## 2024-04-07 RX ADMIN — FUROSEMIDE 20 MG: 10 INJECTION, SOLUTION INTRAMUSCULAR; INTRAVENOUS at 07:42

## 2024-04-07 RX ADMIN — CARBOXYMETHYLCELLULOSE SODIUM 1 DROP: 10 GEL OPHTHALMIC at 11:49

## 2024-04-07 RX ADMIN — DOXYCYCLINE: 100 INJECTION, POWDER, LYOPHILIZED, FOR SOLUTION INTRAVENOUS at 23:29

## 2024-04-07 RX ADMIN — DEXTROSE MONOHYDRATE: 50 INJECTION, SOLUTION INTRAVENOUS at 23:28

## 2024-04-07 RX ADMIN — DEXTROSE MONOHYDRATE: 50 INJECTION, SOLUTION INTRAVENOUS at 15:35

## 2024-04-07 RX ADMIN — CARBOXYMETHYLCELLULOSE SODIUM 1 DROP: 10 GEL OPHTHALMIC at 07:37

## 2024-04-07 RX ADMIN — DOXYCYCLINE: 100 INJECTION, POWDER, LYOPHILIZED, FOR SOLUTION INTRAVENOUS at 11:48

## 2024-04-07 RX ADMIN — EMPAGLIFLOZIN 10 MG: 10 TABLET, FILM COATED ORAL at 07:42

## 2024-04-07 RX ADMIN — DEXTROSE MONOHYDRATE: 50 INJECTION, SOLUTION INTRAVENOUS at 07:40

## 2024-04-07 RX ADMIN — SODIUM CHLORIDE, PRESERVATIVE FREE 20 MG: 5 INJECTION INTRAVENOUS at 20:26

## 2024-04-07 RX ADMIN — DOCUSATE SODIUM LIQUID 100 MG: 100 LIQUID ORAL at 20:26

## 2024-04-07 RX ADMIN — WARFARIN SODIUM 2 MG: 2 TABLET ORAL at 17:37

## 2024-04-07 RX ADMIN — CHLORHEXIDINE GLUCONATE 0.12% ORAL RINSE 15 ML: 1.2 LIQUID ORAL at 07:35

## 2024-04-07 RX ADMIN — METOPROLOL TARTRATE 5 MG: 1 INJECTION, SOLUTION INTRAVENOUS at 03:42

## 2024-04-07 RX ADMIN — POLYETHYLENE GLYCOL 3350 17 G: 17 POWDER, FOR SOLUTION ORAL at 07:43

## 2024-04-07 RX ADMIN — CARBOXYMETHYLCELLULOSE SODIUM 1 DROP: 10 GEL OPHTHALMIC at 17:38

## 2024-04-07 RX ADMIN — METOPROLOL TARTRATE 5 MG: 1 INJECTION, SOLUTION INTRAVENOUS at 20:26

## 2024-04-07 RX ADMIN — CHLORHEXIDINE GLUCONATE 0.12% ORAL RINSE 15 ML: 1.2 LIQUID ORAL at 20:26

## 2024-04-07 ASSESSMENT — PULMONARY FUNCTION TESTS
PIF_VALUE: 29
PIF_VALUE: 28
PIF_VALUE: 30
PIF_VALUE: 33
PIF_VALUE: 26
PIF_VALUE: 32
PIF_VALUE: 27
PIF_VALUE: 31
PIF_VALUE: 31
PIF_VALUE: 29
PIF_VALUE: 27
PIF_VALUE: 29
PIF_VALUE: 22
PIF_VALUE: 23
PIF_VALUE: 25
PIF_VALUE: 29
PIF_VALUE: 28
PIF_VALUE: 29
PIF_VALUE: 27
PIF_VALUE: 31
PIF_VALUE: 27
PIF_VALUE: 26
PIF_VALUE: 30
PIF_VALUE: 27
PIF_VALUE: 26
PIF_VALUE: 30
PIF_VALUE: 29
PIF_VALUE: 28
PIF_VALUE: 27
PIF_VALUE: 32
PIF_VALUE: 28
PIF_VALUE: 25
PIF_VALUE: 29
PIF_VALUE: 27
PIF_VALUE: 26
PIF_VALUE: 23
PIF_VALUE: 26
PIF_VALUE: 27
PIF_VALUE: 26
PIF_VALUE: 29
PIF_VALUE: 25
PIF_VALUE: 27
PIF_VALUE: 29
PIF_VALUE: 33
PIF_VALUE: 24
PIF_VALUE: 27
PIF_VALUE: 24
PIF_VALUE: 19
PIF_VALUE: 24
PIF_VALUE: 24
PIF_VALUE: 26
PIF_VALUE: 27
PIF_VALUE: 24
PIF_VALUE: 26
PIF_VALUE: 29
PIF_VALUE: 25
PIF_VALUE: 25
PIF_VALUE: 29
PIF_VALUE: 26
PIF_VALUE: 23
PIF_VALUE: 26
PIF_VALUE: 29
PIF_VALUE: 26
PIF_VALUE: 31
PIF_VALUE: 26

## 2024-04-07 ASSESSMENT — PAIN SCALES - GENERAL
PAINLEVEL_OUTOF10: 0

## 2024-04-07 NOTE — PROGRESS NOTES
DR HERNÁNDEZ      PROGRESS NOTE        Patient:  Saurav Stacy  YOB: 1945    MRN: [id number]     Acct: [id number]     Admit date: 3/24/2024    Pt seen and Chart reviewed.  Consultant notes reviewed and care evaluated.    Subjective: Patient is on the vent.  Feels the provide this morning but is still not waking up.  Setting on the events the same discussed with Dr. Darden.  His white count is down they released the plan from his chest tubes to his x-ray showing little bit of pneumothorax but the second repeat showed the lungs was expanded.    Diet:  Diet NPO  ADULT TUBE FEEDING; Nasoenteric; Peptide Based High Protein; Continuous; 10; Yes; 15; Q 4 hours; 54; 30; Q 6 hours      Medications:Current Inpatient    Scheduled Meds:   cefTAZidime-avibactam (AVYCAZ) 2.5 g in dextrose 5 % 100 mL IVPB   IntraVENous q8h    doxycycline (VIBRAMYCIN) 100 mg in dextrose 5 % 100 mL IVPB   IntraVENous Q12H    metoprolol  5 mg IntraVENous Q4H    furosemide  20 mg IntraVENous BID    docusate  100 mg Per NG tube BID    polyethylene glycol  17 g Per NG tube Daily    carboxymethylcellulose  1 drop Both Eyes 4x Daily    chlorhexidine  15 mL Mouth/Throat BID    famotidine (PEPCID) injection  20 mg IntraVENous BID    empagliflozin  10 mg Oral Daily    warfarin placeholder: dosing by provider   Other RX Placeholder    [Held by provider] pantoprazole  40 mg Oral QAM AC    [Held by provider] potassium chloride  20 mEq Oral Daily with breakfast     Continuous Infusions:   fentaNYL Stopped (04/05/24 1807)    norepinephrine Stopped (04/06/24 0623)    propofol Stopped (04/07/24 0440)     PRN Meds:artificial tears, LORazepam, Benzocaine-Menthol, acetaminophen        Physical Exam:  Vitals: BP (!) 124/31   Pulse 63   Temp 98.5 °F (36.9 °C) (Oral)   Resp 30   Ht 1.727 m (5' 7.99\")   Wt 106.3 kg (234 lb 5.6 oz)   SpO2 98%   BMI 35.64 kg/m²   24 hour  INR    No family in the room    Discussed with Dr. Maurisio Marie today    Judy Estrada DO FAAFP DAB            4/7/2024, 11:34 AM

## 2024-04-07 NOTE — PROGRESS NOTES
SKIN LESION EXCISION  2019    posterior neck mass    TONSILLECTOMY      TOTAL KNEE ARTHROPLASTY Left     with [employer] and GPS product application    TUMOR REMOVAL      Ear       Medications:      cefTAZidime-avibactam (AVYCAZ) 2.5 g in dextrose 5 % 100 mL IVPB   IntraVENous q8h    doxycycline (VIBRAMYCIN) 100 mg in dextrose 5 % 100 mL IVPB   IntraVENous Q12H    metoprolol  5 mg IntraVENous Q4H    furosemide  20 mg IntraVENous BID    docusate  100 mg Per NG tube BID    polyethylene glycol  17 g Per NG tube Daily    carboxymethylcellulose  1 drop Both Eyes 4x Daily    chlorhexidine  15 mL Mouth/Throat BID    famotidine (PEPCID) injection  20 mg IntraVENous BID    empagliflozin  10 mg Oral Daily    warfarin placeholder: dosing by provider   Other RX Placeholder    [Held by provider] pantoprazole  40 mg Oral QAM AC    [Held by provider] potassium chloride  20 mEq Oral Daily with breakfast       Social History:     Social History     Socioeconomic History    Marital status:      Spouse name: Not on file    Number of children: Not on file    Years of education: Not on file    Highest education level: Not on file   Occupational History    Not on file   Tobacco Use    Smoking status: Former     Current packs/day: 0.00     Average packs/day: 1 pack/day for 10.0 years (10.0 ttl pk-yrs)     Types: Cigarettes     Start date: 1974     Quit date: 1984     Years since quittin.6    Smokeless tobacco: Never   Vaping Use    Vaping Use: Never used   Substance and Sexual Activity    Alcohol use: Not Currently     Alcohol/week: 0.0 standard drinks of alcohol    Drug use: No    Sexual activity: Not on file   Other Topics Concern    Not on file   Social History Narrative    Not on file     Social Determinants of Health     Financial Resource Strain: Not on file   Food Insecurity: No Food Insecurity (3/24/2024)    Hunger Vital Sign     Worried About Running Out of Food in the Last Year: Never true     Ran Out of  Food in the Last Year: Never true   Transportation Needs: No Transportation Needs (3/24/2024)    PRAPARE - Transportation     Lack of Transportation (Medical): No     Lack of Transportation (Non-Medical): No   Physical Activity: Not on file   Stress: Not on file   Social Connections: Not on file   Intimate Partner Violence: Not on file   Housing Stability: Low Risk  (3/24/2024)    Housing Stability Vital Sign     Unable to Pay for Housing in the Last Year: No     Number of Places Lived in the Last Year: 1     Unstable Housing in the Last Year: No       Family History:     Family History   Problem Relation Age of Onset    Cancer Mother     Diabetes Paternal Grandmother       Medical Decision Making:   I have independently reviewed/ordered the following labs:    CBC with Differential:   Recent Labs     04/06/24  0522 04/07/24  0914   WBC 20.1* 16.6*   HGB 10.9* 11.7*   HCT 35.0* 39.3*    259   LYMPHOPCT 7* 6*   MONOPCT 3 6     BMP:  Recent Labs     04/05/24  0507 04/06/24  0522 04/07/24  0411   * 147* 152*   K 4.3 4.3 5.0    109* 114*   CO2 28 29 29   BUN 45* 40* 55*   CREATININE 0.9 0.6* 0.8   MG 2.4 2.2  --      Hepatic Function Panel:   No results for input(s): \"PROT\", \"LABALBU\", \"BILIDIR\", \"IBILI\", \"BILITOT\", \"ALKPHOS\", \"ALT\", \"AST\" in the last 72 hours.      Lab Results   Component Value Date/Time    CREATININE 0.8 04/07/2024 04:11 AM    GLUCOSE 176 04/07/2024 04:11 AM       Detailed results:        Thank you for allowing us to participate in the care of this patient.Please call with questions.    This note is created with the assistance of a speech recognition program.  While intending to generate adocument that actually reflects the content of the visit, the document can still have some errors including those of syntax and sound a like substitutions which may escape proof reading.  It such instances, actual meaningcan be extrapolated by contextual diversion.    Linda Richard, APRN -

## 2024-04-07 NOTE — PLAN OF CARE
Problem: Discharge Planning  Goal: Discharge to home or other facility with appropriate resources  4/7/2024 1642 by Radha Mayers RN  Outcome: Not Progressing  4/7/2024 0523 by Verenice Delacruz RN  Outcome: Progressing     Problem: Safety - Adult  Goal: Free from fall injury  4/7/2024 1642 by Radha Mayers RN  Outcome: Progressing  4/7/2024 0523 by Verenice Delacruz RN  Outcome: Progressing     Problem: ABCDS Injury Assessment  Goal: Absence of physical injury  4/7/2024 1642 by Radha Mayers RN  Outcome: Progressing  Flowsheets (Taken 4/7/2024 1507)  Absence of Physical Injury: Implement safety measures based on patient assessment  4/7/2024 0523 by Verenice Delacruz RN  Outcome: Progressing     Problem: Chronic Conditions and Co-morbidities  Goal: Patient's chronic conditions and co-morbidity symptoms are monitored and maintained or improved  4/7/2024 1642 by Radha Mayers RN  Outcome: Progressing  4/7/2024 0523 by Verenice Delacruz RN  Outcome: Progressing     Problem: Respiratory - Adult  Goal: Achieves optimal ventilation and oxygenation  4/7/2024 1642 by Radha Mayers RN  Outcome: Progressing  Flowsheets (Taken 4/7/2024 0800)  Achieves optimal ventilation and oxygenation: Assess for changes in respiratory status  4/7/2024 0523 by Verenice Delacruz RN  Outcome: Progressing     Problem: Neurosensory - Adult  Goal: Achieves stable or improved neurological status  4/7/2024 1642 by Radha Mayers RN  Outcome: Progressing  Flowsheets (Taken 4/7/2024 0800)  Achieves stable or improved neurological status: Assess for and report changes in neurological status  4/7/2024 0523 by Verenice Delacruz RN  Outcome: Progressing  Goal: Achieves maximal functionality and self care  4/7/2024 1642 by Radha Mayers RN  Outcome: Progressing  Flowsheets (Taken 4/7/2024 0800)  Achieves maximal functionality and self care: Monitor swallowing and airway patency with patient fatigue and changes in

## 2024-04-07 NOTE — PROGRESS NOTES
ICU Progress Note (Vent)   [facility]ulmonary [facility]ists    Patient - Saurav Stacy,  Age - 78 y.o.    - 1945      Room Number -    MRN -  [id number]   [facility] # - [id number]  Date of Admission -  3/24/2024  7:53 PM    Events of Past 24 Hours   Patient currently is off sedation.  Not following commands at this time.    Vitals    height is 1.727 m (5' 7.99\") and weight is 106.3 kg (234 lb 5.6 oz). His oral temperature is 98.3 °F (36.8 °C). His blood pressure is 120/25 (abnormal) and his pulse is 63. His respiration is 30 and oxygen saturation is 98%.       Temperature Range: Temp: 98.3 °F (36.8 °C) Temp  Av °F (37.2 °C)  Min: 98.3 °F (36.8 °C)  Max: 99.8 °F (37.7 °C)  BP Range:  Systolic (24hrs), Av , Min:94 , Max:151     Diastolic (24hrs), Av, Min:15, Max:52    Pulse Range: Pulse  Av.1  Min: 59  Max: 64  Respiration Range: Resp  Av.4  Min: 20  Max: 32  Current Pulse Ox::  SpO2: 98 %  24HR Pulse Ox Range:  SpO2  Av.6 %  Min: 90 %  Max: 98 %  Oxygen Amount and Delivery: O2 Flow Rate (L/min): 24 L/min      Wt Readings from Last 3 Encounters:   24 106.3 kg (234 lb 5.6 oz)   02/15/24 116.1 kg (256 lb)   23 125.6 kg (277 lb)     I/O     Intake/Output Summary (Last 24 hours) at 2024 1316  Last data filed at 2024 0800  Gross per 24 hour   Intake 1164.27 ml   Output 1550 ml   Net -385.73 ml     I/O last 3 completed shifts:  In: 1576.3 [I.V.:109; NG/GT:973; IV Piggyback:494.3]  Out: 2550 [Urine:2550]     DRAIN/TUBE OUTPUT:     Invasive Lines   ETT Day -   6  Arterial line day #6  Right IJ CVC day #6  Left pleural catheter day #6    FiO2 of 50% PRVC, rate of 10.  PEEP at 10.  Tidal volume 500.   Mechanical Ventilation Data   SETTINGS (Comprehensive)  Vent Information  Equipment Changed: Expiratory Filter  Ventilator Initiate: Yes  Vent Mode: AC/PRVC  Additional Respiratory Assessments  Pulse: 63  Respirations: 30  SpO2: 98 %  End Tidal CO2/tcpCO2: 37  doxycycline    Hematology/Oncology   INR currently 1.8  On warfarin  Endocrine       Social/Spiritual/DNR/Disposition/Other       StacyBhumika redding (Spouse)  [phone]     Attempted contacting patient's wife via telephone not successful    Critical Care Time   35 min    Electronically signed by Alek Lee MD on 4/7/2024 at 1:16 PM

## 2024-04-07 NOTE — PROGRESS NOTES
Date:   4/7/2024  Patient name: Saurav Stacy  Date of admission:  3/24/2024  7:53 PM  MRN:   [id number]  YOB: 1945  PCP: Jose Raul Manley MD    Reason for Admission: Congestive heart failure of unknown etiology (HCC) [I50.9]  Community acquired pneumonia, unspecified laterality [J18.9]    Cardiology follow-up: Congestive heart failure diastolic acute on chronic, A-fib, permanent pacemaker, history of aortic valve replacement         Referring physician: Dr Judy Estrada     Impression     Admission 3/24/2024 with increasing shortness of breath, chest discomfort, influenza A, influenza B, COVID not detected  Multiple focal pneumonia/diffuse pulmonary infiltrates  CHF diastolic, pulmonary congestion as per chest x-ray, bilateral pleural effusion more on the right as per CT abdomen 3/21/2024  Intubation 4/2/2024 for severe hypoxia, pulmonary infiltrates/ARDS  Large pneumothorax left side status post intubation, bronchoscopy, BAL, chest tube placed, significant improvement  Ejection fraction 50 to 55%, moderate pulmonary hypertension RVSP 51 mmHg  Aortic Valve replacement  S/P Pacemaker / V Paced, A-fib patient is on warfarin  Obesity BMI 39, sleep apnea  Diabetes mellitus  Hyperlipidemia  Kidney stone  Impaired hearing    Past surgical history includes aortic valve replacement, right foot surgery, colonoscopies, bilateral knee arthroplasty, eye surgery, facial reconstruction surgery right side, premalignant benign skin lesion excision, neck surgery     History of present illness  78 years old  male, ex smoker, morbid obesity with a past medical history of aortic valve replacement, permanent pacemaker placement got hospitalized 3/24/2024 with increasing shortness of breath, generalized weakness ongoing for past few days.  He has received antibiotics for suspected pneumonia.  His symptoms got worse and he came to the emergency department.  Chest x-ray on admission showed pulmonary congestion.  CT  diminished breath sounds bibasilar  Heart:  Cardiac apical impulse not palpable heart sounds distant  Abdomen:  Obese soft bowel sounds very hypoactive  Extremities:  No peripheral edema  Neurologic: Mental status: No response to verbal command    EKG: Ventricular paced rhythm A-fib.  ECHO: reviewed.   Ejection fraction: 50-55%, mild LVH, RVSP 51, Prosthetic Aortic valve  Stress Test: reviewed.  Cardiac Angiography: not obtained.    Assessment / Acute Cardiac Problems:   Congestive heart failure diastolic   Severe hypoxia, hypercapnia,  got intubated 4/2/2024  Large pneumothorax status post bronchoscopy,, chest tube placement left side  No significant improvement  Chest x-ray 4-3-24 no pneumothorax still has massive pulmonary infiltrates/ARDS  Status post pacemaker left infraclavicular region, ventricle paced, A-fib  Status post aortic valve replacement bioprosthetic, mean gradient 10 mm heather 2D echo 3/25/2024  Ejection fraction 50 to 55%  Moderate pulmonary hypertension RVSP 51 mmHg 2D echo 3/25/2024  Morbid obesity  Hypertension stable  Diabetes mellitus  Mild anemia      4/6/2024 patient remains on ventilator FiO2 50%, hypoxic and hypercapnic respiratory failure off the norepinephrine, respiratory rate increased to >30 when taken off the propofol, episode of supraventricular tachycardia heart rate 170 improved when he was put back in supine position, good urine output  Off the paralytic agent,, tolerating NG tube  At present gasping respiratory movements  4/7/2024 no significant change in chest x-ray finding, mental status remains unresponsive off the propofol remains-toxic, hypercapnic, increasing sodium and BUN noted    Patient Active Problem List:     Basal cell carcinoma of right forehead     Neoplasm of uncertain behavior of skin     Acute renal failure with tubular necrosis (HCC)     Essential hypertension     Class 3 severe obesity due to excess calories with body mass index (BMI) of 40.0 to 44.9 in adult

## 2024-04-07 NOTE — PLAN OF CARE
RN  Outcome: Progressing  Flowsheets  Taken 4/6/2024 1200  Achieves maximal functionality and self care:   Monitor swallowing and airway patency with patient fatigue and changes in neurological status   Encourage and assist patient to increase activity and self care with guidance from physical therapy/occupational therapy   Encourage visually impaired, hearing impaired and aphasic patients to use assistive/communication devices  Taken 4/6/2024 0800  Achieves maximal functionality and self care:   Encourage and assist patient to increase activity and self care with guidance from physical therapy/occupational therapy   Monitor swallowing and airway patency with patient fatigue and changes in neurological status     Problem: Cardiovascular - Adult  Goal: Maintains optimal cardiac output and hemodynamic stability  4/7/2024 0523 by Verenice Delacruz RN  Outcome: Progressing  4/6/2024 1826 by Mackenzie Smith RN  Outcome: Progressing  Flowsheets  Taken 4/6/2024 1200  Maintains optimal cardiac output and hemodynamic stability:   Assess for signs of decreased cardiac output   Monitor urine output and notify Licensed Independent Practitioner for values outside of normal range   Monitor blood pressure and heart rate  Taken 4/6/2024 0800  Maintains optimal cardiac output and hemodynamic stability:   Monitor urine output and notify Licensed Independent Practitioner for values outside of normal range   Monitor blood pressure and heart rate   Assess for signs of decreased cardiac output  Goal: Absence of cardiac dysrhythmias or at baseline  4/7/2024 0523 by Verenice Delacruz RN  Outcome: Progressing  4/6/2024 1826 by Mackenzie Smith RN  Outcome: Progressing  Flowsheets (Taken 4/6/2024 0800)  Absence of cardiac dysrhythmias or at baseline:   Monitor cardiac rate and rhythm   Assess for signs of decreased cardiac output     Problem: Skin/Tissue Integrity - Adult  Goal: Skin integrity remains intact  4/7/2024 0523 by Verenice Delacruz  RN  Outcome: Progressing  4/6/2024 1826 by Mackenzie Smith RN  Outcome: Progressing  Flowsheets (Taken 4/6/2024 0842)  Skin Integrity Remains Intact: Monitor for areas of redness and/or skin breakdown  Goal: Oral mucous membranes remain intact  4/7/2024 0523 by Verenice Delacruz RN  Outcome: Progressing  4/6/2024 1826 by Mackenzie Smith RN  Outcome: Progressing  Flowsheets (Taken 4/6/2024 0842)  Oral Mucous Membranes Remain Intact: Assess oral mucosa and hygiene practices     Problem: Musculoskeletal - Adult  Goal: Return mobility to safest level of function  4/7/2024 0523 by Verenice Delacruz RN  Outcome: Progressing  4/6/2024 1826 by Mackenzie Smith RN  Outcome: Progressing  Flowsheets (Taken 4/6/2024 0800)  Return Mobility to Safest Level of Function:   Assess patient stability and activity tolerance for standing, transferring and ambulating with or without assistive devices   Ensure adequate protection for wounds/incisions during mobilization  Goal: Return ADL status to a safe level of function  4/7/2024 0523 by Verenice Delacruz RN  Outcome: Progressing  4/6/2024 1826 by Mackenzie Smith RN  Outcome: Progressing  Flowsheets (Taken 4/6/2024 0800)  Return ADL Status to a Safe Level of Function:   Assess activities of daily living deficits and provide assistive devices as needed   Administer medication as ordered     Problem: Genitourinary - Adult  Goal: Absence of urinary retention  4/7/2024 0523 by Verenice Delacruz RN  Outcome: Progressing  4/6/2024 1826 by Mackenzie Smith RN  Outcome: Progressing  Flowsheets (Taken 4/6/2024 0800)  Absence of urinary retention: Assess patient’s ability to void and empty bladder     Problem: Infection - Adult  Goal: Absence of infection at discharge  4/7/2024 0523 by Verenice Delacruz RN  Outcome: Progressing  4/6/2024 1826 by Mackenzie Smith RN  Outcome: Progressing  Flowsheets (Taken 4/6/2024 0800)  Absence of infection at discharge:   Assess and monitor for signs and symptoms of

## 2024-04-08 ENCOUNTER — APPOINTMENT (OUTPATIENT)
Dept: GENERAL RADIOLOGY | Age: 79
DRG: 871 | End: 2024-04-08
Payer: MEDICARE

## 2024-04-08 LAB
ABSOLUTE BANDS: 0.89 K/UL (ref 0–1)
ANION GAP SERPL CALCULATED.3IONS-SCNC: 7 MMOL/L (ref 9–17)
ARTERIAL PATENCY WRIST A: ABNORMAL
BANDS: 5 % (ref 0–10)
BASOPHILS # BLD: 0 K/UL (ref 0–0.2)
BASOPHILS NFR BLD: 0 % (ref 0–2)
BDY SITE: ABNORMAL
BODY TEMPERATURE: 37
BUN SERPL-MCNC: 68 MG/DL (ref 8–23)
CALCIUM SERPL-MCNC: 8.9 MG/DL (ref 8.6–10.4)
CHLORIDE SERPL-SCNC: 119 MMOL/L (ref 98–107)
CO2 SERPL-SCNC: 33 MMOL/L (ref 20–31)
COHGB MFR BLD: 1.6 % (ref 0–5)
CREAT SERPL-MCNC: 0.9 MG/DL (ref 0.7–1.2)
EKG ATRIAL RATE: 272 BPM
EKG Q-T INTERVAL: 398 MS
EKG QRS DURATION: 108 MS
EKG QTC CALCULATION (BAZETT): 447 MS
EKG R AXIS: -41 DEGREES
EKG T AXIS: 138 DEGREES
EKG VENTRICULAR RATE: 76 BPM
EOSINOPHIL # BLD: 0 K/UL (ref 0–0.4)
EOSINOPHILS RELATIVE PERCENT: 0 % (ref 0–4)
ERYTHROCYTE [DISTWIDTH] IN BLOOD BY AUTOMATED COUNT: 18.8 % (ref 11.5–14.9)
FIO2 ON VENT: 50 %
GAS FLOW.O2 O2 DELIVERY SYS: ABNORMAL L/MIN
GAS FLOW.O2 SETTING OXYMISER: 30 L/MIN
GFR SERPL CREATININE-BSD FRML MDRD: 86 ML/MIN/1.73M2
GLUCOSE SERPL-MCNC: 166 MG/DL (ref 70–99)
HCO3 ARTERIAL: 35.8 MMOL/L (ref 22–26)
HCT VFR BLD AUTO: 36.4 % (ref 41–53)
HGB BLD-MCNC: 10.8 G/DL (ref 13.5–17.5)
INR PPP: 1.4
LYMPHOCYTES NFR BLD: 1.78 K/UL (ref 1–4.8)
LYMPHOCYTES RELATIVE PERCENT: 10 % (ref 24–44)
MCH RBC QN AUTO: 24.7 PG (ref 26–34)
MCHC RBC AUTO-ENTMCNC: 29.8 G/DL (ref 31–37)
MCV RBC AUTO: 83 FL (ref 80–100)
METHEMOGLOBIN: 0.7 % (ref 0–1.9)
MICROORGANISM SPEC CULT: NORMAL
MICROORGANISM SPEC CULT: NORMAL
MICROORGANISM/AGENT SPEC: NORMAL
MICROORGANISM/AGENT SPEC: NORMAL
MONOCYTES NFR BLD: 1.07 K/UL (ref 0.1–1.3)
MONOCYTES NFR BLD: 6 % (ref 1–7)
MORPHOLOGY: ABNORMAL
NEUTROPHILS NFR BLD: 79 % (ref 36–66)
NEUTS SEG NFR BLD: 14.08 K/UL (ref 1.3–9.1)
NUCLEATED RED BLOOD CELLS: 1 PER 100 WBC
O2 SAT, ARTERIAL: 95.8 % (ref 95–98)
PCO2 ARTERIAL: 66.1 MMHG (ref 35–45)
PEEP RESPIRATORY: 10 CM[H2O]
PH ARTERIAL: 7.34 (ref 7.35–7.45)
PLATELET # BLD AUTO: 233 K/UL (ref 150–450)
PMV BLD AUTO: 8.7 FL (ref 6–12)
PO2 ARTERIAL: 102 MMHG (ref 80–100)
POSITIVE BASE EXCESS, ART: 10.1 MMOL/L (ref 0–2)
POTASSIUM SERPL-SCNC: 4.7 MMOL/L (ref 3.7–5.3)
PROTHROMBIN TIME: 17.9 SEC (ref 11.8–14.6)
PT. POSITION: ABNORMAL
RBC # BLD AUTO: 4.38 M/UL (ref 4.5–5.9)
RESPIRATORY RATE: 30
SERVICE CMNT-IMP: NORMAL
SODIUM SERPL-SCNC: 156 MMOL/L (ref 135–144)
SODIUM SERPL-SCNC: 159 MMOL/L (ref 135–144)
SODIUM SERPL-SCNC: 160 MMOL/L (ref 135–144)
SPECIMEN DESCRIPTION: NORMAL
SPECIMEN DESCRIPTION: NORMAL
TEXT FOR RESPIRATORY: ABNORMAL
TOTAL RATE: 34
TRIGL SERPL-MCNC: 163 MG/DL
VENTILATION MODE VENT: ABNORMAL
VT: 500
WBC OTHER # BLD: 17.8 K/UL (ref 3.5–11)

## 2024-04-08 PROCEDURE — 80048 BASIC METABOLIC PNL TOTAL CA: CPT

## 2024-04-08 PROCEDURE — 94003 VENT MGMT INPAT SUBQ DAY: CPT

## 2024-04-08 PROCEDURE — 37799 UNLISTED PX VASCULAR SURGERY: CPT

## 2024-04-08 PROCEDURE — 85025 COMPLETE CBC W/AUTO DIFF WBC: CPT

## 2024-04-08 PROCEDURE — 2500000003 HC RX 250 WO HCPCS: Performed by: INTERNAL MEDICINE

## 2024-04-08 PROCEDURE — 84295 ASSAY OF SERUM SODIUM: CPT

## 2024-04-08 PROCEDURE — 84478 ASSAY OF TRIGLYCERIDES: CPT

## 2024-04-08 PROCEDURE — 6360000002 HC RX W HCPCS: Performed by: FAMILY MEDICINE

## 2024-04-08 PROCEDURE — 85610 PROTHROMBIN TIME: CPT

## 2024-04-08 PROCEDURE — 82805 BLOOD GASES W/O2 SATURATION: CPT

## 2024-04-08 PROCEDURE — 93010 ELECTROCARDIOGRAM REPORT: CPT | Performed by: INTERNAL MEDICINE

## 2024-04-08 PROCEDURE — 94761 N-INVAS EAR/PLS OXIMETRY MLT: CPT

## 2024-04-08 PROCEDURE — 36415 COLL VENOUS BLD VENIPUNCTURE: CPT

## 2024-04-08 PROCEDURE — 99233 SBSQ HOSP IP/OBS HIGH 50: CPT | Performed by: INTERNAL MEDICINE

## 2024-04-08 PROCEDURE — 6370000000 HC RX 637 (ALT 250 FOR IP): Performed by: FAMILY MEDICINE

## 2024-04-08 PROCEDURE — 6370000000 HC RX 637 (ALT 250 FOR IP): Performed by: INTERNAL MEDICINE

## 2024-04-08 PROCEDURE — 2700000000 HC OXYGEN THERAPY PER DAY

## 2024-04-08 PROCEDURE — 2580000003 HC RX 258: Performed by: INTERNAL MEDICINE

## 2024-04-08 PROCEDURE — 2000000000 HC ICU R&B

## 2024-04-08 PROCEDURE — 71045 X-RAY EXAM CHEST 1 VIEW: CPT

## 2024-04-08 PROCEDURE — 6360000002 HC RX W HCPCS: Performed by: INTERNAL MEDICINE

## 2024-04-08 RX ORDER — DEXTROSE MONOHYDRATE 50 MG/ML
INJECTION, SOLUTION INTRAVENOUS CONTINUOUS
Status: DISCONTINUED | OUTPATIENT
Start: 2024-04-08 | End: 2024-04-10 | Stop reason: HOSPADM

## 2024-04-08 RX ORDER — HEPARIN SODIUM 5000 [USP'U]/ML
5000 INJECTION, SOLUTION INTRAVENOUS; SUBCUTANEOUS EVERY 8 HOURS SCHEDULED
Status: DISCONTINUED | OUTPATIENT
Start: 2024-04-08 | End: 2024-04-09

## 2024-04-08 RX ORDER — WARFARIN SODIUM 5 MG/1
5 TABLET ORAL
Status: COMPLETED | OUTPATIENT
Start: 2024-04-08 | End: 2024-04-08

## 2024-04-08 RX ADMIN — HEPARIN SODIUM 5000 UNITS: 5000 INJECTION INTRAVENOUS; SUBCUTANEOUS at 21:35

## 2024-04-08 RX ADMIN — CARBOXYMETHYLCELLULOSE SODIUM 1 DROP: 10 GEL OPHTHALMIC at 18:18

## 2024-04-08 RX ADMIN — METOPROLOL TARTRATE 5 MG: 1 INJECTION, SOLUTION INTRAVENOUS at 10:38

## 2024-04-08 RX ADMIN — METOPROLOL TARTRATE 5 MG: 1 INJECTION, SOLUTION INTRAVENOUS at 21:34

## 2024-04-08 RX ADMIN — DOXYCYCLINE: 100 INJECTION, POWDER, LYOPHILIZED, FOR SOLUTION INTRAVENOUS at 10:38

## 2024-04-08 RX ADMIN — WARFARIN SODIUM 5 MG: 5 TABLET ORAL at 18:19

## 2024-04-08 RX ADMIN — METOPROLOL TARTRATE 5 MG: 1 INJECTION, SOLUTION INTRAVENOUS at 04:37

## 2024-04-08 RX ADMIN — DEXTROSE MONOHYDRATE: 50 INJECTION, SOLUTION INTRAVENOUS at 07:09

## 2024-04-08 RX ADMIN — METOPROLOL TARTRATE 5 MG: 1 INJECTION, SOLUTION INTRAVENOUS at 07:36

## 2024-04-08 RX ADMIN — SODIUM CHLORIDE, PRESERVATIVE FREE 20 MG: 5 INJECTION INTRAVENOUS at 07:36

## 2024-04-08 RX ADMIN — DEXTROSE MONOHYDRATE: 50 INJECTION, SOLUTION INTRAVENOUS at 14:59

## 2024-04-08 RX ADMIN — DEXTROSE MONOHYDRATE: 50 INJECTION, SOLUTION INTRAVENOUS at 10:37

## 2024-04-08 RX ADMIN — METOPROLOL TARTRATE 5 MG: 1 INJECTION, SOLUTION INTRAVENOUS at 14:58

## 2024-04-08 RX ADMIN — CHLORHEXIDINE GLUCONATE 0.12% ORAL RINSE 15 ML: 1.2 LIQUID ORAL at 07:35

## 2024-04-08 RX ADMIN — HEPARIN SODIUM 5000 UNITS: 5000 INJECTION INTRAVENOUS; SUBCUTANEOUS at 14:58

## 2024-04-08 RX ADMIN — EMPAGLIFLOZIN 10 MG: 10 TABLET, FILM COATED ORAL at 07:36

## 2024-04-08 RX ADMIN — CHLORHEXIDINE GLUCONATE 0.12% ORAL RINSE 15 ML: 1.2 LIQUID ORAL at 21:34

## 2024-04-08 RX ADMIN — CARBOXYMETHYLCELLULOSE SODIUM 1 DROP: 10 GEL OPHTHALMIC at 07:36

## 2024-04-08 RX ADMIN — CARBOXYMETHYLCELLULOSE SODIUM 1 DROP: 10 GEL OPHTHALMIC at 21:43

## 2024-04-08 ASSESSMENT — PULMONARY FUNCTION TESTS
PIF_VALUE: 26
PIF_VALUE: 29
PIF_VALUE: 38
PIF_VALUE: 33
PIF_VALUE: 34
PIF_VALUE: 34
PIF_VALUE: 35
PIF_VALUE: 30
PIF_VALUE: 31
PIF_VALUE: 28
PIF_VALUE: 33
PIF_VALUE: 36
PIF_VALUE: 32
PIF_VALUE: 33
PIF_VALUE: 29
PIF_VALUE: 28
PIF_VALUE: 28
PIF_VALUE: 31
PIF_VALUE: 33
PIF_VALUE: 32
PIF_VALUE: 33
PIF_VALUE: 32
PIF_VALUE: 33
PIF_VALUE: 34
PIF_VALUE: 30
PIF_VALUE: 37
PIF_VALUE: 31
PIF_VALUE: 31
PIF_VALUE: 30
PIF_VALUE: 29
PIF_VALUE: 32
PIF_VALUE: 35
PIF_VALUE: 29
PIF_VALUE: 29
PIF_VALUE: 33
PIF_VALUE: 35
PIF_VALUE: 27
PIF_VALUE: 29
PIF_VALUE: 26
PIF_VALUE: 31
PIF_VALUE: 31
PIF_VALUE: 36
PIF_VALUE: 34
PIF_VALUE: 25
PIF_VALUE: 31
PIF_VALUE: 33
PIF_VALUE: 36
PIF_VALUE: 27
PIF_VALUE: 20
PIF_VALUE: 31
PIF_VALUE: 29
PIF_VALUE: 27
PIF_VALUE: 31
PIF_VALUE: 33
PIF_VALUE: 33
PIF_VALUE: 29
PIF_VALUE: 30
PIF_VALUE: 34
PIF_VALUE: 33
PIF_VALUE: 33
PIF_VALUE: 36
PIF_VALUE: 28
PIF_VALUE: 23
PIF_VALUE: 31
PIF_VALUE: 35
PIF_VALUE: 28
PIF_VALUE: 30
PIF_VALUE: 29
PIF_VALUE: 40
PIF_VALUE: 33

## 2024-04-08 NOTE — PLAN OF CARE
Problem: Discharge Planning  Goal: Discharge to home or other facility with appropriate resources  Outcome: Progressing  Flowsheets (Taken 4/6/2024 0000 by Nan Badillo, RN)  Discharge to home or other facility with appropriate resources: Identify barriers to discharge with patient and caregiver     Problem: Safety - Adult  Goal: Free from fall injury  Outcome: Progressing  Flowsheets (Taken 4/6/2024 0842 by Mackenzie Smith, RN)  Free From Fall Injury: Instruct family/caregiver on patient safety     Problem: ABCDS Injury Assessment  Goal: Absence of physical injury  Outcome: Progressing  Flowsheets (Taken 4/7/2024 1507 by Radha Mayers, RN)  Absence of Physical Injury: Implement safety measures based on patient assessment     Problem: Chronic Conditions and Co-morbidities  Goal: Patient's chronic conditions and co-morbidity symptoms are monitored and maintained or improved  Outcome: Progressing  Flowsheets (Taken 4/6/2024 0000 by Nan Badillo, RN)  Care Plan - Patient's Chronic Conditions and Co-Morbidity Symptoms are Monitored and Maintained or Improved: Monitor and assess patient's chronic conditions and comorbid symptoms for stability, deterioration, or improvement     Problem: Respiratory - Adult  Goal: Achieves optimal ventilation and oxygenation  Outcome: Progressing     Problem: Skin/Tissue Integrity  Goal: Absence of new skin breakdown  Description: 1.  Monitor for areas of redness and/or skin breakdown  2.  Assess vascular access sites hourly  3.  Every 4-6 hours minimum:  Change oxygen saturation probe site  4.  Every 4-6 hours:  If on nasal continuous positive airway pressure, respiratory therapy assess nares and determine need for appliance change or resting period.  Outcome: Progressing     Problem: Nutrition Deficit:  Goal: Optimize nutritional status  Outcome: Progressing     Problem: Neurosensory - Adult  Goal: Achieves stable or improved neurological status  Outcome: Progressing  Goal:

## 2024-04-08 NOTE — CARE COORDINATION
ONGOING DISCHARGE PLAN:    Patient intubated on vent FIO2 40%.     Spoke with patient's RN Sandy pt has been off sedation for 24 hours and if patient doesn't show improvement within 24 hours they will opt for terminal wean.    IV avycaz, doxy     Will continue to follow for additional discharge needs.    If patient is discharged prior to next notation, then this note serves as note for discharge by case management.    Electronically signed by Ewa Melvin RN on 4/8/2024 at 2:41 PM

## 2024-04-08 NOTE — PROGRESS NOTES
[facility] PULMONARY,CRITICAL CARE & SLEEP   Alek Lee MD/Fer Agustin MD/ Milton Rayo MD/Yuri MCKAY AGACNP-BC, NP-C      Marielle Cortez APRN NP-C     Merlyn MCKAY NP-C                                           Pulmonary Critical Care Progress Note    Patient - Saurav Stacy   Age - 78 y.o.   - 1945  MRN - [id number]  [facility] # - [id number]  Date of Admission - 3/24/2024  7:53 PM    Consulting Service/Physician:       Primary Care Physician: Jose Raul Manley MD    SUBJECTIVE:     Chief Complaint:   Chief Complaint   Patient presents with    Shortness of Breath   Acute hypoxemic respiratory failure  Subjective:    Patient off sedation but remains unresponsive probably a combination of hyponatremia and uremia.    He is off pressors.    FiO2 remains at 50%.  No airleak.  Still very difficult to adequately ventilate with acute hypercapnia despite ventilator rate of 30 and overbreathing the ventilator at 32 breaths/min    Son is at bedside.  He seems to understand that the prognosis is poor and agrees that his father would not want a tracheostomy tube and feeding tube.  I spoke with the patient's wife Bhumika over the phone.  She understands.  She asked that we give him 1 more day.  If he is not improving tomorrow she will consider terminal weaning.    VITALS  BP (!) 114/23   Pulse 65   Temp 98.2 °F (36.8 °C) (Axillary)   Resp 21   Ht 1.727 m (5' 7.99\")   Wt 106.3 kg (234 lb 5.6 oz)   SpO2 98%   BMI 35.64 kg/m²   Wt Readings from Last 3 Encounters:   24 106.3 kg (234 lb 5.6 oz)   02/15/24 116.1 kg (256 lb)   23 125.6 kg (277 lb)     I/O (24 Hours)    Intake/Output Summary (Last 24 hours) at 2024 1038  Last data filed at 2024 0800  Gross per 24 hour   Intake 1238 ml   Output 2350 ml   Net -1112 ml     Ventilator:   Settings  FiO2 : 50 %  Insp Time (sec): 0.7 sec  Insp Rise Time (%): 0.1 %  Flow Sensitivity: 5 L/min  Exam:   Physical  family at bedside as well as the wife over the phone.  I also spoke with the nurse.  Orders placed sodium correcting  >40mCCT    Electronically signed by Richmond Agustin MD on 04/08/24     This progress note was completed using a voice transcription system. Every effort was made to ensure accuracy. However, inadvertent computerized transcription errors may be present.    Richmond Agustin MD  Pulmonary and Critical Care   [phone] [employer]  [phone] Cell

## 2024-04-08 NOTE — PROGRESS NOTES
Comprehensive Nutrition Assessment    Type and Reason for Visit:  Reassess    Nutrition Recommendations/Plan:   Continue tube feeding regimen as ordered.     Malnutrition Assessment:  Malnutrition Status:  Mild malnutrition (03/31/24 1404)    Context:  Acute Illness     Findings of the 6 clinical characteristics of malnutrition:  Energy Intake:  75% or less of estimated energy requirements for 7 or more days  Weight Loss:  Unable to assess (9.9% x 1 yr. More suspected but unable to evaluate due to fluid shifts.)     Body Fat Loss:  Unable to assess     Muscle Mass Loss:  Unable to assess    Fluid Accumulation:  Mild (May not be related to nutritional status but may mask wt loss.) Extremities   Strength:  Not Performed    Nutrition Assessment:    Pt remains intubated with Dr. Agustin talking with son & wife about poor prognosis. Family indicated pt would not want a trach/PEG, wife asking till tomorrow to decide about terminal wean. Pt tolerating tube feedings; sodium level elevated with MD ordering D5 water at 75 ml/hr.    Nutrition Related Findings:    Mild edema. BM-4/7. Labs & meds reviewed.         Current Nutrition Intake & Therapies:    Average Meal Intake: NPO  Average Supplements Intake: Unable to assess  Current Tube Feeding (TF) Orders:  Feeding Route: Nasogastric  Formula: Peptide Based High Protein  Schedule: Continuous  Feeding Regimen: goal 54 ml  Additives/Modulars:    Water Flushes: 30 ml every 6 hours  Current TF & Flush Orders Provides: 288kcal and 13g protein  Goal TF & Flush Orders Provides: 1296 kcals, 105 gm protein (with Propofol 2054 kcals)    Anthropometric Measures:  Height: 172.7 cm (5' 7.99\")  Ideal Body Weight (IBW): 154 lbs (70 kg)    Admission Body Weight: 116.1 kg (255 lb 15.3 oz) (Method not specified)  Current Body Weight: 106.1 kg (234 lb), 151.9 % IBW. Weight Source: Bed Scale  Current BMI (kg/m2): 35.6  Usual Body Weight: 128.8 kg (283 lb 15.2 oz) (3/2/23. Method not  specified.)  % Weight Change (Calculated): -9.9                    BMI Categories: Obese Class 2 (BMI 35.0 -39.9)    Estimated Daily Nutrient Needs:  Energy Requirements Based On: Formula  Weight Used for Energy Requirements: Admission  Energy (kcal/day): 5404-9993 based on [facility] with 1-1.1 factor  Weight Used for Protein Requirements: Ideal  Protein (g/day): 105-126 based on 1.5-1.8 gm per kg     Fluid (ml/day): per physician    Nutrition Diagnosis:   Inadequate protein-energy intake (new problem) related to impaired respiratory function as evidenced by NPO or clear liquid status due to medical condition, intubation    Nutrition Interventions:   Food and/or Nutrient Delivery: Continue NPO, Continue Current Tube Feeding  Nutrition Education/Counseling: No recommendation at this time  Coordination of Nutrition Care: Continue to monitor while inpatient       Goals:  Previous Goal Met: Progressing toward Goal(s)  Goals: Initiate nutrition support, within 2 days (new goal)       Nutrition Monitoring and Evaluation:      Food/Nutrient Intake Outcomes: Enteral Nutrition Intake/Tolerance  Physical Signs/Symptoms Outcomes: Biochemical Data, GI Status, Fluid Status or Edema, Weight    Discharge Planning:    Too soon to determine     Geovanna Snell RD, LD  [phone]

## 2024-04-08 NOTE — PROGRESS NOTES
[facility] -   Infectious diseases evaluation  admission date 3/24/2024    reason for consultation:   Pneumonia and leukocytosis    Impression :   Current:  CHF /possible ARDS  Multifocal pneumonia   Sepsis  Shock multifactorial  Status post bronchoscopy 4/2/24  Pneumothorax status post left chest tube placement 4/2/24  Acute respiratory failure/intubated 4/2/24  S/P aortic valve replacement  S/P Pacemaker   Afib  DM  Obesity  QT prolongation QTc 574 on 3/29/2024      HENCE:     Follow blood and BAL cultures from 4/2/24, no growth to date   Continue Avycaz day #7  IV doxycycline day #4  Avoid Levaquin due to QT prolongation  Urine for strep pneumo and Legionella antigen negative  Mycoplasma IgM negative  Nasal swab for MRSA was - 3/27/2024  No growth on blood cultures from 3/24/2024  Respiratory panel with COVID-19 - 3/27/2024  He received a course of Zithromax 3/27/2024 through 3/31/2024.  He received cefepime 3/27/2024 through 4/1/2024  He received IV ceftriaxone and doxycycline on 3/24/2024  He received IV Zosyn on 4/1/2024  Follow CBC and renal function  Echocardiogram from 3/25/2024 reviewed, no vegetations      Infection Control Recommendations   [place] Precautions      Antimicrobial Stewardship Recommendations   Simplification of therapy  Targeted therapy      History of Present Illness:   Initial history:  Saurav Stacy is a 78 y.o.-year-old male presented to hospital for worsening shortness of breath associated with cough and generalized weakness for several days prior to admission.  He also had bilateral lower extremity edema.-Symptoms are moderate to severe, no alleviating or aggravating factors.  Initial WBC was 13.5, proBNP elevated more than 10,000, procalcitonin 0.17  Chest x-ray showed diffuse bilateral infiltrates    WBC increased to 21 on 4/1/2024 on steroids  On 4/2/24 The patient deteriorated, required intubation, had a fever with a temperature  16.6* 17.8*   HGB 11.7* 10.8*   HCT 39.3* 36.4*    233   LYMPHOPCT 6* 10*   MONOPCT 6 6       BMP:  Recent Labs     04/06/24  0522 04/07/24  0411 04/08/24  0441   * 152* 159*   K 4.3 5.0 4.7   * 114* 119*   CO2 29 29 33*   BUN 40* 55* 68*   CREATININE 0.6* 0.8 0.9   MG 2.2  --   --        Hepatic Function Panel:   No results for input(s): \"PROT\", \"LABALBU\", \"BILIDIR\", \"IBILI\", \"BILITOT\", \"ALKPHOS\", \"ALT\", \"AST\" in the last 72 hours.      Lab Results   Component Value Date/Time    CREATININE 0.9 04/08/2024 04:41 AM    GLUCOSE 166 04/08/2024 04:41 AM       Detailed results:        Thank you for allowing us to participate in the care of this patient.Please call with questions.    This note is created with the assistance of a speech recognition program.  While intending to generate adocument that actually reflects the content of the visit, the document can still have some errors including those of syntax and sound a like substitutions which may escape proof reading.  It such instances, actual meaningcan be extrapolated by contextual diversion.    Mahesh Danielle MD  Office: [phone]  Perfect serve / office [phone]

## 2024-04-08 NOTE — PROGRESS NOTES
DR HERNÁNDEZ      PROGRESS NOTE        Patient:  Saurav Stacy  YOB: 1945    MRN: [id number]     Acct: [id number]     Admit date: 3/24/2024    Pt seen and Chart reviewed.  Consultant notes reviewed and care evaluated.    Subjective: Patient is still intubated they are cutting down on his FiO2 actually respiratory therapist just dropped him to 40% he still doing 9395% and his ABGs were good this morning his sodium is up we will put him on D5W start him on 50 bone graft to the floor on instead pulmonary changes 75 mill an hour he is putting good urine output.  His son and daughter-in-law in the room had a long discussion with.  While waiting to see how he is responding to the changes and for how long and we discussed the ET tube placement usually up to 2 weeks and then decide trach which date on the trach.    Diet:  Diet NPO  ADULT TUBE FEEDING; Nasoenteric; Peptide Based High Protein; Continuous; 10; Yes; 15; Q 4 hours; 54; 30; Q 6 hours      Medications:Current Inpatient    Scheduled Meds:   cefTAZidime-avibactam (AVYCAZ) 2.5 g in dextrose 5 % 100 mL IVPB   IntraVENous q8h    doxycycline (VIBRAMYCIN) 100 mg in dextrose 5 % 100 mL IVPB   IntraVENous Q12H    metoprolol  5 mg IntraVENous Q4H    [Held by provider] furosemide  20 mg IntraVENous BID    docusate  100 mg Per NG tube BID    polyethylene glycol  17 g Per NG tube Daily    carboxymethylcellulose  1 drop Both Eyes 4x Daily    chlorhexidine  15 mL Mouth/Throat BID    famotidine (PEPCID) injection  20 mg IntraVENous BID    empagliflozin  10 mg Oral Daily    warfarin placeholder: dosing by provider   Other RX Placeholder    [Held by provider] pantoprazole  40 mg Oral QAM AC    [Held by provider] potassium chloride  20 mEq Oral Daily with breakfast     Continuous Infusions:   fentaNYL Stopped (04/05/24 1807)    norepinephrine Stopped (04/06/24 0623)    propofol Stopped (04/07/24 0440)

## 2024-04-09 ENCOUNTER — APPOINTMENT (OUTPATIENT)
Dept: GENERAL RADIOLOGY | Age: 79
DRG: 871 | End: 2024-04-09
Payer: MEDICARE

## 2024-04-09 VITALS
HEART RATE: 60 BPM | WEIGHT: 234.35 LBS | TEMPERATURE: 98.3 F | DIASTOLIC BLOOD PRESSURE: 28 MMHG | RESPIRATION RATE: 20 BRPM | HEIGHT: 68 IN | BODY MASS INDEX: 35.52 KG/M2 | OXYGEN SATURATION: 96 % | SYSTOLIC BLOOD PRESSURE: 104 MMHG

## 2024-04-09 LAB
ABSOLUTE BANDS: 0.16 K/UL (ref 0–1)
ANION GAP SERPL CALCULATED.3IONS-SCNC: 7 MMOL/L (ref 9–17)
BANDS: 1 % (ref 0–10)
BASOPHILS # BLD: 0 K/UL (ref 0–0.2)
BASOPHILS NFR BLD: 0 % (ref 0–2)
BDY SITE: ABNORMAL
BNP SERPL-MCNC: ABNORMAL PG/ML
BUN SERPL-MCNC: 70 MG/DL (ref 8–23)
CALCIUM SERPL-MCNC: 8.9 MG/DL (ref 8.6–10.4)
CHLORIDE SERPL-SCNC: 116 MMOL/L (ref 98–107)
CO2 SERPL-SCNC: 32 MMOL/L (ref 20–31)
COHGB MFR BLD: 1.8 % (ref 0–5)
CREAT SERPL-MCNC: 0.9 MG/DL (ref 0.7–1.2)
EOSINOPHIL # BLD: 0 K/UL (ref 0–0.4)
EOSINOPHILS RELATIVE PERCENT: 0 % (ref 0–4)
ERYTHROCYTE [DISTWIDTH] IN BLOOD BY AUTOMATED COUNT: 17.9 % (ref 11.5–14.9)
FIO2 ON VENT: 40 %
GAS FLOW.O2 O2 DELIVERY SYS: ABNORMAL L/MIN
GAS FLOW.O2 SETTING OXYMISER: 30 L/MIN
GFR SERPL CREATININE-BSD FRML MDRD: 86 ML/MIN/1.73M2
GLUCOSE SERPL-MCNC: 160 MG/DL (ref 70–99)
HCO3 ARTERIAL: 35.5 MMOL/L (ref 22–26)
HCT VFR BLD AUTO: 36.6 % (ref 41–53)
HGB BLD-MCNC: 11 G/DL (ref 13.5–17.5)
INR PPP: 1.3
LYMPHOCYTES NFR BLD: 1.25 K/UL (ref 1–4.8)
LYMPHOCYTES RELATIVE PERCENT: 8 % (ref 24–44)
MAGNESIUM SERPL-MCNC: 2.4 MG/DL (ref 1.6–2.6)
MCH RBC QN AUTO: 25 PG (ref 26–34)
MCHC RBC AUTO-ENTMCNC: 30.1 G/DL (ref 31–37)
MCV RBC AUTO: 83.1 FL (ref 80–100)
METHEMOGLOBIN: 0.9 % (ref 0–1.9)
MONOCYTES NFR BLD: 0.63 K/UL (ref 0.1–1.3)
MONOCYTES NFR BLD: 4 % (ref 1–7)
MORPHOLOGY: ABNORMAL
NEUTROPHILS NFR BLD: 87 % (ref 36–66)
NEUTS SEG NFR BLD: 13.6 K/UL (ref 1.3–9.1)
NUCLEATED RED BLOOD CELLS: 1 PER 100 WBC
O2 SAT, ARTERIAL: 93.8 % (ref 95–98)
PCO2 ARTERIAL: 64.6 MMHG (ref 35–45)
PEEP RESPIRATORY: 10 CM[H2O]
PH ARTERIAL: 7.35 (ref 7.35–7.45)
PLATELET # BLD AUTO: 259 K/UL (ref 150–450)
PMV BLD AUTO: 8.7 FL (ref 6–12)
PO2 ARTERIAL: 86.7 MMHG (ref 80–100)
POSITIVE BASE EXCESS, ART: 9.9 MMOL/L (ref 0–2)
POTASSIUM SERPL-SCNC: 4.9 MMOL/L (ref 3.7–5.3)
PROTHROMBIN TIME: 16.2 SEC (ref 11.8–14.6)
PT. POSITION: ABNORMAL
RBC # BLD AUTO: 4.41 M/UL (ref 4.5–5.9)
RESPIRATORY RATE: 30
SODIUM SERPL-SCNC: 155 MMOL/L (ref 135–144)
SODIUM SERPL-SCNC: 155 MMOL/L (ref 135–144)
TEXT FOR RESPIRATORY: ABNORMAL
TOTAL RATE: 30
VENTILATION MODE VENT: ABNORMAL
VT: 500
WBC OTHER # BLD: 15.6 K/UL (ref 3.5–11)

## 2024-04-09 PROCEDURE — 85025 COMPLETE CBC W/AUTO DIFF WBC: CPT

## 2024-04-09 PROCEDURE — 83880 ASSAY OF NATRIURETIC PEPTIDE: CPT

## 2024-04-09 PROCEDURE — 2580000003 HC RX 258: Performed by: FAMILY MEDICINE

## 2024-04-09 PROCEDURE — 2700000000 HC OXYGEN THERAPY PER DAY

## 2024-04-09 PROCEDURE — 83735 ASSAY OF MAGNESIUM: CPT

## 2024-04-09 PROCEDURE — 82805 BLOOD GASES W/O2 SATURATION: CPT

## 2024-04-09 PROCEDURE — 2580000003 HC RX 258: Performed by: INTERNAL MEDICINE

## 2024-04-09 PROCEDURE — 36415 COLL VENOUS BLD VENIPUNCTURE: CPT

## 2024-04-09 PROCEDURE — 6360000002 HC RX W HCPCS: Performed by: INTERNAL MEDICINE

## 2024-04-09 PROCEDURE — 6370000000 HC RX 637 (ALT 250 FOR IP): Performed by: INTERNAL MEDICINE

## 2024-04-09 PROCEDURE — 71045 X-RAY EXAM CHEST 1 VIEW: CPT

## 2024-04-09 PROCEDURE — C9113 INJ PANTOPRAZOLE SODIUM, VIA: HCPCS | Performed by: FAMILY MEDICINE

## 2024-04-09 PROCEDURE — 84295 ASSAY OF SERUM SODIUM: CPT

## 2024-04-09 PROCEDURE — 80048 BASIC METABOLIC PNL TOTAL CA: CPT

## 2024-04-09 PROCEDURE — 2500000003 HC RX 250 WO HCPCS: Performed by: INTERNAL MEDICINE

## 2024-04-09 PROCEDURE — 6370000000 HC RX 637 (ALT 250 FOR IP): Performed by: FAMILY MEDICINE

## 2024-04-09 PROCEDURE — 94003 VENT MGMT INPAT SUBQ DAY: CPT

## 2024-04-09 PROCEDURE — 6360000002 HC RX W HCPCS: Performed by: FAMILY MEDICINE

## 2024-04-09 PROCEDURE — A4216 STERILE WATER/SALINE, 10 ML: HCPCS | Performed by: FAMILY MEDICINE

## 2024-04-09 PROCEDURE — 94761 N-INVAS EAR/PLS OXIMETRY MLT: CPT

## 2024-04-09 PROCEDURE — 85610 PROTHROMBIN TIME: CPT

## 2024-04-09 RX ORDER — LORAZEPAM 2 MG/ML
2 INJECTION INTRAMUSCULAR EVERY 4 HOURS PRN
Status: DISCONTINUED | OUTPATIENT
Start: 2024-04-09 | End: 2024-04-09

## 2024-04-09 RX ORDER — MORPHINE SULFATE 4 MG/ML
6 INJECTION, SOLUTION INTRAMUSCULAR; INTRAVENOUS
Status: DISCONTINUED | OUTPATIENT
Start: 2024-04-09 | End: 2024-04-10 | Stop reason: HOSPADM

## 2024-04-09 RX ORDER — GLYCOPYRROLATE 0.2 MG/ML
0.2 INJECTION INTRAMUSCULAR; INTRAVENOUS ONCE
Status: DISCONTINUED | OUTPATIENT
Start: 2024-04-09 | End: 2024-04-10 | Stop reason: HOSPADM

## 2024-04-09 RX ORDER — LORAZEPAM 2 MG/ML
1 INJECTION INTRAMUSCULAR EVERY 4 HOURS PRN
Status: DISCONTINUED | OUTPATIENT
Start: 2024-04-09 | End: 2024-04-10 | Stop reason: HOSPADM

## 2024-04-09 RX ORDER — WARFARIN SODIUM 7.5 MG/1
7.5 TABLET ORAL
Status: DISCONTINUED | OUTPATIENT
Start: 2024-04-09 | End: 2024-04-09

## 2024-04-09 RX ORDER — MORPHINE SULFATE 4 MG/ML
4 INJECTION, SOLUTION INTRAMUSCULAR; INTRAVENOUS ONCE
Status: COMPLETED | OUTPATIENT
Start: 2024-04-09 | End: 2024-04-09

## 2024-04-09 RX ORDER — MORPHINE SULFATE 4 MG/ML
4 INJECTION, SOLUTION INTRAMUSCULAR; INTRAVENOUS
Status: DISCONTINUED | OUTPATIENT
Start: 2024-04-09 | End: 2024-04-10 | Stop reason: HOSPADM

## 2024-04-09 RX ORDER — LORAZEPAM 2 MG/ML
2 INJECTION INTRAMUSCULAR ONCE
Status: COMPLETED | OUTPATIENT
Start: 2024-04-09 | End: 2024-04-09

## 2024-04-09 RX ORDER — GLYCOPYRROLATE 0.2 MG/ML
0.1 INJECTION INTRAMUSCULAR; INTRAVENOUS ONCE
Status: COMPLETED | OUTPATIENT
Start: 2024-04-09 | End: 2024-04-09

## 2024-04-09 RX ORDER — MORPHINE SULFATE 4 MG/ML
4 INJECTION, SOLUTION INTRAMUSCULAR; INTRAVENOUS
Status: DISCONTINUED | OUTPATIENT
Start: 2024-04-09 | End: 2024-04-09

## 2024-04-09 RX ADMIN — DOXYCYCLINE: 100 INJECTION, POWDER, LYOPHILIZED, FOR SOLUTION INTRAVENOUS at 10:51

## 2024-04-09 RX ADMIN — MORPHINE SULFATE 4 MG: 4 INJECTION, SOLUTION INTRAMUSCULAR; INTRAVENOUS at 14:37

## 2024-04-09 RX ADMIN — METOPROLOL TARTRATE 5 MG: 1 INJECTION, SOLUTION INTRAVENOUS at 07:42

## 2024-04-09 RX ADMIN — PANTOPRAZOLE SODIUM 40 MG: 40 INJECTION, POWDER, FOR SOLUTION INTRAVENOUS at 07:34

## 2024-04-09 RX ADMIN — DEXTROSE MONOHYDRATE: 50 INJECTION, SOLUTION INTRAVENOUS at 01:07

## 2024-04-09 RX ADMIN — METOPROLOL TARTRATE 5 MG: 1 INJECTION, SOLUTION INTRAVENOUS at 03:57

## 2024-04-09 RX ADMIN — ACETAMINOPHEN 650 MG: 325 TABLET ORAL at 00:29

## 2024-04-09 RX ADMIN — CARBOXYMETHYLCELLULOSE SODIUM 1 DROP: 10 GEL OPHTHALMIC at 07:33

## 2024-04-09 RX ADMIN — CHLORHEXIDINE GLUCONATE 0.12% ORAL RINSE 15 ML: 1.2 LIQUID ORAL at 07:34

## 2024-04-09 RX ADMIN — EMPAGLIFLOZIN 10 MG: 10 TABLET, FILM COATED ORAL at 07:43

## 2024-04-09 RX ADMIN — METOPROLOL TARTRATE 5 MG: 1 INJECTION, SOLUTION INTRAVENOUS at 10:54

## 2024-04-09 RX ADMIN — METOPROLOL TARTRATE 5 MG: 1 INJECTION, SOLUTION INTRAVENOUS at 00:13

## 2024-04-09 RX ADMIN — GLYCOPYRROLATE 0.1 MG: 0.2 INJECTION INTRAMUSCULAR; INTRAVENOUS at 12:38

## 2024-04-09 RX ADMIN — CARBOXYMETHYLCELLULOSE SODIUM 1 DROP: 10 GEL OPHTHALMIC at 12:45

## 2024-04-09 RX ADMIN — DOXYCYCLINE: 100 INJECTION, POWDER, LYOPHILIZED, FOR SOLUTION INTRAVENOUS at 00:11

## 2024-04-09 RX ADMIN — LORAZEPAM 2 MG: 2 INJECTION, SOLUTION INTRAMUSCULAR; INTRAVENOUS at 12:33

## 2024-04-09 RX ADMIN — DOCUSATE SODIUM LIQUID 100 MG: 100 LIQUID ORAL at 09:46

## 2024-04-09 RX ADMIN — HEPARIN SODIUM 5000 UNITS: 5000 INJECTION INTRAVENOUS; SUBCUTANEOUS at 06:11

## 2024-04-09 RX ADMIN — DEXTROSE MONOHYDRATE: 50 INJECTION, SOLUTION INTRAVENOUS at 08:43

## 2024-04-09 RX ADMIN — DEXTROSE MONOHYDRATE: 50 INJECTION, SOLUTION INTRAVENOUS at 00:12

## 2024-04-09 RX ADMIN — MORPHINE SULFATE 4 MG: 4 INJECTION, SOLUTION INTRAMUSCULAR; INTRAVENOUS at 12:45

## 2024-04-09 ASSESSMENT — PULMONARY FUNCTION TESTS
PIF_VALUE: 23
PIF_VALUE: 33
PIF_VALUE: 33
PIF_VALUE: 29
PIF_VALUE: 34
PIF_VALUE: 28
PIF_VALUE: 34
PIF_VALUE: 35
PIF_VALUE: 31
PIF_VALUE: 32
PIF_VALUE: 30
PIF_VALUE: 38
PIF_VALUE: 32
PIF_VALUE: 35
PIF_VALUE: 37
PIF_VALUE: 33
PIF_VALUE: 34
PIF_VALUE: 31
PIF_VALUE: 32
PIF_VALUE: 37
PIF_VALUE: 32
PIF_VALUE: 34
PIF_VALUE: 31
PIF_VALUE: 31
PIF_VALUE: 32
PIF_VALUE: 33
PIF_VALUE: 34
PIF_VALUE: 28

## 2024-04-09 ASSESSMENT — PAIN SCALES - GENERAL: PAINLEVEL_OUTOF10: 0

## 2024-04-09 NOTE — CARE COORDINATION
ONGOING DISCHARGE PLAN:    Pt was terminally extubated today.     Palliative Care following.     Will continue to follow for additional discharge needs.    If patient is discharged prior to next notation, then this note serves as note for discharge by case management.    Electronically signed by Ewa Melvin RN on 4/9/2024 at 2:42 PM

## 2024-04-09 NOTE — PLAN OF CARE
Problem: Discharge Planning  Goal: Discharge to home or other facility with appropriate resources  Outcome: Progressing  Flowsheets (Taken 4/8/2024 2000)  Discharge to home or other facility with appropriate resources:   Identify barriers to discharge with patient and caregiver   Identify discharge learning needs (meds, wound care, etc)     Problem: Safety - Adult  Goal: Free from fall injury  Outcome: Progressing  Flowsheets (Taken 4/8/2024 2000)  Free From Fall Injury: Instruct family/caregiver on patient safety     Problem: ABCDS Injury Assessment  Goal: Absence of physical injury  Outcome: Progressing  Flowsheets (Taken 4/8/2024 2000)  Absence of Physical Injury: Implement safety measures based on patient assessment     Problem: Chronic Conditions and Co-morbidities  Goal: Patient's chronic conditions and co-morbidity symptoms are monitored and maintained or improved  Outcome: Progressing  Flowsheets (Taken 4/8/2024 2000)  Care Plan - Patient's Chronic Conditions and Co-Morbidity Symptoms are Monitored and Maintained or Improved:   Monitor and assess patient's chronic conditions and comorbid symptoms for stability, deterioration, or improvement   Collaborate with multidisciplinary team to address chronic and comorbid conditions and prevent exacerbation or deterioration   Update acute care plan with appropriate goals if chronic or comorbid symptoms are exacerbated and prevent overall improvement and discharge     Problem: Respiratory - Adult  Goal: Achieves optimal ventilation and oxygenation  Outcome: Progressing  Flowsheets (Taken 4/8/2024 2000)  Achieves optimal ventilation and oxygenation:   Assess for changes in respiratory status   Assess for changes in mentation and behavior   Position to facilitate oxygenation and minimize respiratory effort   Oxygen supplementation based on oxygen saturation or arterial blood gases   Assess the need for suctioning and aspirate as needed   Respiratory therapy support as  infection at discharge  Outcome: Progressing  Flowsheets (Taken 4/8/2024 2000)  Absence of infection at discharge:   Assess and monitor for signs and symptoms of infection   Monitor lab/diagnostic results   Monitor all insertion sites i.e., indwelling lines, tubes and drains   Monitor endotracheal (as able) and nasal secretions for changes in amount and color   Administer medications as ordered   Instruct and encourage patient and family to use good hand hygiene technique     Problem: Metabolic/Fluid and Electrolytes - Adult  Goal: Electrolytes maintained within normal limits  Outcome: Progressing  Flowsheets (Taken 4/8/2024 2000)  Electrolytes maintained within normal limits:   Monitor labs and assess patient for signs and symptoms of electrolyte imbalances   Administer electrolyte replacement as ordered  Goal: Hemodynamic stability and optimal renal function maintained  Outcome: Progressing  Flowsheets (Taken 4/8/2024 2000)  Hemodynamic stability and optimal renal function maintained:   Monitor labs and assess for signs and symptoms of volume excess or deficit   Monitor intake, output and patient weight     Problem: Hematologic - Adult  Goal: Maintains hematologic stability  Outcome: Progressing  Flowsheets (Taken 4/8/2024 2000)  Maintains hematologic stability:   Assess for signs and symptoms of bleeding or hemorrhage   Monitor labs for bleeding or clotting disorders     Problem: Gastrointestinal - Adult  Goal: Minimal or absence of nausea and vomiting  Flowsheets (Taken 4/8/2024 2000)  Minimal or absence of nausea and vomiting: Administer IV fluids as ordered to ensure adequate hydration  Goal: Maintains adequate nutritional intake  Outcome: Progressing  Flowsheets (Taken 4/7/2024 0800 by Radha Mayers, RN)  Maintains adequate nutritional intake: Monitor intake and output, weight and lab values     Problem: Skin/Tissue Integrity  Goal: Absence of new skin breakdown  Description: 1.  Monitor for areas of

## 2024-04-09 NOTE — PROGRESS NOTES
Patient extubated per physician order. Patient extubated in usual fashion. Patient placed on  3 liters/min via nasal cannula.     Cece Robles RCP   1:51 PM

## 2024-04-09 NOTE — PLAN OF CARE
Patient not seen by myself yet today. Planning to see this afternoon.  Reviewed chart. Terminal extubation noted.  I have adjusted his end of life medications after discussions with RN, see orders.  Will also have our RN Deisy stop by to provide comfort to the family.    Sancho Edmondson, DO  Hospice and Palliative Care Medicine

## 2024-04-09 NOTE — SIGNIFICANT EVENT
I spoke with the wife and several family members at bedside.  They all agreed that the patient would not want to continue with this level of care nor would he want a tracheostomy tube or feeding tube.  They are comfortable with terminal extubation.  I explained the process to them and they seem to understand.  I will place orders for comfort medications and we will remove chest tube as well as endotracheal tube and gastric tube.    DNR CC    Family comfortable with the decision.  I relayed the information to nursing staff as well    Richmond Agustin MD  Pulmonary and Critical Care  [phone] [employer]  [phone] Cell

## 2024-04-09 NOTE — PROGRESS NOTES
Skyla, from [facility], returned called and body is released and will not be used for donations.

## 2024-04-09 NOTE — PROGRESS NOTES
DR HERNÁNDEZ      PROGRESS NOTE        Patient:  Saurav Stacy  YOB: 1945    MRN: [id number]     Acct: [id number]     Admit date: 3/24/2024    Pt seen and Chart reviewed.  Consultant notes reviewed and care evaluated.    Subjective: Patient is about the same still on the vent FiO2 40%.  Sodium still not coming down as fast he is at 155 d5w still getting tube feeding and getting flushes secondary to his RN still putting good urine output.    Diet:  Diet NPO  ADULT TUBE FEEDING; Nasoenteric; Peptide Based High Protein; Continuous; 10; Yes; 15; Q 4 hours; 54; 200; Q 4 hours      Medications:Current Inpatient    Scheduled Meds:   pantoprazole (PROTONIX) 40 mg in sodium chloride (PF) 0.9 % 10 mL injection  40 mg IntraVENous Daily    heparin (porcine)  5,000 Units SubCUTAneous 3 times per day    cefTAZidime-avibactam (AVYCAZ) 2.5 g in dextrose 5 % 100 mL IVPB   IntraVENous q8h    doxycycline (VIBRAMYCIN) 100 mg in dextrose 5 % 100 mL IVPB   IntraVENous Q12H    metoprolol  5 mg IntraVENous Q4H    [Held by provider] furosemide  20 mg IntraVENous BID    docusate  100 mg Per NG tube BID    polyethylene glycol  17 g Per NG tube Daily    carboxymethylcellulose  1 drop Both Eyes 4x Daily    chlorhexidine  15 mL Mouth/Throat BID    empagliflozin  10 mg Oral Daily    warfarin placeholder: dosing by provider   Other RX Placeholder    [Held by provider] potassium chloride  20 mEq Oral Daily with breakfast     Continuous Infusions:   dextrose 75 mL/hr at 04/09/24 0107    propofol Stopped (04/07/24 0440)     PRN Meds:artificial tears, LORazepam, Benzocaine-Menthol, acetaminophen        Physical Exam:  Vitals: BP (!) 115/31   Pulse 60   Temp 99.1 °F (37.3 °C) (Axillary)   Resp 28   Ht 1.727 m (5' 7.99\")   Wt 106.3 kg (234 lb 5.6 oz)   SpO2 96%   BMI 35.64 kg/m²   24 hour intake/output:  Intake/Output Summary (Last 24 hours) at 4/9/2024 0703  Last data

## 2024-04-09 NOTE — PROGRESS NOTES
Writer spoke to Dr. Edmondson. Notified him of patient's condition. See new orders for comfort care.

## 2024-04-09 NOTE — PROGRESS NOTES
Post mortem care complete, art line, central line, and hassan catheter removed, and patient placed in body bag.

## 2024-04-09 NOTE — PROGRESS NOTES
[facility] PULMONARY,CRITICAL CARE & SLEEP   Alek Lee MD/Fer Agustin MD/ Milton Rayo MD/Yuri MCKAY AGAP-BC, NP-C      Marielle Cortez APRN NP-C     Merlyn Marks APRN NP-C                                           Pulmonary Critical Care Progress Note    Patient - Saurav Stacy   Age - 78 y.o.   - 1945  MRN - [id number]  [facility] # - [id number]  Date of Admission - 3/24/2024  7:53 PM    Consulting Service/Physician:       Primary Care Physician: Jose Raul Manley MD    SUBJECTIVE:     Chief Complaint:   Chief Complaint   Patient presents with    Shortness of Breath   Acute hypoxemic respiratory failure  Subjective:    No acute events.  Still persistent acute hypercapnia.    Chest tube currently not clamped.  Not exactly sure why.  No meaningful pneumothorax on any of the x-rays including today.  No airleak.  I clamped the chest tube again.    FiO2 down to 40%.    Minimally responsive but starting to move his head spontaneously and arms according to the family at bedside.    Sodium level slowly correcting down from peak of 1 60-1 55.  D5 water further adjusted.  If we are going to continue he will need enteral free water as well.    No fever documented.    Son currently at bedside awaiting arrival of his mother.    VITALS  BP (!) 122/43   Pulse 64   Temp 98.3 °F (36.8 °C) (Axillary)   Resp 24   Ht 1.727 m (5' 7.99\")   Wt 106.3 kg (234 lb 5.6 oz)   SpO2 96%   BMI 35.64 kg/m²   Wt Readings from Last 3 Encounters:   24 106.3 kg (234 lb 5.6 oz)   02/15/24 116.1 kg (256 lb)   23 125.6 kg (277 lb)     I/O (24 Hours)    Intake/Output Summary (Last 24 hours) at 2024 0923  Last data filed at 2024 0600  Gross per 24 hour   Intake 3613.77 ml   Output 2490 ml   Net 1123.77 ml     Ventilator:   Settings  FiO2 : 40 %  PEEP/CPAP (cm H2O): 10 cm H20  Insp Time (sec): 0.7 sec  Insp Rise Time (%): 3 %  Flow Sensitivity: 5 L/min  Exam:   Physical Exam

## 2024-04-09 NOTE — PROCEDURES
[facility] PULMONARY,CRITICAL CARE & SLEEP   Alek Lee MD/Fer Agustin MD/Yuri MCKAY AGACNP-BC, NP-C      Merlyn MCKAY NP-C    Mike MCKAY NP-C                                             PROCEDURE DONE- chest tube removal    INDICATION  - withdrawing care    CONSENT - IMPLIED/family aware    ANESTHESIA - NONE    -  Marilou MCKAY AGACNP-BC     SUMMARY OF PROCEDURE- WITH PT  IN SUPINE POSITION AND WITH ventilator support ongoing, WITH APPROPRIATE MONITORING IN PLACE, L apical chest tube was prepped, sutures removed, clamped to suction. Chest tube was easily removed. Dressing with occlusive dressing applied and secured. No bleeding noted. Dr Richmond Agustin at bedside during entire procedure, as well as bedside RN.    COMPLICATIONS - NONE

## 2024-04-09 NOTE — PROGRESS NOTES
Family stepped  into waiting room during while RT extubated patient. After patient was extubated and cleaned up,  went to waiting room to notify the family they can come back.    Comfort cart in room for family and family asked to have monitor set to comfort care.

## 2024-04-09 NOTE — PROGRESS NOTES
SPIRITUAL CARE DEPARTMENT - [facility]     Patient Death Note                                      DEATH                Room #    Name: Saurav Stacy            Age: 78 y.o.  Gender: male          Shinto: Holiness   Place of Mormonism: [facility]  Admit Date & Time: 3/24/2024  7:53 PM     Referral: MILADYS Frey   Actual date of death: 2024   TOD: 1506       SITUATION AT DEATH:  Patient is a 78 y.o. male in ICU.  Family changed patient's code status to DNR/CC earlier today.  Patient terminally weaned and transitioned to comfort care.  Family holding bedside raymond when patient .  This is not a 's case.     SPIRITUAL/EMOTIONAL INTERVENTION:  Writer present with patient and family throughout the day.  Family in shock about patient's unexpected medical circumstances and grim prognosis.  Family in agreement to honor patient's end of life wishes, and did not want patient to \"suffer anymore\".  Family grieving and coping appropriately.  Provided staff support. Facilitated communication between family and staff. Helped complete paperwork.        Family Received Grief Packet?  No     HOME:  Name: [facility]  City: [place]  Phone Number: [phone]    NEXT OF KIN:  Name: Boston Stacy  Relationship: Son  Street Address: [address] [place] [address]  City: [place]  State: [place]  Zip code: [id number]   Phone Number: [phone]    Chaplain Kalina  2024 6:28 PM         24 1550   Encounter Summary   Encounter Overview/Reason  Spiritual/Emotional Needs;Grief, Loss, and Adjustments   Service Provided For: Family   Referral/Consult From: Nurse   Support System Spouse;Children;Family members   Last Encounter  24   Complexity of Encounter High   Begin Time 1515   End Time  1550   Total Time Calculated 35 min   Spiritual/Emotional needs   Type Spiritual Support   Grief, Loss, and Adjustments   Type Death   Assessment/Intervention/Outcome   Assessment

## 2024-04-09 NOTE — PROGRESS NOTES
Facts: Writer kaley to provided support to patient and his family as they proceed to withdraw care and transition to comfort care; visit with patient's wife, children, in-laws and grandchildren; patient unresponsive; present with patient during terminal wean; facilitated communication between family and staff in this regard;     Feelings: Family experiencing intense emotions at times, in shock, tearful, and grieving; family mutually supportive and loving toward patient and each other;     Teresa: Patient is a devout [employer]; spent time attempting to contact [facility] Louis at [facility], per wife's request, despite patient being anointed 4/1/24; wife explained patient's love of the [employer] and felt patient would be comforted by a visit from their ; Fawad Heriberto on vacation and unavailable so family agreed to proceed with terminal wean; family comforted by prayer;      Follow-up; Family remains at bedside holding raymond; Chaplains remain available;     04/09/24 1441   Encounter Summary   Encounter Overview/Reason  Spiritual/Emotional Needs   Service Provided For: Patient and family together   Referral/Consult From: Nurse;Family   Support System Spouse;Children;Family members   Last Encounter  04/09/24   Complexity of Encounter High   Begin Time 1230   End Time  1400   Total Time Calculated 90 min   Spiritual/Emotional needs   Type Spiritual Support   Grief, Loss, and Adjustments   Type Anticipatory Grief;End of Life   Palliative Care   Type Palliative Care, Family Care   Assessment/Intervention/Outcome   Assessment Concerns with suffering;Powerlessness;Sad;Shock;Tearful   Intervention Active listening;Discussed belief system/Catholic practices/teresa;Discussed illness injury and it’s impact;End of Life Care;Explored/Affirmed feelings, thoughts, concerns;Prayer (assurance of)/[place];Sustaining Presence/Ministry of presence   Outcome Comfort;Coping;Engaged in conversation;Expressed feelings, needs, and

## 2024-04-09 NOTE — PROGRESS NOTES
Patient's arterial line significantly dropped and oxygen monitoring stopped reading. Writebrandon and Berta RN went into patient's room. No audible heart tones or lung sounds. Dr. Franco came to patient's room to pronounce time of death.

## 2024-04-10 NOTE — DISCHARGE SUMMARY
Physician Discharge Summary     Patient ID:  Saurav Stacy  [id number]  78 y.o.  1945    Admit date: 3/24/2024    Discharge date and time: 4/9/2024  7:30 PM     Admitting Physician: Judy Estrada DO     Discharge Physician: Judy Estrada DO      Admission Diagnoses:   Congestive heart failure of unknown etiology (HCC) [I50.9]  Community acquired pneumonia, unspecified laterality [J18.9]    Discharge Diagnoses:   Principal Problem:    Congestive heart failure of unknown etiology (HCC)  Active Problems:    Multifocal pneumonia    Sepsis with acute hypoxic respiratory failure and septic shock (HCC)    Leukocytosis    QT prolongation  Resolved Problems:    * No resolved hospital problems. *     Congestive heart failure of unknown etiology (HCC)  Active Problems:    Multifocal pneumonia    Sepsis with acute hypoxic respiratory failure and septic shock (HCC)    Leukocytosis    QT prolongation  Resolved Problems:    * No resolved hospital problems. *  Acute on chronic diastolic CHF  Pulmonary congestion on my reading his x-ray with cardiomegaly and evidence of a pacemaker  Dyspnea on exertion  Acute bronchitis with possible underlying infiltrate  STANLEY by history  Hypertension  Hypokalemia  Hypercoag INR 4.1   Worsening chest x-ray on my reading with opacification and questionable mucus or aspiration  His BNP has dropped  Increased oxygen requirement and short of breath  Yesterday chest x-ray still shows possible pulmonary edema  Increase in his BNP still higher this more  Leukocytosis probably secondary to  Episode of ICU psychosis last night resolved  Chest x-ray on my reading may be slight improvement from yesterday at least on the left upper lobe but continued vascular congestion and opacification  Hypoxic respiratory failure  Hyper coag his INR still at 4.8 there is no signs of bleeding we will continue to monitor mostly second to nutrition and medications effect.  He has not had any warfarin for almost a  week  Improvement in his chest x-ray  Still ICU delirium worse at night he is off Solu-Medrol now  Leukocytosis  Hyper coag state with INR jumped to 6 and abnormal LFTs with his alk phos high as well as his bilirubin is high patient might have underlying liver disease such as fatty liver or cirrhosis we will screen him with ultrasound and possibly CAT scan once he is stable  Leukocytosis could be effect from steroid but we will run testing make sure no new infection  His BMP finally coming down  CT with evidence of possible cirrhosis  Also some evidence of may be some sort  INR is therapeutic now at 2.9  Hypokalemia replace potassium  Was able to review the chest x-ray from this morning worsening patchy infiltrate with edema compared to couple days ago was clearing.  Status post intubated  Pneumothorax with chest tube  Chest x-ray this morning still shows opacities throughout his lungs picture can be hard may be slight improvement from yesterday  Significant elevation in his BNP  Is INR 2.0 he has not received any Coumadin this is secondary to his liver problems and infectious process and medication  Will continue holding his Coumadin for now he is at the 2 range for now but if it drops below 2 we will get initiated just small dose, and to keep him in 2 range  Patient is back on Lasix and I agree to keep him on low-dose diuretic  Chest x-ray shows some improvement on my reading except the right base with opacity still  Leukocytosis improving still today at 20,000  X-rays still show some opacities may be a little bit less but is still shows ARDStype picture  INR still at 1.8 patient has not been on any anticoagulant  Not responding after sedation resolved  Leukocytosis slightly improved  Hypernatremia diuretic stopped by Dr. Marie discussed with him  Chest x-ray on my reading may be some improvement still some opacities but looks less than on previous x-rays     Still SVT last night according to the RN reported as

## 2024-04-15 LAB
MICROORGANISM SPEC CULT: NORMAL
MICROORGANISM SPEC CULT: NORMAL
MICROORGANISM/AGENT SPEC: NORMAL
MICROORGANISM/AGENT SPEC: NORMAL
SERVICE CMNT-IMP: NORMAL
SPECIMEN DESCRIPTION: NORMAL
SPECIMEN DESCRIPTION: NORMAL

## 2024-05-13 LAB
MICROORGANISM SPEC CULT: NORMAL
MICROORGANISM/AGENT SPEC: NORMAL
SERVICE CMNT-IMP: NORMAL
SPECIMEN DESCRIPTION: NORMAL

## 2024-05-20 LAB
MICROORGANISM SPEC CULT: NORMAL
MICROORGANISM/AGENT SPEC: NORMAL
SERVICE CMNT-IMP: NORMAL
SPECIMEN DESCRIPTION: NORMAL

## (undated) PROCEDURE — 0W9B30Z DRAINAGE OF LEFT PLEURAL CAVITY WITH DRAINAGE DEVICE, PERCUTANEOUS APPROACH: ICD-10-PCS

## (undated) PROCEDURE — 0B9D8ZX DRAINAGE OF RIGHT MIDDLE LUNG LOBE, VIA NATURAL OR ARTIFICIAL OPENING ENDOSCOPIC, DIAGNOSTIC: ICD-10-PCS

## (undated) PROCEDURE — 0BH17EZ INSERTION OF ENDOTRACHEAL AIRWAY INTO TRACHEA, VIA NATURAL OR ARTIFICIAL OPENING: ICD-10-PCS

## (undated) DEVICE — SOLUTION IV IRRIG 500ML 0.9% SODIUM CHL 2F7123

## (undated) DEVICE — SUTURE VCRL SZ 4-0 L18IN ABSRB UD L13MM P-3 3/8 CIR PRIM J494H

## (undated) DEVICE — ENCORE® LATEX ACCLAIM SIZE 7.5, STERILE LATEX POWDER-FREE SURGICAL GLOVE: Brand: ENCORE

## (undated) DEVICE — INTENDED FOR TISSUE SEPARATION, AND OTHER PROCEDURES THAT REQUIRE A SHARP SURGICAL BLADE TO PUNCTURE OR CUT.: Brand: BARD-PARKER ® SAFETYLOCK CARBON RIB-BACK BLADES

## (undated) DEVICE — NEEDLE HYPO 25GA L1.5IN BLU POLYPR HUB S STL REG BVL STR

## (undated) DEVICE — ARROWHEAD LOCAL PACK: Brand: MEDLINE INDUSTRIES, INC.

## (undated) DEVICE — SUTURE VCRL SZ 3-0 L27IN ABSRB UD L19MM PS-2 3/8 CIR PRIM J427H

## (undated) DEVICE — SUTURE PROL SZ 4-0 L18IN NONABSORBABLE BLU L16MM PC-3 3/8 8634G